# Patient Record
Sex: FEMALE | Race: BLACK OR AFRICAN AMERICAN | Employment: OTHER | URBAN - METROPOLITAN AREA
[De-identification: names, ages, dates, MRNs, and addresses within clinical notes are randomized per-mention and may not be internally consistent; named-entity substitution may affect disease eponyms.]

---

## 2017-02-13 ENCOUNTER — HOSPITAL ENCOUNTER (OUTPATIENT)
Dept: PHYSICAL THERAPY | Age: 67
Discharge: HOME | End: 2017-02-13
Payer: MEDICARE

## 2017-02-13 PROCEDURE — 97161 PT EVAL LOW COMPLEX 20 MIN: CPT

## 2017-02-13 PROCEDURE — G8978 MOBILITY CURRENT STATUS: HCPCS

## 2017-02-13 PROCEDURE — G8979 MOBILITY GOAL STATUS: HCPCS

## 2017-02-13 PROCEDURE — 97110 THERAPEUTIC EXERCISES: CPT

## 2017-02-13 NOTE — PROGRESS NOTES
[facility] [place] [employer] [facility]  [address] [place] [address]  [phone] fax    Plan of Care/ Statement of Necessity for Physical Therapy Services    Patient name: Eddie Lopez Start of Care: 2017   Referral source: Triston Aleixs MD : 1950    Medical Diagnosis: Unsteadiness on feet [R26.81]  Dizziness and giddiness [R42]   Onset Date: 15-20 years ago    Treatment Diagnosis: dizziness, L BPPV   Prior Hospitalization: see medical history Provider#: [id number]   Medications: Verified on Patient summary List    Comorbidities: HTN, arthritis    Prior Level of Function: Ind with ADLs, Ind with ambulation      The Plan of Care and following information is based on the information from the initial evaluation. Assessment/ key information:  Pt. Is a 74year old female c/o dizziness symptoms that began 15-20 years ago. She reports she usually has some dizziness but intensity goes up and down. She has been treated with Epley maneuver in the past with good results. She reports having room spinning dizziness with turning to left and looking up. Her symptoms last less than a minute. She demonstrates good occular mobility negative smooth pursuits, negative saccades, negative VOR testing. She also demonstrates decreased standing balance with tandem stance and with eyes closed balance on compliant surface. Positive Langdon-Hallpike test on L and Epley maneuver was performed. Skilled PT is medically necessary in order to improve dizziness symptoms and balance in order to improve quality of life.      Evaluation Complexity History MEDIUM  Complexity : 1-2 comorbidities / personal factors will impact the outcome/ POC ; Examination MEDIUM Complexity : 3 Standardized tests and measures addressing body structure, function, activity limitation and / or participation in recreation  ;Presentation LOW Complexity : Stable, uncomplicated  ;Clinical Decision Making MEDIUM Complexity : FOTO score of 26-74  Overall Complexity Rating: LOW   Problem List: pain affecting function, impaired gait/ balance, decrease ADL/ functional abilitiies, decrease activity tolerance and decrease transfer abilities   Treatment Plan may include any combination of the following: Therapeutic exercise, Therapeutic activities, Neuromuscular re-education, Physical agent/modality, Gait/balance training, Manual therapy, Patient education, Self Care training and Functional mobility training  Patient / Family readiness to learn indicated by: asking questions  Persons(s) to be included in education: patient (P)  Barriers to Learning/Limitations: None  Patient Goal (s): to have less dizziness  Patient Self Reported Health Status: good  Rehabilitation Potential: good    Short Term Goals: To be accomplished in 1 weeks:  1. Patient will demonstrate compliance with HEP in order to improve dizziness symptoms. Long Term Goals: To be accomplished in 5 weeks:  1. Patient will improve FOTO score by 23 points in order to demonstrate a significant improvement in function. 2. Patient will report a 75% improvement in dizziness symptoms since [facility] in order to improve quality of life. 3. Patient will demonstrate negative Kenny-Hallpike test in order to demonstrate decreased dizziness symptoms. 4. Patient will perform EC Rhomberg on compliant surface with no LOB in order to demonstrate improving balance. Frequency / Duration: Patient to be seen 1 times per week for 5 weeks. Patient/ Caregiver education and instruction: Diagnosis, prognosis, exercises   [x]  Plan of care has been reviewed with PTA    G-Codes (GP)  Mobility   Current  CK= 40-59%   Goal  CJ= 20-39%    The severity rating is based on clinical judgment and the FOTO score.     Certification Period: 2/13/17-4/14/17    Sarabjit Sandoval, PT 2/13/2017 9:29 AM    ________________________________________________________________________    I certify that the above Therapy Services are being furnished while the patient is under my care. I agree with the treatment plan and certify that this therapy is necessary.     Physician's Signature:____________________  Date:____________Time: _________    Please sign and return to [facility] [employer] [facility]  [address] [place] [address]  [phone] fax

## 2017-02-13 NOTE — PROGRESS NOTES
PT DAILY TREATMENT NOTE/VESTIBULAR EVAL 3-16    Patient Name: Stas Blanco  Date:2017  : 1950  [x]  Patient  Verified  Payor: Cristiana Jacob / Plan: [place] MEDICARE PART A & B / Product Type: Medicare /    In time: 8:30  Out time:9:20  Total Treatment Time (min): 50  Total Timed Codes (min): 10  1:1 Treatment Time ( only): 50   Visit #: 1 of 5    Treatment Area: Unsteadiness on feet [R26.81]  Dizziness and giddiness [R42]    SUBJECTIVE  Pain Level (0-10 scale):  6/10.  0/10 dizziness  []constant []intermittent []improving []worsening [x]no change since onset    Any medication changes, allergies to medications, adverse drug reactions, diagnosis change, or new procedure performed?: [x] No    [] Yes (see summary sheet for update)  Subjective functional status/changes:     PLOF: retired. Housework, reading  Mechanism of Injury:  Vertigo has been for 15-20 years. Comes and goes in intensity. Reports has had Epley maneuver before. Denies dizziness medication. Dizziness usually goes away within a minutes. Describes room tilting sensation. Reports she feels a clunk in the back of her head as well. Lack of sleep also brings it on. A lot of people around makes her dizzy as well like at [employer]. Gets nausea from it. Denies headaches. Denies difficulty speaking or swallowing. Dizziness rolling in bed and reaching into cabinets. 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 Living Situation: pt. Lives alone but 25year old grand daughter moved in recently  Pt Goals:   To have less dizziness  Barriers: []pain []financial []time []transportation []other none  Motivation: good  Substance use: []Alcohol []Tobacco []other: no   FABQ Score: [x]low []elevate  Cognition: A & O x 4    Other:    OBJECTIVE/EXAMINATION      10 min Manual Therapy:  Epley maneuver    Rationale: correct positional vertigo to increase ease of ADLs          With   [x] TE   [] TA   [] neuro   [] other: Patient Education: [x] Review HEP    [] Progressed/Changed HEP based on:   [] positioning   [] body mechanics   [] transfers   [] heat/ice application    [] other:      Physical Therapy Evaluation - Vestibular    Posture:  [] WNL      [x] Forward head    [x] Protracted shoulders    [] Retracted shoulders  [] Kyphosis:  [] increased   [] decreased   [] Lordosis:   [] increased   [] decreased  Other:    C/S ROM: [] WFL    [x] Limited    Describe: mild decrease with end range rotation     Optional Tests:    Coordination Testing:       Disdiadochokinesia [] WFL    [] Impaired    Describe:       Heel - [place]  [] WFL    [] Impaired    Describe:       FNF   [] WFL    [] Impaired    Describe: Toe Tap   [x] [facility]    [] Impaired    Describe:    Oculomotor Tests: (Fixation Not Blocked)       Ocular ROM:   [] WFL    [] Limited    Describe:       Spontaneous Nystag. [x] Neg     [] Pos    [] Left    [] Right       Gaze Holding Nystag. [x] Neg     [] Pos    [] Left    [] Right        Smooth Pursuit  [x] Neg     [] Pos    [] Left    [] Right        Saccades   [x] Neg     [] Pos    [] Left    [] Right challenged with side to side       VOR - Slow Head Mvmt [x] Neg     [] Pos    [] Left    [] Right        VOR - Fast Head Mvmt [x] Neg     [] Pos    [] Left    [] Right        Head Thrust  [x] Neg     [] Pos    [] Left    [] Right        Static Visual Acuity [] Neg     [] Pos    [] Left    [] Right        Dynamic Visual Acuity [] Neg     [] Pos    [] Left    [] Right     Good convergence    Other Special Tests:       Vertebral Artery Testing [x] Neg     [] Pos    [x] Left    [x] Right       Hallpike-Kenny Maneuver [] Neg     [x] Pos    [x] Left    [] Right       Roll Test   [x] Neg     [] Pos    [x] Left    [x] Right       Ashraf Balance Scale [] Neg     [] Pos    Score:       Dynamic Gait Index [] Neg     [] Pos    Score:       Functional Gait Assess.  [] Neg [] Pos    Score: time constraints     Balance Standard Testing (Eyes Open/Eyes Closed - EO/EC)       Romberg   [x] WFL    [] Pos    Describe:           Stand on Foam  [] WFL    [x] Pos    Describe: falls posteriorly with EC        Standing on Rail  [] WFL    [] Pos    Describe:        Sharpened Romberg [] WFL    [x] Pos    Describe: Difficulty with EO and unable with EC       Single Leg Stand  [] WFL    [] Pos    Describe:     Pain Level (0-10 scale) post treatment: 6/10    ASSESSMENT/Changes in Function:      [x]  See Plan of Care  []  See progress note/recertification  []  See Discharge Summary         Progress towards goals / Updated goals:  See POC    PLAN  []  Upgrade activities as tolerated     [x]  Continue plan of care  []  Update interventions per flow sheet       []  Discharge due to:_  []  Other:_      Alfonzo Huynh, PT 2/13/2017  8:37 AM

## 2017-02-20 ENCOUNTER — HOSPITAL ENCOUNTER (OUTPATIENT)
Dept: PHYSICAL THERAPY | Age: 67
Discharge: HOME | End: 2017-02-20
Payer: MEDICARE

## 2017-02-20 PROCEDURE — 97110 THERAPEUTIC EXERCISES: CPT

## 2017-02-20 PROCEDURE — 97140 MANUAL THERAPY 1/> REGIONS: CPT

## 2017-02-20 NOTE — PROGRESS NOTES
PT DAILY TREATMENT NOTE - [facility] 3-    Patient Name: Komal Lucas  Date:2017  : 1950  [x]  Patient  Verified  Payor: [place] MEDICARE / Plan: [place] MEDICARE PART A & B / Product Type: Medicare /    In time: 8:00  Out time: 8:25  Total Treatment Time (min):  25  Total Timed Codes (min):  25  1:1 Treatment Time ( only): 25   Visit #: 2 of 5    Treatment Area: Unsteadiness on feet [R26.81]  Dizziness and giddiness [R42]    SUBJECTIVE  Pain Level (0-10 scale): 6/10  Any medication changes, allergies to medications, adverse drug reactions, diagnosis change, or new procedure performed?: [x] No    [] Yes (see summary sheet for update)  Subjective functional status/changes:   [] No changes reported  Pt. Reports she has been feeling dizzy. She reports she has a slow spin or a tilt dizziness. She has increased dizziness bending forward. She reports she has not been doing her HEP. OBJECTIVE    13 min Therapeutic Exercise:  [x] See flow sheet :   Rationale: increase strength, improve coordination and improve balance to improve the patients ability to increase ease of ADLs    10 min Manual Therapy:  Epley maneuver x2   Rationale: correct positional vertigo to increase ease of ADLs          With   [x] TE   [] TA   [] neuro   [] other: Patient Education: [x] Review HEP    [] Progressed/Changed HEP based on:   [] positioning   [] body mechanics   [] transfers   [] heat/ice application    [] other:      Other Objective/Functional Measures:   Positive Kenny-Hallpike to left   Negative Erie-Hallpike after Epley maneuver   Pt. Has good balance with Rhomberg eyes closed  Pt. Was educated on importance of performing HEP    Pain Level (0-10 scale) post treatment: 8/10    ASSESSMENT/Changes in Function:  Pt. Is making limited progress towards goals. She continues to have dizziness symptoms and decreased balance.      Patient will continue to benefit from skilled PT services to modify and progress therapeutic interventions, address functional mobility deficits, address ROM deficits, address strength deficits, analyze and address soft tissue restrictions, analyze and cue movement patterns, analyze and modify body mechanics/ergonomics and address imbalance/dizziness to attain remaining goals. Progress towards goals / Updated goals:  Short Term Goals: To be accomplished in 1 weeks:  1. Patient will demonstrate compliance with HEP in order to improve dizziness symptoms. Not met: pt. Only performed HEP one time (2/20/17)     Long Term Goals: To be accomplished in 5 weeks:  1. Patient will improve FOTO score by 23 points in order to demonstrate a significant improvement in function. 2. Patient will report a 75% improvement in dizziness symptoms since [facility] in order to improve quality of life. 3. Patient will demonstrate negative Kenny-Hallpike test in order to demonstrate decreased dizziness symptoms. 4. Patient will perform EC Rhomberg on compliant surface with no LOB in order to demonstrate improving balance.     PLAN  []  Upgrade activities as tolerated     [x]  Continue plan of care  []  Update interventions per flow sheet       []  Discharge due to:_  []  Other:_      Luz Lugo, PT 2/20/2017  8:01 AM

## 2017-02-27 ENCOUNTER — HOSPITAL ENCOUNTER (OUTPATIENT)
Dept: PHYSICAL THERAPY | Age: 67
Discharge: HOME | End: 2017-02-27
Payer: MEDICARE

## 2017-02-27 PROCEDURE — 97140 MANUAL THERAPY 1/> REGIONS: CPT

## 2017-02-27 PROCEDURE — 97110 THERAPEUTIC EXERCISES: CPT

## 2017-02-27 NOTE — PROGRESS NOTES
PT DAILY TREATMENT NOTE - [facility] 3-    Patient Name: Carlene Benitez  Date:2017  : 1950  [x]  Patient  Verified  Payor: [place] MEDICARE / Plan: [place] MEDICARE PART A & B / Product Type: Medicare /    In time: 8:01  Out time: 8:25  Total Treatment Time (min):  24  Total Timed Codes (min):  24  1:1 Treatment Time (MC only): 24   Visit #: 3 of 5    Treatment Area: Unsteadiness on feet [R26.81]  Dizziness and giddiness [R42]    SUBJECTIVE  Pain Level (0-10 scale):  -6/10  Any medication changes, allergies to medications, adverse drug reactions, diagnosis change, or new procedure performed?: [x] No    [] Yes (see summary sheet for update)  Subjective functional status/changes:   [] No changes reported  Pt. Reports she continues to have dizziness with bending forward and looking up. Reports no significant change after Epley    OBJECTIVE    9 min Therapeutic Exercise:  [x] See flow sheet :   Rationale: increase ROM and increase strength to improve the patients ability to increase ease of ADLs    15 min Manual Therapy:   Epleyx2   Rationale: correct positional vertigo to increase ease of ADLs          With   [x] TE   [] TA   [] neuro   [] other: Patient Education: [x] Review HEP    [] Progressed/Changed HEP based on:   [] positioning   [] body mechanics   [] transfers   [] heat/ice application    [] other:      Other Objective/Functional Measures:   Kenny-Hallpike positive to L 2x with up beating nystagmus. She reported having spinning once getting up and possibly had down beating nystagmus   She was able to perform Rhomberg foam EC without LOB  She was re-educated on importance of HEP    Pain Level (0-10 scale) post treatment: -6/10    ASSESSMENT/Changes in Function:  Pt. Is making no significant progress with PT. She continues to have positive Kenny-Hallpike test. Will need to re-assess if symptoms persist after she is compliant with HEP.  May need to rule out shift into horizontal canal.     Patient will continue to benefit from skilled PT services to modify and progress therapeutic interventions, address functional mobility deficits, address ROM deficits, address strength deficits, analyze and address soft tissue restrictions, analyze and cue movement patterns and analyze and modify body mechanics/ergonomics to attain remaining goals. Progress towards goals / Updated goals:  Short Term Goals: To be accomplished in 1 weeks:  1. Patient will demonstrate compliance with HEP in order to improve dizziness symptoms. Not met: pt. Only performed HEP one time (2/20/17)      Long Term Goals: To be accomplished in 5 weeks:  1. Patient will improve FOTO score by 23 points in order to demonstrate a significant improvement in function. 2. Patient will report a 75% improvement in dizziness symptoms since [facility] in order to improve quality of life. 3. Patient will demonstrate negative Kenny-Hallpike test in order to demonstrate decreased dizziness symptoms. Not met: (2/27/17)  4. Patient will perform EC Rhomberg on compliant surface with no LOB in order to demonstrate improving balance.   Met (2/27/17)    PLAN  []  Upgrade activities as tolerated     [x]  Continue plan of care  []  Update interventions per flow sheet       []  Discharge due to:_  []  Other:_      Alina Tolbert, PT 2/27/2017  8:01 AM

## 2017-03-06 ENCOUNTER — HOSPITAL ENCOUNTER (OUTPATIENT)
Dept: PHYSICAL THERAPY | Age: 67
Discharge: HOME | End: 2017-03-06
Payer: MEDICARE

## 2017-03-06 PROCEDURE — 97140 MANUAL THERAPY 1/> REGIONS: CPT

## 2017-03-06 NOTE — PROGRESS NOTES
PT DAILY TREATMENT NOTE - [facility]     Patient Name: Claudia Larose  Date:3/6/2017  : 1950  [x]  Patient  Verified  Payor: VA MEDICARE / Plan: VA MEDICARE PART A & B / Product Type: Medicare /    In time:800  Out time:808  Total Treatment Time (min): 8  Total Timed Codes (min): 8  1:1 Treatment Time ( only): 8   Visit #: 4 of 5    Treatment Area: Unsteadiness on feet [R26.81]  Dizziness and giddiness [R42]    SUBJECTIVE  Pain Level (0-10 scale): 5-6/10  Any medication changes, allergies to medications, adverse drug reactions, diagnosis change, or new procedure performed?: [x] No    [] Yes (see summary sheet for update)  Subjective functional status/changes:   [] No changes reported  Pt stated that the dizziness is about the same    OBJECTIVE    8 min Manual Therapy:  eugene quinn pike, epley maneuver    Rationale: decrease pain, increase ROM and increase tissue extensibility to decrease dizziness    With   [x] TE   [] TA   [] neuro   [] other: Patient Education: [x] Review HEP    [] Progressed/Changed HEP based on:   [] positioning   [] body mechanics   [] transfers   [] heat/ice application    [] other:      Other Objective/Functional Measures:   Pt had neg eugene quinn pike   Pt stated that she was great and did not want to do the exercises  Pt stated that if the dizziness stays gone she will cancel her next appointment     Pain Level (0-10 scale) post treatment: 0/10    ASSESSMENT/Changes in Function:     Patient will continue to benefit from skilled PT services to address imbalance/dizziness to attain remaining goals. [x]  See Plan of Care  []  See progress note/recertification  []  See Discharge Summary         Progress towards goals / Updated goals:  Short Term Goals: To be accomplished in 1 weeks:  1. Patient will demonstrate compliance with HEP in order to improve dizziness symptoms. Not met: pt. Only performed HEP one time (17)      Long Term Goals: To be accomplished in 5 weeks:  1.  Patient will improve FOTO score by 23 points in order to demonstrate a significant improvement in function. 2. Patient will report a 75% improvement in dizziness symptoms since [facility] in order to improve quality of life. 3. Patient will demonstrate negative Kenny-Hallpike test in order to demonstrate decreased dizziness symptoms. Not met: (2/27/17)  4. Patient will perform EC Rhomberg on compliant surface with no LOB in order to demonstrate improving balance.   Met (2/27/17)    PLAN  []  Upgrade activities as tolerated     [x]  Continue plan of care  []  Update interventions per flow sheet       []  Discharge due to:_  []  Other:_      Michelle Becker PTA 3/6/2017  12:26 PM    Future Appointments  Date Time Provider Pricila Manuel   3/10/2017 9:00 AM Montserrat Goldman  [address]   3/13/2017 8:00 AM Kenney Emerson PT [id number]   3/20/2017 8:00 AM BONNIE TraceyPTOLIVER [facility]

## 2017-03-10 ENCOUNTER — OFFICE VISIT (OUTPATIENT)
Dept: ORTHOPEDIC SURGERY | Age: 67
End: 2017-03-10

## 2017-03-10 VITALS
DIASTOLIC BLOOD PRESSURE: 77 MMHG | HEART RATE: 91 BPM | OXYGEN SATURATION: 98 % | SYSTOLIC BLOOD PRESSURE: 154 MMHG | RESPIRATION RATE: 18 BRPM | TEMPERATURE: 98.6 F | WEIGHT: 172.6 LBS | BODY MASS INDEX: 34.8 KG/M2 | HEIGHT: 59 IN

## 2017-03-10 DIAGNOSIS — M54.2 NECK PAIN: ICD-10-CM

## 2017-03-10 DIAGNOSIS — M54.50 PAIN OF LUMBAR SPINE: ICD-10-CM

## 2017-03-10 DIAGNOSIS — M16.12 ARTHRITIS OF LEFT HIP: ICD-10-CM

## 2017-03-10 DIAGNOSIS — M48.061 LUMBAR STENOSIS: Primary | ICD-10-CM

## 2017-03-10 DIAGNOSIS — M54.16 LUMBAR RADICULOPATHY: ICD-10-CM

## 2017-03-10 DIAGNOSIS — M47.899 FACET SYNDROME: ICD-10-CM

## 2017-03-10 DIAGNOSIS — M06.9 RHEUMATOID ARTHRITIS, INVOLVING UNSPECIFIED SITE, UNSPECIFIED RHEUMATOID FACTOR PRESENCE: ICD-10-CM

## 2017-03-10 RX ORDER — PREDNISONE 10 MG/1
10 TABLET ORAL SEE ADMIN INSTRUCTIONS
Qty: 21 TAB | Refills: 0 | Status: SHIPPED | OUTPATIENT
Start: 2017-03-10 | End: 2017-04-11 | Stop reason: CLARIF

## 2017-03-10 RX ORDER — TRIAMTERENE/HYDROCHLOROTHIAZID 37.5-25 MG
TABLET ORAL
Refills: 0 | COMMUNITY
Start: 2017-03-03 | End: 2020-08-14

## 2017-03-10 NOTE — MR AVS SNAPSHOT
Visit Information Date & Time Provider Department Dept. Phone Encounter #  
 3/10/2017  9:00 AM Brian Cunningham MD [facility] [phone] [id number] Follow-up Instructions Return in about 2 weeks (around 3/24/2017), or if symptoms worsen or fail to improve. Upcoming Health Maintenance Date Due DTaP/Tdap/Td series (1 - Tdap) 5/3/1969 FOBT Q 1 YEAR AGE 50-75 5/3/1998 ZOSTER VACCINE AGE 60> 5/3/2008 GLAUCOMA SCREENING Q2Y 5/3/2013 Pneumococcal 65+ Low/Medium Risk (1 of 2 - PCV13) 5/3/2013 MEDICARE YEARLY EXAM 5/3/2013 INFLUENZA AGE 9 TO ADULT 8/1/2016 BREAST CANCER SCRN MAMMOGRAM 11/1/2018 Allergies as of 3/10/2017  Review Complete On: 3/10/2017 By: Kavon Torres No Known Allergies Current Immunizations  Never Reviewed No immunizations on file. Not reviewed this visit You Were Diagnosed With   
  
 Codes Comments Lumbar stenosis    -  Primary ICD-10-CM: M48.06 
ICD-9-CM: 724.02 Lumbar radiculopathy     ICD-10-CM: M54.16 
ICD-9-CM: 724.4 Facet syndrome     ICD-10-CM: M12.88 ICD-9-CM: 724.8 Arthritis of left hip     ICD-10-CM: M19.90 ICD-9-CM: 716.95 Pain of lumbar spine     ICD-10-CM: M54.5 ICD-9-CM: 724.2 Neck pain     ICD-10-CM: M54.2 ICD-9-CM: 723.1 Rheumatoid arthritis, involving unspecified site, unspecified rheumatoid factor presence ([facility]     ICD-10-CM: M06.9 ICD-9-CM: 714.0 Vitals BP Pulse Temp Resp Height(growth percentile) Weight(growth percentile) 154/77 91 98.6 °F (37 °C) (Oral) 18 4' 11\" (1.499 m) 172 lb 9.6 oz (78.3 kg) SpO2 BMI OB Status Smoking Status 98% 34.86 kg/m2 Hysterectomy Never Smoker BMI and BSA Data Body Mass Index Body Surface Area 34.86 kg/m 2 1.81 m 2 Preferred Pharmacy Pharmacy Name Phone [id number] [place], [address] [facility] [phone] Your Updated Medication List  
  
   
This list is accurate as of: 3/10/17 10:23 AM.  Always use your most recent med list.  
  
  
  
  
 allopurinol 300 mg tablet Commonly known as:  Lydia Reed Take  by mouth daily. folic acid 1 mg tablet Commonly known as:  Google Take  by mouth daily. metoprolol tartrate 50 mg tablet Commonly known as:  LOPRESSOR Take  by mouth daily. ondansetron 8 mg disintegrating tablet Commonly known as:  ZOFRAN ODT Take 1 Tab by mouth every eight (8) hours as needed for Nausea for up to 12 doses. predniSONE 10 mg dose pack Commonly known as:  STERAPRED DS Take 1 Tab by mouth See Admin Instructions. See administration instruction per 10mg dose pack PROTONIX 40 mg tablet Generic drug:  pantoprazole Take 40 mg by mouth daily. triamterene-hydroCHLOROthiazide 37.5-25 mg per tablet Commonly known as:  MAXZIDE  
  
 VITAMIN B-12 1,000 mcg tablet Generic drug:  cyanocobalamin Take 1,000 mcg by mouth daily. VITAMIN D3 1,000 unit Cap Generic drug:  cholecalciferol Take  by mouth. Prescriptions Sent to Pharmacy Refills  
 predniSONE (STERAPRED DS) 10 mg dose pack 0 Sig: Take 1 Tab by mouth See Admin Instructions. See administration instruction per 10mg dose pack Class: Normal  
 [address] #: [phone] Route: Oral  
  
We Performed the Following AMB POC XRAY, SPINE, CERVICAL; 2 OR 3 [13935 CPT(R)] AMB POC XRAY, SPINE, LUMBOSACRAL; 2 O [17181 CPT(R)] REFERRAL TO RHEUMATOLOGY [VII52 Custom] Comments:  
 Rheumatoid arthritis Follow-up Instructions Return in about 2 weeks (around 3/24/2017), or if symptoms worsen or fail to improve. To-Do List   
 03/13/2017 8:00 AM  
  Appointment with Silverio Hidalgo, PT at [facility] PT [address] [facility] ([phone])  
  
 03/17/2017 Imaging:  MRI LUMB SPINE WO CONT   
  
 2017 8:00 AM  
  Appointment with Veronika Bustillo PT at [facility] PT [address]  ([phone]) Referral Information Referral ID Referred By Referred To  
  
 [id number] Roshan Memos Not Available Visits Status Start Date End Date 1 New Request 3/10/17 3/10/18 If your referral has a status of pending review or denied, additional information will be sent to support the outcome of this decision. Referral ID Referred By Referred To  
 [id number] [facility] [place], [address] [facility] [place], [address] Jordine Dedrick [facility] [place] [address] [place], [address] Phone: [phone] Fax: [phone] Visits Status Start Date End Date 1 New Request 3/10/17 3/10/18 If your referral has a status of pending review or denied, additional information will be sent to support the outcome of this decision. Patient Instructions [employer] Activation Thank you for requesting access to [employer]. Please follow the instructions below to securely access and download your online medical record. [employer] allows you to send messages to your doctor, view your test results, renew your prescriptions, schedule appointments, and more. How Do I Sign Up? 1. In your internet browser, go to [email or web address] 
2. Click on the First Time User? Click Here link in the Sign In box. You will be redirect to the New Member Sign Up page. 3. Enter your [employer] Access Code exactly as it appears below. You will not need to use this code after youve completed the sign-up process. If you do not sign up before the expiration date, you must request a new code. [employer] Access Code: [email or web address] Expires: 3/29/2017  9:34 AM (This is the date your [employer] access code will ) 4. Enter the last four digits of your Social Security Number (xxxx) and Date of Birth (mm/dd/yyyy) as indicated and click Submit. You will be taken to the next sign-up page. 5. Create a Vdopiat ID. This will be your "[employer] login ID and cannot be changed, so think of one that is secure and easy to remember. 6. Create a "[employer] password. You can change your password at any time. 7. Enter your Password Reset Question and Answer. This can be used at a later time if you forget your password. 8. Enter your e-mail address. You will receive e-mail notification when new information is available in [address] 9. Click Sign Up. You can now view and download portions of your medical record. 10. Click the Download Summary menu link to download a portable copy of your medical information. Additional Information If you have questions, please visit the Frequently Asked Questions section of the "[employer] website at [email or web address]. Clear Blue Technologies/Innerscope Research/. Remember, "[employer] is NOT to be used for urgent needs. For medical emergencies, dial 911. MRI of the Lumbar Spine: About This Test 
What is it? MRI (magnetic resonance imaging) is a test that uses a magnetic field and pulses of radio wave energy to make pictures of the organs and structures inside the body. An MRI can give your doctor information about the spine in your lower back (the lumbar spine). This can include the spine, the space around the spinal cord, and the vertebrae in your lower back. When you have an MRI, you lie on a table that moves into the MRI machine. Why is this test done? An MRI of the lumbar spine can help find the cause of symptoms like back pain or leg pain, weakness, or numbness. It can help find problems such as a herniated disc, a tumor, or an infection. How can you prepare for the test? 
Talk to your doctor about all your health conditions before the test. For example, tell your doctor if: 
· You are allergic to any medicines. · You are or might be pregnant.  
· You have a pacemaker, an artificial limb, any metal pins or metal parts in your body, metal heart valves, metal clips in your brain, metal implants in your ears, or any other implanted or prosthetic medical device. · You have an intrauterine device (IUD) in place. · You get nervous in confined spaces. You may need medicine to help you relax. · You wear a patch that contains medicine. · You have kidney disease. What happens before the test? 
· You will remove all metal objects, such as hearing aids, dentures, jewelry, watches, and hairpins. · You will take off all or most of your clothes and change into a gown. · You may have contrast material (dye) put into your arm through a tube called an IV. Contrast material helps doctors see specific organs, blood vessels, and most tumors. What happens during the test? 
· You will lie on your back on a table that is part of the MRI scanner. Your head, chest, and arms may be held with straps to help you stay still. · The table will slide into the space that contains the magnet. · Inside the scanner you will hear a fan and feel air moving. You may hear tapping, thumping, or snapping noises. You may be given earplugs or headphones to reduce the noise. · You will be asked to hold still during the scan. You may be asked to hold your breath for short periods. · You may be alone in the scanning room, but a technologist will watch you through a window and talk with you during the test. 
What else should you know about the test? 
· An MRI does not hurt. · If a dye is used, you may feel a quick sting or pinch and some coolness when the IV is started. Some people feel sick to their stomach or get a headache. · If you breastfeed and are concerned about whether the dye used in this test is safe, talk to your doctor. Most experts believe that very little dye passes into breast milk and even less is passed on to the baby. But if you prefer, you can store some of your breast milk ahead of time and use it for a day or two after the test. 
· You may feel warmth in the area being examined.  This is normal. 
 How long does the test take? · The test usually takes 30 to 60 minutes. What happens after the test? 
· You will probably be able to go home right away, depending on the reason for the test. 
· You can go back to your usual activities right away. Follow-up care is a key part of your treatment and safety. Be sure to make and go to all appointments, and call your doctor if you are having problems. It's also a good idea to keep a list of the medicines you take. Ask your doctor when you can expect to have your test results. Where can you learn more? Go to http://jamison-ilya.info/. Enter O800 in the search box to learn more about \"MRI of the Lumbar Spine: About This Test.\" Current as of: February 19, 2016 Content Version: 11.1 © 9213-7770 Meez[employer] Incorporated. Care instructions adapted under license by [employer] (which disclaims liability or warranty for this information). If you have questions about a medical condition or this instruction, always ask your healthcare professional. [address] any warranty or liability for your use of this information. Introducing [facility]! Romayne Duster introduces [employer] patient portal. Now you can access parts of your medical record, email your doctor's office, and request medication refills online. 1. In your internet browser, go to [email or web address] 2. Click on the First Time User? Click Here link in the Sign In box. You will see the New Member Sign Up page. 3. Enter your [employer] Access Code exactly as it appears below. You will not need to use this code after youve completed the sign-up process. If you do not sign up before the expiration date, you must request a new code. · [employer] Access Code: [email or web address] Expires: 3/29/2017  9:34 AM 
 
4.  Enter the last four digits of your Social Security Number (xxxx) and Date of Birth (mm/dd/yyyy) as indicated and click Submit. You will be taken to the next sign-up page. 5. Create a [employer]ent[employer] ID. This will be your [employer] login ID and cannot be changed, so think of one that is secure and easy to remember. 6. Create a [employer] password. You can change your password at any time. 7. Enter your Password Reset Question and Answer. This can be used at a later time if you forget your password. 8. Enter your e-mail address. You will receive e-mail notification when new information is available in [address] 9. Click Sign Up. You can now view and download portions of your medical record. 10. Click the Download Summary menu link to download a portable copy of your medical information. If you have questions, please visit the Frequently Asked Questions section of the [employer] website. Remember, [employer] is NOT to be used for urgent needs. For medical emergencies, dial 911. Now available from your iPhone and Android! Please provide this summary of care documentation to your next provider. Your primary care clinician is listed as Jacinta Galvez. If you have any questions after today's visit, please call [phone].

## 2017-03-10 NOTE — PATIENT INSTRUCTIONS
[employer] Activation    Thank you for requesting access to [employer]. Please follow the instructions below to securely access and download your online medical record. [employer] allows you to send messages to your doctor, view your test results, renew your prescriptions, schedule appointments, and more. How Do I Sign Up? 1. In your internet browser, go to [email or web address]  2. Click on the First Time User? Click Here link in the Sign In box. You will be redirect to the New Member Sign Up page. 3. Enter your [employer] Access Code exactly as it appears below. You will not need to use this code after youve completed the sign-up process. If you do not sign up before the expiration date, you must request a new code. [employer] Access Code: [email or web address]  Expires: 3/29/2017  9:34 AM (This is the date your [employer] access code will )    4. Enter the last four digits of your Social Security Number (xxxx) and Date of Birth (mm/dd/yyyy) as indicated and click Submit. You will be taken to the next sign-up page. 5. Create a [employer] ID. This will be your [employer] login ID and cannot be changed, so think of one that is secure and easy to remember. 6. Create a [employer] password. You can change your password at any time. 7. Enter your Password Reset Question and Answer. This can be used at a later time if you forget your password. 8. Enter your e-mail address. You will receive e-mail notification when new information is available in [address] 9. Click Sign Up. You can now view and download portions of your medical record. 10. Click the Download Summary menu link to download a portable copy of your medical information. Additional Information    If you have questions, please visit the Frequently Asked Questions section of the [employer] website at [email or web address]. [email or web address] [email or web address] Remember, [employer] is NOT to be used for urgent needs. For medical emergencies, dial 911.          MRI of the Lumbar Spine: About This Test  What is it? MRI (magnetic resonance imaging) is a test that uses a magnetic field and pulses of radio wave energy to make pictures of the organs and structures inside the body. An MRI can give your doctor information about the spine in your lower back (the lumbar spine). This can include the spine, the space around the spinal cord, and the vertebrae in your lower back. When you have an MRI, you lie on a table that moves into the MRI machine. Why is this test done? An MRI of the lumbar spine can help find the cause of symptoms like back pain or leg pain, weakness, or numbness. It can help find problems such as a herniated disc, a tumor, or an infection. How can you prepare for the test?  Talk to your doctor about all your health conditions before the test. For example, tell your doctor if:  · You are allergic to any medicines. · You are or might be pregnant. · You have a pacemaker, an artificial limb, any metal pins or metal parts in your body, metal heart valves, metal clips in your brain, metal implants in your ears, or any other implanted or prosthetic medical device. · You have an intrauterine device (IUD) in place. · You get nervous in confined spaces. You may need medicine to help you relax. · You wear a patch that contains medicine. · You have kidney disease. What happens before the test?  · You will remove all metal objects, such as hearing aids, dentures, jewelry, watches, and hairpins. · You will take off all or most of your clothes and change into a gown. · You may have contrast material (dye) put into your arm through a tube called an IV. Contrast material helps doctors see specific organs, blood vessels, and most tumors. What happens during the test?  · You will lie on your back on a table that is part of the MRI scanner. Your head, chest, and arms may be held with straps to help you stay still. · The table will slide into the space that contains the magnet.   · Inside the scanner you will hear a fan and feel air moving. You may hear tapping, thumping, or snapping noises. You may be given earplugs or headphones to reduce the noise. · You will be asked to hold still during the scan. You may be asked to hold your breath for short periods. · You may be alone in the scanning room, but a technologist will watch you through a window and talk with you during the test.  What else should you know about the test?  · An MRI does not hurt. · If a dye is used, you may feel a quick sting or pinch and some coolness when the IV is started. Some people feel sick to their stomach or get a headache. · If you breastfeed and are concerned about whether the dye used in this test is safe, talk to your doctor. Most experts believe that very little dye passes into breast milk and even less is passed on to the baby. But if you prefer, you can store some of your breast milk ahead of time and use it for a day or two after the test.  · You may feel warmth in the area being examined. This is normal.  How long does the test take? · The test usually takes 30 to 60 minutes. What happens after the test?  · You will probably be able to go home right away, depending on the reason for the test.  · You can go back to your usual activities right away. Follow-up care is a key part of your treatment and safety. Be sure to make and go to all appointments, and call your doctor if you are having problems. It's also a good idea to keep a list of the medicines you take. Ask your doctor when you can expect to have your test results. Where can you learn more? Go to http://jamison-ilya.info/. Enter P945 in the search box to learn more about \"MRI of the Lumbar Spine: About This Test.\"  Current as of: February 19, 2016  Content Version: 11.1  © 4902-8889 [employer]ank. Care instructions adapted under license by [employer] (which disclaims liability or warranty for this information). If you have questions about a medical condition or this instruction, always ask your healthcare professional. [place] [address] any warranty or liability for your use of this information.

## 2017-03-10 NOTE — PROGRESS NOTES
[address]  [address], [address]  [place]  Phone: [phone]  Fax: [phone]        Kevin Brandt  : 1950  PCP: Natalia Jones DO  3/10/2017    NEW PATIENT      ASSESSMENT AND PLAN     Luca Limon comes in to the office today c/o 10/10 aching cervical and lumbar pain with regional pain in the anterior and posterior portions of her left leg x 10 years. Pt also notes that her leg frequently cramps. Her xray today shows degenerative lumbar scoliosis. Her lumbar pain is likely due to facet syndrome. Her left leg pain appears to be due to a combination of left hip arthritis and lumbar stenosis/radiculopathy. She was referred for a lumbar MRI. Pt was prescribed a Prednisone 10 mg dose pack. She was advised to see a rheumatologist for her RA. Pt will f/u in 2 weeks or prn. Freddy Iyer was seen today for back pain, new patient and neck pain. Diagnoses and all orders for this visit:    Lumbar stenosis  -     MRI LUMB SPINE WO CONT; Future  -     predniSONE (STERAPRED DS) 10 mg dose pack; Take 1 Tab by mouth See Admin Instructions. See administration instruction per 10mg dose pack    Lumbar radiculopathy  -     MRI LUMB SPINE WO CONT; Future  -     predniSONE (STERAPRED DS) 10 mg dose pack; Take 1 Tab by mouth See Admin Instructions. See administration instruction per 10mg dose pack    Facet syndrome  -     MRI LUMB SPINE WO CONT; Future  -     predniSONE (STERAPRED DS) 10 mg dose pack; Take 1 Tab by mouth See Admin Instructions. See administration instruction per 10mg dose pack    Arthritis of left hip  -     predniSONE (STERAPRED DS) 10 mg dose pack; Take 1 Tab by mouth See Admin Instructions. See administration instruction per 10mg dose pack    Pain of lumbar spine  -     [80395] L/S 2-3 views  -     MRI LUMB SPINE WO CONT; Future  -     predniSONE (STERAPRED DS) 10 mg dose pack; Take 1 Tab by mouth See Admin Instructions.  See administration instruction per 10mg dose pack    Neck pain  -     [49024] C Spine 2-3V  -     predniSONE (STERAPRED DS) 10 mg dose pack; Take 1 Tab by mouth See Admin Instructions. See administration instruction per 10mg dose pack    Rheumatoid arthritis, involving unspecified site, unspecified rheumatoid factor presence ([facility] [address]  -     REFERRAL TO RHEUMATOLOGY         Follow-up Disposition:  Return in about 2 weeks (around 3/24/2017), or if symptoms worsen or fail to improve. CHIEF COMPLAINT  Luis M Craven is seen today in consultation at the request of [facility] for complaints of lumbar pain. HISTORY OF PRESENT ILLNESS  Luis M Craven is a 74 y.o. female c/o 10/10 aching cervical and lumbar pain with regional pain in the anterior and posterior portions of her left leg x 10 years. Pt also notes that her leg frequently cramps. She states she has tried PT and spinal injections without relief. She has taken Tramadol and Tylenol for her pain. Sitting, standing, lying down, walking, changing positions, and lifting exacerbate her pain. She has RA. Pt denies any fevers, chills, nausea, vomiting. Pt denies any chest pain and shortness of breath. Pt denies any ear, nose, and throat problems. Pt denies any fecal or urinary incontinence. She works a seasonal tax preparation job.       PAST MEDICAL HISTORY   Past Medical History:   Diagnosis Date    Arthritis     GERD (gastroesophageal reflux disease)     Hypertension     Ill-defined condition     Positional Vertigo -  pt can not lie flat       Past Surgical History:   Procedure Laterality Date    HX BREAST BIOPSY      6-8 yrs ago, scar marked    HX CARPAL TUNNEL RELEASE Right     mid to late [de-identified]    HX CARPAL TUNNEL RELEASE Left     mid     HX  SECTION      HX CHOLECYSTECTOMY      HX GYN      Vaginal Hernia as a child     HX HYSTERECTOMY      partial        MEDICATIONS      Current Outpatient Prescriptions   Medication Sig Dispense Refill    triamterene-hydroCHLOROthiazide (MAXZIDE) 37.5-25 mg per tablet   0    metoprolol (LOPRESSOR) 50 mg tablet Take  by mouth daily.  allopurinol (ZYLOPRIM) 300 mg tablet Take  by mouth daily.  pantoprazole (PROTONIX) 40 mg tablet Take 40 mg by mouth daily.  folic acid (FOLVITE) 1 mg tablet Take  by mouth daily.  ondansetron (ZOFRAN ODT) 8 mg disintegrating tablet Take 1 Tab by mouth every eight (8) hours as needed for Nausea for up to 12 doses. 12 Tab 0    Cholecalciferol, Vitamin D3, (VITAMIN D3) 1,000 unit cap Take  by mouth.  cyanocobalamin (VITAMIN B-12) 1,000 mcg tablet Take 1,000 mcg by mouth daily. ALLERGIES  No Known Allergies       SOCIAL HISTORY    Social History     Social History    Marital status:      Spouse name: N/A    Number of children: N/A    Years of education: N/A     Social History Main Topics    Smoking status: Never Smoker    Smokeless tobacco: Not on file    Alcohol use Yes    Drug use: Not on file    Sexual activity: Not on file     Other Topics Concern    Not on file     Social History Narrative       FAMILY HISTORY  No family history on file. REVIEW OF SYSTEMS  Review of Systems   Constitutional: Negative for chills, diaphoresis, fever, malaise/fatigue and weight loss. Respiratory: Negative for shortness of breath. Cardiovascular: Negative for chest pain and leg swelling. Gastrointestinal: Negative for constipation, nausea and vomiting. Neurological: Negative for dizziness, tingling, seizures, loss of consciousness and headaches. Psychiatric/Behavioral: The patient does not have insomnia.           PHYSICAL EXAMINATION  Visit Vitals    /77    Pulse 91    Temp 98.6 °F (37 °C) (Oral)    Resp 18    Ht 4' 11\" (1.499 m)    Wt 172 lb 9.6 oz (78.3 kg)    SpO2 98%    BMI 34.86 kg/m2         Pain Assessment  3/10/2017   Location of Pain Back;Neck   Severity of Pain 7   Quality of Pain Aching Constitutional:  Well developed, well nourished, in no acute distress. Psychiatric: Affect and mood are appropriate. HEENT: Normocephalic, atraumatic. Extraocular movements intact. Integumentary: No rashes or abrasions noted on exposed areas. Cardiovascular: Regular rate and rhythm. Pulmonary: Clear to auscultation bilaterally. SPINE/MUSCULOSKELETAL EXAM    Cervical spine:  Neck is midline. Normal muscle tone. No focal atrophy is noted. ROM pain free. Shoulder ROM intact. Mild tenderness to palpation. Negative Spurling's sign. Negative Tinel's sign. Negative Leonard's sign. Sensation in the bilateral arms grossly intact to light touch. Lumbar spine:  No rash, ecchymosis, or gross obliquity. No fasciculations. No focal atrophy is noted. Pain with left hip internal rotation. Full range of motion. Tenderness to palpation in left QL. No tenderness to palpation at the sciatic notch. SI joints non-tender. Trochanters non tender. No pain with back flexion, relief with coming up. Pain with back extension L=M=R. Sensation in the bilateral legs grossly intact to light touch. MOTOR:      Biceps  Triceps Deltoids Wrist Ext Wrist Flex Hand Intrin   Right 5/5 5/5 5/5 5/5 5/5 5/5   Left 5/5 5/5 5/5 5/5 5/5 5/5             Hip Flex  Quads Hamstrings Ankle DF EHL Ankle PF   Right 5/5 5/5 5/5 5/5 5/5 5/5   Left 5/5 5/5 5/5 5/5 5/5 5/5     DTRs are 1+ biceps, triceps, brachioradialis, patella, and Achilles. Mildly positive left Straight Leg raise. Squat not tested. No difficulty with tandem gait. Ambulation without assistive device. FWB.       RADIOGRAPHS  Cervical XR images taken on 3/10/2017 personally reviewed with patient:  1) Osteophytes  2) No obvious fractures or instabilities    Lumbar XR images taken on 3/10/2017 personally reviewed with patient:  1) Degenerative sclerosis with bend to the right side centered at L2-3  2) No obvious fractures     reviewed    Ms. Erinn Cruz has a reminder for a \"due or due soon\" health maintenance. I have asked that she contact her primary care provider for follow-up on this health maintenance. This plan was reviewed with the patient and patient agrees. All questions were answered. More than half of this visit today was spent on counseling. Written by Kaci Leonard, as dictated by Dr. Charan Denis. I, Dr. Charan Denis, confirm that all documentation is accurate.

## 2017-03-13 ENCOUNTER — TELEPHONE (OUTPATIENT)
Dept: ORTHOPEDIC SURGERY | Age: 67
End: 2017-03-13

## 2017-03-13 ENCOUNTER — HOSPITAL ENCOUNTER (OUTPATIENT)
Dept: PHYSICAL THERAPY | Age: 67
Discharge: HOME | End: 2017-03-13
Payer: MEDICARE

## 2017-03-13 PROCEDURE — 97140 MANUAL THERAPY 1/> REGIONS: CPT

## 2017-03-13 PROCEDURE — 97110 THERAPEUTIC EXERCISES: CPT

## 2017-03-13 NOTE — PROGRESS NOTES
PT DAILY TREATMENT NOTE - [facility] 3-16    Patient Name: Bhanu Robertson  Date:3/13/2017  : 1950  [x]  Patient  Verified  [place]or[place] MED[place]ARE / Plan: [place] MEDICARE PART A & B / Product Type: Medicare /    In time: 8:03  Out time: 8:33  Total Treatment Time (min):  30  Total Timed Codes (min):  30  1:1 Treatment Time ( only): 30   Visit #: 5 of 5    Treatment Area: Unsteadiness on feet [R26.81]  Dizziness and giddiness [R42]    SUBJECTIVE  Pain Level (0-10 scale):  10  Any medication changes, allergies to medications, adverse drug reactions, diagnosis change, or new procedure performed?: [x] No    [] Yes (see summary sheet for update)  Subjective functional status/changes:   [] No changes reported  Pt. Reports she feels like a woozy dizziness today. Denies spinning dizziness. OBJECTIVE    10 min Therapeutic Exercise:  [x] See flow sheet : HEP   Rationale: increase ROM and increase strength to improve the patients ability to increase ease of ADLs    20 min: manual: Epley maneuver  Rationale: improve dizziness for increased ease of ADLs    With   [x] TE   [] TA   [] neuro   [] other: Patient Education: [x] Review HEP    [] Progressed/Changed HEP based on:   [] positioning   [] body mechanics   [] transfers   [] heat/ice application    [] other:      Other Objective/Functional Measures:   Positive Kenny-Hallpike test to L  Negative roll test  Pt.  Was educated on home Epley manuever     Pain Level (0-10 scale) post treatment: 6/10    ASSESSMENT/Changes in Function:      []  See Plan of Care  [x]  See progress note/recertification  []  See Discharge Summary         Progress towards goals / Updated goals:  See progress note    PLAN  []  Upgrade activities as tolerated     [x]  Continue plan of care  []  Update interventions per flow sheet       []  Discharge due to:_  []  Other:_      Sarabjit Sandoval, PT 3/13/2017  8:04 AM

## 2017-03-13 NOTE — PROGRESS NOTES
[facility]  [address] [place] [address]  [phone] fax    Continued Plan of Care/ Re-certification for Physical Therapy Services    Patient name: Ammon Art Start of Care:  17   Referral source: Jess Avina DO : 1950   Medical/Treatment Diagnosis: Unsteadiness on feet [R26.81]  Dizziness and giddiness [R42] Onset Date: 15-20 years ago     Prior Hospitalization: see medical history Provider#: [id number]   Medications: Verified on Patient Summary List    Comorbidities:  HTN, arthritis  Prior Level of Function: Ind with ADLs, Ind with ambulation  Visits from Start of Care: 5    Missed Visits: 0    The Plan of Care and following information is based on the patient's current status:  Goal: Patient will improve FOTO score by 23 points in order to demonstrate a significant improvement in function. Status at last note/certification: 46  Current Status: not met    Goal: Patient will report a 75% improvement in dizziness symptoms since [facility] in order to improve quality of life. Status at last note/certification: n/a  Current Status: not met    Goal: Patient will demonstrate negative Kenny-Hallpike test in order to demonstrate decreased dizziness symptoms. Status at last note/certification: positive on L   Current Status: not met    Goal: Patient will perform EC [place] on compliant surface with no LOB in order to demonstrate improving balance. Status at last note/certification: unable   Current Status: met    Key functional changes: pt. Demonstrated improving standing balance on compliant surface.        Problems/ barriers to goal attainment: none     Problem List: decrease strength, impaired gait/ balance, decrease ADL/ functional abilitiies, decrease activity tolerance, decrease flexibility/ joint mobility and decrease transfer abilities    Treatment Plan: Therapeutic exercise, Therapeutic activities, Neuromuscular re-education, Physical agent/modality, Gait/balance training, Manual therapy and Patient education     Patient Goal (s) has been updated and includes: to have less dizziness    Goals for this certification period to be accomplished in 1 weeks:  1. Patient will improve FOTO score by 23 points in order to demonstrate a significant improvement in function. 2. Patient will report a 75% improvement in dizziness symptoms since [facility] in order to improve quality of life. 3. Patient will demonstrate negative Kenny-Hallpike test in order to demonstrate decreased dizziness symptoms. Frequency / Duration: Patient to be seen 1-2 times per week for 4 weeks:    Assessment / Recommendations: pt. Is making limited progress towards goals. On her appointment on 3/6/17 she had a negative Kenny-Hallpike test and reported feeling better. On her appointment on 3/13/17 her symptoms had returned and she had positive Kenny-Hallpike test on left. Negative roll test bilaterally. Negative [place]-Hallpike to R. She was initially non-compliant with HEP but reports trying to do it more recently. She was educated on home Epley maneuver since symptoms have returned. She does demonstrate increased ease of eyes closed balance on compliant surface. Skilled PT is medically necessary in order to improve dizziness symptoms and balance in order to improve quality of life. G-Codes (GP)  Mobility  [id number] Current  CK= 40-59%  [id number] Goal  CJ= 20-39%    The severity rating is based on clinical judgment and the FOTO score. Certification Period:  3/13/17-5/12/17    Kenney Emerson, PT 3/13/2017 8:39 AM    ________________________________________________________________________  I certify that the above Therapy Services are being furnished while the patient is under my care. I agree with the treatment plan and certify that this therapy is necessary. [] I have read the above and request that my patient continue as recommended.   [] I have read the above report and request that my patient continue therapy with the following changes/special instructions: _______________________________________  [] I have read the above report and request that my patient be discharged from therapy    Physician's Signature:________________________________Date:___________Time:__________    Please sign and return to [facility]  [address] [place] [address]  [phone] fax

## 2017-03-18 ENCOUNTER — HOSPITAL ENCOUNTER (OUTPATIENT)
Dept: MRI IMAGING | Age: 67
Discharge: HOME | End: 2017-03-18
Attending: PHYSICAL MEDICINE & REHABILITATION
Payer: MEDICARE

## 2017-03-18 DIAGNOSIS — M47.899 FACET SYNDROME: ICD-10-CM

## 2017-03-18 DIAGNOSIS — M48.061 LUMBAR STENOSIS: ICD-10-CM

## 2017-03-18 DIAGNOSIS — M54.16 LUMBAR RADICULOPATHY: ICD-10-CM

## 2017-03-18 DIAGNOSIS — M54.50 PAIN OF LUMBAR SPINE: ICD-10-CM

## 2017-03-18 PROCEDURE — 72148 MRI LUMBAR SPINE W/O DYE: CPT

## 2017-03-20 ENCOUNTER — HOSPITAL ENCOUNTER (OUTPATIENT)
Dept: PHYSICAL THERAPY | Age: 67
Discharge: HOME | End: 2017-03-20
Payer: MEDICARE

## 2017-03-20 PROCEDURE — 97110 THERAPEUTIC EXERCISES: CPT

## 2017-03-20 PROCEDURE — 97140 MANUAL THERAPY 1/> REGIONS: CPT

## 2017-03-20 NOTE — PROGRESS NOTES
PT DAILY TREATMENT NOTE - [facility] 3-16    Patient Name: Raghav Nguyen  Date:3/20/2017  : 1950  [x]  Patient  Verified  Payor: [place] MEDICARE / Plan: [place] MEDICARE PART A & B / Product Type: Medicare /    In time: 8:04  Out time: 8:28  Total Treatment Time (min): 24  Total Timed Codes (min): 24  1:1 Treatment Time ( only): 24   Visit #: 1 of 8    Treatment Area: Unsteadiness on feet [R26.81]  Dizziness and giddiness [R42]    SUBJECTIVE  Pain Level (0-10 scale): 5/10  Any medication changes, allergies to medications, adverse drug reactions, diagnosis change, or new procedure performed?: [x] No    [] Yes (see summary sheet for update)  Subjective functional status/changes:   [] No changes reported  Pt. Reports she tried HEP a few times    OBJECTIVE    14 min Therapeutic Exercise:  [x] See flow sheet :   Rationale: improve balance to improve the patients ability to increase ease of ambulation    10 min Manual Therapy:  Epley maneuver x2   Rationale: correct positional vertigo to improve dizziness symptoms    With   [x] TE   [] TA   [] neuro   [] other: Patient Education: [x] Review HEP    [] Progressed/Changed HEP based on:   [] positioning   [] body mechanics   [] transfers   [] heat/ice application    [] other:      Other Objective/Functional Measures:   Pt. Continues to have positive [place]-Halpike to L with upbeating nystagmus  She was educated on following up with an ENT if symptoms continue to persist  She demonstrated good EC balance on compliant surface     Pain Level (0-10 scale) post treatment:  5/10    ASSESSMENT/Changes in Function: pt. Is making limited progress towards goals.  She continues to have dizziness symptoms with positional changes    Patient will continue to benefit from skilled PT services to modify and progress therapeutic interventions, address functional mobility deficits, address strength deficits, analyze and modify body mechanics/ergonomics, assess and modify postural abnormalities, address imbalance/dizziness and instruct in home and community integration to attain remaining goals. Progress towards goals / Updated goals:  1. Patient will improve FOTO score by 23 points in order to demonstrate a significant improvement in function. 2. Patient will report a 75% improvement in dizziness symptoms since [facility] in order to improve quality of life. Not met (3/20/17)  3. Patient will demonstrate negative [place]-Hallpike test in order to demonstrate decreased dizziness symptoms.      PLAN  []  Upgrade activities as tolerated     [x]  Continue plan of care  []  Update interventions per flow sheet       []  Discharge due to:_  []  Other:_      Diane Fraga, PT 3/20/2017  8:04 AM

## 2017-03-24 ENCOUNTER — OFFICE VISIT (OUTPATIENT)
Dept: ORTHOPEDIC SURGERY | Age: 67
End: 2017-03-24

## 2017-03-24 VITALS
BODY MASS INDEX: 34.07 KG/M2 | HEIGHT: 59 IN | TEMPERATURE: 97.9 F | HEART RATE: 89 BPM | RESPIRATION RATE: 18 BRPM | DIASTOLIC BLOOD PRESSURE: 88 MMHG | WEIGHT: 169 LBS | OXYGEN SATURATION: 98 % | SYSTOLIC BLOOD PRESSURE: 154 MMHG

## 2017-03-24 DIAGNOSIS — M47.899 FACET SYNDROME: ICD-10-CM

## 2017-03-24 DIAGNOSIS — M54.16 LUMBAR RADICULOPATHY: Primary | ICD-10-CM

## 2017-03-24 RX ORDER — KETOROLAC TROMETHAMINE 15 MG/ML
30 INJECTION, SOLUTION INTRAMUSCULAR; INTRAVENOUS ONCE
Qty: 1 VIAL | Refills: 0
Start: 2017-03-24 | End: 2017-03-24

## 2017-03-24 NOTE — PROGRESS NOTES
[address]  [address], [address]  [place] [address]  Phone: [phone]  Fax: [phone]        Haydee Lawrence  : 1950  PCP: Monet Miguel DO  3/24/2017    PROGRESS NOTE      ASSESSMENT AND PLAN    Michelle Johns comes in to the office today for a lumbar MRI f/u. The MRI shows L1-2 and L2-3 R>L disc protrusions. There is also L4-5 and L5-S1 moderate to severe facet arthropathy. She states that her pain varies from day to day and typically worsens as the day progresses with the worst regions of pain being her anterior and posterior left leg, the top of her left foot, and her right knee. She was referred for an L1-2 interlaminar injection with valium. Pt was given a Toradol injection today. We discussed medication as well but pt declined. She is scheduled to see a rheumatologist within the next few months. Pt will f/u in 3 weeks or prn. Romana Drew was seen today for back pain. Diagnoses and all orders for this visit:    Lumbar radiculopathy  -     SCHEDULE SURGERY  -     KETOROLAC TROMETHAMINE INJ  -     ketorolac (TORADOL) 15 mg/mL soln injection; 2 mL by IntraMUSCular route once for 1 dose. -     THER/PROPH/DIAG INJECTION, SUBCUT/IM    Facet syndrome  -     SCHEDULE SURGERY  -     KETOROLAC TROMETHAMINE INJ  -     ketorolac (TORADOL) 15 mg/mL soln injection; 2 mL by IntraMUSCular route once for 1 dose. -     THER/PROPH/DIAG INJECTION, SUBCUT/IM         Follow-up Disposition:  Return in about 3 weeks (around 2017), or if symptoms worsen or fail to improve. HISTORY OF PRESENT ILLNESS  Michelle Johns is a 74 y.o. female. A&P / HPI from 3/10/2017:  Sunny Lilia comes in to the office today c/o 10/10 aching cervical and lumbar pain with regional pain in the anterior and posterior portions of her left leg x 10 years. Pt also notes that her leg frequently cramps.  Her xray today shows degenerative lumbar scoliosis. Her lumbar pain is likely due to facet syndrome. Her left leg pain appears to be due to a combination of left hip arthritis and lumbar stenosis/radiculopathy.     She was referred for a lumbar MRI. Pt was prescribed a Prednisone 10 mg dose pack. She was advised to see a rheumatologist for her RA.     Pt will f/u in 2 weeks or prn. Updates from 17:  Pt presents for a lumbar MRI f/u. The MRI shows L1-2 and L2-3 R>L disc protrusions. There is also L4-5 and L5-S1 moderate to severe facet arthropathy. She states that her pain varies from day to day and typically worsens as the day progresses with the worst regions of pain being her anterior and posterior left leg, the top of her left foot, and her right knee. She is scheduled to see a rheumatologist within the next few months. Pt took the Prednisone 10 mg dose pack without relief. PAST MEDICAL HISTORY   Past Medical History:   Diagnosis Date    Arthritis     GERD (gastroesophageal reflux disease)     Hypertension     Ill-defined condition     Positional Vertigo -  pt can not lie flat       Past Surgical History:   Procedure Laterality Date    HX BREAST BIOPSY      6-8 yrs ago, scar marked    HX CARPAL TUNNEL RELEASE Right     mid to late [de-identified]    HX CARPAL TUNNEL RELEASE Left     mid 1990s    HX  SECTION      HX CHOLECYSTECTOMY      HX GYN      Vaginal Hernia as a child     HX HYSTERECTOMY      partial    .      MEDICATIONS      Current Outpatient Prescriptions   Medication Sig Dispense Refill    triamterene-hydroCHLOROthiazide (MAXZIDE) 37.5-25 mg per tablet   0    metoprolol (LOPRESSOR) 50 mg tablet Take  by mouth daily.  allopurinol (ZYLOPRIM) 300 mg tablet Take  by mouth daily.  pantoprazole (PROTONIX) 40 mg tablet Take 40 mg by mouth daily.  folic acid (FOLVITE) 1 mg tablet Take  by mouth daily.  Cholecalciferol, Vitamin D3, (VITAMIN D3) 1,000 unit cap Take  by mouth.       cyanocobalamin (VITAMIN B-12) 1,000 mcg tablet Take 1,000 mcg by mouth daily.  predniSONE (STERAPRED DS) 10 mg dose pack Take 1 Tab by mouth See Admin Instructions. See administration instruction per 10mg dose pack 21 Tab 0    ondansetron (ZOFRAN ODT) 8 mg disintegrating tablet Take 1 Tab by mouth every eight (8) hours as needed for Nausea for up to 12 doses. 12 Tab 0        ALLERGIES  No Known Allergies       SOCIAL HISTORY    Social History     Social History    Marital status:      Spouse name: N/A    Number of children: N/A    Years of education: N/A     Social History Main Topics    Smoking status: Never Smoker    Smokeless tobacco: None    Alcohol use Yes    Drug use: None    Sexual activity: Not Asked     Other Topics Concern    None     Social History Narrative       FAMILY HISTORY  History reviewed. No pertinent family history. REVIEW OF SYSTEMS  Review of Systems   Constitutional: Negative for chills, diaphoresis, fever, malaise/fatigue and weight loss. Respiratory: Negative for shortness of breath. Cardiovascular: Negative for chest pain and leg swelling. Gastrointestinal: Negative for constipation, nausea and vomiting. Neurological: Negative for dizziness, tingling, seizures, loss of consciousness and headaches. Psychiatric/Behavioral: The patient does not have insomnia. PHYSICAL EXAMINATION  Visit Vitals    /88    Pulse 89    Temp 97.9 °F (36.6 °C) (Oral)    Resp 18    Ht 4' 11\" (1.499 m)    Wt 169 lb (76.7 kg)    SpO2 98%    BMI 34.13 kg/m2       Pain Assessment  3/10/2017   Location of Pain Back;Neck   Severity of Pain 7   Quality of Pain Aching           Constitutional:  Well developed, well nourished, in no acute distress. Psychiatric: Affect and mood are appropriate. Integumentary: No rashes or abrasions noted on exposed areas. SPINE/MUSCULOSKELETAL EXAM    Cervical spine:  Neck is midline. Normal muscle tone. No focal atrophy is noted.    ROM pain free. Shoulder ROM intact. Mild tenderness to palpation. Negative Spurling's sign. Negative Tinel's sign. Negative Leonard's sign.       Sensation in the bilateral arms grossly intact to light touch.      Lumbar spine:  No rash, ecchymosis, or gross obliquity. No fasciculations. No focal atrophy is noted. Pain with left hip internal rotation. Full range of motion. Tenderness to palpation in left QL. No tenderness to palpation at the sciatic notch. SI joints non-tender. Trochanters non tender. No pain with back flexion, relief with coming up. Pain with back extension L=M=R.      Sensation in the bilateral legs grossly intact to light touch. MOTOR:      Biceps  Triceps Deltoids Wrist Ext Wrist Flex Hand Intrin   Right 5/5 5/5 5/5 5/5 5/5 5/5   Left 5/5 5/5 5/5 5/5 5/5 5/5             Hip Flex  Quads Hamstrings Ankle DF EHL Ankle PF   Right 5/5 5/5 5/5 5/5 5/5 5/5   Left 5/5 5/5 5/5 5/5 5/5 5/5     DTRs are 1+ biceps, triceps, brachioradialis, patella, and Achilles.     Mildly positive left Straight Leg raise. Squat not tested. No difficulty with tandem gait.      Ambulation without assistive device. FWB.       RADIOGRAPHS  Cervical XR images taken on 3/10/2017 personally reviewed with patient:  1) Osteophytes  2) No obvious fractures or instabilities     Lumbar XR images taken on 3/10/2017 personally reviewed with patient:  1) Degenerative sclerosis with bend to the right side centered at L2-3  2) No obvious fractures    Lumbar MRI images taken on 3/18/2017 personally reviewed with patient:  FINDINGS:      No evidence of destructive bone marrow replacement process is detected. There  is slight sclerotic curvature. Associated with the scoliotic curvature, there  are multiple endplate osteophytes in the lumbar spine.  Minimal grade 1  spondylolisthesis is suggested at L4-5 and L5-S1, with offset of about 0.2 cm at  L4-5 and 0.3 cm at L5-S1.     No abnormal intrathecal contents are noted. The conus medullaris is identified  at T12 level. Notably, there is disc-osteophyte bulge-protrusion at T10-11 with  left sided neural foraminal narrowing.     T12-L1: Unremarkable.     L1-2: Broad-based bilateral protruding disc-osteophyte, most pronounced in the  right paracentral to posterolateral aspect. Moderate right foraminal narrowing. No significant left foraminal narrowing. Right lateral recess narrowing. No  significant central canal stenosis.     L2-3: Broad-based disc height and osteophyte protrusion along both  posterolateral to lateral aspects, greater on the right side than the left. Minimal foraminal narrowing on the right side. No significant foraminal  narrowing on the left. No significant central canal stenosis.     L3-4: Bilobed disc bulge, more pronounced on the left side than the right. Mild facet and ligamentum flavum hypertrophy. No central canal stenosis or  foraminal stenosis.     L4-5: Moderate to pronounced bilateral facet hypertrophy. Bilobed small disc  protrusion along both paracentral to posterolateral aspects. No central canal  stenosis. No significant foraminal stenosis.     L5-S1: Severe facet hypertrophy bilaterally. Mild disc protrusion along the  left paracentral to posterolateral aspect. Moderate bulging disc-osteophyte  along the right side. Mild to moderate left foraminal narrowing. Borderline  right foraminal narrowing.     IMPRESSION  IMPRESSION:     1. Disc-osteophyte at multiple levels of the lumbar spine as described above. No significant central canal stenosis. The most pronounced disc protrusion is  observed at L1-2, followed by L2-3. Significant foraminal narrowing on the  right side at L1-2. Foraminal narrowing is observed on the left side at L5-S1  as well.     2. Facet arthropathy.     3. Additional degenerative disc disease suspected in the thoracic spine.       reviewed     Ms. Kenisha Reina has a reminder for a \"due or due soon\" health maintenance. I have asked that she contact her primary care provider for follow-up on this health maintenance.      This plan was reviewed with the patient and patient agrees. All questions were answered. More than half of this visit today was spent on counseling.     Written by Lillian Sevilla, as dictated by Dr. Bert Rodriguez, Dr. Kena Wong, confirm that all documentation is accurate.

## 2017-03-24 NOTE — PATIENT INSTRUCTIONS
[employer] Activation    Thank you for requesting access to [employer]. Please follow the instructions below to securely access and download your online medical record. [employer] allows you to send messages to your doctor, view your test results, renew your prescriptions, schedule appointments, and more. How Do I Sign Up? 1. In your internet browser, go to [email or web address]  2. Click on the First Time User? Click Here link in the Sign In box. You will be redirect to the New Member Sign Up page. 3. Enter your [employer] Access Code exactly as it appears below. You will not need to use this code after youve completed the sign-up process. If you do not sign up before the expiration date, you must request a new code. [employer] Access Code: [email or web address]  Expires: 3/29/2017 10:34 AM (This is the date your [employer] access code will )    4. Enter the last four digits of your Social Security Number (xxxx) and Date of Birth (mm/dd/yyyy) as indicated and click Submit. You will be taken to the next sign-up page. 5. Create a [employer] ID. This will be your [employer] login ID and cannot be changed, so think of one that is secure and easy to remember. 6. Create a [employer] password. You can change your password at any time. 7. Enter your Password Reset Question and Answer. This can be used at a later time if you forget your password. 8. Enter your e-mail address. You will receive e-mail notification when new information is available in [address] 9. Click Sign Up. You can now view and download portions of your medical record. 10. Click the Download Summary menu link to download a portable copy of your medical information. Additional Information    If you have questions, please visit the Frequently Asked Questions section of the [employer] website at [email or web address] Remember, [employer] is NOT to be used for urgent needs. For medical emergencies, dial 911.          Learning About Lumbar Epidural Steroid Injections  What is a lumbar epidural steroid injection? A doctor may give you a lumbar epidural steroid injection to try to decrease pain, tingling, or numbness in your back, buttock, or leg. These might be the result of a back or disc problem. The injection goes directly into your epidural space. This is the area in your back around your spinal cord. This injection may have both a local anesthetic and a steroid medicine. Or it may only have a steroid. Local anesthetic medicines numb your nerves right away for a short time. Steroids reduce swelling and pain. But they take a few days to start working. Some people get a series of these injections over weeks or months. How is a lumbar epidural done? The doctor may use an imaging test before or during your injection. This can be an MRI, a CT scan, or an X-ray. These tests can show where your nerve problems are. After finding the right spot, the doctor may inject a numbing medicine into the skin where you will get the steroid injection. Then he or she puts the needle for the steroid into the numbed area. You may feel some pressure. You could feel some stinging or burning during the injection. It takes about 10 to 15 minutes to get this injection. You will probably go home about 20 to 30 minutes after you get it. You will need someone to drive you home. What can you expect after a lumbar epidural?  If your injection had local anesthetic and a steroid, your legs may feel heavy or numb right after. You will probably be able to walk. But you may need to be extra careful. Take care not to lose your balance and be sure to follow your doctor's instructions. If your injection contained local anesthetic, you may feel better right away. But this pain relief will last only a few hours. Your pain will probably return. This is because the steroids have not started working yet. Before the steroids start to work, your back may be sore for a few days.   These injections don't always work. When they do, it takes 1 to 5 days. This pain relief can last for several days to a few months or longer. You may want to do less than normal for a few days. But you may also be able to return to your daily routine. Some people are dizzy or feel sick to their stomach after getting this injection. These symptoms usually do not last very long. If your pain is better, you may be able to keep doing your normal activities or physical therapy. But try not to overdo it, even if your back pain has improved a lot. If your pain is only a little better or if it comes back, your doctor may recommend another injection in a few weeks. If your pain has not changed, talk to your doctor about other treatment choices. Side effects from an epidural steroid injection include headache, fever, or infection. Serious side effects are rare. But they can include stroke, paralysis, or loss of vision. Follow-up care is a key part of your treatment and safety. Be sure to make and go to all appointments, and call your doctor if you are having problems. It's also a good idea to know your test results and keep a list of the medicines you take. Where can you learn more? Go to http://jamison-ilya.info/. Enter Marline Couch in the search box to learn more about \"Learning About Lumbar Epidural Steroid Injections. \"  Current as of: May 23, 2016  Content Version: 11.1  © 3544-7184 [employer]. Care instructions adapted under license by [employer] (which disclaims liability or warranty for this information). If you have questions about a medical condition or this instruction, always ask your healthcare professional. [address] any warranty or liability for your use of this information.

## 2017-03-30 ENCOUNTER — APPOINTMENT (OUTPATIENT)
Dept: GENERAL RADIOLOGY | Age: 67
End: 2017-03-30
Attending: PHYSICAL MEDICINE & REHABILITATION
Payer: MEDICARE

## 2017-03-30 ENCOUNTER — HOSPITAL ENCOUNTER (OUTPATIENT)
Age: 67
Setting detail: OUTPATIENT SURGERY
Discharge: HOME | End: 2017-03-30
Attending: PHYSICAL MEDICINE & REHABILITATION | Admitting: PHYSICAL MEDICINE & REHABILITATION
Payer: MEDICARE

## 2017-03-30 VITALS
OXYGEN SATURATION: 93 % | TEMPERATURE: 98.3 F | DIASTOLIC BLOOD PRESSURE: 105 MMHG | SYSTOLIC BLOOD PRESSURE: 171 MMHG | HEART RATE: 90 BPM | RESPIRATION RATE: 18 BRPM

## 2017-03-30 PROCEDURE — 74011000250 HC RX REV CODE- 250

## 2017-03-30 PROCEDURE — 76010000009 HC PAIN MGT 0 TO 30 MIN PROC: Performed by: PHYSICAL MEDICINE & REHABILITATION

## 2017-03-30 PROCEDURE — 74011250636 HC RX REV CODE- 250/636

## 2017-03-30 PROCEDURE — 77030003543 HC NDL EPDRL BBMI -A: Performed by: PHYSICAL MEDICINE & REHABILITATION

## 2017-03-30 PROCEDURE — 77030014124 HC TY EPDRL BBMI -A: Performed by: PHYSICAL MEDICINE & REHABILITATION

## 2017-03-30 PROCEDURE — 74011250637 HC RX REV CODE- 250/637: Performed by: PHYSICAL MEDICINE & REHABILITATION

## 2017-03-30 RX ORDER — DIAZEPAM 5 MG/1
5-20 TABLET ORAL ONCE
Status: COMPLETED | OUTPATIENT
Start: 2017-03-30 | End: 2017-03-30

## 2017-03-30 RX ORDER — DEXAMETHASONE SODIUM PHOSPHATE 100 MG/10ML
INJECTION INTRAMUSCULAR; INTRAVENOUS AS NEEDED
Status: DISCONTINUED | OUTPATIENT
Start: 2017-03-30 | End: 2017-03-30 | Stop reason: HOSPADM

## 2017-03-30 RX ORDER — LIDOCAINE HYDROCHLORIDE 10 MG/ML
INJECTION, SOLUTION EPIDURAL; INFILTRATION; INTRACAUDAL; PERINEURAL AS NEEDED
Status: DISCONTINUED | OUTPATIENT
Start: 2017-03-30 | End: 2017-03-30 | Stop reason: HOSPADM

## 2017-03-30 RX ADMIN — DIAZEPAM 5 MG: 5 TABLET ORAL at 12:33

## 2017-03-30 NOTE — DISCHARGE INSTRUCTIONS
[facility]   (JADE)  Dr. Jennifer Hansen, Dr. Luigi Merrill, Dr. Mayen Bent not drive a car, operate heavy machinery or dangerous equipment for 24 hours. * Activity as tolerated; rest for the remainder of the day. * Resume pre-block medications including those for your family doctor. * Do not drink alcoholic beverages for 24 hours. Alcohol and the medications you have received may interact and cause an adverse reaction. * You may feel better this evening and worse tomorrow, as the numbing medications wears off and the steroid has yet to begin to work. After 48 hrs the steroid should begin to release bringing you relief. * You may shower this evening and remove any bandages. * Avoid hot tubs and heating pads for 24 hours. You may use cold packs on the procedure site as tolerated for the first 24 hours. * If a headache develops, drink plenty of fluids and rest.  Take over the counter medications for headache if needed. If the headache continues longer than 24 hours, call MD at the [address] [place] [address]. [phone]    * Continue taking pain medications as needed. * You may resume your regular diet if tolerated. Otherwise, start with sips of water and advance slowly. * If Diabetic: check your blood sugar three times a day for the next 3 days. If your sugar is greater than 300 call your family doctor. If your sugar is greater than 400, have someone transport you to the nearest Emergency Room. * If you experience any of the following problems, Please Call the [address] [place] [address] at [phone].         * Shortness of Breath    * Fever of 101 or higher    * Nausea / Vomiting    * Severe Headache    * Weakness or numbness in arms or legs that is not      resolving    * Prolonged increase in pain greater than 4 days      DISCHARGE SUMMARY from Nurse      PATIENT INSTRUCTIONS:    After oral sedation, for 24 hours or while taking prescription Narcotics:  · Limit your activities  · Do not drive and operate hazardous machinery  · Do not make important personal or business decisions  · Do  not drink alcoholic beverages  · If you have not urinated within 8 hours after discharge, please contact your surgeon on call. Report the following to your surgeon:  · Excessive pain, swelling, redness or odor of or around the surgical area  · Temperature over 101  · Nausea and vomiting lasting longer than 4 hours or if unable to take medications  · Any signs of decreased circulation or nerve impairment to extremity: change in color, persistent  numbness, tingling, coldness or increase pain  · Any questions            What to do at Home:  Recommended activity: Activity as tolerated, NO DRIVING FOR 12 Hours post injection          *  Please give a list of your current medications to your Primary Care Provider. *  Please update this list whenever your medications are discontinued, doses are      changed, or new medications (including over-the-counter products) are added. *  Please carry medication information at all times in case of emergency situations. These are general instructions for a healthy lifestyle:    No smoking/ No tobacco products/ Avoid exposure to second hand smoke    Surgeon General's Warning:  Quitting smoking now greatly reduces serious risk to your health. Obesity, smoking, and sedentary lifestyle greatly increases your risk for illness    A healthy diet, regular physical exercise & weight monitoring are important for maintaining a healthy lifestyle    You may be retaining fluid if you have a history of heart failure or if you experience any of the following symptoms:  Weight gain of 3 pounds or more overnight or 5 pounds in a week, increased swelling in our hands or feet or shortness of breath while lying flat in bed.   Please call your doctor as soon as you notice any of these symptoms; do not wait until your next office visit. Recognize signs and symptoms of STROKE:    F-face looks uneven    A-arms unable to move or move unevenly    S-speech slurred or non-existent    T-time-call 911 as soon as signs and symptoms begin-DO NOT go       Back to bed or wait to see if you get better-TIME IS BRAIN. MyChart Activation    Thank you for requesting access to [employer]. Please follow the instructions below to securely access and download your online medical record. [employer] allows you to send messages to your doctor, view your test results, renew your prescriptions, schedule appointments, and more. How Do I Sign Up? 1. In your internet browser, go to www.[employer]  2. Click on the First Time User? Click Here link in the Sign In box. You will be redirect to the New Member Sign Up page. 3. Enter your [employer] Access Code exactly as it appears below. You will not need to use this code after youve completed the sign-up process. If you do not sign up before the expiration date, you must request a new code. [employer] Access Code: [email or web address]  Expires: 2017 11:30 AM (This is the date your [employer] access code will )    4. Enter the last four digits of your Social Security Number (xxxx) and Date of Birth (mm/dd/yyyy) as indicated and click Submit. You will be taken to the next sign-up page. 5. Create a [employer] ID. This will be your [employer] login ID and cannot be changed, so think of one that is secure and easy to remember. 6. Create a [employer] password. You can change your password at any time. 7. Enter your Password Reset Question and Answer. This can be used at a later time if you forget your password. 8. Enter your e-mail address. You will receive e-mail notification when new information is available in [address] 9. Click Sign Up. You can now view and download portions of your medical record.   10. Click the Download Summary menu link to download a portable copy of your medical information. Additional Information    If you have questions, please visit the Frequently Asked Questions section of the [employer] website at [email or web address] Remember, [employer] is NOT to be used for urgent needs. For medical emergencies, dial 911.

## 2017-03-30 NOTE — PROCEDURES
Intralaminar Epidural Steroid Block Procedure Note      Patient Name: Jesus Alberto Infanjum    Date of Procedure: March 30, 2017    Preoperative Diagnosis: Lumbar radiculopathy [M54.16]  Arthropathy of cervical facet joint ([address] [M12.88]    Post Operative Diagnosis: Lumbar radiculopathy [M54.16]  Arthropathy of cervical facet joint ([address] [M12.88]    Procedure: L1-L2 Epidural Block     PROCEDURE:  Lumbar Epidural Block    Consent:  Informed  Consent was obtained prior to the procedure. The patient was given the opportunity to ask questions regarding the procedure and its associated risks. In addition to the potential risks associated with the procedure itself, the patient was informed both verbally and in writing of potential side effects of the use glucocorticoids. The patient appeared to comprehend the informed consent and desired to have the procedure performed. The patient was placed in the prone position on the fluoroscopy table and the back prepped and draped in the usual sterile manner. The interlaminar space was identified using fluoroscopy and the skin anesthetized with 1% Lidocaine. A #18 Tuohy Epidural needle was advanced under fluoroscopic control from a paramedian approach to the  epidural space as noted above, using the loss of resistance to fluid technique. Then, 30 mg of preservative free Dexamethasone and 4 cc of Lidocaine was slowly injected. The patient tolerated the procedure well. The injection area was cleaned and band aids applied. No excessive bleeding was noted. Patient dressed and was discharged to home with instructions.

## 2017-04-04 NOTE — TELEPHONE ENCOUNTER
Patient states that after the spinal block she had on  Thursday she has been in the worst pain, more than the pain she had before. She has had major muscle spasms in her back. She is asking that a muscle relaxer would be called in for her. Please advise. Patient can be reached now until 5:45pm at [phone],  After 6 pm [phone].

## 2017-04-04 NOTE — TELEPHONE ENCOUNTER
Per Dr. Kierra Montano, patient could be having a steroid flare from injection. Encourage patient to increase fluids and caffeine intake. Informed patient of above, informed patient she should start feeling some relief in the morning, however for full relief it could take about 4 more days. Patient verbalized understanding, informed patient I would check back with her tomorrow afternoon to see how she is feeling. Patient very grateful.

## 2017-04-04 NOTE — TELEPHONE ENCOUNTER
Called and spoke with patient, per patient, she does not recall ever trying any muscle spasm or muscle relaxer medications in the past.    Per patient, when she took the steroid pack in the past prior to her injection, that did not relieve her pain at all. Patient states she is having pain at a 9/10 currently that increases at the end of the day. Patient states pain is located in left side of back, to her buttocks/thigh/leg/and foot to the left as well. Patient describes pain as burning/spasms with pinching down her left leg. Informed patient it can take up to a week to feel full effects of injection, but will put message in for review by Dr. Gilbert Santa.

## 2017-04-06 RX ORDER — TRAMADOL HYDROCHLORIDE 50 MG/1
50 TABLET ORAL
Qty: 60 TAB | Refills: 0 | OUTPATIENT
Start: 2017-04-06 | End: 2020-08-14

## 2017-04-06 RX ORDER — CYCLOBENZAPRINE HCL 10 MG
10 TABLET ORAL
Qty: 90 TAB | Refills: 1 | Status: SHIPPED | OUTPATIENT
Start: 2017-04-06 | End: 2020-08-14

## 2017-04-06 NOTE — TELEPHONE ENCOUNTER
Called patient to inform I am awaiting a response back from Dr. Lula Irby. Patient verbalized agreement/understanding.

## 2017-04-06 NOTE — TELEPHONE ENCOUNTER
Returned call to patient, apologized for not getting back with her yesterday afternoon. Per patient, her pain is still the same, it has not even in the slightest improved. Patient requesting a muscle relaxer and possibly an anti-inflammatory. She did state that the prednisone prescribed prior to her injection never touched the pain. Please advise.

## 2017-04-06 NOTE — TELEPHONE ENCOUNTER
Patient called to follow up on prescription request from earlier - she wanted to remind Daisy Santos.   Please advise patient at [phone]

## 2017-04-06 NOTE — TELEPHONE ENCOUNTER
Per Dr. Casey Lange, due to patient's GFR she is unable to take any anti-inflammatories as he does not want to do any damage to her kidneys. However he is willing to try Flexeril 10mg TID PRN #90/1RF, it may make patient sleepy, if patient feels it is too strong she can cut pill in half. Also he is willing to order Tramadol 50mg BID PRN #60/0RF, to help with pain. Per Dr. Casey Lange, he does not intend for this medication to be long term, he is giving this to the patient this one time, and will see her at her f/u appt in April. Called patient verified pharmacy, informed of above. Patient wanting to half the pill of Tramadol. Per Dr. Casey Lange, yes the patient can half the Tramadol tablet. Patient verbalized agreement/understanding. Med phoned into pharmacy on file, spoke with Pharmacist Marleni Aguayo, ben Connell. No further action required at this time.

## 2017-04-11 ENCOUNTER — APPOINTMENT (OUTPATIENT)
Age: 67
End: 2017-04-11
Attending: EMERGENCY MEDICINE
Payer: MEDICARE

## 2017-04-11 ENCOUNTER — HOSPITAL ENCOUNTER (EMERGENCY)
Age: 67
Discharge: HOME | End: 2017-04-11
Attending: EMERGENCY MEDICINE
Payer: MEDICARE

## 2017-04-11 ENCOUNTER — APPOINTMENT (OUTPATIENT)
Dept: CT IMAGING | Age: 67
End: 2017-04-11
Attending: EMERGENCY MEDICINE
Payer: MEDICARE

## 2017-04-11 ENCOUNTER — TELEPHONE (OUTPATIENT)
Dept: ORTHOPEDIC SURGERY | Age: 67
End: 2017-04-11

## 2017-04-11 VITALS
HEIGHT: 59 IN | RESPIRATION RATE: 18 BRPM | DIASTOLIC BLOOD PRESSURE: 88 MMHG | HEART RATE: 100 BPM | BODY MASS INDEX: 34.27 KG/M2 | OXYGEN SATURATION: 93 % | TEMPERATURE: 98.9 F | WEIGHT: 170 LBS | SYSTOLIC BLOOD PRESSURE: 135 MMHG

## 2017-04-11 DIAGNOSIS — R20.2 FACIAL PARESTHESIA: Primary | ICD-10-CM

## 2017-04-11 DIAGNOSIS — T50.905A MEDICATION ADVERSE EFFECT, INITIAL ENCOUNTER: ICD-10-CM

## 2017-04-11 LAB
ALBUMIN SERPL BCP-MCNC: 3.3 G/DL (ref 3.4–5)
ALBUMIN/GLOB SERPL: 0.8 {RATIO} (ref 0.8–1.7)
ALP SERPL-CCNC: 91 U/L (ref 45–117)
ALT SERPL-CCNC: 23 U/L (ref 13–56)
ANION GAP BLD CALC-SCNC: 7 MMOL/L (ref 3–18)
AST SERPL W P-5'-P-CCNC: 17 U/L (ref 15–37)
ATRIAL RATE: 96 BPM
BILIRUB SERPL-MCNC: 0.3 MG/DL (ref 0.2–1)
BUN SERPL-MCNC: 19 MG/DL (ref 7–18)
BUN/CREAT SERPL: 15 (ref 12–20)
CALCIUM SERPL-MCNC: 9.3 MG/DL (ref 8.5–10.1)
CALCULATED P AXIS, ECG09: 51 DEGREES
CALCULATED R AXIS, ECG10: -4 DEGREES
CALCULATED T AXIS, ECG11: 29 DEGREES
CHLORIDE SERPL-SCNC: 104 MMOL/L (ref 100–108)
CO2 SERPL-SCNC: 30 MMOL/L (ref 21–32)
CREAT SERPL-MCNC: 1.3 MG/DL (ref 0.6–1.3)
DIAGNOSIS, 93000: NORMAL
ERYTHROCYTE [DISTWIDTH] IN BLOOD BY AUTOMATED COUNT: 15.2 % (ref 11.6–14.5)
GLOBULIN SER CALC-MCNC: 4 G/DL (ref 2–4)
GLUCOSE SERPL-MCNC: 100 MG/DL (ref 74–99)
HCT VFR BLD AUTO: 41.1 % (ref 35–45)
HGB BLD-MCNC: 12.9 G/DL (ref 12–16)
MCH RBC QN AUTO: 25.2 PG (ref 24–34)
MCHC RBC AUTO-ENTMCNC: 31.4 G/DL (ref 31–37)
MCV RBC AUTO: 80.3 FL (ref 74–97)
P-R INTERVAL, ECG05: 116 MS
PLATELET # BLD AUTO: 265 K/UL (ref 135–420)
PMV BLD AUTO: 10.5 FL (ref 9.2–11.8)
POTASSIUM SERPL-SCNC: 3.4 MMOL/L (ref 3.5–5.5)
PROT SERPL-MCNC: 7.3 G/DL (ref 6.4–8.2)
Q-T INTERVAL, ECG07: 352 MS
QRS DURATION, ECG06: 70 MS
QTC CALCULATION (BEZET), ECG08: 444 MS
RBC # BLD AUTO: 5.12 M/UL (ref 4.2–5.3)
SODIUM SERPL-SCNC: 141 MMOL/L (ref 136–145)
VENTRICULAR RATE, ECG03: 96 BPM
WBC # BLD AUTO: 7.8 K/UL (ref 4.6–13.2)

## 2017-04-11 PROCEDURE — 80053 COMPREHEN METABOLIC PANEL: CPT | Performed by: EMERGENCY MEDICINE

## 2017-04-11 PROCEDURE — 70450 CT HEAD/BRAIN W/O DYE: CPT

## 2017-04-11 PROCEDURE — 93005 ELECTROCARDIOGRAM TRACING: CPT

## 2017-04-11 PROCEDURE — 70551 MRI BRAIN STEM W/O DYE: CPT

## 2017-04-11 PROCEDURE — 85027 COMPLETE CBC AUTOMATED: CPT | Performed by: EMERGENCY MEDICINE

## 2017-04-11 PROCEDURE — 99285 EMERGENCY DEPT VISIT HI MDM: CPT

## 2017-04-11 RX ORDER — DIAZEPAM 5 MG/1
5 TABLET ORAL
Qty: 20 TAB | Refills: 0 | Status: SHIPPED | OUTPATIENT
Start: 2017-04-11 | End: 2020-11-12

## 2017-04-11 NOTE — ED NOTES
PT resting in bed with monitors on. PT talking on the phone no signs of distress noted. PT denied any pain or discomfort at this time.

## 2017-04-11 NOTE — TELEPHONE ENCOUNTER
Pt called in states that she started Flexeril on 04/06/17 from Dr Carri Case and since then her mouth/ lips have been pulling to the right side. Pt also states that her face feels numb like after having a tooth pulled and that she is not always forming her words correctly. Pt had spinal block on 03/30/17 by Sonal Yee  Please contact pt at [phone].

## 2017-04-11 NOTE — ED TRIAGE NOTES
Patient states she started taking the Flexril on Thursday, states it makes her feel \"a little drowsy\". Pt states she was also given Ultram, states she has not taken that due to the bottle stating it causes drowsiness.

## 2017-04-11 NOTE — TELEPHONE ENCOUNTER
Called patient to inform her of Liv Romano advise. Patient was informed to go to her nearest ER, medication should not be causeing those symptoms and she needs to be evaluated to see if there is something else going on. Patient verbalized understanding and stated this has been going on for 2 days. Patient stated she may no go today she will just go tomorrow. Advised patient that is not a good ideas and that I advise for her to go today and be evaluated. Patient still insisted she will just go tomorrow morning. Advised Nilda Turpin of patients response.

## 2017-04-11 NOTE — ED NOTES
Per MD, no code s is necessary. Patient states possible reaction to Flexeril. H-HIT test performed by MD and found to be negative.

## 2017-04-11 NOTE — ED NOTES
Spoke with Carl Hernandez in [address] [place] [address]  Requested estimated time for patient's MRI procedure. She states that Erving Bears will call back with estimation.

## 2017-04-11 NOTE — DISCHARGE INSTRUCTIONS
Numbness and Tingling: Care Instructions  Your Care Instructions  Many things can cause numbness or tingling. Swelling may put pressure on a nerve. This could cause you to lose feeling or have a pins-and-needles sensation on part of your body. Nerves may be damaged from trauma, toxins, or diseases, such as diabetes or multiple sclerosis (MS). Sometimes, though, the cause is not clear. If there is no clear reason for your symptoms, and you are not having any other symptoms, your doctor may suggest watching and waiting for a while to see if the numbness or tingling goes away on its own. Your doctor may want you to have blood or nerve tests to find the cause of your symptoms. Follow-up care is a key part of your treatment and safety. Be sure to make and go to all appointments, and call your doctor if you are having problems. It's also a good idea to know your test results and keep a list of the medicines you take. How can you care for yourself at home? · If your doctor prescribes medicine, take it exactly as directed. Call your doctor if you think you are having a problem with your medicine. · If you have any swelling, put ice or a cold pack on the area for 10 to 20 minutes at a time. Put a thin cloth between the ice and your skin. When should you call for help? Call 911 anytime you think you may need emergency care. For example, call if:  · You have weakness, numbness, or tingling in both legs. · You lose bowel or bladder control. · You have symptoms of a stroke. These may include:  ¨ Sudden numbness, tingling, weakness, or loss of movement in your face, arm, or leg, especially on only one side of your body. ¨ Sudden vision changes. ¨ Sudden trouble speaking. ¨ Sudden confusion or trouble understanding simple statements. ¨ Sudden problems with walking or balance. ¨ A sudden, severe headache that is different from past headaches.   Watch closely for changes in your health, and be sure to contact your doctor if you have any problems, or if:  · You do not get better as expected. Where can you learn more? Go to http://jamison-ilya.info/. Enter J851 in the search box to learn more about \"Numbness and Tingling: Care Instructions. \"  Current as of: October 14, 2016  Content Version: 11.2  © 6938-1835 [employer]. Care instructions adapted under license by [employer] (which disclaims liability or warranty for this information). If you have questions about a medical condition or this instruction, always ask your healthcare professional. [place] [address] any warranty or liability for your use of this information.

## 2017-04-11 NOTE — ED TRIAGE NOTES
Patient reports feeling as if her mouth is \"twisted\", states \"I think my tongue is off centered\". Pt reports feeling as though her eye is numb and tingling in her face. Pt also express concern this may be related to taking Flexril. Pt states symptoms first started on Sunday.

## 2017-04-11 NOTE — ED NOTES
Pt back from MRI and placed back on cardiac and pulse ox monitors.   PT denied any pain or discomfort at this time

## 2017-04-11 NOTE — ED NOTES
PT laying in bed resting with no signs of distress noted. Pt stated that she had no pain and was comfortable.

## 2017-04-11 NOTE — ED PROVIDER NOTES
HPI Comments: 11:25 AM Masoud Pisano is a 74 y.o. female with h/o HTN who presents to ED complaining of facial numbness onset 2 days ago. The patient states \"my tongue is acting strange and not making words right\" and \"my bottom lip is pulling over to the right\". The patient states she began taking Flexeril 5 days ago for \"spinal issues\" and she does notice an increase in symptoms after taking this medication and has attributed the symptoms to Flexeril. The patient also complains of chronic vertigo. No other concerns or symptoms at this time. Left cheek, forehead  Numb tingling and lower lip \"drawn\". Onset Sat midnight. Waxing waning. PCP: Nikita Madrigal DO      The history is provided by the patient. Past Medical History:   Diagnosis Date    Arthritis     GERD (gastroesophageal reflux disease)     Hypertension     Ill-defined condition     Positional Vertigo -  pt can not lie flat       Past Surgical History:   Procedure Laterality Date    HX BREAST BIOPSY      6-8 yrs ago, scar marked    HX CARPAL TUNNEL RELEASE Right     mid to late [de-identified]    HX CARPAL TUNNEL RELEASE Left     mid 1990s    HX  SECTION      HX CHOLECYSTECTOMY      HX GYN      Vaginal Hernia as a child     HX HYSTERECTOMY      partial          No family history on file. Social History     Social History    Marital status:      Spouse name: N/A    Number of children: N/A    Years of education: N/A     Occupational History    Not on file. Social History Main Topics    Smoking status: Never Smoker    Smokeless tobacco: Not on file    Alcohol use Yes    Drug use: Not on file    Sexual activity: Not on file     Other Topics Concern    Not on file     Social History Narrative         ALLERGIES: Review of patient's allergies indicates no known allergies. Review of Systems   Constitutional: Negative for fever. HENT: Negative for congestion. Eyes: Negative for visual disturbance.    Respiratory: Negative for cough and shortness of breath. Cardiovascular: Negative for chest pain. Gastrointestinal: Negative for abdominal pain and vomiting. Musculoskeletal: Negative for back pain. Skin: Negative for rash. Neurological: Positive for light-headedness and numbness (facial). Has been treated for vertigo in the recent past.   All other systems reviewed and are negative. Vitals:    04/11/17 1558 04/11/17 1600 04/11/17 1615 04/11/17 1635   BP: 154/87 151/87 135/88    Pulse: (!) 104 (!) 105 (!) 104 100   Resp: 24 (!) 47 25 18   Temp:       SpO2: 94% (!) 84% 93%    Weight:       Height:                Physical Exam   Constitutional: She is oriented to person, place, and time. She appears well-developed. HENT:   Head: Normocephalic. Mouth/Throat: Oropharynx is clear and moist.   Eyes: Pupils are equal, round, and reactive to light. Neck: Normal range of motion. Cardiovascular: Normal rate and normal heart sounds. No murmur heard. Pulmonary/Chest: Effort normal. She has no wheezes. She has no rales. Abdominal: Soft. There is no tenderness. Musculoskeletal: Normal range of motion. She exhibits deformity. She exhibits no edema. Neurological: She is alert and oriented to person, place, and time. She has normal reflexes. No cranial nerve deficit. Coordination normal.   GUME . EOM's OK. h-HIT normal response. Gait and Romberg normal.   Skin: Skin is warm and dry. Nursing note and vitals reviewed. MDM  Number of Diagnoses or Management Options  Facial paresthesia:   Medication adverse effect, initial encounter:   Diagnosis management comments: Imp: Drug effext- Flexeril is a Tricyclic with anticholinergic effects  Etc. ED Course       Procedures      Vitals:  No data found. SpO2 reviewed and within normal limits.         Medications ordered:   Medications - No data to display      Lab findings:  No results found for this or any previous visit (from the past 12 hour(s)). X-Ray, CT or other radiology findings or impressions:  CT HEAD WO CONT   Final Result   IMPRESSION:     No acute intracranial abnormality. Interpreted by Radiologist.     MRI BRAIN WO CONT:  Impression:     Unremarkable MRI of the brain without contrast.      Progress notes, Consult notes or additional Procedure notes:   Stable and asymptomatic in ED    Disposition:  Diagnosis:   1. Facial paresthesia    2. Medication adverse effect, initial encounter        Disposition: Home . To Follow up with family MD.    Follow-up Information     Follow up With Details Comments [facility], [address] [place] [address]  [phone]             Discharge Medication List as of 4/11/2017  4:12 PM      START taking these medications    Details   diazePAM (VALIUM) 5 mg tablet Take 1 Tab by mouth every eight (8) hours as needed for Anxiety. Max Daily Amount: 15 mg., Print, Disp-20 Tab, R-0         CONTINUE these medications which have NOT CHANGED    Details   traMADol (ULTRAM) 50 mg tablet Take 1 Tab by mouth two (2) times daily as needed for Pain. Max Daily Amount: 100 mg., Phone In, Disp-60 Tab, R-0      cyclobenzaprine (FLEXERIL) 10 mg tablet Take 1 Tab by mouth three (3) times daily as needed for Muscle Spasm(s). Indications: MUSCLE SPASM, Normal, Disp-90 Tab, R-1      triamterene-hydroCHLOROthiazide (MAXZIDE) 37.5-25 mg per tablet Historical Med, R-0      ondansetron (ZOFRAN ODT) 8 mg disintegrating tablet Take 1 Tab by mouth every eight (8) hours as needed for Nausea for up to 12 doses. , Print, Disp-12 Tab, R-0      metoprolol (LOPRESSOR) 50 mg tablet Take  by mouth daily. , Historical Med      allopurinol (ZYLOPRIM) 300 mg tablet Take  by mouth daily. , Historical Med      pantoprazole (PROTONIX) 40 mg tablet Take 40 mg by mouth daily. , Historical Med      folic acid (FOLVITE) 1 mg tablet Take  by mouth daily. , Historical Med      Cholecalciferol, Vitamin D3, (VITAMIN D3) 1,000 unit cap Take  by mouth., Historical Med      cyanocobalamin (VITAMIN B-12) 1,000 mcg tablet Take 1,000 mcg by mouth daily. , Historical Med           Scribe Attestation  Reymundo Dakins scribing for and in the presence of Ryann Velázquez MD (04/11/17)  Signed by: Reymundo Dakins, Scribe, April 11, 2017 at 1:17 PM     Physician Attestation  I personally performed the services described in this documentation, reviewed and edited the documentation which was dictated to the scribe in my presence, and it accurately records my words and actions.     Ryann Velázquez MD (04/11/17)

## 2017-04-20 ENCOUNTER — OFFICE VISIT (OUTPATIENT)
Dept: ORTHOPEDIC SURGERY | Age: 67
End: 2017-04-20

## 2017-04-20 VITALS
SYSTOLIC BLOOD PRESSURE: 138 MMHG | TEMPERATURE: 98.1 F | BODY MASS INDEX: 34.96 KG/M2 | WEIGHT: 173.4 LBS | OXYGEN SATURATION: 100 % | RESPIRATION RATE: 18 BRPM | HEIGHT: 59 IN | HEART RATE: 86 BPM | DIASTOLIC BLOOD PRESSURE: 78 MMHG

## 2017-04-20 DIAGNOSIS — M79.18 MYOFASCIAL PAIN: Primary | ICD-10-CM

## 2017-04-20 RX ORDER — ACETAMINOPHEN 325 MG/1
650 TABLET ORAL
COMMUNITY

## 2017-04-20 NOTE — MR AVS SNAPSHOT
Visit Information Date & Time Provider Department Dept. Phone Encounter #  
 4/20/2017 11:30 AM Jennifer Hawk MD [facility] [phone] [id number] Follow-up Instructions Return in about 6 weeks (around 6/1/2017), or if symptoms worsen or fail to improve. Upcoming Health Maintenance Date Due DTaP/Tdap/Td series (1 - Tdap) 5/3/1969 FOBT Q 1 YEAR AGE 50-75 5/3/1998 ZOSTER VACCINE AGE 60> 5/3/2008 GLAUCOMA SCREENING Q2Y 5/3/2013 Pneumococcal 65+ Low/Medium Risk (1 of 2 - PCV13) 5/3/2013 MEDICARE YEARLY EXAM 5/3/2013 INFLUENZA AGE 9 TO ADULT 8/1/2016 BREAST CANCER SCRN MAMMOGRAM 11/1/2018 Allergies as of 4/20/2017  Review Complete On: 4/20/2017 By: Balwinder Yee No Known Allergies Current Immunizations  Never Reviewed No immunizations on file. Not reviewed this visit You Were Diagnosed With   
  
 Codes Comments Myofascial pain    -  Primary ICD-10-CM: M79.1 ICD-9-CM: 729.1 Vitals BP Pulse Temp Resp Height(growth percentile) Weight(growth percentile) 138/78 86 98.1 °F (36.7 °C) (Oral) 18 4' 11\" (1.499 m) 173 lb 6.4 oz (78.7 kg) SpO2 BMI OB Status Smoking Status 100% 35.02 kg/m2 Hysterectomy Never Smoker BMI and BSA Data Body Mass Index Body Surface Area 35.02 kg/m 2 1.81 m 2 Preferred Pharmacy Pharmacy Name Phone [id number]. Timbo Haynes, [address] [phone] Your Updated Medication List  
  
   
This list is accurate as of: 4/20/17 12:07 PM.  Always use your most recent med list.  
  
  
  
  
 allopurinol 300 mg tablet Commonly known as:  Ever Gobble Take  by mouth daily. cyclobenzaprine 10 mg tablet Commonly known as:  FLEXERIL Take 1 Tab by mouth three (3) times daily as needed for Muscle Spasm(s). Indications: MUSCLE SPASM  
  
 diazePAM 5 mg tablet Commonly known as:  VALIUM  
 Take 1 Tab by mouth every eight (8) hours as needed for Anxiety. Max Daily Amount: 15 mg.  
  
 folic acid 1 mg tablet Commonly known as:  Google Take  by mouth daily. metoprolol tartrate 50 mg tablet Commonly known as:  LOPRESSOR Take  by mouth daily. ondansetron 8 mg disintegrating tablet Commonly known as:  ZOFRAN ODT Take 1 Tab by mouth every eight (8) hours as needed for Nausea for up to 12 doses. PROTONIX 40 mg tablet Generic drug:  pantoprazole Take 40 mg by mouth daily. traMADol 50 mg tablet Commonly known as:  ULTRAM  
Take 1 Tab by mouth two (2) times daily as needed for Pain. Max Daily Amount: 100 mg.  
  
 triamterene-hydroCHLOROthiazide 37.5-25 mg per tablet Commonly known as:  Ruthe Salon TYLENOL 325 mg tablet Generic drug:  acetaminophen Take  by mouth every four (4) hours as needed for Pain. VITAMIN B-12 1,000 mcg tablet Generic drug:  cyanocobalamin Take 1,000 mcg by mouth daily. VITAMIN D3 1,000 unit Cap Generic drug:  cholecalciferol Take  by mouth. We Performed the Following REFERRAL TO PHYSICAL THERAPY [FJA61 Custom] Comments:  
 Patient is a member of the [place]. Please provide her with a gym based exercise program to relieve myofascial back pain. Follow-up Instructions Return in about 6 weeks (around 6/1/2017), or if symptoms worsen or fail to improve. Referral Information Referral ID Referred By Referred To  
  
 [id number] MARY Mcgill Not Available Visits Status Start Date End Date 1 New Request 4/20/17 4/20/18 If your referral has a status of pending review or denied, additional information will be sent to support the outcome of this decision. Patient Instructions What Is Myofascial Pain Syndrome?  
 
Myofascial pain syndrome is a chronic pain disorder that affects fascia (connective tissue that covers the muscles) and is characterized by muscle pain, tenderness, and spasm. The syndrome can involve a single muscle or a muscle group. Myofascial pain syndrome typically affects muscles in asymmetric areas of the body. The precise cause of the syndrome is unknown. As with most chronic pain conditions, associated symptoms may include poor sleep, fatigue, depression, and behavioral disturbances. In myofascial pain syndrome, there are focal tender points in muscles called trigger points. A trigger point is usually within a taut band of muscle that can be felt by the examiner. They can ultimately be identified by the examiner applying pressure with one to three fingers and the thumb. Patients usually exhibit a jump sign when a trigger point is pressed. The patient with a positive jump sign may wince, cry out, and withdraw from the pressure being applied by an examiner. The pain may be referred, or felt at a distance away from the area being compressed. Trigger points can occur in muscles, ligaments, fascia, and periosteum (the membrane surrounding a bone). Pain in trigger points can be made worse with activity or stress. Currently, four types of trigger points can be distinguished: 
 
-An active trigger point is an area of extreme tenderness usually within a taut muscle or muscle group 
-A latent trigger point is an inactive area with the potential to act like a trigger point 
-A secondary trigger point is a highly irritable area in a muscle or muscle group that can be activated due to a trigger point or overload in another muscle or muscle group 
-A satellite trigger point is a highly irritable spot in a muscle or muscle group that becomes active because the muscle is in the region of another trigger point Causes Of Myofascial Pain Syndrome No one single factor can be identified as causing myofascial pain syndrome. The syndrome may develop from a muscle injury or excessive strain on a certain muscle or muscle group, ligament, or tendon. In addition, the following factors may be contributors: 
 
Trauma to the discs between the backbones, or vertebrae Inflammatory conditions Lack of blood flow to heart muscle Deconditioning from lack of exercise General fatigue Nutritional deficiencies Hormonal changes Obesity Intense cooling of parts of the body Use of tobacco 
Repetitive motions Overuse of a muscle Alcohol or drug abuse Long-term emotional stress Treatments For Myofascial Pain Syndrome Myofascial pain syndrome is best treated with a team of various medical professionals and various forms of therapies. The treatment team may consist of a primary care physician, a specialist in [address] [facility], nurses, physical and occupational therapists, massage therapists, and psychologists. The therapies may be carried out through drug and non-drug means. Current practice is combining non-drug therapies with short-term drug therapies for longer lasting and maximal benefits. Various drugs can be used in the treatment of myofascial pain syndrome. Non-steroidal anti-inflammatory drugs (NSAIDs) such as aspirin (Rahel), ibuprofen (Advil), and naproxen sodium (Aleve) can be used short term to reduce acute pain and inflammation. Acetaminophen (Tylenol) and oral narcotics such as oxycodone and hydrocodone may also be used in the short term for pain relief. Tricyclic antidepressants such as trazodone (Desyrel) and amitriptyline (Elavil) can be used at bedtime to improve sleep as well as relieve pain. Muscle relaxants such as cyclobenzaprine (Flexeril) and carisoprodol (Soma) can be used to relax muscle spasm and improve sleep, as they can have a sedating effect. Antidepressants such as fluoxetine (Prozac), sertraline (Zoloft), and duloxetine (Cymbalta) can be helpful with the chronic pain of myofascial pain syndrome.  Anti-seizure medications such as gabapentin (Neurontin) and pregabalin (Lyrica) can also be helpful with the chronic pain of the syndrome. Capsaicin cream, a topical pain reliever derived from chili peppers, may also be helpful with the chronic pain of myofascial pain syndrome. Injections can also be utilized in the treatment of myofascial pain syndrome. Trigger point injections and botulinum toxin (Botox) injections are two areas of recent interest. Trigger point injections involve direct injection of a local anesthetic into the trigger point. This method is very effective when there are only a few precisely located trigger points. Botox can also be directly injected into trigger points. This method has produced inconsistent results. Various non-drug therapies can be utilized in the treatment of myofascial pain syndrome. Physical therapy is one of the best treatments for the syndrome. The stretch and spray method has also been used with some success for treatment of the syndrome. It involves spraying the muscle or muscle group containing the trigger point with a coolant, such as flourimethane, and then slowly stretching the muscle. Massage therapy is another non-drug treatment used in the treatment of the syndrome. Massage therapists exert ischemic compression, which is the application of progressively stronger pressure on a trigger point for the purpose of eliminating its tenderness. Conclusion Myofascial pain syndrome is a chronic pain disorder that affects muscle and the fascia covering the muscle. Key to the diagnosis and treatment of the syndrome is the identification of trigger points, which are areas of extreme tenderness in bands of taut muscle. No one knows the exact cause of the disorder. The treatment of myofascial pain syndrome is best done by a multidisciplinary team utilizing various drug and non-drug therapies.  The combination of physical therapy, trigger point injections, and massage are routinely used with some success in the treatment of myofascial pain syndrome. [email or web address]  
 
  
Introducing [facility]! Gayatri Bravo introduces Gl[employer]Re[employer] patient portal. Now you can access parts of your medical record, email your doctor's office, and request medication refills online. 1. In your internet browser, go to [email or web address] [email or web address] 2. Click on the First Time User? Click Here link in the Sign In box. You will see the New Member Sign Up page. 3. Enter your Gl[employer] Access Code exactly as it appears below. You will not need to use this code after youve completed the sign-up process. If you do not sign up before the expiration date, you must request a new code. · Gl[employer] Access Code: [email or web address] Expires: 6/27/2017 11:30 AM 
 
4. Enter the last four digits of your Social Security Number (xxxx) and Date of Birth (mm/dd/yyyy) as indicated and click Submit. You will be taken to the next sign-up page. 5. Create a Gl[employer]Reach ID. This will be your [employer]Re[employer] login ID and cannot be changed, so think of one that is secure and easy to remember. 6. Create a [employer] password. You can change your password at any time. 7. Enter your Password Reset Question and Answer. This can be used at a later time if you forget your password. 8. Enter your e-mail address. You will receive e-mail notification when new information is available in [address] 9. Click Sign Up. You can now view and download portions of your medical record. 10. Click the Download Summary menu link to download a portable copy of your medical information. If you have questions, please visit the Frequently Asked Questions section of the [employer] website. Remember, [employer]ach is NOT to be used for urgent needs. For medical emergencies, dial 911. Now available from your iPhone and Android! Please provide this summary of care documentation to your next provider. Your primary care clinician is listed as Shahriar Flores. If you have any questions after today's visit, please call [phone].

## 2017-04-20 NOTE — PROGRESS NOTES
[address]  [address]  [place] [address]  Phone: [phone]  Fax: [phone]        [facility]  : 1950  PCP: Reed Ramires, DO  2017    PROGRESS NOTE      ASSESSMENT AND PLAN    Shira Toure comes in to the office today for an L1-2 interlaminar injection f/u. She did not find relief for her left leg pain with the injection. Pt called in for a muscle relaxer afterwards and was prescribed Flexeril which caused her mouth to twist and her face to feel numb. She went to the ED who diagnosed her symptoms as an adverse reaction to the medication as her brain MRI was negative. Her pain today appears to be mostly myofascial in nature. We discussed a course of PT but pt would prefer to do her own exercise program at the [facility]. She was given a referral to PT to learn exercises to use in the gym. Pt was informed that she may call for a refill of Tramadol. Pt will f/u in 6 weeks or prn. Ariana was seen today for back pain. Diagnoses and all orders for this visit:    Myofascial pain  -     REFERRAL TO PHYSICAL THERAPY         Follow-up Disposition:  Return in about 6 weeks (around 2017), or if symptoms worsen or fail to improve. HISTORY OF PRESENT ILLNESS  Shira Toure is a 74 y.o. female. A&P / HPI from 3/10/2017:  Ellie [place] comes in to the office today c/o 10/10 aching cervical and lumbar pain with regional pain in the anterior and posterior portions of her left leg x 10 years. Pt also notes that her leg frequently cramps. Her xray today shows degenerative lumbar scoliosis. Her lumbar pain is likely due to facet syndrome. Her left leg pain appears to be due to a combination of left hip arthritis and lumbar stenosis/radiculopathy.  Ray Hankss  She was referred for a lumbar MRI. Pt was prescribed a Prednisone 10 mg dose pack.  She was advised to see a rheumatologist for her RA.      Pt will f/u in 2 weeks or prn.     Updates from 17:  Pt presents for a lumbar MRI f/u. The MRI shows L1-2 and L2-3 R>L disc protrusions. There is also L4-5 and L5-S1 moderate to severe facet arthropathy. She states that her pain varies from day to day and typically worsens as the day progresses with the worst regions of pain being her anterior and posterior left leg, the top of her left foot, and her right knee.     She is scheduled to see a rheumatologist within the next few months.     Pt took the Prednisone 10 mg dose pack without relief. Updates from 17:  Pt presents for an L1-2 interlaminar injection f/u. She did not find relief for her left leg pain with the injection. Pt called in for a muscle relaxer afterwards and was prescribed Flexeril which caused her mouth to twist and her face to feel numb. She went to the ED who diagnosed her symptoms as an adverse reaction to the medication as her brain MRI was negative. Her pain today appears to be mostly myofascial in nature. PAST MEDICAL HISTORY   Past Medical History:   Diagnosis Date    Arthritis     GERD (gastroesophageal reflux disease)     Hypertension     Ill-defined condition     Positional Vertigo -  pt can not lie flat       Past Surgical History:   Procedure Laterality Date    HX BREAST BIOPSY      6-8 yrs ago, scar marked    HX CARPAL TUNNEL RELEASE Right     mid to late     HX CARPAL TUNNEL RELEASE Left     mid     HX  SECTION      HX CHOLECYSTECTOMY      HX GYN      Vaginal Hernia as a child     HX HYSTERECTOMY      partial    .      MEDICATIONS      Current Outpatient Prescriptions   Medication Sig Dispense Refill    acetaminophen (TYLENOL) 325 mg tablet Take  by mouth every four (4) hours as needed for Pain.  traMADol (ULTRAM) 50 mg tablet Take 1 Tab by mouth two (2) times daily as needed for Pain. Max Daily Amount: 100 mg. 60 Tab 0    metoprolol (LOPRESSOR) 50 mg tablet Take  by mouth daily.       allopurinol (ZYLOPRIM) 300 mg tablet Take  by mouth daily.  pantoprazole (PROTONIX) 40 mg tablet Take 40 mg by mouth daily.  folic acid (FOLVITE) 1 mg tablet Take  by mouth daily.  diazePAM (VALIUM) 5 mg tablet Take 1 Tab by mouth every eight (8) hours as needed for Anxiety. Max Daily Amount: 15 mg. 20 Tab 0    cyclobenzaprine (FLEXERIL) 10 mg tablet Take 1 Tab by mouth three (3) times daily as needed for Muscle Spasm(s). Indications: MUSCLE SPASM 90 Tab 1    triamterene-hydroCHLOROthiazide (MAXZIDE) 37.5-25 mg per tablet   0    ondansetron (ZOFRAN ODT) 8 mg disintegrating tablet Take 1 Tab by mouth every eight (8) hours as needed for Nausea for up to 12 doses. 12 Tab 0    Cholecalciferol, Vitamin D3, (VITAMIN D3) 1,000 unit cap Take  by mouth.  cyanocobalamin (VITAMIN B-12) 1,000 mcg tablet Take 1,000 mcg by mouth daily. ALLERGIES  No Known Allergies       SOCIAL HISTORY    Social History     Social History    Marital status:      Spouse name: N/A    Number of children: N/A    Years of education: N/A     Social History Main Topics    Smoking status: Never Smoker    Smokeless tobacco: None    Alcohol use Yes    Drug use: None    Sexual activity: Not Asked     Other Topics Concern    None     Social History Narrative       FAMILY HISTORY  History reviewed. No pertinent family history. REVIEW OF SYSTEMS  Review of Systems   Constitutional: Negative for chills, diaphoresis, fever, malaise/fatigue and weight loss. Respiratory: Negative for shortness of breath. Cardiovascular: Negative for chest pain and leg swelling. Gastrointestinal: Negative for constipation, nausea and vomiting. Neurological: Negative for dizziness, tingling, seizures, loss of consciousness and headaches. Psychiatric/Behavioral: The patient does not have insomnia.            PHYSICAL EXAMINATION  Visit Vitals    /78    Pulse 86    Temp 98.1 °F (36.7 °C) (Oral)    Resp 18    Ht 4' 11\" (1.499 m)    Wt 173 lb 6.4 oz (78.7 kg)    SpO2 100%    BMI 35.02 kg/m2       Pain Assessment  3/10/2017   Location of Pain Back;Neck   Severity of Pain 7   Quality of Pain Aching           Constitutional:  Well developed, well nourished, in no acute distress. Psychiatric: Affect and mood are appropriate. Integumentary: No rashes or abrasions noted on exposed areas. SPINE/MUSCULOSKELETAL EXAM    Cervical spine:  Neck is midline. Normal muscle tone. No focal atrophy is noted. ROM pain free. Shoulder ROM intact. Mild tenderness to palpation. Negative Spurling's sign. Negative Tinel's sign. Negative Leonard's sign.       Sensation in the bilateral arms grossly intact to light touch.       Lumbar spine:  No rash, ecchymosis, or gross obliquity. No fasciculations. No focal atrophy is noted. Pain with left hip internal rotation. Full range of motion. Tenderness to palpation in left QL. No tenderness to palpation at the sciatic notch. SI joints non-tender. Trochanters non tender. No pain with back flexion, relief with coming up. Pain with back extension L=M=R.      Sensation in the bilateral legs grossly intact to light touch. Updates from 04/20/17:  Pain with back flexion and extension. MOTOR:      Biceps  Triceps Deltoids Wrist Ext Wrist Flex Hand Intrin   Right 5/5 5/5 5/5 5/5 5/5 5/5   Left 5/5 5/5 5/5 5/5 5/5 5/5             Hip Flex  Quads Hamstrings Ankle DF EHL Ankle PF   Right 5/5 5/5 5/5 5/5 5/5 5/5   Left 5/5 5/5 5/5 5/5 5/5 5/5     DTRs are 1+ biceps, triceps, brachioradialis, patella, and Achilles.      Mildly positive left Straight Leg raise. Squat not tested. No difficulty with tandem gait.       Ambulation without assistive device. FWB.         RADIOGRAPHS  Cervical XR images taken on 3/10/2017 personally reviewed with patient:  1) Osteophytes  2) No obvious fractures or instabilities      Lumbar XR images taken on 3/10/2017 personally reviewed with patient:  1) Degenerative sclerosis with bend to the right side centered at L2-3  2) No obvious fractures     Lumbar MRI images taken on 3/18/2017 personally reviewed with patient:  FINDINGS:       No evidence of destructive bone marrow replacement process is detected. There  is slight sclerotic curvature. Associated with the scoliotic curvature, there  are multiple endplate osteophytes in the lumbar spine. Minimal grade 1  spondylolisthesis is suggested at L4-5 and L5-S1, with offset of about 0.2 cm at  L4-5 and 0.3 cm at L5-S1.      No abnormal intrathecal contents are noted. The conus medullaris is identified  at T12 level. Notably, there is disc-osteophyte bulge-protrusion at T10-11 with  left sided neural foraminal narrowing.      T12-L1: Unremarkable.      L1-2: Broad-based bilateral protruding disc-osteophyte, most pronounced in the  right paracentral to posterolateral aspect. Moderate right foraminal narrowing. No significant left foraminal narrowing. Right lateral recess narrowing. No  significant central canal stenosis.      L2-3: Broad-based disc height and osteophyte protrusion along both  posterolateral to lateral aspects, greater on the right side than the left. Minimal foraminal narrowing on the right side. No significant foraminal  narrowing on the left. No significant central canal stenosis.      L3-4: Bilobed disc bulge, more pronounced on the left side than the right. Mild facet and ligamentum flavum hypertrophy. No central canal stenosis or  foraminal stenosis.      L4-5: Moderate to pronounced bilateral facet hypertrophy. Bilobed small disc  protrusion along both paracentral to posterolateral aspects. No central canal  stenosis. No significant foraminal stenosis.      L5-S1: Severe facet hypertrophy bilaterally. Mild disc protrusion along the  left paracentral to posterolateral aspect. Moderate bulging disc-osteophyte  along the right side. Mild to moderate left foraminal narrowing. Borderline  right foraminal narrowing.      IMPRESSION  IMPRESSION:      1. Disc-osteophyte at multiple levels of the lumbar spine as described above. No significant central canal stenosis. The most pronounced disc protrusion is  observed at L1-2, followed by L2-3. Significant foraminal narrowing on the  right side at L1-2. Foraminal narrowing is observed on the left side at L5-S1  as well.      2. Facet arthropathy.      3. Additional degenerative disc disease suspected in the thoracic spine.        reviewed      Ms. Amadou Torres has a reminder for a \"due or due soon\" health maintenance. I have asked that she contact her primary care provider for follow-up on this health maintenance.       This plan was reviewed with the patient and patient agrees. All questions were answered. More than half of this visit today was spent on counseling.      Written by Sol Crabtree, as dictated by Dr. Zach Du, Dr. Silke Pa, confirm that all documentation is accurate.

## 2017-04-20 NOTE — PATIENT INSTRUCTIONS
What Is Myofascial Pain Syndrome? Myofascial pain syndrome is a chronic pain disorder that affects fascia (connective tissue that covers the muscles) and is characterized by muscle pain, tenderness, and spasm. The syndrome can involve a single muscle or a muscle group. Myofascial pain syndrome typically affects muscles in asymmetric areas of the body. The precise cause of the syndrome is unknown. As with most chronic pain conditions, associated symptoms may include poor sleep, fatigue, depression, and behavioral disturbances. In myofascial pain syndrome, there are focal tender points in muscles called trigger points. A trigger point is usually within a taut band of muscle that can be felt by the examiner. They can ultimately be identified by the examiner applying pressure with one to three fingers and the thumb. Patients usually exhibit a jump sign when a trigger point is pressed. The patient with a positive jump sign may wince, cry out, and withdraw from the pressure being applied by an examiner. The pain may be referred, or felt at a distance away from the area being compressed. Trigger points can occur in muscles, ligaments, fascia, and periosteum (the membrane surrounding a bone). Pain in trigger points can be made worse with activity or stress.  Currently, four types of trigger points can be distinguished:    -An active trigger point is an area of extreme tenderness usually within a taut muscle or muscle group  -A latent trigger point is an inactive area with the potential to act like a trigger point  -A secondary trigger point is a highly irritable area in a muscle or muscle group that can be activated due to a trigger point or overload in another muscle or muscle group  -A satellite trigger point is a highly irritable spot in a muscle or muscle group that becomes active because the muscle is in the region of another trigger point  Causes Of Myofascial Pain Syndrome    No one single factor can be identified as causing myofascial pain syndrome. The syndrome may develop from a muscle injury or excessive strain on a certain muscle or muscle group, ligament, or tendon. In addition, the following factors may be contributors:    Trauma to the discs between the backbones, or vertebrae  Inflammatory conditions  Lack of blood flow to heart muscle  Deconditioning from lack of exercise  General fatigue  Nutritional deficiencies  Hormonal changes  Obesity  Intense cooling of parts of the body  Use of tobacco  Repetitive motions  Overuse of a muscle  Alcohol or drug abuse  Long-term emotional stress  Treatments For Myofascial Pain Syndrome    Myofascial pain syndrome is best treated with a team of various medical professionals and various forms of therapies. The treatment team may consist of a primary care physician, a specialist in [address] [facility], nurses, physical and occupational therapists, massage therapists, and psychologists. The therapies may be carried out through drug and non-drug means. Current practice is combining non-drug therapies with short-term drug therapies for longer lasting and maximal benefits. Various drugs can be used in the treatment of myofascial pain syndrome. Non-steroidal anti-inflammatory drugs (NSAIDs) such as aspirin (Rahel), ibuprofen (Advil), and naproxen sodium (Aleve) can be used short term to reduce acute pain and inflammation. Acetaminophen (Tylenol) and oral narcotics such as oxycodone and hydrocodone may also be used in the short term for pain relief. Tricyclic antidepressants such as trazodone (Desyrel) and amitriptyline (Elavil) can be used at bedtime to improve sleep as well as relieve pain. Muscle relaxants such as cyclobenzaprine (Flexeril) and carisoprodol (Soma) can be used to relax muscle spasm and improve sleep, as they can have a sedating effect.     Antidepressants such as fluoxetine (Prozac), sertraline (Zoloft), and duloxetine (Cymbalta) can be helpful with the chronic pain of myofascial pain syndrome. Anti-seizure medications such as gabapentin (Neurontin) and pregabalin (Lyrica) can also be helpful with the chronic pain of the syndrome. Capsaicin cream, a topical pain reliever derived from chili peppers, may also be helpful with the chronic pain of myofascial pain syndrome. Injections can also be utilized in the treatment of myofascial pain syndrome. Trigger point injections and botulinum toxin (Botox) injections are two areas of recent interest. Trigger point injections involve direct injection of a local anesthetic into the trigger point. This method is very effective when there are only a few precisely located trigger points. Botox can also be directly injected into trigger points. This method has produced inconsistent results. Various non-drug therapies can be utilized in the treatment of myofascial pain syndrome. Physical therapy is one of the best treatments for the syndrome. The stretch and spray method has also been used with some success for treatment of the syndrome. It involves spraying the muscle or muscle group containing the trigger point with a coolant, such as flourimethane, and then slowly stretching the muscle. Massage therapy is another non-drug treatment used in the treatment of the syndrome. Massage therapists exert ischemic compression, which is the application of progressively stronger pressure on a trigger point for the purpose of eliminating its tenderness. Conclusion    Myofascial pain syndrome is a chronic pain disorder that affects muscle and the fascia covering the muscle. Key to the diagnosis and treatment of the syndrome is the identification of trigger points, which are areas of extreme tenderness in bands of taut muscle. No one knows the exact cause of the disorder. The treatment of myofascial pain syndrome is best done by a multidisciplinary team utilizing various drug and non-drug therapies.  The combination of physical therapy, trigger point injections, and massage are routinely used with some success in the treatment of myofascial pain syndrome.     [email or web address]

## 2017-05-04 NOTE — PROGRESS NOTES
[facility] [place] [employer]  [address] [facility]  [phone] fax    Physical Therapy Discharge Summary    Patient name: Yana Oneill Start of Care:  17   Referral source: Bertha Aparicio DO : [id number]   Medical/Treatment Diagnosis: Unsteadiness on feet [R26.81]  Dizziness and giddiness [R42] Onset Date: 15-20 years ago     Prior Hospitalization: see medical history Provider#: [id number]   Medications: Verified on Patient Summary List    Comorbidities:  HTN, arthritis  Prior Level of Function: Ind with ADLs, Ind with ambulation    Visits from Start of Care: 6    Missed Visits: 0    Reporting Period : 17 to 3/20/17    Summary of Care:  Goal: Patient will improve FOTO score by 23 points in order to demonstrate a significant improvement in function. Status at last note/certification: 46  Status at discharge: not met    Goal: Patient will report a 75% improvement in dizziness symptoms since [facility] in order to improve quality of life. Status at last note/certification: n/a  Status at discharge: not met    Goal: Patient will demonstrate negative Dodson-Hallpike test in order to demonstrate decreased dizziness symptoms. Status at last note/certification: positive   Status at discharge: not met    Pt. Was making limited progress towards goals. She was feeling better and had a negative Dodson-Hallpike test, but then symptoms returned the next week. She continues to have positive Dodson-Hallpike test to L. Negative roll test. She was educated on home Epley maneuver. Pt. Came for 1 visit following last progress note and then did not return to PT. She will be D/C at this time.      ASSESSMENT/RECOMMENDATIONS:  [x]Discontinue therapy: []Patient has reached or is progressing toward set goals      [x]Patient is non-compliant or has abdicated      []Due to lack of appreciable progress towards set goals    Dion Wang, PT 2017 12:31 PM

## 2017-05-08 ENCOUNTER — APPOINTMENT (OUTPATIENT)
Dept: PHYSICAL THERAPY | Age: 67
End: 2017-05-08

## 2017-06-01 ENCOUNTER — OFFICE VISIT (OUTPATIENT)
Dept: ORTHOPEDIC SURGERY | Age: 67
End: 2017-06-01

## 2017-06-01 VITALS
HEART RATE: 83 BPM | WEIGHT: 171.2 LBS | HEIGHT: 59 IN | BODY MASS INDEX: 34.51 KG/M2 | OXYGEN SATURATION: 98 % | RESPIRATION RATE: 18 BRPM | SYSTOLIC BLOOD PRESSURE: 138 MMHG | TEMPERATURE: 98 F | DIASTOLIC BLOOD PRESSURE: 81 MMHG

## 2017-06-01 DIAGNOSIS — M79.18 MYOFASCIAL PAIN: Primary | ICD-10-CM

## 2017-06-01 RX ORDER — GLUCOSAMINE/CHONDR SU A SOD 750-600 MG
10000 TABLET ORAL EVERY OTHER DAY
COMMUNITY
End: 2020-08-14

## 2017-06-01 NOTE — PROGRESS NOTES
[address]  [address]  [place], [facility] [address]  Phone: [phone]  Fax: [phone]        Daren Graham  : 1950  PCP: Arelis Arroyo,   2017    PROGRESS NOTE      ASSESSMENT AND PLAN    Anthony Guy comes in to the office today for L1-2 interlaminar injection f/u. She reports that the injection did not provide any relief. Although, she has been to a chiropractor who did some manipulation which provided good relief. She has also seen Dr. Sofiya Ross who did a trochanter bursitis injection which provided great relief. I believe that her pain is mainly myofascial by nature, therefore she will continue with the chiropractric aspect. She is also going to continue with aqua therapy. We will see how she does with this approach. Pt will f/u in 6 months or sooner as needed. Daletyler Parrish was seen today for back pain. Diagnoses and all orders for this visit:    Myofascial pain       Follow-up Disposition:  Return in about 6 months (around 2017), or if symptoms worsen or fail to improve. HISTORY OF PRESENT ILLNESS  Anthony Guy is a 69 y.o. female. A&P / HPI from 3/10/2017:  Roly Michael comes in to the office today c/o 10/10 aching cervical and lumbar pain with regional pain in the anterior and posterior portions of her left leg x 10 years. Pt also notes that her leg frequently cramps. Her xray today shows degenerative lumbar scoliosis. Her lumbar pain is likely due to facet syndrome. Her left leg pain appears to be due to a combination of left hip arthritis and lumbar stenosis/radiculopathy.  Baby Butch  She was referred for a lumbar MRI. Pt was prescribed a Prednisone 10 mg dose pack. She was advised to see a rheumatologist for her RA.      Pt will f/u in 2 weeks or prn.     Updates from 17:  Pt presents for a lumbar MRI f/u. The MRI shows L1-2 and L2-3 R>L disc protrusions.  There is also L4-5 and L5-S1 moderate to severe facet arthropathy. She states that her pain varies from day to day and typically worsens as the day progresses with the worst regions of pain being her anterior and posterior left leg, the top of her left foot, and her right knee.     She is scheduled to see a rheumatologist within the next few months.     Pt took the Prednisone 10 mg dose pack without relief.       Updates from 17:  Pt presents for L1-2 interlaminar injection with valium f/u. She reports that the injection did not provide any relief. She has been to a chiropractor who did some manipulation which provided good relief. She has also seen Dr. Jimmy Watson who did a trochanter bursitis injection which provided great relief as well. She has also done acupuncture which helped relieve her myofascial pain. PAST MEDICAL HISTORY   Past Medical History:   Diagnosis Date    Arthritis     GERD (gastroesophageal reflux disease)     Hypertension     Ill-defined condition     Positional Vertigo -  pt can not lie flat       Past Surgical History:   Procedure Laterality Date    HX BREAST BIOPSY      6-8 yrs ago, scar marked    HX CARPAL TUNNEL RELEASE Right     mid to late [de-identified]    HX CARPAL TUNNEL RELEASE Left     mid     HX  SECTION      HX CHOLECYSTECTOMY      HX GYN      Vaginal Hernia as a child     HX HYSTERECTOMY      partial    .      MEDICATIONS      Current Outpatient Prescriptions   Medication Sig Dispense Refill    Biotin 2,500 mcg cap Take  by mouth.  MELOXICAM PO Take  by mouth.  diazePAM (VALIUM) 5 mg tablet Take 1 Tab by mouth every eight (8) hours as needed for Anxiety. Max Daily Amount: 15 mg. 20 Tab 0    triamterene-hydroCHLOROthiazide (MAXZIDE) 37.5-25 mg per tablet   0    metoprolol (LOPRESSOR) 50 mg tablet Take  by mouth daily.  allopurinol (ZYLOPRIM) 300 mg tablet Take  by mouth daily.  pantoprazole (PROTONIX) 40 mg tablet Take 40 mg by mouth daily.       folic acid (FOLVITE) 1 mg tablet Take  by mouth daily.  acetaminophen (TYLENOL) 325 mg tablet Take  by mouth every four (4) hours as needed for Pain.  traMADol (ULTRAM) 50 mg tablet Take 1 Tab by mouth two (2) times daily as needed for Pain. Max Daily Amount: 100 mg. 60 Tab 0    cyclobenzaprine (FLEXERIL) 10 mg tablet Take 1 Tab by mouth three (3) times daily as needed for Muscle Spasm(s). Indications: MUSCLE SPASM 90 Tab 1    ondansetron (ZOFRAN ODT) 8 mg disintegrating tablet Take 1 Tab by mouth every eight (8) hours as needed for Nausea for up to 12 doses. 12 Tab 0    Cholecalciferol, Vitamin D3, (VITAMIN D3) 1,000 unit cap Take  by mouth.  cyanocobalamin (VITAMIN B-12) 1,000 mcg tablet Take 1,000 mcg by mouth daily. ALLERGIES  No Known Allergies       SOCIAL HISTORY    Social History     Social History    Marital status:      Spouse name: N/A    Number of children: N/A    Years of education: N/A     Social History Main Topics    Smoking status: Never Smoker    Smokeless tobacco: None    Alcohol use Yes    Drug use: None    Sexual activity: Not Asked     Other Topics Concern    None     Social History Narrative       FAMILY HISTORY  History reviewed. No pertinent family history. REVIEW OF SYSTEMS  Review of Systems   Constitutional: Negative for chills, diaphoresis, fever, malaise/fatigue and weight loss. Respiratory: Negative for shortness of breath. Cardiovascular: Negative for chest pain and leg swelling. Gastrointestinal: Negative for constipation, nausea and vomiting. Neurological: Negative for dizziness, tingling, seizures, loss of consciousness and headaches. Psychiatric/Behavioral: The patient does not have insomnia.            PHYSICAL EXAMINATION  Visit Vitals    /81    Pulse 83    Temp 98 °F (36.7 °C) (Oral)    Resp 18    Ht 4' 11\" (1.499 m)    Wt 171 lb 3.2 oz (77.7 kg)    SpO2 98%    BMI 34.58 kg/m2       Pain Assessment  6/1/2017   Location of Pain Back   Severity of Pain 5   Quality of Pain Aching   Quality of Pain Comment tingling.numbness   Frequency of Pain Constant   Result of Injury No           Constitutional:  Well developed, well nourished, in no acute distress. Psychiatric: Affect and mood are appropriate. Integumentary: No rashes or abrasions noted on exposed areas. SPINE/MUSCULOSKELETAL EXAM    Cervical spine:  Neck is midline. Normal muscle tone. No focal atrophy is noted. ROM pain free. Shoulder ROM intact. Mild tenderness to palpation. Negative Spurling's sign. Negative Tinel's sign. Negative Leonard's sign.       Sensation in the bilateral arms grossly intact to light touch.       Lumbar spine:  No rash, ecchymosis, or gross obliquity. No fasciculations. No focal atrophy is noted. Pain with left hip internal rotation. Full range of motion. Tenderness to palpation in left QL. No tenderness to palpation at the sciatic notch. SI joints non-tender. Trochanters non tender. No pain with back flexion, relief with coming up. Pain with back extension L=M=R.      Sensation in the bilateral legs grossly intact to light touch. MOTOR:      Biceps  Triceps Deltoids Wrist Ext Wrist Flex Hand Intrin   Right 5/5 5/5 5/5 5/5 5/5 5/5   Left 5/5 5/5 5/5 5/5 5/5 5/5             Hip Flex  Quads Hamstrings Ankle DF EHL Ankle PF   Right 5/5 5/5 5/5 5/5 5/5 5/5   Left 5/5 5/5 5/5 5/5 5/5 5/5     DTRs are 1+ biceps, triceps, brachioradialis, patella, and Achilles.      Mildly positive left Straight Leg raise. Squat not tested. No difficulty with tandem gait.       Ambulation without assistive device. FWB.         RADIOGRAPHS  Cervical XR images taken on 3/10/2017 personally reviewed with patient:  1) Osteophytes  2) No obvious fractures or instabilities      Lumbar XR images taken on 3/10/2017 personally reviewed with patient:  1) Degenerative sclerosis with bend to the right side centered at L2-3  2) No obvious fractures     Lumbar MRI images taken on 3/18/2017 personally reviewed with patient:  FINDINGS:       No evidence of destructive bone marrow replacement process is detected. There  is slight sclerotic curvature. Associated with the scoliotic curvature, there  are multiple endplate osteophytes in the lumbar spine. Minimal grade 1  spondylolisthesis is suggested at L4-5 and L5-S1, with offset of about 0.2 cm at  L4-5 and 0.3 cm at L5-S1.      No abnormal intrathecal contents are noted. The conus medullaris is identified  at T12 level. Notably, there is disc-osteophyte bulge-protrusion at T10-11 with  left sided neural foraminal narrowing.      T12-L1: Unremarkable.      L1-2: Broad-based bilateral protruding disc-osteophyte, most pronounced in the  right paracentral to posterolateral aspect. Moderate right foraminal narrowing. No significant left foraminal narrowing. Right lateral recess narrowing. No  significant central canal stenosis.      L2-3: Broad-based disc height and osteophyte protrusion along both  posterolateral to lateral aspects, greater on the right side than the left. Minimal foraminal narrowing on the right side. No significant foraminal  narrowing on the left. No significant central canal stenosis.      L3-4: Bilobed disc bulge, more pronounced on the left side than the right. Mild facet and ligamentum flavum hypertrophy. No central canal stenosis or  foraminal stenosis.      L4-5: Moderate to pronounced bilateral facet hypertrophy. Bilobed small disc  protrusion along both paracentral to posterolateral aspects. No central canal  stenosis. No significant foraminal stenosis.      L5-S1: Severe facet hypertrophy bilaterally. Mild disc protrusion along the  left paracentral to posterolateral aspect. Moderate bulging disc-osteophyte  along the right side. Mild to moderate left foraminal narrowing. Borderline  right foraminal narrowing.      IMPRESSION  IMPRESSION:      1.  Disc-osteophyte at multiple levels of the lumbar spine as described above. No significant central canal stenosis. The most pronounced disc protrusion is  observed at L1-2, followed by L2-3. Significant foraminal narrowing on the  right side at L1-2. Foraminal narrowing is observed on the left side at L5-S1  as well.      2. Facet arthropathy.      3. Additional degenerative disc disease suspected in the thoracic spine.        reviewed      Ms. Willey Duane has a reminder for a \"due or due soon\" health maintenance. I have asked that she contact her primary care provider for follow-up on this health maintenance.       This plan was reviewed with the patient and patient agrees. All questions were answered.  More than half of this visit today was spent on counseling.      Written by Dianelys Rob, as dictated by Dr. Dorenda Snellen, Dr. Cassy Hanson, confirm that all documentation is accurate.

## 2017-06-01 NOTE — PATIENT INSTRUCTIONS

## 2017-06-01 NOTE — MR AVS SNAPSHOT
Visit Information Date & Time Provider Department Dept. Phone Encounter #  
 6/1/2017 11:30 AM Corona Lind, 27 [facility] [phone] [id number] Follow-up Instructions Return in about 6 months (around 12/1/2017), or if symptoms worsen or fail to improve. Upcoming Health Maintenance Date Due DTaP/Tdap/Td series (1 - Tdap) 5/3/1969 FOBT Q 1 YEAR AGE 50-75 5/3/1998 ZOSTER VACCINE AGE 60> 5/3/2008 GLAUCOMA SCREENING Q2Y 5/3/2013 Pneumococcal 65+ Low/Medium Risk (1 of 2 - PCV13) 5/3/2013 MEDICARE YEARLY EXAM 5/3/2013 INFLUENZA AGE 9 TO ADULT 8/1/2017 BREAST CANCER SCRN MAMMOGRAM 11/1/2018 Allergies as of 6/1/2017  Review Complete On: 6/1/2017 By: Monica Mccray LPN No Known Allergies Current Immunizations  Never Reviewed No immunizations on file. Not reviewed this visit You Were Diagnosed With   
  
 Codes Comments Myofascial pain    -  Primary ICD-10-CM: M79.1 ICD-9-CM: 729.1 Vitals BP Pulse Temp Resp Height(growth percentile) Weight(growth percentile) 138/81 83 98 °F (36.7 °C) (Oral) 18 4' 11\" (1.499 m) 171 lb 3.2 oz (77.7 kg) SpO2 BMI OB Status Smoking Status 98% 34.58 kg/m2 Hysterectomy Never Smoker BMI and BSA Data Body Mass Index Body Surface Area 34.58 kg/m 2 1.8 m 2 Preferred Pharmacy Pharmacy Name Phone [id number] Vish Vicente, [address] [facility] [phone] Your Updated Medication List  
  
   
This list is accurate as of: 6/1/17 11:47 AM.  Always use your most recent med list.  
  
  
  
  
 allopurinol 300 mg tablet Commonly known as:  Pratt Clifton Take  by mouth daily. Biotin 2,500 mcg Cap Take  by mouth. cyclobenzaprine 10 mg tablet Commonly known as:  FLEXERIL Take 1 Tab by mouth three (3) times daily as needed for Muscle Spasm(s). Indications: MUSCLE SPASM diazePAM 5 mg tablet Commonly known as:  VALIUM Take 1 Tab by mouth every eight (8) hours as needed for Anxiety. Max Daily Amount: 15 mg.  
  
 folic acid 1 mg tablet Commonly known as:  Google Take  by mouth daily. MELOXICAM PO Take  by mouth.  
  
 metoprolol tartrate 50 mg tablet Commonly known as:  LOPRESSOR Take  by mouth daily. ondansetron 8 mg disintegrating tablet Commonly known as:  ZOFRAN ODT Take 1 Tab by mouth every eight (8) hours as needed for Nausea for up to 12 doses. PROTONIX 40 mg tablet Generic drug:  pantoprazole Take 40 mg by mouth daily. traMADol 50 mg tablet Commonly known as:  ULTRAM  
Take 1 Tab by mouth two (2) times daily as needed for Pain. Max Daily Amount: 100 mg.  
  
 triamterene-hydroCHLOROthiazide 37.5-25 mg per tablet Commonly known as:  Stark Rota TYLENOL 325 mg tablet Generic drug:  acetaminophen Take  by mouth every four (4) hours as needed for Pain. VITAMIN B-12 1,000 mcg tablet Generic drug:  cyanocobalamin Take 1,000 mcg by mouth daily. VITAMIN D3 1,000 unit Cap Generic drug:  cholecalciferol Take  by mouth. Follow-up Instructions Return in about 6 months (around 12/1/2017), or if symptoms worsen or fail to improve. Patient Instructions Low Back Pain: Exercises Your Care Instructions Here are some examples of typical rehabilitation exercises for your condition. Start each exercise slowly. Ease off the exercise if you start to have pain. Your doctor or physical therapist will tell you when you can start these exercises and which ones will work best for you. How to do the exercises Press-up 1. Lie on your stomach, supporting your body with your forearms. 2. Press your elbows down into the floor to raise your upper back.  As you do this, relax your stomach muscles and allow your back to arch without using your back muscles. As your press up, do not let your hips or pelvis come off the floor. 3. Hold for 15 to 30 seconds, then relax. 4. Repeat 2 to 4 times. Alternate arm and leg (bird dog) exercise Note: Do this exercise slowly. Try to keep your body straight at all times, and do not let one hip drop lower than the other. 1. Start on the floor, on your hands and knees. 2. Tighten your belly muscles. 3. Raise one leg off the floor, and hold it straight out behind you. Be careful not to let your hip drop down, because that will twist your trunk. 4. Hold for about 6 seconds, then lower your leg and switch to the other leg. 5. Repeat 8 to 12 times on each leg. 6. Over time, work up to holding for 10 to 30 seconds each time. 7. If you feel stable and secure with your leg raised, try raising the opposite arm straight out in front of you at the same time. Knee-to-chest exercise 1. Lie on your back with your knees bent and your feet flat on the floor. 2. Bring one knee to your chest, keeping the other foot flat on the floor (or keeping the other leg straight, whichever feels better on your lower back). 3. Keep your lower back pressed to the floor. Hold for at least 15 to 30 seconds. 4. Relax, and lower the knee to the starting position. 5. Repeat with the other leg. Repeat 2 to 4 times with each leg. 6. To get more stretch, put your other leg flat on the floor while pulling your knee to your chest. 
Curl-ups 1. Lie on the floor on your back with your knees bent at a 90-degree angle. Your feet should be flat on the floor, about 12 inches from your buttocks. 2. Cross your arms over your chest. If this bothers your neck, try putting your hands behind your neck (not your head), with your elbows spread apart. 3. Slowly tighten your belly muscles and raise your shoulder blades off the floor.  
4. Keep your head in line with your body, and do not press your chin to your chest. 
 5. Hold this position for 1 or 2 seconds, then slowly lower yourself back down to the floor. 6. Repeat 8 to 12 times. Pelvic tilt exercise 1. Lie on your back with your knees bent. 2. \"Brace\" your stomach. This means to tighten your muscles by pulling in and imagining your belly button moving toward your spine. You should feel like your back is pressing to the floor and your hips and pelvis are rocking back. 3. Hold for about 6 seconds while you breathe smoothly. 4. Repeat 8 to 12 times. Heel dig bridging 1. Lie on your back with both knees bent and your ankles bent so that only your heels are digging into the floor. Your knees should be bent about 90 degrees. 2. Then push your heels into the floor, squeeze your buttocks, and lift your hips off the floor until your shoulders, hips, and knees are all in a straight line. 3. Hold for about 6 seconds as you continue to breathe normally, and then slowly lower your hips back down to the floor and rest for up to 10 seconds. 4. Do 8 to 12 repetitions. Hamstring stretch in doorway 1. Lie on your back in a doorway, with one leg through the open door. 2. Slide your leg up the wall to straighten your knee. You should feel a gentle stretch down the back of your leg. 3. Hold the stretch for at least 15 to 30 seconds. Do not arch your back, point your toes, or bend either knee. Keep one heel touching the floor and the other heel touching the wall. 4. Repeat with your other leg. 5. Do 2 to 4 times for each leg. Hip flexor stretch 1. Kneel on the floor with one knee bent and one leg behind you. Place your forward knee over your foot. Keep your other knee touching the floor. 2. Slowly push your hips forward until you feel a stretch in the upper thigh of your rear leg. 3. Hold the stretch for at least 15 to 30 seconds. Repeat with your other leg. 4. Do 2 to 4 times on each side. Wall sit 1. Stand with your back 10 to 12 inches away from a wall. 2. Lean into the wall until your back is flat against it. 3. Slowly slide down until your knees are slightly bent, pressing your lower back into the wall. 4. Hold for about 6 seconds, then slide back up the wall. 5. Repeat 8 to 12 times. Follow-up care is a key part of your treatment and safety. Be sure to make and go to all appointments, and call your doctor if you are having problems. It's also a good idea to know your test results and keep a list of the medicines you take. Where can you learn more? Go to http://jamison-ilya.info/. Enter X083 in the search box to learn more about \"Low Back Pain: Exercises. \" Current as of: May 23, 2016 Content Version: 11.2 © 0592-6191 [employer] ([employer]. Care instructions adapted under license by [employer] (which disclaims liability or warranty for this information). If you have questions about a medical condition or this instruction, always ask your healthcare professional. [place] [address] any warranty or liability for your use of this information. Introducing [facility]! [employer] introduces The [employer]oot patient portal. Now you can access parts of your medical record, email your doctor's office, and request medication refills online. 1. In your internet browser, go to https://[email or web address]. [employer] 2. Click on the First Time User? Click Here link in the Sign In box. You will see the New Member Sign Up page. 3. Enter your [employer] Whoot Access Code exactly as it appears below. You will not need to use this code after youve completed the sign-up process. If you do not sign up before the expiration date, you must request a new code. · The Wh[employer] Access Code: [email or web address] Expires: 6/27/2017 11:30 AM 
 
4. Enter the last four digits of your Social Security Number (xxxx) and Date of Birth (mm/dd/yyyy) as indicated and click Submit. You will be taken to the next sign-up page. 5. Create a [email or web address] ID. This will be your [email or web address] login ID and cannot be changed, so think of one that is secure and easy to remember. 6. Create a [email or web address] password. You can change your password at any time. 7. Enter your Password Reset Question and Answer. This can be used at a later time if you forget your password. 8. Enter your e-mail address. You will receive e-mail notification when new information is available in [address] 9. Click Sign Up. You can now view and download portions of your medical record. 10. Click the Download Summary menu link to download a portable copy of your medical information. If you have questions, please visit the Frequently Asked Questions section of the [email or web address] website. Remember, [email or web address] is NOT to be used for urgent needs. For medical emergencies, dial 911. Now available from your iPhone and Android! Please provide this summary of care documentation to your next provider. Your primary care clinician is listed as Star Min. If you have any questions after today's visit, please call [phone].

## 2017-10-18 ENCOUNTER — HOSPITAL ENCOUNTER (OUTPATIENT)
Dept: MAMMOGRAPHY | Age: 67
Discharge: HOME | End: 2017-10-18
Attending: FAMILY MEDICINE
Payer: MEDICARE

## 2017-10-18 DIAGNOSIS — Z12.31 VISIT FOR SCREENING MAMMOGRAM: ICD-10-CM

## 2017-10-18 PROCEDURE — 77063 BREAST TOMOSYNTHESIS BI: CPT

## 2018-02-14 ENCOUNTER — HOSPITAL ENCOUNTER (OUTPATIENT)
Dept: PHYSICAL THERAPY | Age: 68
Discharge: HOME | End: 2018-02-14
Payer: MEDICARE

## 2018-02-14 PROCEDURE — G8981 BODY POS CURRENT STATUS: HCPCS

## 2018-02-14 PROCEDURE — 97110 THERAPEUTIC EXERCISES: CPT

## 2018-02-14 PROCEDURE — G8982 BODY POS GOAL STATUS: HCPCS

## 2018-02-14 PROCEDURE — 97161 PT EVAL LOW COMPLEX 20 MIN: CPT

## 2018-02-14 NOTE — PROGRESS NOTES
PT DAILY TREATMENT NOTE - [facility]     Patient Name: Dominik Mejia  Date:2018  : 1950  [x]  Patient  Verified  Payor: Stoney Page / Plan: [place] & B / Product Type: Medicare /    In time:12:03  Out time:12:48  Total Treatment Time (min): 45  Total Timed Codes (min): 25  1:1 Treatment Time ( only): 39   Visit #: 1 of 8    Treatment Area: Radiculopathy, lumbar region [M54.16]    SUBJECTIVE  Pain Level (0-10 scale): 5  Any medication changes, allergies to medications, adverse drug reactions, diagnosis change, or new procedure performed?: [x] No    [] Yes (see summary sheet for update)  Subjective functional status/changes:   [] No changes reported  Pt reports her lumbar pain is more of a discomfort, she does have some aching into L lateral LE    OBJECTIVE    20 min [x]Eval                  []Re-Eval       25 min Therapeutic Exercise:  [] See flow sheet :   Rationale: increase ROM and improve coordination to improve the patients ability to perform ADLs        With   [] TE   [] TA   [] neuro   [] other: Patient Education: [x] Review HEP    [] Progressed/Changed HEP based on:   [] positioning   [] body mechanics   [] transfers   [] heat/ice application    [] other:      Other Objective/Functional Measures: (+) R upslip, improved with LE traction     Pain Level (0-10 scale) post treatment: 4    ASSESSMENT/Changes in Function: see POC    Patient will continue to benefit from skilled PT services to modify and progress therapeutic interventions, address functional mobility deficits, address ROM deficits, address strength deficits, analyze and address soft tissue restrictions, analyze and cue movement patterns and analyze and modify body mechanics/ergonomics to attain remaining goals. []  See Plan of Care  []  See progress note/recertification  []  See Discharge Summary         Progress towards goals / Updated goals:  Short Term Goals: To be accomplished in 2 weeks:  1.  Pt will be I and compliant with HEP daily to improve progression and efficacy of therapy. 2. Pt will improve L 90/90 HS flexibility <20 deg for reduced dural tension and improved lumbopelvic mechanics. Long Term Goals: To be accomplished in 4 weeks:  1. Pt will demonstrate 10 bridges to full height for improved core strength and bed mobility. 2. Pt will improve trunk SB AROM to 75% of WNL for improved performance of housework. 3. Pt will report no difficulty performing her normal housework for improved functional independence. 4. Pt will demonstrate 4/5 hip ABD and EXT MMT for improved standing activity tolerance.     PLAN  []  Upgrade activities as tolerated     [x]  Continue plan of care  []  Update interventions per flow sheet       []  Discharge due to:_  []  Other:_      Deshaun Olivas DPT, CMTPT 2/14/2018  1:11 PM    Future Appointments  Date Time Provider Pricila Manuel   2/16/2018 2:00 PM [facility] PT PHYSICAL THERAPY 1 [facility]   2/19/2018 10:00 AM Claribel Del Castillo [facility]   2/21/2018 10:00 AM Bell Butt PT [facility]   2/26/2018 11:00 AM Deshaun Olivas [facility]   2/28/2018 11:00 AM Gus Paez PTA [facility]   3/5/2018 11:00 AM Briceño Filter [facility]   3/7/2018 11:00 AM Travis Bell PTA [facility]

## 2018-02-14 NOTE — PROGRESS NOTES
[facility]   [place] [address]  [place], [address]  [phone] fax    Plan of Care/ Statement of Necessity for Physical Therapy Services    Patient name: Staci Bright Start of Care: 2018   Referral source: Nia Heredia MD : 1950    Medical Diagnosis: Radiculopathy, lumbar region [M54.16]   Onset Date:1 year   Laminectomy 2018    Treatment Diagnosis: LBP s/p lumbar laminectomy   Prior Hospitalization: see medical history Provider#: [id number]   Medications: Verified on Patient summary List    Comorbidities: arthritis, HTN, sciatica, vertigo, B knee pain   Prior Level of Function: Pt reports chronic LBP for the past year. I with ADLs      The Plan of Care and following information is based on the information from the initial evaluation. Assessment/ key information: Pt is a 70 y/o F presenting with c/o lumbar \"discomfort\" with occasional lateral L LE aching. She reports strong radicular symptoms into L LE priox to laminectomy on 18. Pt unsure but believes her surgery was performed at L5-S1 segment. Pt demonstrates limited trunk extension and SB ROM at this time, questionable dural tension on L LE. Static balance WNL with EO and EC. Soft tissue restrictions and TTP through B glutes L>R with associated R upslip. Pt would benefit from PT to address deficits in ROM, flexibility, core strength and activity tolerance to improve functional independence.     Evaluation Complexity History HIGH Complexity :3+ comorbidities / personal factors will impact the outcome/ POC ; Examination LOW Complexity : 1-2 Standardized tests and measures addressing body structure, function, activity limitation and / or participation in recreation  ;Presentation LOW Complexity : Stable, uncomplicated  ;Clinical Decision Making MEDIUM Complexity : FOTO score of 26-74  Overall Complexity Rating: LOW   Problem List: pain affecting function, decrease ROM, decrease strength, decrease ADL/ functional abilitiies, decrease activity tolerance, decrease flexibility/ joint mobility and decrease transfer abilities   Treatment Plan may include any combination of the following: Therapeutic exercise, Therapeutic activities, Neuromuscular re-education, Physical agent/modality, Gait/balance training, Manual therapy, Patient education, Self Care training and Functional mobility training  Patient / Family readiness to learn indicated by: asking questions and trying to perform skills  Persons(s) to be included in education: patient (P)  Barriers to Learning/Limitations: yes;  other chronic pain  Patient Goal (s): To walk and do my housework  Patient Self Reported Health Status: good  Rehabilitation Potential: good    Short Term Goals: To be accomplished in 2 weeks:  1. Pt will be I and compliant with HEP daily to improve progression and efficacy of therapy. 2. Pt will improve L 90/90 HS flexibility <20 deg for reduced dural tension and improved lumbopelvic mechanics. Long Term Goals: To be accomplished in 4 weeks:  1. Pt will demonstrate 10 bridges to full height for improved core strength and bed mobility. 2. Pt will improve trunk SB AROM to 75% of WNL for improved performance of housework. 3. Pt will report no difficulty performing her normal housework for improved functional independence. 4. Pt will demonstrate 4/5 hip ABD and EXT MMT for improved standing activity tolerance. Frequency / Duration: Patient to be seen 2 times per week for 4 weeks. Patient/ Caregiver education and instruction: Diagnosis, prognosis, self care and exercises   [x]  Plan of care has been reviewed with PTA    G-Codes (GP)  Position   Current  CL= 60-79%   Goal  CK= 40-59%      The severity rating is based on clinical judgment and the FOTO score.     Certification Period: 2/14/2018 to 4/11/2018  Nam Torres DPT, JOSE 2/14/2018 12:59 PM    ________________________________________________________________________    I certify that the above Therapy Services are being furnished while the patient is under my care. I agree with the treatment plan and certify that this therapy is necessary.     [de-identified] Signature:____________________  Date:____________Time: _________    Please sign and return to [facility]  [address] [facility] [address]  [place], [address]  [phone] fax

## 2018-02-16 ENCOUNTER — HOSPITAL ENCOUNTER (OUTPATIENT)
Dept: PHYSICAL THERAPY | Age: 68
Discharge: HOME | End: 2018-02-16
Payer: MEDICARE

## 2018-02-16 PROCEDURE — 97112 NEUROMUSCULAR REEDUCATION: CPT | Performed by: PHYSICAL THERAPIST

## 2018-02-16 PROCEDURE — 97110 THERAPEUTIC EXERCISES: CPT | Performed by: PHYSICAL THERAPIST

## 2018-02-16 NOTE — PROGRESS NOTES
PT DAILY TREATMENT NOTE - [facility]     Patient Name: Analia Figueredo  Date:2018  : 1950  [x]  Patient  Verified  Payor: Namita Chan / Plan: [place] MEDICARE PART A & B / Product Type: Medicare /    In time:  Out time:247  Total Treatment Time (min): 45  Total Timed Codes (min): 35  1:1 Treatment Time ( [address] only): 39   Visit #: 2 of 8    Treatment Area: Radiculopathy, lumbar region [M54.16]    SUBJECTIVE  Pain Level (0-10 scale): 6  Any medication changes, allergies to medications, adverse drug reactions, diagnosis change, or new procedure performed?: [x] No    [] Yes (see summary sheet for update)  Subjective functional status/changes:   [] No changes reported  \"I'm hurting today, and I had a lot of pain after my evaluation. I took Ibuprofen after I got home. Yesterday, my daughter smashed her thumb, and I spent all day in the car, which really hurt my back. \"    OBJECTIVE    Modality rationale: decrease pain and increase tissue extensibility to improve the patients ability to ambulate with reduced pain.    Min Type Additional Details    [] Estim:  []Unatt       []IFC  []Premod                        []Other:  []w/ice   []w/heat  Position:  Location:    [] Estim: []Att    []TENS instruct  []NMES                    []Other:  []w/US   []w/ice   []w/heat  Position:  Location:    []  Traction: [] Cervical       []Lumbar                       [] Prone          []Supine                       []Intermittent   []Continuous Lbs:  [] before manual  [] after manual    []  Ultrasound: []Continuous   [] Pulsed                           []1MHz   []3MHz W/cm2:  Location:    []  Iontophoresis with dexamethasone         Location: [] Take home patch   [] In clinic   10 []  Ice     [x]  heat  []  Ice massage  []  Laser   []  Anodyne Position:seated, 2 pillows under feet to elevate  Location: LB    []  Laser with stim  []  Other:  Position:  Location:    []  Vasopneumatic Device Pressure:       [] lo [] med [] hi Temperature: [] lo [] med [] hi   [x] Skin assessment post-treatment:  [x]intact []redness- no adverse reaction    []redness - adverse reaction:     25 min Therapeutic Exercise:  [] See flow sheet :   Rationale: increase ROM, increase strength, improve coordination, improve balance and increase proprioception to improve the patients ability to ambulate with reduced c/o LBP. 10 min Neuromuscular Re-education:  []  See flow sheet :   Rationale: increase ROM, increase strength, improve coordination, improve balance and increase proprioception  to improve the patients ability to ambulate with reduced pain and reduced fall risk. With   [] TE   [] TA   [] neuro   [] other: Patient Education: [x] Review HEP    [] Progressed/Changed HEP based on:   [] positioning   [] body mechanics   [] transfers   [] heat/ice application    [] other:      Other Objective/Functional Measures: Pt notes nerve pain/symptoms down B LE, but greater on L than R.     Pain Level (0-10 scale) post treatment: 5    ASSESSMENT/Changes in Function: Pt notes benefit from initiating POC today and tolerates treatment well, with slight increased c/o pain. PT spent increased time on education regarding posture/use of lumbar roll, proper posture when picking objects off floor, importance of HEP. Patient will continue to benefit from skilled PT services to modify and progress therapeutic interventions, address functional mobility deficits, address ROM deficits, address strength deficits, analyze and address soft tissue restrictions, analyze and cue movement patterns, analyze and modify body mechanics/ergonomics and assess and modify postural abnormalities to attain remaining goals. []  See Plan of Care  []  See progress note/recertification  []  See Discharge Summary         Progress towards goals / Updated goals:  Short Term Goals: To be accomplished in 2 weeks:  1.  Pt will be I and compliant with HEP daily to improve progression and efficacy of therapy. 2. Pt will improve L 90/90 HS flexibility <20 deg for reduced dural tension and improved lumbopelvic mechanics. Long Term Goals: To be accomplished in 4 weeks:  1. Pt will demonstrate 10 bridges to full height for improved core strength and bed mobility. 2. Pt will improve trunk SB AROM to 75% of WNL for improved performance of housework. 3. Pt will report no difficulty performing her normal housework for improved functional independence. 4. Pt will demonstrate 4/5 hip ABD and EXT MMT for improved standing activity tolerance.     PLAN  []  Upgrade activities as tolerated     []  Continue plan of care  []  Update interventions per flow sheet       []  Discharge due to:_  []  Other:_      Johnna Holstein, PT 2/16/2018  2:18 PM    Future Appointments  Date Time Provider Pricila Manuel   [facility]2018 10[facility]   2/21/2018 10:00 AM Giovani Montano, PT [facility]   2/26/2018 11:00 AM Maine Terrell [facility]   2/28/2018 11:00 AM Chanel Lopez PTA [facility]   3/5/2018 11:00 AM Thanh Navarro [facility]   3/7/2018 11:00 AM [facility]once [facility]

## 2018-02-19 ENCOUNTER — HOSPITAL ENCOUNTER (OUTPATIENT)
Dept: PHYSICAL THERAPY | Age: 68
Discharge: HOME | End: 2018-02-19
Payer: MEDICARE

## 2018-02-19 PROCEDURE — 97110 THERAPEUTIC EXERCISES: CPT

## 2018-02-19 PROCEDURE — 97112 NEUROMUSCULAR REEDUCATION: CPT

## 2018-02-19 NOTE — PROGRESS NOTES
PT DAILY TREATMENT NOTE - [facility]    Patient Name: Kiara Rodriguez  Date:2018  : 1950  [x]  Patient  Verified  Payor: [place] MEDICARE / Plan: [place] MEDICARE PART A & B / Product Type: Medicare /    In time:10:00  Out time:10:46  Total Treatment Time (min): 46  Total Timed Codes (min): 36  1:1 Treatment Time ( only): 36   Visit #: 3 of 8    Treatment Area: Radiculopathy, lumbar region [M54.16]    SUBJECTIVE  Pain Level (0-10 scale): 5  Any medication changes, allergies to medications, adverse drug reactions, diagnosis change, or new procedure performed?: [x] No    [] Yes (see summary sheet for update)  Subjective functional status/changes:   [] No changes reported  Patient's main complaint continues to be left LE aching.      OBJECTIVE  Modality rationale: decrease pain and increase tissue extensibility to improve the patients ability to ease ADL tolerance   Min Type Additional Details    [] Estim:  []Unatt       []IFC  []Premod                        []Other:  []w/ice   []w/heat  Position:  Location:    [] Estim: []Att    []TENS instruct  []NMES                    []Other:  []w/US   []w/ice   []w/heat  Position:  Location:    []  Traction: [] Cervical       []Lumbar                       [] Prone          []Supine                       []Intermittent   []Continuous Lbs:  [] before manual  [] after manual    []  Ultrasound: []Continuous   [] Pulsed                           []1MHz   []3MHz Location:  W/cm2:    []  Iontophoresis with dexamethasone         Location: [] Take home patch   [] In clinic   10 []  Ice     [x]  heat  []  Ice massage  []  Laser   []  Anodyne Position:  Location:    []  Laser with stim  []  Other: Position:  Location:    []  Vasopneumatic Device Pressure:       [] lo [] med [] hi   Temperature: [] lo [] med [] hi   [] Skin assessment post-treatment:  []intact []redness- no adverse reaction    []redness - adverse reaction:    26 min Therapeutic Exercise:  [x] See flow sheet : Rationale: increase ROM, increase strength and improve coordination to improve the patients ability to increase ease with ADLs    10 min Neuromuscular Re-education:  [x]  See flow sheet : core stabilization activities   Rationale: increase strength, improve coordination and increase proprioception  to improve the patients ability to improve core activation and reduce stress on l/s     With   [] TE   [] TA   [] neuro   [] other: Patient Education: [x] Review HEP    [] Progressed/Changed HEP based on:   [] positioning   [] body mechanics   [] transfers   [] heat/ice application    [] other:      Other Objective/Functional Measures:   Continues to be noncompliant with HEP     Pain Level (0-10 scale) post treatment: 5    ASSESSMENT/Changes in Function:   Patient is a pleasure to work with, though requires cues to maintain on task secondary to her talkative nature. Continues to c/o left LE aching. Patient fatigues quickly. Patient will continue to benefit from skilled PT services to modify and progress therapeutic interventions, address functional mobility deficits, address ROM deficits, address strength deficits, analyze and address soft tissue restrictions, analyze and cue movement patterns, analyze and modify body mechanics/ergonomics and assess and modify postural abnormalities to attain remaining goals. []  See Plan of Care  []  See progress note/recertification  []  See Discharge Summary         Progress towards goals / Updated goals:  Short Term Goals: To be accomplished in 2 weeks:  1. Pt will be I and compliant with HEP daily to improve progression and efficacy of therapy. not met per patient report (2/19/2018)  2. Pt will improve L 90/90 HS flexibility <20 deg for reduced dural tension and improved lumbopelvic mechanics. Long Term Goals: To be accomplished in 4 weeks:  1. Pt will demonstrate 10 bridges to full height for improved core strength and bed mobility.   2. Pt will improve trunk SB AROM to 75% of WNL for improved performance of housework. 3. Pt will report no difficulty performing her normal housework for improved functional independence. 4. Pt will demonstrate 4/5 hip ABD and EXT MMT for improved standing activity tolerance.     PLAN  []  Upgrade activities as tolerated     [x]  Continue plan of care  []  Update interventions per flow sheet       []  Discharge due to:_  []  Other:_      Sameera Precise 2/19/2018  9:54 AM    Future Appointments  Date Time Provider Pricila Manuel   2/19/2018 10:00 AM Sameera Precise [facility]   2/21/2018 10:00 AM Resa Schwab, PT [facility]   2/26/2018 11:00 AM Pedro Contreras [facility]   2/28/2018 11:00 AM Giovana Stoner PTA [facility]   3/5/2018 11:00 AM Sameera Precise [facility]   3/7/2018 11:00 AM Dion Flaherty PTA [facility]

## 2018-02-21 ENCOUNTER — HOSPITAL ENCOUNTER (OUTPATIENT)
Dept: PHYSICAL THERAPY | Age: 68
Discharge: HOME | End: 2018-02-21
Payer: MEDICARE

## 2018-02-21 PROCEDURE — 97110 THERAPEUTIC EXERCISES: CPT

## 2018-02-21 NOTE — PROGRESS NOTES
PT DAILY TREATMENT NOTE - [facility]     Patient Name: Anthony Ma  Date:2018  : 1950  [x]  Patient  Verified  Payor: VA MEDICARE / Plan: VA MEDICARE PART A & B / Product Type: Medicare /    In time:10:00  Out time:10:45  Total Treatment Time (min): 45  Total Timed Codes (min): 35  1:1 Treatment Time ([facility] only): 17   Visit #: 4 of 8    Treatment Area: Radiculopathy, lumbar region [M54.16]    SUBJECTIVE   Pain Level (0-10 scale): 7/10  Any medication changes, allergies to medications, adverse drug reactions, diagnosis change, or new procedure performed?: [x] No    [] Yes (see summary sheet for update)  Subjective functional status/changes:   [] No changes reported  The patient states that her knees are bothersome upon presentation today. Slow progress    OBJECTIVE  Modality rationale: decrease edema, decrease inflammation and decrease pain to improve the patients ability to improve ADL ease. Min Type Additional Details   10 []  Ice     [x]  heat  []  Ice massage  []  Laser   []  Anodyne Position: seated  Location: lumbar spine   [] Skin assessment post-treatment:  []intact []redness- no adverse reaction    []redness - adverse reaction:     25 min Therapeutic Exercise:  [x] See flow sheet :   Rationale: increase ROM and increase strength to improve the patients ability to improve ADL ease. 10 min Neuromuscular Re-education:  [x]  See flow sheet :   Rationale: increase ROM and increase strength  to improve the patients ability to improve ADL ease. With   [] TE   [] TA   [] neuro   [] other: Patient Education: [x] Review HEP    [] Progressed/Changed HEP based on:   [] positioning   [] body mechanics   [] transfers   [] heat/ice application    [] other:      Other Objective/Functional Measures: The patient is limited by knee pain and apparent lumbar referral pattern into L lower lateral leg during standing. Held mini squats due to this.       The patient required cues in order to position correctly into sidelying as well as reducing lumbar rotation during hip strengthening exercises in sidelying. Pain Level (0-10 scale) post treatment: 5/10    ASSESSMENT/Changes in Function: Fair progress with limitations during standing exercises due to knee pain reported. Patient will continue to benefit from skilled PT services to modify and progress therapeutic interventions, address functional mobility deficits, address ROM deficits, address strength deficits, analyze and address soft tissue restrictions, analyze and cue movement patterns, analyze and modify body mechanics/ergonomics, assess and modify postural abnormalities and instruct in home and community integration to attain remaining goals. []  See Plan of Care  []  See progress note/recertification  []  See Discharge Summary         Progress towards goals / Updated goals:  Short Term Goals: To be accomplished in 2 weeks:  1. Pt will be I and compliant with HEP daily to improve progression and efficacy of therapy. not met per patient report (2/19/2018)  2. Pt will improve L 90/90 HS flexibility <20 deg for reduced dural tension and improved lumbopelvic mechanics. Long Term Goals: To be accomplished in 4 weeks:  1. Pt will demonstrate 10 bridges to full height for improved core strength and bed mobility. 2. Pt will improve trunk SB AROM to 75% of WNL for improved performance of housework. 3. Pt will report no difficulty performing her normal housework for improved functional independence.   4. Pt will demonstrate 4/5 hip ABD and EXT MMT for improved standing activity tolerance.     PLAN  []  Upgrade activities as tolerated     [x]  Continue plan of care  []  Update interventions per flow sheet       []  Discharge due to:_  []  Other:_      Danni Martínez, PT 2/21/2018  11:19 AM    Future Appointments  Date Time Provider Pricila Manuel   2/26/2018 11:00 AM [id number] [facility]   2/28/2018 11:00 AM [facility]   3/5/2018 11:00 AM [facility]   3/7/2018 11:00 AM [facility] Butcher [facility]

## 2018-02-26 ENCOUNTER — HOSPITAL ENCOUNTER (OUTPATIENT)
Dept: PHYSICAL THERAPY | Age: 68
Discharge: HOME | End: 2018-02-26
Payer: MEDICARE

## 2018-02-26 PROCEDURE — 97110 THERAPEUTIC EXERCISES: CPT

## 2018-02-26 PROCEDURE — 97112 NEUROMUSCULAR REEDUCATION: CPT

## 2018-02-26 NOTE — PROGRESS NOTES
PT DAILY TREATMENT NOTE - [facility]     Patient Name: Bk Precise  Date:2018  : 1950  [x]  Patient  Verified  Payor: VA MEDICARE / Plan: VA MEDICARE PART A & B / Product Type: Medicare /    In time:11:05  Out time:11:55  Total Treatment Time (min): 50  Total Timed Codes (min): 40  1:1 Treatment Time ( [address] only): 34   Visit #: 5 of 8    Treatment Area: Low back pain [M54.5]    SUBJECTIVE  Pain Level (0-10 scale): 5  Any medication changes, allergies to medications, adverse drug reactions, diagnosis change, or new procedure performed?: [x] No    [] Yes (see summary sheet for update)  Subjective functional status/changes:   [] No changes reported  Pt reports pulling in her legs TM ambulation but also declines to perform bike. Advised pt we need some LE strength and endurance to progress activity tolerance.     OBJECTIVE    Modality rationale: decrease pain and increase tissue extensibility to improve the patients ability to perform ADLs   Min Type Additional Details    [] Estim:  []Unatt       []IFC  []Premod                        []Other:  []w/ice   []w/heat  Position:  Location:    [] Estim: []Att    []TENS instruct  []NMES                    []Other:  []w/US   []w/ice   []w/heat  Position:  Location:    []  Traction: [] Cervical       []Lumbar                       [] Prone          []Supine                       []Intermittent   []Continuous Lbs:  [] before manual  [] after manual    []  Ultrasound: []Continuous   [] Pulsed                           []1MHz   []3MHz W/cm2:  Location:    []  Iontophoresis with dexamethasone         Location: [] Take home patch   [] In clinic   10 []  Ice     [x]  heat  []  Ice massage  []  Laser   []  Anodyne Position:seated  Location: lumbar    []  Laser with stim  []  Other:  Position:  Location:    []  Vasopneumatic Device Pressure:       [] lo [] med [] hi   Temperature: [] lo [] med [] hi   [] Skin assessment post-treatment:  []intact []redness- no adverse reaction    []redness - adverse reaction:       25 min Therapeutic Exercise:  [] See flow sheet :   Rationale: increase ROM and increase strength to improve the patients ability to perform ADLs     15 min Neuromuscular Re-education:  []  See flow sheet :   Rationale: increase strength and increase proprioception  to improve the patients core activation and endurance          With   [] TE   [] TA   [] neuro   [] other: Patient Education: [x] Review HEP    [] Progressed/Changed HEP based on:   [] positioning   [] body mechanics   [] transfers   [] heat/ice application    [] other:      Other Objective/Functional Measures: progressed to seated alternating UE/LE to challenge core stability     Pain Level (0-10 scale) post treatment: 5.5    ASSESSMENT/Changes in Function: Pt complains of limited activity tolerance and knee pain limiting TM to 3 minutes. Good overall tolerance to therapy, LE fatigue most limiting. Continue to progress core strength and endurance    Patient will continue to benefit from skilled PT services to modify and progress therapeutic interventions, address functional mobility deficits, address ROM deficits, address strength deficits, analyze and address soft tissue restrictions, analyze and cue movement patterns, analyze and modify body mechanics/ergonomics and assess and modify postural abnormalities to attain remaining goals. []  See Plan of Care  []  See progress note/recertification  []  See Discharge Summary         Progress towards goals / Updated goals:  Short Term Goals: To be accomplished in 2 weeks:  1. Pt will be I and compliant with HEP daily to improve progression and efficacy of therapy. not met per patient report (2/19/2018); pt still reports not compliant with HEP 2/26/18  2. Pt will improve L 90/90 HS flexibility <20 deg for reduced dural tension and improved lumbopelvic mechanics. Progressing 25 deg 2/26/18  Long Term Goals: To be accomplished in 4 weeks:  1.  Pt will demonstrate 10 bridges to full height for improved core strength and bed mobility. 2. Pt will improve trunk SB AROM to 75% of WNL for improved performance of housework. 3. Pt will report no difficulty performing her normal housework for improved functional independence. 4. Pt will demonstrate 4/5 hip ABD and EXT MMT for improved standing activity tolerance.     PLAN  [x]  Upgrade activities as tolerated     []  Continue plan of care  []  Update interventions per flow sheet       []  Discharge due to:_  []  Other:_      Garlan Soulier, DPT, CMTPT 2/26/2018  11:07 AM    Future Appointments  Date Time Provider Pricila Manuel   2/28/2018 11:00 AM Jean Huang PTA [facility]   3/5/2018 11:00 AM Will Robison [facility]   3/7/2018 11:00 AM Davina Quinonez PTA [facility]

## 2018-02-28 ENCOUNTER — HOSPITAL ENCOUNTER (OUTPATIENT)
Dept: PHYSICAL THERAPY | Age: 68
Discharge: HOME | End: 2018-02-28
Payer: MEDICARE

## 2018-02-28 PROCEDURE — 97112 NEUROMUSCULAR REEDUCATION: CPT

## 2018-02-28 PROCEDURE — 97110 THERAPEUTIC EXERCISES: CPT

## 2018-02-28 NOTE — PROGRESS NOTES
PT DAILY TREATMENT NOTE - [facility]     Patient Name: Bina [place]  Date:2018  : 1950  [x]  Patient  Verified  Payor: VA MEDICARE / Plan: VA MEDICARE PART A & B / Product Type: Medicare /    In time:11:00  Out time:11:56  Total Treatment Time (min): 56  Total Timed Codes (min): 46  1:1 Treatment Time ( only): 36   Visit #: 6 of 8    Treatment Area: Low back pain [M54.5]    SUBJECTIVE  Pain Level (0-10 scale): 510  Any medication changes, allergies to medications, adverse drug reactions, diagnosis change, or new procedure performed?: [x] No    [] Yes (see summary sheet for update)  Subjective functional status/changes:   [] No changes reported  \"I'm still a bit sore from Monday. \"    OBJECTIVE    Modality rationale: decrease pain and increase tissue extensibility to improve the patients ability to perform ADL's.    Min Type Additional Details    [] Estim:  []Unatt       []IFC  []Premod                        []Other:  []w/ice   []w/heat  Position:  Location:    [] Estim: []Att    []TENS instruct  []NMES                    []Other:  []w/US   []w/ice   []w/heat  Position:  Location:    []  Traction: [] Cervical       []Lumbar                       [] Prone          []Supine                       []Intermittent   []Continuous Lbs:  [] before manual  [] after manual    []  Ultrasound: []Continuous   [] Pulsed                           []1MHz   []3MHz W/cm2:  Location:    []  Iontophoresis with dexamethasone         Location: [] Take home patch   [] In clinic   10 []  Ice     [x]  heat  []  Ice massage  []  Laser   []  Anodyne Position: Seated  Location: L/S    []  Laser with stim  []  Other:  Position:  Location:    []  Vasopneumatic Device Pressure:       [] lo [] med [] hi   Temperature: [] lo [] med [] hi   [] Skin assessment post-treatment:  []intact []redness- no adverse reaction    []redness - adverse reaction:     36 min Therapeutic Exercise:  [x] See flow sheet :   Rationale: increase ROM and increase strength to improve the patients ability to perform ADL's. 10 min Neuromuscular Re-education:  [x]  See flow sheet :   Rationale: increase strength and increase proprioception  to improve the patients ability to perform functional activities. With   [x] TE   [] TA   [] neuro   [] other: Patient Education: [x] Review HEP    [] Progressed/Changed HEP based on:   [] positioning   [] body mechanics   [] transfers   [] heat/ice application    [] other:      Other Objective/Functional Measures: Added mini-squats 10 reps. Performing bridges ~75% of full height. Pain Level (0-10 scale) post treatment: 4.5/10    ASSESSMENT/Changes in Function: FOTO 36%. Knee discomfort with mini-squats, denied low back pain. Patient will continue to benefit from skilled PT services to modify and progress therapeutic interventions, address functional mobility deficits, address ROM deficits, address strength deficits, analyze and cue movement patterns and analyze and modify body mechanics/ergonomics to attain remaining goals. [x]  See Plan of Care  []  See progress note/recertification  []  See Discharge Summary         Progress towards goals / Updated goals:  Short Term Goals: To be accomplished in 2 weeks:  1. Pt will be I and compliant with HEP daily to improve progression and efficacy of therapy. not met per patient report (2/19/2018); pt still reports not compliant with HEP 2/26/18  2. Pt will improve L 90/90 HS flexibility <20 deg for reduced dural tension and improved lumbopelvic mechanics. Progressing 25 deg 2/26/18  Long Term Goals: To be accomplished in 4 weeks:  1. Pt will demonstrate 10 bridges to full height for improved core strength and bed mobility. - Performing bridges ~75% of full height. 2/28/2018  2. Pt will improve trunk SB AROM to 75% of WNL for improved performance of housework. 3. Pt will report no difficulty performing her normal housework for improved functional independence.   4. Pt will demonstrate 4/5 hip ABD and EXT MMT for improved standing activity tolerance.     PLAN  []  Upgrade activities as tolerated     [x]  Continue plan of care  []  Update interventions per flow sheet       []  Discharge due to:_  []  Other:_      Leafy Litten, PTA 2/28/2018  10:46 AM    Future Appointments  Date Time Provider Pricila Manuel   2/28/2018 11:00 AM Leafy Litten, PTA [facility]   3/5/2018 11:00 AM Ada Margarette [facility]   3/7/2018 11:00 AM Joaquin Villeda PTA [facility]

## 2018-03-05 ENCOUNTER — HOSPITAL ENCOUNTER (OUTPATIENT)
Dept: PHYSICAL THERAPY | Age: 68
Discharge: HOME | End: 2018-03-05
Payer: MEDICARE

## 2018-03-05 PROCEDURE — 97110 THERAPEUTIC EXERCISES: CPT

## 2018-03-05 NOTE — PROGRESS NOTES
PT DAILY TREATMENT NOTE - [facility]    Patient Name: Susan Flores  Date:3/5/2018  : 1950  [x]  Patient  Verified  Payor: VA MEDICARE / Plan: VA MEDICARE PART A & B / Product Type: Medicare /    In time:11:01  Out time:11:45  Total Treatment Time (min): 44  Total Timed Codes (min): 34  1:1 Treatment Time ( [address] only): 20   Visit #: 7 of 8    Treatment Area: Low back pain [M54.5]    SUBJECTIVE  Pain Level (0-10 scale): 7  Any medication changes, allergies to medications, adverse drug reactions, diagnosis change, or new procedure performed?: [x] No    [] Yes (see summary sheet for update)  Subjective functional status/changes:   [] No changes reported  \"I'm not doing good today. \" Patient reports that she had to sit for prolonged periods over the weekend which could be contributing to her elevated pain.      OBJECTIVE  Modality rationale: decrease pain and increase tissue extensibility to improve the patients ability to ease ADL tolerance   Min Type Additional Details    [] Estim:  []Unatt       []IFC  []Premod                        []Other:  []w/ice   []w/heat  Position:  Location:    [] Estim: []Att    []TENS instruct  []NMES                    []Other:  []w/US   []w/ice   []w/heat  Position:  Location:    []  Traction: [] Cervical       []Lumbar                       [] Prone          []Supine                       []Intermittent   []Continuous Lbs:  [] before manual  [] after manual    []  Ultrasound: []Continuous   [] Pulsed                           []1MHz   []3MHz Location:  W/cm2:    []  Iontophoresis with dexamethasone         Location: [] Take home patch   [] In clinic   10 []  Ice     [x]  heat  []  Ice massage  []  Laser   []  Anodyne Position: seated  Location: l/s    []  Laser with stim  []  Other: Position:  Location:    []  Vasopneumatic Device Pressure:       [] lo [] med [] hi   Temperature: [] lo [] med [] hi   [] Skin assessment post-treatment:  []intact []redness- no adverse reaction []redness - adverse reaction:     24 min Therapeutic Exercise:  [x] See flow sheet :   Rationale: increase ROM, increase strength and improve coordination to improve the patients ability to increase ease with ADLs    10 min Neuromuscular Re-education:  [x]  See flow sheet :   Rationale: increase ROM, increase strength and increase proprioception  to improve the patients ability to improve core activation             With   [] TE   [] TA   [] neuro   [] other: Patient Education: [x] Review HEP    [] Progressed/Changed HEP based on:   [] positioning   [] body mechanics   [] transfers   [] heat/ice application    [] other:      Other Objective/Functional Measures:   Trunk SB AROM: L, 50% with c/o pain. R, 75% with c/o pain      Pain Level (0-10 scale) post treatment: 5    ASSESSMENT/Changes in Function:   Patient making slower progress towards initial goals with varying pain levels contributing. She reports that sitting increases her pain and that she had to sit for prolonged periods over this past weekend. Trunk AROM continues to be somewhat limited. Patient will continue to benefit from skilled PT services to modify and progress therapeutic interventions, address functional mobility deficits, address ROM deficits, address strength deficits, analyze and address soft tissue restrictions, analyze and cue movement patterns, analyze and modify body mechanics/ergonomics and assess and modify postural abnormalities to attain remaining goals. []  See Plan of Care  []  See progress note/recertification  []  See Discharge Summary         Progress towards goals / Updated goals:  Short Term Goals: To be accomplished in 2 weeks:  1. Pt will be I and compliant with HEP daily to improve progression and efficacy of therapy. not met per patient report (2/19/2018); pt still reports not compliant with HEP 2/26/18  2. Pt will improve L 90/90 HS flexibility <20 deg for reduced dural tension and improved lumbopelvic mechanics. Progressing 25 deg 2/26/18  Long Term Goals: To be accomplished in 4 weeks:  1. Pt will demonstrate 10 bridges to full height for improved core strength and bed mobility. - Performing bridges ~75% of full height. 2/28/2018  2. Pt will improve trunk SB AROM to 75% of WNL for improved performance of housework.-no change since initial eval, L, 50% with c/o pain. R, 75% with c/o pain (3/5/2018)  3. Pt will report no difficulty performing her normal housework for improved functional independence. 4. Pt will demonstrate 4/5 hip ABD and EXT MMT for improved standing activity tolerance.     PLAN  []  Upgrade activities as tolerated     [x]  Continue plan of care  []  Update interventions per flow sheet       []  Discharge due to:_  []  Other:_      Marlon Kocher 3/5/2018  11:13 AM    Future Appointments  Date Time Provider Pricila Manuel   3/7/2018 11:00 AM Zainab Mayoal, PTA [id number]

## 2018-03-07 ENCOUNTER — HOSPITAL ENCOUNTER (OUTPATIENT)
Dept: PHYSICAL THERAPY | Age: 68
Discharge: HOME | End: 2018-03-07
Payer: MEDICARE

## 2018-03-07 PROCEDURE — G8982 BODY POS GOAL STATUS: HCPCS

## 2018-03-07 PROCEDURE — G8981 BODY POS CURRENT STATUS: HCPCS

## 2018-03-07 PROCEDURE — 97112 NEUROMUSCULAR REEDUCATION: CPT

## 2018-03-07 PROCEDURE — 97110 THERAPEUTIC EXERCISES: CPT

## 2018-03-07 NOTE — PROGRESS NOTES
PT DAILY TREATMENT NOTE - [facility]     Patient Name: Eric Herr  Date:3/7/2018  : 1950  [x]  Patient  Verified  Payor: [place] MED[place] / Plan: [place] MEDICARE PART A & B / Product Type: Medicare /    In time:11:00  Out time:11:50  Total Treatment Time (min): 50  Total Timed Codes (min): 40  1:1 Treatment Time ( only): 30   Visit #: 8 of 8    Treatment Area: Low back pain [M54.5]    SUBJECTIVE  Pain Level (0-10 scale): 5/10  Any medication changes, allergies to medications, adverse drug reactions, diagnosis change, or new procedure performed?: [x] No    [] Yes (see summary sheet for update)  Subjective functional status/changes:   [] No changes reported  Pt reports no new complaints of pain. Pt reports she has pain sometimes during or after performing housework. OBJECTIVE    Modality rationale: decrease inflammation and decrease pain to improve the patients ability to perform functional activities.     Min Type Additional Details    [] Estim:  []Unatt       []IFC  []Premod                        []Other:  []w/ice   []w/heat  Position:  Location:    [] Estim: []Att    []TENS instruct  []NMES                    []Other:  []w/US   []w/ice   []w/heat  Position:  Location:    []  Traction: [] Cervical       []Lumbar                       [] Prone          []Supine                       []Intermittent   []Continuous Lbs:  [] before manual  [] after manual    []  Ultrasound: []Continuous   [] Pulsed                           []1MHz   []3MHz W/cm2:  Location:    []  Iontophoresis with dexamethasone         Location: [] Take home patch   [] In clinic   10 []  Ice     [x]  heat  []  Ice massage  []  Laser   []  Anodyne Position:sitting  Location:lumbar    []  Laser with stim  []  Other:  Position:  Location:    []  Vasopneumatic Device Pressure:       [] lo [] med [] hi   Temperature: [] lo [] med [] hi   [] Skin assessment post-treatment:  []intact []redness- no adverse reaction    []redness - adverse reaction:       30 min Therapeutic Exercise:  [x] See flow sheet :   Rationale: increase ROM and increase strength to improve the patients ability to tolerate ADLs. 10 min Neuromuscular Re-education:  [x]  See flow sheet :   Rationale: increase strength, improve coordination and increase proprioception  to improve the patients ability to perform functional activities. With   [] TE   [] TA   [] neuro   [] other: Patient Education: [x] Review HEP    [] Progressed/Changed HEP based on:   [] positioning   [] body mechanics   [] transfers   [] heat/ice application    [] other:      Other Objective/Functional Measures:  Hip abduction strength: 4/5 bilaterally  Hip extension strength: 4-/5 bilaterally     Pain Level (0-10 scale) post treatment: 4/10    ASSESSMENT/Changes in Function: Pt continues to put fair effort into performing therapeutic exercises during PT treatment. Educated patient about ethical PT treatment when continuing POC. Continue PT for 3 weeks to assess progress toward functional strength and mobility goals; Informed patient if she demonstrates no progress in 3 weeks she will be DC'd to HEP. Patient will continue to benefit from skilled PT services to modify and progress therapeutic interventions, address functional mobility deficits, address ROM deficits, address strength deficits, analyze and address soft tissue restrictions, analyze and cue movement patterns and analyze and modify body mechanics/ergonomics to attain remaining goals. []  See Plan of Care  [x]  See progress note/recertification  []  See Discharge Summary         Progress towards goals / Updated goals:  Short Term Goals: To be accomplished in 2 weeks:  1. Pt will be I and compliant with HEP daily to improve progression and efficacy of therapy. not met per patient report (2/19/2018); pt still reports not compliant with HEP 2/26/18  2.  Pt will improve L 90/90 HS flexibility <20 deg for reduced dural tension and improved lumbopelvic mechanics. Progressing 25 deg 2/26/18  Long Term Goals: To be accomplished in 4 weeks:  1. Pt will demonstrate 10 bridges to full height for improved core strength and bed mobility. - Performing bridges ~75% of full height. 2/28/2018  2. Pt will improve trunk SB AROM to 75% of WNL for improved performance of housework.-no change since initial eval, L, 50% with c/o pain. R, 75% with c/o pain (3/5/2018)  3. Pt will report no difficulty performing her normal housework for improved functional independence. -not met per patient report. 3/7/18  4. Pt will demonstrate 4/5 hip ABD and EXT MMT for improved standing activity tolerance. - Not met B hip abduction 4/5; B hip extension 4-/5. 3/7/18       PLAN  []  Upgrade activities as tolerated     [x]  Continue plan of care  []  Update interventions per flow sheet       []  Discharge due to:_  []  Other:_      Hector Bishop PTA 3/7/2018  11:08 AM    No future appointments.

## 2018-03-09 NOTE — PROGRESS NOTES
[facility]   [address]  [place], [address]  [phone] fax    Medicare Progress Report    Patient name: Nash Julien Start of Care: 2018   Referral source: Christiano Khan MD : 1950                         Medical Diagnosis: Radiculopathy, lumbar region [M54.16] Onset Date:1 year   Laminectomy 2018                         Treatment Diagnosis: LBP s/p lumbar laminectomy   Prior Hospitalization: see medical history Provider#: [id number]   Medications: Verified on Patient summary List    Comorbidities: arthritis, HTN, sciatica, vertigo, B knee pain   Prior Level of Function: Pt reports chronic LBP for the past year. I with ADLs  Visits from Start of Care: 8    Missed Visits: 0    Reporting Period: 2018 to 3/7/2018    Subjective Reports: Pt reports continued lumbar pain and limited activity tolerance    Key functional changes: slowly progressing core strength    Short Term Goals: To be accomplished in 2 weeks:  1. Pt will be I and compliant with HEP daily to improve progression and efficacy of therapy. not met per patient report (2018); pt still reports not compliant with HEP 18  2. Pt will improve L 90/90 HS flexibility <20 deg for reduced dural tension and improved lumbopelvic mechanics. Progressing 25 deg 18  Long Term Goals: To be accomplished in 4 weeks:  1. Pt will demonstrate 10 bridges to full height for improved core strength and bed mobility. - Performing bridges ~75% of full height. 2018  2. Pt will improve trunk SB AROM to 75% of WNL for improved performance of housework.-no change since initial eval, L, 50% with c/o pain. R, 75% with c/o pain (3/5/2018)  3. Pt will report no difficulty performing her normal housework for improved functional independence. -not met per patient report. 3/7/18  4. Pt will demonstrate 4/5 hip ABD and EXT MMT for improved standing activity tolerance. - Not met B hip abduction 4/5; B hip extension 4-/5. 3/7/18    Problems/ barriers to goal attainment: continued pain and poor HEP compliance    Assessment / Recommendations:Pt has made marginal progress towards her goals at this time. She is still quite limited with daily task performance and tolerance. Will continue PT for 3 more weeks to attempt further core strength and activity tolerance. Problem List: pain affecting function, decrease ROM, decrease strength, impaired gait/ balance, decrease ADL/ functional abilitiies, decrease activity tolerance and decrease flexibility/ joint mobility   Treatment Plan: Therapeutic exercise, Therapeutic activities, Neuromuscular re-education, Physical agent/modality, Gait/balance training, Manual therapy, Patient education, Self Care training, Functional mobility training and Stair training    Patient Goal (s) has been updated and includes:      Updated Goals to be accomplished in 3-4 weeks:  1. Pt will improve trunk SB AROM to 75% of WNL for improved performance of housework. 2. Pt will report no difficulty performing her normal housework for improved functional independence. 3. Pt will demonstrate 4/5 hip EXT MMT for improved standing activity tolerance. Frequency / Duration: Patient to be seen 2 times per week for 4 weeks:    Bret (GP)  Position  [id number] Current  CL= 60-79%   Goal  CK= 40-59%      The severity rating is based on clinical judgment and the FOTO score.       Erick Mcfadden DPT, CMTPT 3/9/2018 9:49 AM

## 2018-03-12 ENCOUNTER — HOSPITAL ENCOUNTER (OUTPATIENT)
Dept: PHYSICAL THERAPY | Age: 68
Discharge: HOME | End: 2018-03-12
Payer: MEDICARE

## 2018-03-12 PROCEDURE — 97112 NEUROMUSCULAR REEDUCATION: CPT

## 2018-03-12 PROCEDURE — 97110 THERAPEUTIC EXERCISES: CPT

## 2018-03-12 NOTE — PROGRESS NOTES
PT DAILY TREATMENT NOTE - [facility] 3-16    Patient Name: Johnny Vasquez  Date:3/12/2018  : 1950  [x]  Patient  Verified  Payor: Brittani Panaca / Plan: [place] MEDICARE PART A & B / Product Type: Medicare /    In time:10:33  Out time:11:18  Total Treatment Time (min): 45  Total Timed Codes (min): 35  1:1 Treatment Time ( only): 30   Visit #: 1 of 8    Treatment Area: Low back pain [M54.5]    SUBJECTIVE  Pain Level (0-10 scale): 4  Any medication changes, allergies to medications, adverse drug reactions, diagnosis change, or new procedure performed?: [x] No    [] Yes (see summary sheet for update)  Subjective functional status/changes:   [] No changes reported  \"My arthritis is killing me. Sometimes, it's hard for me to give a pain level because I have arthritis in my knees, elbows, and back. \"    OBJECTIVE  Modality rationale: decrease pain and increase tissue extensibility to improve the patients ability to ease ADL tolerance   Min Type Additional Details    [] Estim:  []Unatt       []IFC  []Premod                        []Other:  []w/ice   []w/heat  Position:  Location:    [] Estim: []Att    []TENS instruct  []NMES                    []Other:  []w/US   []w/ice   []w/heat  Position:  Location:    []  Traction: [] Cervical       []Lumbar                       [] Prone          []Supine                       []Intermittent   []Continuous Lbs:  [] before manual  [] after manual    []  Ultrasound: []Continuous   [] Pulsed                           []1MHz   []3MHz Location:  W/cm2:    []  Iontophoresis with dexamethasone         Location: [] Take home patch   [] In clinic   10 []  Ice     [x]  heat  []  Ice massage  []  Laser   []  Anodyne Position: seated  Location: l/s    []  Laser with stim  []  Other: Position:  Location:    []  Vasopneumatic Device Pressure:       [] lo [] med [] hi   Temperature: [] lo [] med [] hi   [] Skin assessment post-treatment:  []intact []redness- no adverse reaction    []redness - adverse reaction:     25 min Therapeutic Exercise:  [x] See flow sheet :   Rationale: increase ROM, increase strength and improve coordination to improve the patients ability to increase ease with ADLs    10 min Neuromuscular Re-education:  [x]  See flow sheet :   Rationale: increase strength, improve coordination and increase proprioception  to improve the patients ability to improve core activation            With   [] TE   [] TA   [] neuro   [] other: Patient Education: [x] Review HEP    [] Progressed/Changed HEP based on:   [] positioning   [] body mechanics   [] transfers   [] heat/ice application    [] other:      Other Objective/Functional Measures:   Presents with lordosis posture with standing therex     Pain Level (0-10 scale) post treatment: 3-4/10    ASSESSMENT/Changes in Function:   She requires constant cueing for upright posture and to maintain TA activation. She puts forth good effort with exercises today. Patient will continue to benefit from skilled PT services to modify and progress therapeutic interventions, address functional mobility deficits, address ROM deficits, address strength deficits, analyze and address soft tissue restrictions, analyze and cue movement patterns, analyze and modify body mechanics/ergonomics and assess and modify postural abnormalities to attain remaining goals. []  See Plan of Care  []  See progress note/recertification  []  See Discharge Summary            Updated Goals to be accomplished in 3-4 weeks:  1. Pt will improve trunk SB AROM to 75% of WNL for improved performance of housework. 2. Pt will report no difficulty performing her normal housework for improved functional independence.   3. Pt will demonstrate 4/5 hip EXT MMT for improved standing activity tolerance.     PLAN  []  Upgrade activities as tolerated     [x]  Continue plan of care  []  Update interventions per flow sheet       []  Discharge due to:_  []  Other:_      Chrissy Vergara Bud Escalona 3/12/2018  10:32 AM    Future Appointments  Date Time Provider Pricila Manuel   3/14/2018 10:30 AM [facility]AN [facility]   3/19/2018 10:30 AM Kyra [facility]   3/21/2018 10:30 AM Kyra Argueta [facility]   3/26/2018 10:30 AM Raina Quezada PTA [facility]   3/28/2018 10:30 AM Raina Quezada PTA [facility]

## 2018-03-14 ENCOUNTER — HOSPITAL ENCOUNTER (OUTPATIENT)
Dept: PHYSICAL THERAPY | Age: 68
Discharge: HOME | End: 2018-03-14
Payer: MEDICARE

## 2018-03-14 PROCEDURE — 97112 NEUROMUSCULAR REEDUCATION: CPT

## 2018-03-14 PROCEDURE — 97110 THERAPEUTIC EXERCISES: CPT

## 2018-03-14 NOTE — PROGRESS NOTES
PT DAILY TREATMENT NOTE - [facility]     Patient Name: Abilio De Luna  Date:3/14/2018  : 1950  [x]  Patient  Verified  Payor: [place] MEDICARE / Plan: [place] MEDICARE PART A & B / Product Type: Medicare /    In time:10:30  Out time:11:22  Total Treatment Time (min): 52   Total Timed Codes (min): 42  1:1 Treatment Time ( only): 42   Visit #: 2 of 8    Treatment Area: Low back pain [M54.5]    SUBJECTIVE  Pain Level (0-10 scale): 6/10  Any medication changes, allergies to medications, adverse drug reactions, diagnosis change, or new procedure performed?: [x] No    [] Yes (see summary sheet for update)  Subjective functional status/changes:   [] No changes reported  \"I had to sit for a long time yesterday while I was getting my hair down, I always have more pain when I sit for a long time. \"    OBJECTIVE    Modality rationale: decrease pain and increase tissue extensibility to improve the patients ability to perform ADL's.    Min Type Additional Details    [] Estim:  []Unatt       []IFC  []Premod                        []Other:  []w/ice   []w/heat  Position:  Location:    [] Estim: []Att    []TENS instruct  []NMES                    []Other:  []w/US   []w/ice   []w/heat  Position:  Location:    []  Traction: [] Cervical       []Lumbar                       [] Prone          []Supine                       []Intermittent   []Continuous Lbs:  [] before manual  [] after manual    []  Ultrasound: []Continuous   [] Pulsed                           []1MHz   []3MHz W/cm2:  Location:    []  Iontophoresis with dexamethasone         Location: [] Take home patch   [] In clinic   10 []  Ice     [x]  heat  []  Ice massage  []  Laser   []  Anodyne Position: Seated  Location: L/S    []  Laser with stim  []  Other:  Position:  Location:    []  Vasopneumatic Device Pressure:       [] lo [] med [] hi   Temperature: [] lo [] med [] hi   [] Skin assessment post-treatment:  []intact []redness- no adverse reaction    []redness - adverse reaction:     32 min Therapeutic Exercise:  [x] See flow sheet :   Rationale: increase ROM and increase strength to improve the patients ability to perform ADL's. 10 min Neuromuscular Re-education:  [x]  See flow sheet :   Rationale: increase strength and increase proprioception  to improve the patients ability to perform functional activities. With   [x] TE   [] TA   [] neuro   [] other: Patient Education: [x] Review HEP    [] Progressed/Changed HEP based on:   [] positioning   [] body mechanics   [] transfers   [] heat/ice application    [] other:      Other Objective/Functional Measures: Extensive education on core strength and positioning. Added PPT/TA hold with SB in lap 10x3\", added contralateral iso hip flexion in supine 10x3\". Pain Level (0-10 scale) post treatment: 5/10    ASSESSMENT/Changes in Function: Core fatigue while performing PPT and TA holds. Slight decrease in pain level post-treatment. Patient will continue to benefit from skilled PT services to modify and progress therapeutic interventions, address functional mobility deficits, address ROM deficits, address strength deficits, analyze and cue movement patterns and analyze and modify body mechanics/ergonomics to attain remaining goals. [x]  See Plan of Care  []  See progress note/recertification  []  See Discharge Summary         Progress towards goals / Updated goals:  Updated Goals to be accomplished in 3-4 weeks:  1. Pt will improve trunk SB AROM to 75% of WNL for improved performance of housework. 2. Pt will report no difficulty performing her normal housework for improved functional independence.   3. Pt will demonstrate 4/5 hip EXT MMT for improved standing activity tolerance.     PLAN  []  Upgrade activities as tolerated     [x]  Continue plan of care  []  Update interventions per flow sheet       []  Discharge due to:_  []  Other:_      Nilson Montana, PTA 3/14/2018  10:27 AM    Future Appointments  Date Time Provider Department [place]   3/14/2018 10:30 AM Giovana Stoner PTA [facility]   3/19/2018 10:30 AM Pedro Contreras [facility]   3/21/2018 10:30 AM Pedro Contreras [facility]   3/26/2018 10:30 AM Giovana Stoner PTA [facility]   3/28/2018 10:30 AM Giovana Stoner PTA [facility]

## 2018-03-19 ENCOUNTER — HOSPITAL ENCOUNTER (OUTPATIENT)
Dept: PHYSICAL THERAPY | Age: 68
Discharge: HOME | End: 2018-03-19
Payer: MEDICARE

## 2018-03-19 PROCEDURE — 97112 NEUROMUSCULAR REEDUCATION: CPT

## 2018-03-19 PROCEDURE — 97110 THERAPEUTIC EXERCISES: CPT

## 2018-03-19 NOTE — PROGRESS NOTES
PT DAILY TREATMENT NOTE - [facility] 3-16    Patient Name: Erick Harrison  Date:3/19/2018  : 1950  [x]  Patient  Verified  Payor: [place] MEDICARE / Plan: [place] MEDICARE PART A & B / Product Type: Medicare /    In time:10:30  Out time:11:20  Total Treatment Time (min): 50  Total Timed Codes (min): 40  1:1 Treatment Time ( only): 40   Visit #: 3 of 8    Treatment Area: Low back pain [M54.5]    SUBJECTIVE  Pain Level (0-10 scale): 5  Any medication changes, allergies to medications, adverse drug reactions, diagnosis change, or new procedure performed?: [x] No    [] Yes (see summary sheet for update)  Subjective functional status/changes:   [] No changes reported  Patient reports that she went to [facility] over the weekend because of how bad her neck pain was. Per patient report, she was told she most likely slept wrong. She was given steroids for pain.      OBJECTIVE  Modality rationale: decrease pain and increase tissue extensibility to improve the patients ability to ease ADL tolerance   Min Type Additional Details    [] Estim:  []Unatt       []IFC  []Premod                        []Other:  []w/ice   []w/heat  Position:  Location:    [] Estim: []Att    []TENS instruct  []NMES                    []Other:  []w/US   []w/ice   []w/heat  Position:  Location:    []  Traction: [] Cervical       []Lumbar                       [] Prone          []Supine                       []Intermittent   []Continuous Lbs:  [] before manual  [] after manual    []  Ultrasound: []Continuous   [] Pulsed                           []1MHz   []3MHz Location:  W/cm2:    []  Iontophoresis with dexamethasone         Location: [] Take home patch   [] In clinic   10 []  Ice     [x]  heat  []  Ice massage  []  Laser   []  Anodyne Position: seated  Location: l/s    []  Laser with stim  []  Other: Position:  Location:    []  Vasopneumatic Device Pressure:       [] lo [] med [] hi   Temperature: [] lo [] med [] hi   [] Skin assessment post-treatment:  []intact []redness- no adverse reaction    []redness - adverse reaction:     30 min Therapeutic Exercise:  [x] See flow sheet :   Rationale: increase ROM, increase strength and improve coordination to improve the patients ability to increase ease with ADLs    10 min Neuromuscular Re-education:  [x]  See flow sheet :   Rationale: increase strength, improve coordination and increase proprioception  to improve the patients ability to improve core activation     With   [] TE   [] TA   [] neuro   [] other: Patient Education: [x] Review HEP    [] Progressed/Changed HEP based on:   [] positioning   [] body mechanics   [] transfers   [] heat/ice application    [] other:      Other Objective/Functional Measures:   Unable to isolate glutes with s/l hip ext reps--frequent hip rotational compensation     Pain Level (0-10 scale) post treatment: 4    ASSESSMENT/Changes in Function:   Patient continues with c/o occasional left knee pain that limits her standing tolerance. She continues to fatigue quickly and presents with decreased glute strength. Patient will continue to benefit from skilled PT services to modify and progress therapeutic interventions, address functional mobility deficits, address ROM deficits, address strength deficits, analyze and address soft tissue restrictions, analyze and cue movement patterns, analyze and modify body mechanics/ergonomics and assess and modify postural abnormalities to attain remaining goals. []  See Plan of Care  []  See progress note/recertification  []  See Discharge Summary         Progress towards goals / Updated goals:  Updated Goals to be accomplished in 3-4 weeks:  1. Pt will improve trunk SB AROM to 75% of WNL for improved performance of housework. 2. Pt will report no difficulty performing her normal housework for improved functional independence.   3. Pt will demonstrate 4/5 hip EXT MMT for improved standing activity tolerance.     PLAN  []  Upgrade activities as tolerated     [x]  Continue plan of care  []  Update interventions per flow sheet       []  Discharge due to:_  []  Other:_      Thanh Heath 3/19/2018  10:29 AM    Future Appointments  Date Time Provider Pricila Manuel   3/19/2018 10:30 AM Camilo Morgano [facility]   3/21/2018 10:30 AM Thanh Heath [facility]   [facility]   3/28/2018 10:30 AM SEAN Osborn [facility]

## 2018-03-21 ENCOUNTER — HOSPITAL ENCOUNTER (OUTPATIENT)
Dept: PHYSICAL THERAPY | Age: 68
Discharge: HOME | End: 2018-03-21
Payer: MEDICARE

## 2018-03-21 PROCEDURE — 97110 THERAPEUTIC EXERCISES: CPT

## 2018-03-21 PROCEDURE — 97112 NEUROMUSCULAR REEDUCATION: CPT

## 2018-03-21 NOTE — PROGRESS NOTES
PT DAILY TREATMENT NOTE - [facility] 3-16    Patient Name: Iggy Sin  Date:3/21/2018  : 1950  [x]  Patient  Verified  Payor: [place] MEDICARE / Plan: [place] MEDICARE PART A & B / Product Type: Medicare /    In time:10:30  Out time:11:09  Total Treatment Time (min): 39  Total Timed Codes (min): 39  1:1 Treatment Time ( [address] only): 44   Visit #: 4 of 8    Treatment Area: Low back pain [M54.5]    SUBJECTIVE  Pain Level (0-10 scale): 5  Any medication changes, allergies to medications, adverse drug reactions, diagnosis change, or new procedure performed?: [x] No    [] Yes (see summary sheet for update)  Subjective functional status/changes:   [] No changes reported  \"It's just certain things that fire up my back. \" Patient reports noncompliance with HEP. Stressed importance of HEP compliance.      OBJECTIVE  Modality rationale: PD   Min Type Additional Details    [] Estim:  []Unatt       []IFC  []Premod                        []Other:  []w/ice   []w/heat  Position:  Location:    [] Estim: []Att    []TENS instruct  []NMES                    []Other:  []w/US   []w/ice   []w/heat  Position:  Location:    []  Traction: [] Cervical       []Lumbar                       [] Prone          []Supine                       []Intermittent   []Continuous Lbs:  [] before manual  [] after manual    []  Ultrasound: []Continuous   [] Pulsed                           []1MHz   []3MHz Location:  W/cm2:    []  Iontophoresis with dexamethasone         Location: [] Take home patch   [] In clinic    []  Ice     []  heat  []  Ice massage  []  Laser   []  Anodyne Position:  Location:    []  Laser with stim  []  Other: Position:  Location:    []  Vasopneumatic Device Pressure:       [] lo [] med [] hi   Temperature: [] lo [] med [] hi   [] Skin assessment post-treatment:  []intact []redness- no adverse reaction    []redness - adverse reaction:     29 min Therapeutic Exercise:  [x] See flow sheet :   Rationale: increase ROM, increase strength and improve coordination to improve the patients ability to increase ease with ADLs    10 min Neuromuscular Re-education:  [x]  See flow sheet :   Rationale: increase strength, improve coordination and increase proprioception  to improve the patients ability to improve core activation and postural awareness    With   [] TE   [] TA   [] neuro   [] other: Patient Education: [x] Review HEP    [] Progressed/Changed HEP based on:   [] positioning   [] body mechanics   [] transfers   [] heat/ice application    [] other:      Other Objective/Functional Measures:   Lumbar SB=75%    Requires cues to maintain core activation with seated activities     Pain Level (0-10 scale) post treatment: 4    ASSESSMENT/Changes in Function:   Patient's pain levels remain unchanged although her l/s ROM has improved. Patient will continue to benefit from skilled PT services to modify and progress therapeutic interventions, address functional mobility deficits, address ROM deficits, address strength deficits, analyze and address soft tissue restrictions, analyze and cue movement patterns, analyze and modify body mechanics/ergonomics and assess and modify postural abnormalities to attain remaining goals. []  See Plan of Care  []  See progress note/recertification  []  See Discharge Summary         Progress towards goals / Updated goals:  Updated Goals to be accomplished in 3-4 weeks:  1. Pt will improve trunk SB AROM to 75% of WNL for improved performance of housework.-met, 75% (3/21/2018)  2. Pt will report no difficulty performing her normal housework for improved functional independence.   3. Pt will demonstrate 4/5 hip EXT MMT for improved standing activity tolerance.     PLAN  []  Upgrade activities as tolerated     [x]  Continue plan of care  []  Update interventions per flow sheet       []  Discharge due to:_  []  Other:_      Emely Singh 3/21/2018  10:31 AM    Future Appointments  Date Time Provider Pricila Manuel   3/26/2018 10:30 AM [facility]   3/28/2018 10:30 AM SEAN Saini [facility]

## 2018-03-26 ENCOUNTER — HOSPITAL ENCOUNTER (OUTPATIENT)
Dept: PHYSICAL THERAPY | Age: 68
Discharge: HOME | End: 2018-03-26
Payer: MEDICARE

## 2018-03-26 PROCEDURE — 97110 THERAPEUTIC EXERCISES: CPT

## 2018-03-26 PROCEDURE — 97112 NEUROMUSCULAR REEDUCATION: CPT

## 2018-03-28 ENCOUNTER — HOSPITAL ENCOUNTER (OUTPATIENT)
Dept: PHYSICAL THERAPY | Age: 68
Discharge: HOME | End: 2018-03-28
Payer: MEDICARE

## 2018-03-28 PROCEDURE — 97110 THERAPEUTIC EXERCISES: CPT

## 2018-03-28 PROCEDURE — 97112 NEUROMUSCULAR REEDUCATION: CPT

## 2018-03-28 NOTE — PROGRESS NOTES
PT DAILY TREATMENT NOTE - [facility]     Patient Name: Filemon Matthews  Date:3/28/2018  : 1950  [x]  Patient  Verified  Payor: [place] MEDICARE / Plan: [place] MEDICARE PART A & B / Product Type: Medicare /    In time:10:30  Out time:11:24  Total Treatment Time (min): 54  Total Timed Codes (min): 44  1:1 Treatment Time ( [address] only): 31   Visit #: 5 of 8    Treatment Area: Low back pain [M54.5]    SUBJECTIVE  Pain Level (0-10 scale): 3.5/10  Any medication changes, allergies to medications, adverse drug reactions, diagnosis change, or new procedure performed?: [x] No    [] Yes (see summary sheet for update)  Subjective functional status/changes:   [] No changes reported  \"I was going to make today my last but I think I'd like to finish the last two visits. \"    OBJECTIVE    Modality rationale: decrease pain and increase tissue extensibility to improve the patients ability to perform ADL's.    Min Type Additional Details    [] Estim:  []Unatt       []IFC  []Premod                        []Other:  []w/ice   []w/heat  Position:  Location:    [] Estim: []Att    []TENS instruct  []NMES                    []Other:  []w/US   []w/ice   []w/heat  Position:  Location:    []  Traction: [] Cervical       []Lumbar                       [] Prone          []Supine                       []Intermittent   []Continuous Lbs:  [] before manual  [] after manual    []  Ultrasound: []Continuous   [] Pulsed                           []1MHz   []3MHz W/cm2:  Location:    []  Iontophoresis with dexamethasone         Location: [] Take home patch   [] In clinic   10 []  Ice     [x]  heat  []  Ice massage  []  Laser   []  Anodyne Position: Seated  Location: Low back    []  Laser with stim  []  Other:  Position:  Location:    []  Vasopneumatic Device Pressure:       [] lo [] med [] hi   Temperature: [] lo [] med [] hi   [] Skin assessment post-treatment:  []intact []redness- no adverse reaction    []redness - adverse reaction:     34 min Therapeutic Exercise:  [x] See flow sheet :   Rationale: increase ROM and increase strength to improve the patients ability to perform ADL's. 10 min Neuromuscular Re-education:  [x]  See flow sheet :   Rationale: increase strength and increase proprioception  to improve the patients ability to perform functional activities. With   [x] TE   [] TA   [] neuro   [] other: Patient Education: [x] Review HEP    [] Progressed/Changed HEP based on:   [] positioning   [] body mechanics   [] transfers   [] heat/ice application    [] other:      Other Objective/Functional Measures: Added airex for sit to stands, performed with good mechanics and form. MMT Hip ext Left 4-/5, Right 4/5. Mild difficulty performing usual ADL's/housework per pt. Pain Level (0-10 scale) post treatment: 3/10    ASSESSMENT/Changes in Function: FOTO improved to 44%. Pt reports pain continues to increase with prolonged walking/standing and when carrying heavy objects. Continue PT for additional 2 visits to work on current limitations. Patient will continue to benefit from skilled PT services to modify and progress therapeutic interventions, address functional mobility deficits, address ROM deficits, address strength deficits, analyze and cue movement patterns and analyze and modify body mechanics/ergonomics to attain remaining goals. [x]  See Plan of Care  []  See progress note/recertification  []  See Discharge Summary         Progress towards goals / Updated goals:  Updated Goals to be accomplished in 3-4 weeks:  1. Pt will improve trunk SB AROM to 75% of WNL for improved performance of housework.-met, 75% (3/21/2018)  2. Pt will report no difficulty performing her normal housework for improved functional independence.  - Mild difficulty performing usual ADL's/housework per pt. 3/28/2018  3. Pt will demonstrate 4/5 hip EXT MMT for improved standing activity tolerance.    - MMT Hip ext Left 4-/5, Right 4/5. 3/28/2018    PLAN  []  Upgrade activities as tolerated     [x]  Continue plan of care  []  Update interventions per flow sheet       []  Discharge due to:_  []  Other:_       Dartha Collet, PTA 3/28/2018  10:30 AM    No future appointments.

## 2018-04-04 ENCOUNTER — HOSPITAL ENCOUNTER (OUTPATIENT)
Dept: PHYSICAL THERAPY | Age: 68
Discharge: HOME | End: 2018-04-04
Payer: MEDICARE

## 2018-04-04 PROCEDURE — 97110 THERAPEUTIC EXERCISES: CPT

## 2018-04-04 PROCEDURE — 97112 NEUROMUSCULAR REEDUCATION: CPT

## 2018-04-04 NOTE — PROGRESS NOTES
PT DAILY TREATMENT NOTE - [facility] 3-16    Patient Name: Flor Estimable  Date:2018  : 1950  [x]  Patient  Verified  Payor: VA MEDICARE / Plan: VA MEDICARE PART A & B / Product Type: Medicare /    In time:11:00  Out time:11:53  Total Treatment Time (min): 53  Total Timed Codes (min): 43  1:1 Treatment Time ([facility] only): 37   Visit #: 6 of 8    Treatment Area: Low back pain [M54.5]    SUBJECTIVE  Pain Level (0-10 scale): 3.5-4  Any medication changes, allergies to medications, adverse drug reactions, diagnosis change, or new procedure performed?: [x] No    [] Yes (see summary sheet for update)  Subjective functional status/changes:   [] No changes reported  Pt reports overall improvement in symptoms since beginning therapy. Pt is now able to perform household duties with longer endurance and less pain. OBJECTIVE  Modality rationale: decrease pain and increase tissue extensibility to improve the patients ability to ease ADL tolerance.    Min Type Additional Details    [] Estim:  []Unatt       []IFC  []Premod                        []Other:  []w/ice   []w/heat  Position:  Location:    [] Estim: []Att    []TENS instruct  []NMES                    []Other:  []w/US   []w/ice   []w/heat  Position:  Location:    []  Traction: [] Cervical       []Lumbar                       [] Prone          []Supine                       []Intermittent   []Continuous Lbs:  [] before manual  [] after manual    []  Ultrasound: []Continuous   [] Pulsed                           []1MHz   []3MHz Location:  W/cm2:    []  Iontophoresis with dexamethasone         Location: [] Take home patch   [] In clinic    []  Ice     []  heat  []  Ice massage  []  Laser   []  Anodyne Position:  Location:    []  Laser with stim  []  Other: Position:  Location:    []  Vasopneumatic Device Pressure:       [] lo [] med [] hi   Temperature: [] lo [] med [] hi   [] Skin assessment post-treatment:  []intact []redness- no adverse reaction    []redness - adverse reaction:     33 min Therapeutic Exercise:  [x] See flow sheet :    Rationale: increase ROM, increase strength and improve coordination to improve the patients ability to increase ease with ADLs    10 min Neuromuscular Re-education:  [x]  See flow sheet :   Rationale: increase strength, improve coordination, improve balance and increase proprioception  to improve the patients ability to improve core activation and hip stability             With   [] TE   [] TA   [] neuro   [] other: Patient Education: [x] Review HEP    [] Progressed/Changed HEP based on:   [x] positioning   [x] body mechanics   [] transfers   [] heat/ice application    [] other:      Other Objective/Functional Measures: Added dynadisc for increased core stability, pt had fair tolerance. Pain Level (0-10 scale) post treatment: 2-2.5    ASSESSMENT/Changes in Function: Pt tolerated all therapeutic interventions well and without increase in pain. Pt able to perform all exercises with progressions and with minimal verbal cuing provided. Pt continues to remain compliant with home exercises and reports overall improvement since start of care. Patient will continue to benefit from skilled PT services to modify and progress therapeutic interventions, address functional mobility deficits, address ROM deficits, address strength deficits, analyze and address soft tissue restrictions, analyze and cue movement patterns, analyze and modify body mechanics/ergonomics and assess and modify postural abnormalities to attain remaining goals. []  See Plan of Care  []  See progress note/recertification  []  See Discharge Summary         Progress towards goals / Updated goals:  Updated Goals to be accomplished in 3-4 weeks:  1. Pt will improve trunk SB AROM to 75% of WNL for improved performance of housework.-met, 75% (3/21/2018)  2.  Pt will report no difficulty performing her normal housework for improved functional independence.  - Mild difficulty performing usual ADL's/housework per pt. 3/28/2018  3. Pt will demonstrate 4/5 hip EXT MMT for improved standing activity tolerance.    - MMT Hip ext Left 4-/5, Right 4/5. 3/28/2018    PLAN  [x]  Upgrade activities as tolerated     [x]  Continue plan of care  []  Update interventions per flow sheet       []  Discharge due to:_  []  Other:     [facility] 4/4/2018  10:57 AM    Future Appointments  Date Time Provider Pricila Manuel   4/4/2018 11:00 AM Camilo Carrera [facility] [id number] [facility]   4/6/2018 11:00 AM [id number]

## 2018-04-06 ENCOUNTER — HOSPITAL ENCOUNTER (OUTPATIENT)
Dept: PHYSICAL THERAPY | Age: 68
Discharge: HOME | End: 2018-04-06
Payer: MEDICARE

## 2018-04-06 PROCEDURE — 97112 NEUROMUSCULAR REEDUCATION: CPT

## 2018-04-06 PROCEDURE — G8979 MOBILITY GOAL STATUS: HCPCS

## 2018-04-06 PROCEDURE — G8980 MOBILITY D/C STATUS: HCPCS

## 2018-04-06 PROCEDURE — 97110 THERAPEUTIC EXERCISES: CPT

## 2018-04-06 NOTE — PROGRESS NOTES
PT DAILY TREATMENT NOTE - [facility] 3-16    Patient Name: Erlinda Mckeon  Date:2018  : 1950  [x]  Patient  Verified  Payor: [place] MEDICARE / Plan: VA MEDICARE PART A & B / Product Type: Medicare /    In time:11:00  Out time:11:45  Total Treatment Time (min): 45  Total Timed Codes (min): 35  1:1 Treatment Time ( only): 25   Visit #: 8 of 8    Treatment Area: Low back pain [M54.5]    SUBJECTIVE  Pain Level (0-10 scale): 5  Any medication changes, allergies to medications, adverse drug reactions, diagnosis change, or new procedure performed?: [x] No    [] Yes (see summary sheet for update)  Subjective functional status/changes:   [] No changes reported  \"I'm hurting today. Harrison Somers did me in on Wednesday. \"    OBJECTIVE  Modality rationale: decrease pain and increase tissue extensibility to improve the patients ability to ease ADL tolerance   Min Type Additional Details    [] Estim:  []Unatt       []IFC  []Premod                        []Other:  []w/ice   []w/heat  Position:  Location:    [] Estim: []Att    []TENS instruct  []NMES                    []Other:  []w/US   []w/ice   []w/heat  Position:  Location:    []  Traction: [] Cervical       []Lumbar                       [] Prone          []Supine                       []Intermittent   []Continuous Lbs:  [] before manual  [] after manual    []  Ultrasound: []Continuous   [] Pulsed                           []1MHz   []3MHz Location:  W/cm2:    []  Iontophoresis with dexamethasone         Location: [] Take home patch   [] In clinic   10 []  Ice     [x]  heat  []  Ice massage  []  Laser   []  Anodyne Position: seated  Location: l/s    []  Laser with stim  []  Other: Position:  Location:    []  Vasopneumatic Device Pressure:       [] lo [] med [] hi   Temperature: [] lo [] med [] hi   [] Skin assessment post-treatment:  []intact []redness- no adverse reaction    []redness - adverse reaction:     25 min Therapeutic Exercise:  [x] See flow sheet :   Rationale: increase ROM, increase strength and improve coordination to improve the patients ability to increase ease with ADLs    10 min Neuromuscular Re-education:  [x]  See flow sheet :   Rationale: increase strength, improve coordination, improve balance and increase proprioception  to improve the patients ability to improve core activation             With   [] TE   [] TA   [] neuro   [] other: Patient Education: [x] Review HEP    [] Progressed/Changed HEP based on:   [] positioning   [] body mechanics   [] transfers   [] heat/ice application    [] other:      Other Objective/Functional Measures:   Reports 85-90% improvement     Pain Level (0-10 scale) post treatment: 4    ASSESSMENT/Changes in Function:   Patient reports 85-90% improvement since [facility]. Patient has made slow, steady progress since [facility] with intermittent c/o knee pain and her decreased overall endurance contributing to plateau in progress at this time. She is discharged today with updated HEP. Patient will continue to benefit from skilled PT services to modify and progress therapeutic interventions, address functional mobility deficits, address ROM deficits, address strength deficits, analyze and address soft tissue restrictions, analyze and cue movement patterns, analyze and modify body mechanics/ergonomics and assess and modify postural abnormalities to attain remaining goals. []  See Plan of Care  []  See progress note/recertification  [x]  See Discharge Summary         Progress towards goals / Updated goals:  Updated Goals to be accomplished in 3-4 weeks:  1. Pt will improve trunk SB AROM to 75% of WNL for improved performance of housework.-met, 75% (3/21/2018)  2. Pt will report no difficulty performing her normal housework for improved functional independence.  - Mild difficulty performing usual ADL's/housework per pt. 3/28/2018  3.  Pt will demonstrate 4/5 hip EXT MMT for improved standing activity tolerance.   - MMT Hip ext Left 4-/5, Right 4/5. 3/28/2018    PLAN  []  Upgrade activities as tolerated     []  Continue plan of care  []  Update interventions per flow sheet       [x]  Discharge  []  Other:_      Arielle Gee 4/6/2018  11:04 AM    No future appointments.

## 2018-04-06 NOTE — PROGRESS NOTES
[facility]  [address]  [place], [address]  [phone] fax    Physical Therapy Discharge Summary    Patient name: Jacque Canas Start of Care: 2018   Referral source: Clay Kidd MD : 1950                         Medical Diagnosis: Radiculopathy, lumbar region [M54.16] Onset Date:1 year   Laminectomy 2018                         Treatment Diagnosis: LBP s/p lumbar laminectomy   Prior Hospitalization: see medical history Provider#: [id number]   Medications: Verified on Patient summary List    Comorbidities: arthritis, HTN, sciatica, vertigo, B knee pain   Prior Level of Function: Pt reports chronic LBP for the past year. I with ADLs  Visits from Start of Care: 16    Missed Visits: 0  Reporting Period : 3/9/2018 to 2018    Summary of Care:  Updated Goals to be accomplished in 3-4 weeks:  1. Pt will improve trunk SB AROM to 75% of WNL for improved performance of housework.-met, 75% (3/21/2018)  2. Pt will report no difficulty performing her normal housework for improved functional independence.  - Mild difficulty performing usual ADL's/housework per pt. 3/28/2018  3. Pt will demonstrate 4/5 hip EXT MMT for improved standing activity tolerance.   - MMT Hip ext Left 4-/5, Right 4/5. 3/28/2018    G-Codes (GP)  Position   Goal  CK= 40-59%   D/C  CK= 40-59%      The severity rating is based on clinical judgment and the FOTO score. ASSESSMENT/RECOMMENDATIONS: Pt has progressed well with overall functional mobility and LE stability. She reports 85-90% improvement since [facility]. Will D/C to HEP management at this time.     [x]Discontinue therapy: [x]Patient has reached or is progressing toward set goals      []Patient is non-compliant or has abdicated      []Due to lack of appreciable progress towards set goals    Marlene Cornell DPT, KAITPT 2018 12:21 PM

## 2018-06-05 ENCOUNTER — HOSPITAL ENCOUNTER (OUTPATIENT)
Dept: GENERAL RADIOLOGY | Age: 68
Discharge: HOME | End: 2018-06-05
Attending: INTERNAL MEDICINE
Payer: MEDICARE

## 2018-06-05 DIAGNOSIS — R13.13 PHARYNGEAL DYSPHAGIA: ICD-10-CM

## 2018-06-05 DIAGNOSIS — R13.10 DYSPHAGIA: ICD-10-CM

## 2018-06-05 PROCEDURE — G8998 SWALLOW D/C STATUS: HCPCS

## 2018-06-05 PROCEDURE — 74230 X-RAY XM SWLNG FUNCJ C+: CPT

## 2018-06-05 PROCEDURE — 74011000255 HC RX REV CODE- 255: Performed by: INTERNAL MEDICINE

## 2018-06-05 PROCEDURE — 92611 MOTION FLUOROSCOPY/SWALLOW: CPT

## 2018-06-05 PROCEDURE — G8997 SWALLOW GOAL STATUS: HCPCS

## 2018-06-05 PROCEDURE — G8996 SWALLOW CURRENT STATUS: HCPCS

## 2018-06-05 RX ADMIN — BARIUM SULFATE 120 G: 960 POWDER, FOR SUSPENSION ORAL at 14:00

## 2018-06-05 RX ADMIN — BARIUM SULFATE 700 MG: 700 TABLET ORAL at 14:00

## 2018-06-05 RX ADMIN — BARIUM SULFATE 45 ML: 400 PASTE ORAL at 14:00

## 2018-06-05 NOTE — PROGRESS NOTES
Outpatient Modified Barium Swallow Evaluation    Patient: Germain Perez (77 y.o. female)  Date: 6/5/2018  Primary Diagnosis: Dysphagia [R13.10]        Precautions: aspiration       Videofluoroscopy Results  MBS completed with results yielding mild-mod pharyngeal dysphagia c/b trace penetration with thin liquids +/- straw and 13 mm Ba pill with thin liquid wash, resolved with chin tuck maneuver. Pt tolerated reg solid, puree, and thin liquids +/- straw with chin tuck without any aspiration/penetration events. Bolus manipulation and tongue base retraction were functional and timely throughout study. Mildly decreased laryngeal elevation noted. Positive oropharyngeal clearance observed. Rec reg solid diet with thin liquids, strict chin tuck with liquid PO, aspiration precautions, oral care TID, and meds as tolerated with chin tuck. Pt may benefit from OP SLP services to address above stated deficits and for further dysphagia management. Results/recommendations d/w pt in detail utilizing video feedback with verbalized understanding. Video Flouroscopic Procedures  [x] Lateral View   [] A-P View [] Scanned to level of Sternum    [x] Seated at 90 deg.   [] Other:    Presentation:    [x] Spoon   [x] Cup   [x] Straw   [] Syringe   [] Consecutive Swallows  [] Other:    Consistencies:   [x] Ba+ liquid   [] Ba+ liquid (nectar)   [] Ba+ liquid (honey)   [x] Ba+ puree [x] Ba+ cookie [x] 13 mm Ba pill with thin Ba wash    Treatment Techniques Attempted  [] Head Turn: [] Right [] Left     [] Head Tilt: [] Right [] Left     [x] Chin Down: effective at airway protection  [] Small Sips/bites:  [] Effortful swallow:  [] Double swallow:  [] Other:    Results  Dysphagia Present:     [x] Yes  [] No    Ratings of Dysphagia:     [x] Mild  [x] Moderate  [] Severe    Stages of Breakdown:   [] Oral  [x] Pharyngeal  [] Esophageal    Aspiration:   [] Yes    [x] No  [x] At Risk     Cough: [] Yes      [x] No     Penetration:   [x] Yes    [] No     Cough: [] Yes      [x] No   [x] Flash/trace   [] Mod   [] To Chords          Consistency Aspirated:   Consistency Penetrated:   [] Thin Liquid     [x] Thin Liquid  [] Nectar-thick Liquid    [] Nectar-thick Liquid   [] Honey-thick Liquid    [] Honey-thick Liquid   [] Puree     [] Puree  [] Solid     [] Solid  [] 13 mm Ba pill with     [x] 13 mm Ba pill with thin     Motility Problems with:  [] Lip Closure:    [] Mastication:   [] Bolus Formation/control:   [] A-P Transport:  [] Tongue Base Retraction:  [] Swallow Response (delayed):  [] Velopharyngeal Closure:  [] Pharyngeal Aspirations:  [x] Laryngeal Elevation/adduction: mild-mod  [] Epiglottic Inversion:  [] Pharyngeal motility/sensation:  [] Cricopharyngeal Relaxation:  [] Esophageal Peristalsis:  [] Other:    Timing of Aspiration/Penetration:  [] Before Swallow:  [x] During Swallow:  [] After Swallow:    Transit Time Delay:  [] >1 Second  Oral  [] >1 Second Pharyngeal  [] >20 Second Esophageal     Residuals:  [] Vallecula:    [] Mild  [] Mod  [] Severe  [] Pyriform Sinus:   [] Mild  [] Mod  [] Severe  [] Posterior Pharyngeal Wall:  [] Mild  [] Mod  [] Severe    GCODES(GN): GCODESwallowing:  Swallow Current Status CJ= 20-39%   Swallow Goal Status CJ= 20-39%   Swallow D/C Status CJ= 20-39%    Thank you for this referral.    Vanna Lubin M.S. CCC-SLP/L  Speech-Language Pathologist

## 2018-07-26 ENCOUNTER — HOSPITAL ENCOUNTER (OUTPATIENT)
Dept: PHYSICAL THERAPY | Age: 68
Discharge: HOME | End: 2018-07-26
Payer: MEDICARE

## 2018-07-26 PROCEDURE — 92526 ORAL FUNCTION THERAPY: CPT

## 2018-07-26 PROCEDURE — 92610 EVALUATE SWALLOWING FUNCTION: CPT

## 2018-07-26 PROCEDURE — G8997 SWALLOW GOAL STATUS: HCPCS

## 2018-07-26 PROCEDURE — G8996 SWALLOW CURRENT STATUS: HCPCS

## 2018-07-26 NOTE — PROGRESS NOTES
ST DAILY TREATMENT NOTE    Patient Name: Adryan Noyola  Date:2018  : 1950  [x]  Patient  Verified  Payor: Meghana Villanueva / Plan: [place] MED[place] & B / Product Type: Medicare /   In time: 12:50  Out time:1:50  Total Treatment Time (min): 60  Visit #: 1 of 24    SUBJECTIVE  Pain Level (0-10 scale): 6 LBP   Any medication changes, allergies to medications, adverse drug reactions, diagnosis change, or new procedure performed?: [x] No    [] Yes (see summary sheet for update)  Subjective functional status/changes:   [] No changes reported  I feel that pills get stuck in my throat. Date of Onset: at least a year. Social History: , lives alone, twin daughters who live in the area  Prior Functional level: Consuming a regular diet with thin liquids without difficulty  Radiology: MBS completed 18 (see results in connect care and summary on speech therapy POC)  Current diet: regular diet with thin liquids   positioning: most of the time upright, but sometimes partially reclined  Mental Status:  [x]alert []lethargic []confused  Orientation: [x]person [x]place [x]time []situation  [facility] Directions: [x]1-step [x]2-step [x]3-step [x]complex  Motivation: []excellent []good  []fair  []poor   Barriers to learning: [x]none []aphasia []? cognition []lethargy/motivation [][place]   []?vision []language []Fatigue/pain []psych factors compensate with:   Dentition: []normal []abnormal []edentulous []dentures   Respiratory Status: [x]WFL []SOB  []O2 L/min:  []NC []Mask               []Trach Tube:                     []Excess secretions  Lips:  [x]Symmetrical []asymmetrical  Retraction [x]WFL  []?min []?mod []?max  Protrusion [x]WFL  []?min []?mod []?max  Strength [x]WFL  []?min []?mod []?max  Puff  [x]WFL  []?min []?mod []?max  Tongue:  [x]Symmetrical []asymmetrical  Protrusion [x]WFL  []?min []?mod []?max  Elevation [x]WFL  []?min []?mod []?max  Depression [x]WFL  []?min []?mod []?max  Lateralization [x]WFL  []?min []?mod []?max  Strength [x]WFL  []?min []?mod []?max  Velum:  [x]Symmetrical []asymmetrical  Gag Reflex:  [x]Present []absent []DNT  Sensation:  [x]Intact []Diminished  Specify:   Voice:  [x]Normal []Hoarse []harsh  []Breathy []Hypernasal []hyponasal  []Gurgly Other:   Swallow:  [x]Volitional []absent  [x]Reflexive []absent  Cough   Strength : []WNL []Diminished  [x]Volitional []absent  []Reflexive []absent (no observed)   Mildly decreased laryngeal elevation upon palpation noted. OBJECTIVE    OP SWALLOW EVALUATION    Observation of Swallow:  Thin Nectar Honey Puree  applesauce Solids  Mechanical soft fruit and grain bar/regular peanut butter cracker nabs Other  Mixed consistency canned fruit cocktail   Oral Phase         Anterior loss of bolus  (decreased labial seal [])         Decreased bolus formation  (?lingual ROM/Coord[])         Increased mastication         Increased oral transit time  (?A-P bolus transit[])         Abnormal chewing         Tongue pumping/tremors         Pocketing  (?labial/buccal tension/lingual control)         Other         Oral Pharyngeal Phase         Delayed swallow initiation         Absent swallow         Stasis on lingual surface  (?lingual movement)         Adherence to hard palate   (? lingual elevation)         Pharyngeal Phase         Multiple swallows  (residue in pharynx [])         Coughing post swallow         Throat clear post swallow     X    Wet vocal quality  (residue on vocal cords [])         Reduced laryngeal elevation X   X X X   Nasal Regurgitation  (? velopharyngeal closure)         Other           [] No symptoms of dysphagia evidenced  [x] Symptoms of dysphagia observed  [] Patient at risk for aspiration  [] Other:     Recommendations:  Diet:  [] NPO    [] Pureed [] Ground [] MechSoft [x] Regular  Liquids: [] Water  [x] Regular [] Thickened   [] [place]  [] [place]  [] Pudding  Presentation: [x] Cup    [] Spoon [] Straw [x] Alternate liquids and solids  Monitor: [x] Sitting up at 90 deg [] Reclined to:  [] Head turned to:    [x] Chin tuck  [] Head tilt to:      [x] Seated upright post meals (min): for at least 35 minutes    Videoflouroscopy: [] Yes [x] No  Dysphagia Treatment: [x] Yes [] No Sessions per week: 2        Patient/Caregiver instruction/education: [] Review HEP    [] Progressed/Changed    HEP/Handouts given: falsetto pitch ee, safe swallowing strategies (see more information on POC)    Pain Level (0-10 scale) post treatment: 6 LPB    ASSESSMENT  [x]  See Plan of Care    Short Term Goals: To be accomplished in 4-6 weeks     1. Patient will complete 15 pharyngeal/laryngeal  swallow exercises (ie: effortful swallow, produce  push/ pull,  mendelsohn, supraglottic,  Chilo Luster Lonza Penta, etc as appropriate) in therapy and at home in 5/5 trials with min-mod A given visual/verbal cues to increase pharyngeal muscle strength and airway protection. Current: 7/26:  falsetto pitch ee x 10 with mod cues   2. Patient will recall/demonstrate aspiration precautions and safe swallowing strategies with 10/10 trials 100% acc ( small sips/bites, chin tuck with liquids, mixed consistency slow rate; oral care 3x daily; sit upright at 90 degree angle during po trials and for at least 30 minute post po intake) for thin liquids and mixed consistencies, regular solids, pills  to decrease the reported incidences of dysphagia and s/sx of aspiration/ globus complaints. Current: 7/26:  Progressing with thin liquid trials with small sip, chin tuck, slow rate : 5/5 100% acc with min cues.    3. Patient will tolerate trials 10/10 trials of isolated small sips of thin liquids using comp strategies ( chin tuck) independently without s/sx of aspiration/ penetration of 5/5 sessions. Current: 7/26: progressing 0 overt s/sx of asp nor complaints when using these strategies with min cues.         Long Term Goals: To be accomplished in 12 weeks       1Patient will complete restorative swallowing exercises and utilize compensatory strategies to increase safe swallowing function with po intake with SLP present to the safety of PO intake and improved QOL during mealtimes. 2. P atient will consume thin liquids and regular consistencies with 0 instances of aspiration/globus complaints utilizing  compensatory swallowing strategies with mod I    3. The patient will implement safe swallowing techniques/strategies with 100% acc with mod I in order to improve the safety of oral intake and increase her , increase her QOL during mealtimes.  Tiffani Burow  PLAN  []  Upgrade activities as tolerated     []  Continue plan of care  []  Discharge due to:__  [x] Other:_intiiate dysphagia treatments/continue POC_    JUSTYNA Anderson 7/26/2018  1:44 PM

## 2018-07-26 NOTE — PROGRESS NOTES
[facility] [place] [employer] [facility]  [address] [place] [address]  [phone] fax    Plan of Care/ Statement of Necessity for Speech Therapy Services    Patient name: Tenisha Hull Start of Care: 2018   Referral source: Louie Charles MD : 1950    Medical Diagnosis: Dysphagia [R13.10]   Onset Date: approx a year    Treatment Diagnosis: haryngeal dysphagia   Prior Hospitalization: see medical history Provider#: [id number]   Medications: Verified on Patient summary List    Comorbidities: HTN, arthritis, GERD, vertigo, gout, breast biopsy, carpel tunnel release, cholecysterctomy, partial hysterectomy   Prior Level of Function: she was consuming a regular diet with thin liquids without difficulty     The Plan of Care and following information is based on the information from the initial evaluation. Assessment/ key information: Patient is a 78 yo female referred to speech therapy due to difficulty swallowing. She stated that she has been having difficulty with swallowing for some time now but can't really determine how long it has been-possibly a year. She stated that she has to keep swallowing for her food or liquid to go down and that at times it feels as if her throat is closing. She said that she keeps drinking water for her pills to go down but she feels that go too slow. She stated that her pills get stuck in her throat and that sometimes she takes her pills without anything to eat or drink. She reported that sometimes she will have solid food come out her nose. She stated that having a hx of working for [employer] and hurrying with lunch that she normally eats fast and can take large bites. She stated that she has some difficulty with her long term memory. She is consuming a regular diet with thin liquids. An MBS was completed on 18 at [facility].  From the SLP's written summary the results yielding mild-mod pharyngeal dysphagia c/b trace penetration with thin liquids +/- straw and 13 mm Ba pill with thin liquid wash, resolved with chin tuck maneuver. Pt tolerated reg solid, puree, and thin liquids +/- straw with chin tuck without any aspiration/penetration events. Bolus manipulation and tongue base retraction were functional and timely throughout study. Mildly decreased laryngeal elevation noted. Positive oropharyngeal clearance observed. Rec reg solid diet with thin liquids, strict chin tuck with liquid PO, aspiration precautions, oral care TID, and meds as tolerated with chin tuck. Pt may benefit from OP SLP services to address above stated deficits and for further dysphagia management. Results/recommendations d/w pt in detail utilizing video feedback with verbalized understanding. A bedside swallowing evaluation was completed today with the following results: taking into consideration results of today's bedside swallowing evaluation, patient's complaints and results of there [facility], patient exhibits a mild-mod pharyngeal dysphagia. Patient was oriented x 4 and able to follow complex directives. She has normal dentition, normal speech/articulation and voice. An oral peripherall exam was unremarkable for labial, lingual and velar structures and functions. She has a positive gag reflex and the strength of her volitional cough was normal. She exhibited  mildly decreased laryngeal elevation with palpation. . PO trials today included thin liquid via cup and straw, pureed, mechanical soft, mixed and regular consistencies. She exhibited no overt s/sx of aspiration with thin liquids, pureed, mechanical soft and mixed consistencies. She exhibited a throat clear post swallow for regular consistency cookie on 1/4 trials. Bolus manipulation and trigger of the pharyngeal swallow were timely and she exhibited no oral residue post swallow. Patient was able to demo chin tuck with thin liquids after teaching today.   The following recommendations were made (verbal and written) with patient verbalizing understanding. It was recommended that she have a regular diet with thin liquids. Safe swallowing/compensatory swallowing strategies were also addressed. (see this information under patient education below). It is recommended that patient undergo skilled speech therapy services as treatment is medically necessary to address the above mentioned deficits to improve patients safety for oral intake and her QOL. Problem List:     []aphasic  []dysarthric  [x]dysphagic       []alexic  []agraphic  []dysphonia       []dysfluency   []Cognitive-Linguistic Disorder       []other   Treatment Plan may include any combination of the following: Dysphagia Treatment and Patient Education      Patient / Family readiness to learn indicated by: asking questions, trying to perform skills and interest    Persons(s) to be included in education:   patient (P)    Barriers to Learning/Limitations: None    Patient Goal (s): I want to swallow safely and be able to swallow without food or pills getting stuck in my throat\"     Patient Self Reported Health Status: fair    Rehabilitation Potential: good     Short Term Goals: To be accomplished in 4-6 weeks  1. Patient will complete 15 pharyngeal/laryngeal  swallow exercises (ie: effortful swallow, produce  push/ pull,  mendelsohn, supraglottic,  Nannie Jolly Rica Dayhoff, etc as appropriate) in therapy and at home in 5/5 trials with min-mod A given visual/verbal cues to increase pharyngeal muscle strength and airway protection.   2. Patient will recall/demonstrate aspiration precautions and safe swallowing strategies with 10/10 trials 100% acc ( small sips/bites, chin tuck with liquids, mixed consistency slow rate; oral care 3x daily; sit upright at 90 degree angle during po trials and for at least 30 minute post po intake) for thin liquids and mixed consistencies, regular solids, pills  to decrease the reported incidences of dysphagia and s/sx of aspiration/ globus complaints. .   3. Patient will tolerate trials 10/10 trials of isolated small sips of thin liquids using comp strategies ( chin tuck) independently without s/sx of aspiration/ penetration of 5/5 sessions.       Long Term Goals: To be accomplished in 12 weeks       1Patient will complete restorative swallowing exercises and utilize compensatory strategies to increase safe swallowing function with po intake with SLP present to the safety of PO intake and improved QOL during mealtimes. .   2. P atient will consume thin liquids and regular consistencies with 0 instances of aspiration/globus complaints utilizing  compensatory swallowing strategies with mod I  3. The patient will implement safe swallowing techniques/strategies with 100% acc with mod I in order to improve the safety of oral intake and increase her , increase her QOL during mealtimes. .         Frequency / Duration: Patient to be seen 3 times per week for 12 weeks:       G Codes:  Swallowing:  Swallow Current Status CJ= 20-39%   Swallow Goal Status CI= 1-19%    The severity rating is based on the following outcomes:                   National Outcomes Measures (NOMS) re-assessment/ results of MBS if indicated, clinical judgement         Patient/ Caregiver education and instruction: Diagnosis, prognosis, Swallowing Precautions, Compensatory Techniques and Exercises, s/sx of aspiration, what aspiration is, safe swallowing guidelines and compensatory swallowing strategies (sit upright for all eating and drinking and for at least 35 minutes afterwards, take small isolated sip, strict use of  chin tuck for every liquid swallow and every mixed consistency swallow, Take small bites,  alternate solids and liquids, eat and drink at a  slow rate, meds as tolerated-one at a time buried in applesauce/pureed item.  Short and long term goals were addressed and developed with patient input and approval  Swallowing exercise introdued today: laryngeal elevation ex: dave jimenez      Certification Period: 7/26/18 to 10/24/18    JUSTYNA Fairchild 7/26/2018 1:43 PM  ________________________________________________________________________    I certify that the above Therapy Services are being furnished while the patient is under my care. I agree with the treatment plan and certify that this therapy is necessary.     Physician's Signature:____________________  Date:___________Time:_________    Please sign and return to [facility]  [address] [place] [address]  [phone] fax     Thank you

## 2018-07-31 ENCOUNTER — HOSPITAL ENCOUNTER (OUTPATIENT)
Dept: PHYSICAL THERAPY | Age: 68
Discharge: HOME | End: 2018-07-31
Payer: MEDICARE

## 2018-07-31 PROCEDURE — 92526 ORAL FUNCTION THERAPY: CPT

## 2018-07-31 NOTE — PROGRESS NOTES
ST DAILY TREATMENT NOTE    Patient Name: Ericka Escudero  Date:2018  : 1950  [x]  Patient  Verified  Payor: Payor: [place] MEDICARE / Plan: [place] MEDICARE PART A & B / Product Type: Medicare /   In time: 9:51 Out time: 10:30  Total Treatment Time (min): 39  Visit #: 2 of 24    Treatment Diagnosis: Dysphagia [R13.10]    SUBJECTIVE  Pain Level (0-10 scale):5 LBP sore from exercise, right elbow sharp  Any medication changes, allergies to medications, adverse drug reactions, diagnosis change, or new procedure performed?: [x] No    [] Yes (see summary sheet for update)    Subjective functional status/changes:   [] No changes reported  I have been sitting up 30 minutes to an hour after I eat. I have gotten in the habit more of taking small portions, I have to be more cognizant about taking smaller bites. OBJECTIVE  Treatment provided includes:  Increase/Improve:  []  Voice Quality []  Cognitive Linguistic Skills []  Laryngeal/Pharyngeal Exercises   []  Vocal Loudness []  Reading Comprehension [x]  Swallowing Skills    []  Vocal Cord Function []  Auditory Comprehension []  Oral Motor Skills   []  Resonance []  Writing Skills []  Compensatory strategies    []  Speech Intelligibility []  Expressive Language []  Attention   []  Breath Support/Coord. []  Receptive language []  Memory   []  Articulation [x]  Safety Awareness []    []  Fluency []  Word Retrieval []        Treatment Provided:   Pharyngeal strengthening exercises:   Teaching s/sx of aspiration, safe swallowing strategies  Patient/Caregiver  Education: [x] Review HEP      HEP/Handouts given: purpose of each ex, copies of vocal fold adduction with pushing, mendelsohn, information of how to obtain CTAR ball for HEP.      Pain Level (0-10 scale) post treatment:5 LBP sore from exercise, right elbow sharp pain    .      []   Improving appropriately and progressing toward goals  [x]   Improving slowly and progressing toward goals  []   Approximating goals/maximum potential  [x]   Continues to benefit from skilled therapy to address remaining functional deficits  []   Not progressing toward goals and plan of care will be adjusted    Patient will continue to benefit from skilled therapy to address remaining functional deficits: pharyngeal dysphagia    Progress towards goals / Updated goals:    1. Patient will complete 15 pharyngeal/laryngeal  swallow exercises (ie: effortful swallow, produce  push/ pull,  mendelsohn, supraglottic,  Duayne Gamaliel Kyara Situ, etc as appropriate) in therapy and at home in 5/5 trials with min-mod A given visual/verbal cues to increase pharyngeal muscle strength and airway protection. Current: 7/31Regular Shaker exercise attempted in supine, it activated her vertigo. CTAR ball for modified Shaker, 3 1 minute holds, min cues dec to S.vocal fold adduction with pulling x10 mod cues, falsetto pitch ee x 10 min cues, mendelsohn 0/5 with max cues. 2. Patient will recall/demonstrate aspiration precautions and safe swallowing strategies with 10/10 trials 100% acc ( small sips/bites, chin tuck with liquids, mixed consistency slow rate; oral care 3x daily; sit upright at 90 degree angle during po trials and for at least 30 minute post po intake) for thin liquids and mixed consistencies, regular solids, pills  to decrease the reported incidences of dysphagia and s/sx of aspiration/ globus complaints. Current: 7/31: patient stated with min cues after reviewed again today. 3. Patient will tolerate trials 10/10 trials of isolated small sips of thin liquids using comp strategies ( chin tuck) independently without s/sx of aspiration/ penetration of 5/5 sessions.   Current: 7/31: not targeted this session    PLAN  [x]  Continue plan of care  []  Modify Goals/Treatment Plan      []  Discharge due to:  [] Other:    JUSTYNA Arambula 7/31/2018  9:51 AM    Future Appointments  Date Time Provider Pricila Manuel   8/2/2018 9:45 AM JUSTYNA Arambula EATUJIT [facility]   8/14/2018 3:45 PM Dulce Dao, SLP [facility]   8/16/2018 1:00 PM Dulce Dao, SLP [facility]   8/21/2018 10:45 AM JUSTYNA Wright CRYUTLW [facility]   8/24/2018 9:00 AM Reyes Forte, SLP [facility]   8/28/2018 12:15 PM Dulce Dao SLP [facility]   8/30/2018 12:15 PM Dulce Dao, SLP [facility]

## 2018-08-02 ENCOUNTER — HOSPITAL ENCOUNTER (OUTPATIENT)
Dept: PHYSICAL THERAPY | Age: 68
Discharge: HOME | End: 2018-08-02
Payer: MEDICARE

## 2018-08-02 PROCEDURE — 92526 ORAL FUNCTION THERAPY: CPT

## 2018-08-02 NOTE — PROGRESS NOTES
ST DAILY TREATMENT NOTE    Patient Name: Miguel Ángel Mccloud  Date:2018  : 1950  [x]  Patient  Verified  Payor: Payor: [place] MEDICARE / Plan: [place] MEDICARE PART A & B / Product Type: Medicare /   In time: 9:51 Out time: 10:33  Total Treatment Time (min): 42  Visit #: 3 of 24    Treatment Diagnosis: Dysphagia [R13.10]    SUBJECTIVE  Pain Level (0-10 scale):4 LBP sore from exercise, right elbow sharp  Any medication changes, allergies to medications, adverse drug reactions, diagnosis change, or new procedure performed?: [x] No    [] Yes (see summary sheet for update)    Subjective functional status/changes:   [] No changes reported  Patient stated that she always belches a lot. Feels the bubbles in her chest and feels when she swallows that there isn't enough room in her throat. She said that she had her espohagus dialated last year. Checking in chart review the only one listed was with Dr. Latanya Guan in . She will call his office to see when the last one was done. OBJECTIVE  Treatment provided includes:  Increase/Improve:  []  Voice Quality []  Cognitive Linguistic Skills []  Laryngeal/Pharyngeal Exercises   []  Vocal Loudness []  Reading Comprehension [x]  Swallowing Skills    []  Vocal Cord Function []  Auditory Comprehension []  Oral Motor Skills   []  Resonance []  Writing Skills []  Compensatory strategies    []  Speech Intelligibility []  Expressive Language []  Attention   []  Breath Support/Coord. []  Receptive language []  Memory   []  Articulation [x]  Safety Awareness []    []  Fluency []  Word Retrieval []        Treatment Provided:   Pharyngeal strengthening exercises:   Teaching s/sx of aspiration, safe swallowing strategies  Thin liquid trials with compensatory strategies. Patient/Caregiver  Education: [x] Review HEP      HEP/Handouts given:Continue HEP. How to palpate for connor apples, versus cricoid cartilage.  Mendelsohn maneuver: Come down from your chin only an inch and put your finger there, swallow and you will feel your rickie's apple rise and then lower as you swallow. Then teaching about how to perform the ex. Pain Level (0-10 scale) post treatment:4 LBP sore from exercise, right elbow sharp pain     Assessment:     Throat clear post swallow with thin liquids resulting in a throat clear post swallow my be due to some esophageal issues as she would belch or feel air coming up. Her throat may have been a little irritated from the vocal fold adduction and falsetto pitch exercises. Max A with Mendelsohn ex. []   Improving appropriately and progressing toward goals  [x]   Improving slowly and progressing toward goals  []   Approximating goals/maximum potential  [x]   Continues to benefit from skilled therapy to address remaining functional deficits  []   Not progressing toward goals and plan of care will be adjusted    Patient will continue to benefit from skilled therapy to address remaining functional deficits: pharyngeal dysphagia    Progress towards goals / Updated goals:    1. Patient will complete 15 pharyngeal/laryngeal  swallow exercises (ie: effortful swallow, produce  push/ pull,  mendelsohn, supraglottic,  [place] Kyara Situ, etc as appropriate) in therapy and at home in 5/5 trials with min-mod A given visual/verbal cues to increase pharyngeal muscle strength and airway protection. Current: 8/2:  CTAR ball for modified Shaker, 3 1 minute holds with S;Sfold adduction with pulling x15 min cues, falsetto pitch ee x 10 min cues, mendelsohn 2/7 with max cues. Difficulty triggering the swallow reflex with mendelsohn trials even when she would take a liquid sip prior.   2. Patient will recall/demonstrate aspiration precautions and safe swallowing strategies with 10/10 trials 100% acc ( small sips/bites, chin tuck with liquids, mixed consistency slow rate; oral care 3x daily; sit upright at 90 degree angle during po trials and for at least 30 minute post po intake) for thin liquids and mixed consistencies, regular solids, pills  to decrease the reported incidences of dysphagia and s/sx of aspiration/ globus complaints. Current: 8/2: patient stated with min cues after reviewed again today. 3. Patient will tolerate trials 10/10 trials of isolated small sips of thin liquids using comp strategies ( chin tuck) independently without s/sx of aspiration/ penetration of 5/5 sessions. Current: 8/2:  10 trials, with patient taking a small sip, chin tuck: small throat clear post swallow on 7/10 trials She stated that she would have air bubbles when she swallowed or needed to belch. She also stated that her throat felt scratching and that is another reason she was throat clearing she stated. (she had completed falsetto pitch ee and vocal fold adduction exercises prior the trials) added double swallows but there was no decrease in the throat clear post swallow.        PLAN  [x]  Continue plan of care  []  Modify Goals/Treatment Plan      []  Discharge due to:  [] Other:    Marylee Levan, SLP 8/2/2018  9:51 AM    Future Appointments  Date Time Provider Pricila Manuel   8/14/2018 3:45 PM JUSTYNA Carter [facility]   8/16/2018 1:00 PM JUSTYNA Carter [facility]   8/21/2018 10:45 AM JUSTYNA Carter [facility]   8/24/2018 9:00 AM Marylee Levan, SLP [facility]   8/28/2018 12:15 PM JUSTYNA Carter [facility]   8/30/2018 12:15 PM JUSTYNA Carter [facility]

## 2018-08-14 ENCOUNTER — HOSPITAL ENCOUNTER (OUTPATIENT)
Dept: PHYSICAL THERAPY | Age: 68
Discharge: HOME | End: 2018-08-14
Payer: MEDICARE

## 2018-08-14 PROCEDURE — 92526 ORAL FUNCTION THERAPY: CPT

## 2018-08-14 NOTE — PROGRESS NOTES
ST DAILY TREATMENT NOTE    Patient Name: Rishabh Hill  Date:2018  : 1950  [x]  Patient  Verified  Payor: Payor: Vani Breath / Plan: [place] MEDICARE PART A & B / Product Type: Medicare /   In time: 3:50  Out time: 4:45  Total Treatment Time (min): 55  Visit #: 4 of 24    Treatment Diagnosis: Dysphagia [R13.10]    SUBJECTIVE  Pain Level (0-10 scale): Knees 6-7; Back 5; right elbow 6; Neck 4    Any medication changes, allergies to medications, adverse drug reactions, diagnosis change, or new procedure performed?: [x] No    [] Yes (see summary sheet for update)    Subjective functional status/changes:   [] No changes reported  Pt forgot to call Dr Eber Herrera re: last dilatation. Pt to call tomorrow. \"It seems to be doing a better\". \"High /eee/ is giving me a sore throat\". Pt c/o having trouble doing the exercises. She reports not being able to do 25 reps throughout her day  and not feeling confident doing them correctly. Modifications to be made this session with scheduling recs. OBJECTIVE  Treatment provided includes:  Increase/Improve:  []  Voice Quality []  Cognitive Linguistic Skills [x]  Laryngeal/Pharyngeal Exercises   []  Vocal Loudness []  Reading Comprehension [x]  Swallowing Skills    []  Vocal Cord Function []  Auditory Comprehension []  Oral Motor Skills   []  Resonance []  Writing Skills [x]  Compensatory strategies    []  Speech Intelligibility []  Expressive Language []  Attention   []  Breath Support/Coord. []  Receptive language []  Memory   []  Articulation []  Safety Awareness [x] Pt education    []  Fluency []  Word Retrieval []        Treatment Provided:  Pharyngeal/laryngeal exercises; recall/ demo comp strategies with po trials. Patient/Caregiver  Education: [x] Review HEP      HEP/Handouts given: Daily swallowing exercises modified; magenta theraband given to trial at home and in clinic for push/ pull d/t arthritis pain.      Pain Level (0-10 scale) post treatment: Knees 6-7; Back 5; right elbow 6; Neck 4      ASSESSMENT     []   Improving appropriately and progressing toward goals  [x]   Improving slowly and progressing toward goals  []   Approximating goals/maximum potential  [x]   Continues to benefit from skilled therapy to address remaining functional deficits  []   Not progressing toward goals and plan of care will be adjusted    Patient will continue to benefit from skilled therapy to address remaining functional deficits: dysphagia    Progress towards goals / Updated goals:  1. Patient will complete 15 pharyngeal/laryngeal  swallow exercises (ie: effortful swallow, produce  push/ pull,  mendelsohn, supraglottic, Sharlett Dills Verlinda Bering, etc as appropriate) in therapy and at home in 5/5 trials with min-mod A given visual/verbal cues to increase pharyngeal muscle strength and airway protection. Current: 8/14:    1) CTAR ball for modified Shaker, 3 1 minute holds with Lawrence  2) VF adduction with pushing and pulling x15 with mod skilled instruction for options d/t reported pain in arms and back; options and improved posture: using a theraband, a table, or seat of chair min cues (rec complete during commercial breaks)  3) falsetto pitch high /eee/ x 15 min cues (rec completing during shower or doing dishes d/t pt c/o of sound bothers her);   4) Mendelsohn maneuver x10/15 attempts following mod-max skilled instruction to take a  Breath and push entire tongue to roof of mouth with therapedia video and tactile cues. 5) Chin tuck effortful swallow: x15 min skilled instruction with saliva only ( rec completing during meals/ snacks throughout the day)   2.  Patient will recall/demonstrate aspiration precautions and safe swallowing strategies with 10/10 trials 100% acc ( small sips/bites, chin tuck with liquids, mixed consistency slow rate; oral care 3x daily; sit upright at 90 degree angle during po trials and for at least 30 minute post po intake) for thin liquids and mixed consistencies, regular solids, pills  to decrease the reported incidences of dysphagia and s/sx of aspiration/ globus complaints. Current: 8/14: patient stated/demo'd with min cues  With thin liquid trials  3. Patient will tolerate trials 10/10 trials of isolated small sips of thin liquids using comp strategies ( chin tuck) independently without s/sx of aspiration/ penetration of 5/5 sessions. Current: 8/14:  6 trials using comp strategies; no overt s/sx of aspiration/ penetration; no change in VQ apparent or observed this date.      PLAN  [x]  Continue plan of care  []  Modify Goals/Treatment Plan      []  Discharge due to:   [x] Other: FURTHER ADDRESS [address] AND SUPRAGLOTTIC SWALLOW NEXT SESSION X15 REPS TO INCREASE CARRYOVER OF HEP    JUSTYNA Bosch 8/14/2018  3:53 PM  MA, [address] Pathologist    Future Appointments  Date Time Provider Pricila Manuel   8/16/2018 1:00 PM JUSTYNA Larkin [facility]   8/21/2018 10:45 AM JUSTYNA Bosch LDZIFF[facility]   8/24/2018 9:00 AM JUSTYNA LarkinPTOLIVER [facility]   8/28/2018 12:15 PM JUSTYNA BoschPTOLIVER [facility]

## 2018-08-16 ENCOUNTER — HOSPITAL ENCOUNTER (OUTPATIENT)
Dept: PHYSICAL THERAPY | Age: 68
Discharge: HOME | End: 2018-08-16
Payer: MEDICARE

## 2018-08-16 PROCEDURE — 92526 ORAL FUNCTION THERAPY: CPT

## 2018-08-16 NOTE — PROGRESS NOTES
ST DAILY TREATMENT NOTE    Patient Name: Francine Ward  Date:2018  : 1950  [x]  Patient  Verified  Payor: Payor: [place] MEDICARE / Plan: [place] MEDICARE PART A & B / Product Type: Medicare /   In time: 1:03  Out time: 1:45  Total Treatment Time (min): 42  Visit #: 5 of 24    Treatment Diagnosis: Dysphagia [R13.10]     SUBJECTIVE  Pain Level (0-10 scale): Knees 6-6.5; Back, right elbow; Neck     Any medication changes, allergies to medications, adverse drug reactions, diagnosis change, or new procedure performed?: [x] No    [] Yes (see summary sheet for update)    Subjective functional status/changes:   [] No changes reported  She stated that she had her last dilatation 2018. She said that she took her pill which was tiny in applesauce and it stuck in her throat. She said the big pill went down. OBJECTIVE  Treatment provided includes:  Increase/Improve:  []  Voice Quality []  Cognitive Linguistic Skills [x]  Laryngeal/Pharyngeal Exercises   []  Vocal Loudness []  Reading Comprehension [x]  Swallowing Skills    []  Vocal Cord Function []  Auditory Comprehension []  Oral Motor Skills   []  Resonance []  Writing Skills [x]  Compensatory strategies    []  Speech Intelligibility []  Expressive Language []  Attention   []  Breath Support/Coord. []  Receptive language []  Memory   []  Articulation []  Safety Awareness [x] Pt education    []  Fluency []  Word Retrieval []      Treatment Provided:  Pharyngeal/laryngeal exercises; recall/ demo comp strategies with po trials. PO trials thin liquids with chin tuck. Supraglottic swallow with small thin water sip to assist with triggering of swallow reflex. Patient/Caregiver  Education: [x] Review HEP      HEP/Handouts given: Daily swallowing exercises modified; added supraglottic swallow, mendelsohn handouts    Pain Level (0-10 scale) post treatment: Knees 6-6.5;  Back, right elbow; Neck       ASSESSMENT   Patient is progressing with her swallowing exercises. She had difficulty performing supraglottic swallow with saliva due to dry mouth. She used a little bit of thin water with chin tuck with mod A. She was unable to comprehend/perform Mendelsohn due to fatigue after performing supra-glottic swallows. . No overt s/sx of asp with thin liquid small isolated sips with the use of chin tuck. Patient exhibited a burp after each supraglottic swallow trial.   []   Improving appropriately and progressing toward goals  [x]   Improving slowly and progressing toward goals  []   Approximating goals/maximum potential  [x]   Continues to benefit from skilled therapy to address remaining functional deficits  []   Not progressing toward goals and plan of care will be adjusted    Patient will continue to benefit from skilled therapy to address remaining functional deficits: dysphagia    Progress towards goals / Updated goals:    1. Patient will complete 15 pharyngeal/laryngeal  swallow exercises (ie: effortful swallow, produce  push/ pull,  mendelsohn, supraglottic, Radha Kales Joice Kawasaki, etc as appropriate) in therapy and at home in 5/5 trials with min-mod A given visual/verbal cues to increase pharyngeal muscle strength and airway protection. Current: 8/16:    1) supraglottic swallow with saliva/enough thin water to initiate the swallows x 18: 70% acc with mod cues  2) falsetto pitch high ee x 15 with min cues to not push so hard to start to avoid sore throat. 4) Mendelsohn maneuver x 5 15 attempts following mod-max skilled instruction. She was too tired from supraglottic swallow to accurately perform the exercises. 5) Chin tuck effortful swallow: 10 trials of thin water with min cues   2.  Patient will recall/demonstrate aspiration precautions and safe swallowing strategies with 10/10 trials 100% acc ( small sips/bites, chin tuck with liquids, mixed consistency slow rate; oral care 3x daily; sit upright at 90 degree angle during po trials and for at least 30 minute post po intake) for thin liquids and mixed consistencies, regular solids, pills  to decrease the reported incidences of dysphagia and s/sx of aspiration/ globus complaints. Current: 8/16: patient stated/demo'd with min cues wWith thin liquid trials  3. Patient will tolerate trials 10/10 trials of isolated small sips of thin liquids using comp strategies ( chin tuck) independently without s/sx of aspiration/ penetration of 5/5 sessions. Current: 8/16:  10  trials using comp strategies; no overt s/sx of aspiration/ penetration; no change in VQ apparent or observed this date. PLAN  [x]  Continue plan of care  []  Modify Goals/Treatment Plan      []  Discharge due to:   [x] Other: continue supraglottic swallow after teaching Mendelsohn. Have her come down from her chin to palpate rickie's apple as she palpates cricoid cartilage instead.       JUSTYNA Serrano 8/16/2018  1:03 AM  MA, [facility]-SLP  Speech-Language Pathologist    Future Appointments  Date Time Provider Pricila Manuel   8/21/2018 10:45 AM JUSTYNA Almeida [facility] [address]   8/24/2018 9:00 AM JUSTYNA Serrano [facility] [address]   8/28/2018 12:15 PM JUSTYNA Almeida [facility] [address]

## 2018-08-21 ENCOUNTER — HOSPITAL ENCOUNTER (OUTPATIENT)
Dept: PHYSICAL THERAPY | Age: 68
Discharge: HOME | End: 2018-08-21
Payer: MEDICARE

## 2018-08-21 PROCEDURE — G8996 SWALLOW CURRENT STATUS: HCPCS

## 2018-08-21 PROCEDURE — G8997 SWALLOW GOAL STATUS: HCPCS

## 2018-08-21 PROCEDURE — 92526 ORAL FUNCTION THERAPY: CPT

## 2018-08-21 NOTE — PROGRESS NOTES
[facility] [employer] [facility] [employer]  [address] [place] [address]  [phone] fax    Medicare Progress Report    Patient name: Lorena Alvarado Start of Care: 18   Referral source: Dre Menendez MD : 1950   Medical/Treatment Diagnosis: Dysphagia [R13.10] Onset Date: ~ 1 year     Prior Hospitalization: see medical history Provider#: [id number]   Medications: Verified on Patient Summary List    Comorbidities: HTN, arthritis, GERD, vertigo, gout, breast biopsy, carpel tunnel release, cholecysterctomy, partial hysterectomy   Prior Level of Function: she was consuming a regular diet with thin liquids without difficulty     Visits from Start of Care: 6    Missed Visits: 0    Reporting Period: 18 to 18    Subjective Reports: Pt forgot to call Dr Maxi Rojo re: last dilatation. Pt to call tomorrow. \"It seems to be doing a better\". \"High /eee/ is giving me a sore throat\". Pt c/o having trouble doing the exercises. She reports not being able to do 25 reps throughout her day  and not feeling confident doing them correctly. Modifications to be made this session with scheduling recs. Goal: 1. Patient will complete 15 pharyngeal/laryngeal  swallow exercises (ie: effortful swallow, produce  push/ pull,  mendelsohn, supraglottic, [place]ta [place] Levonia Balk, etc as appropriate) in therapy and at home in 5/5 trials with min-mod   Status at last note/certification: Mild moderate pharyngeal dysphagia  Current Status: not met;   1) supraglottic swallow with saliva/enough thin water to initiate the swallows x 18: 70% acc with mod cues  2) falsetto pitch high ee x 15 with min cues to not push so hard to start to avoid sore throat. 4) Mendelsohn maneuver x 5 15 attempts following mod-max skilled instruction. She was too tired from supraglottic swallow to accurately perform the exercises. 5) Chin tuck effortful swallow: 10 trials of thin water with min cues    Goal: 2.  Patient will recall/demonstrate aspiration precautions and safe swallowing strategies with 10/10 trials 100% acc ( small sips/bites, chin tuck with liquids, mixed consistency slow rate; oral care 3x daily; sit upright at 90 degree angle during po trials and for at least 30 minute post po intake) for thin liquids and mixed consistencies, regular solids, pills  to decrease the reported incidences of dysphagia and s/sx of aspiration/ globus complaints. Status at last note/certification:  Mild moderate pharyngeal dysphagia  Current Status: not met; patient stated/demo'd with min cues with thin liquid trials    Goal:3. Patient will tolerate trials 10/10 trials of isolated small sips of thin liquids using comp strategies ( chin tuck) independently without s/sx of aspiration/ penetration of 5/5 sessions. Status at last note/certification:  Mild moderate pharyngeal dysphagia  Current Status: not met; 10  trials using comp strategies; no overt s/sx of aspiration/ penetration; no change in VQ apparent or observed this date with Lawrence      Key functional changes: Pt is learning strengthening exercises with less cueing for improved compliance with HEP in order to improve overall swallow function. Chin tuck was determine to be best comp strategy to reduce aspiration/ penetration, per MBS. Problems/ barriers to goal attainment: pt may warrant an esophageal dilitation    Assessment / Recommendations:  Continue adressing skilled ST to further addres dysphagia, as it is medically necessary to promote safety, implement LRD, and reduce aspiration risk. Problem List:    []aphasic  []dysarthric  [x]dysphagic       []alexic  []agraphic  []dysphonia       []dysfluency   []Cognitive-Linguistic Disorder       []other        Treatment Plan: Dysphagia Treatment, Treatment of Swallowing and Patient Education    Patient Goal (s) has been updated and includes: Continue addressing all STGS. Updated Goals to be accomplished in 4-6 weeks:  1. Patient will complete 15 pharyngeal/laryngeal  swallow exercises (ie: effortful swallow, produce  push/ pull,  mendelsohn, supraglottic,  Erica Killer Garcia Chol, etc as appropriate) in therapy and at home in 5/5 trials with min A given visual/verbal cues to increase pharyngeal muscle strength and airway protection. 2. Patient will recall/demonstrate aspiration precautions and safe swallowing strategies with 10/10 trials 100% acc ( small sips/bites, chin tuck with liquids, mixed consistency slow rate; oral care 3x daily; sit upright at 90 degree angle during po trials and for at least 30 minute post po intake) for thin liquids and mixed consistencies, regular solids, pills  to decrease the reported incidences of dysphagia and s/sx of aspiration/ globus complaints. .   3. Patient will tolerate trials 10/10 trials of isolated small sips of thin liquids using comp strategies ( chin tuck) independently without s/sx of aspiration/ penetration of 5/5 sessions.     Frequency / Duration: Patient to be seen 3 times per week for 12 weeks:    G Codes:     Swallow Current Status CJ= 20-39%   Swallow Goal Status CJ= 20-39%    The severity rating is based on the following outcomes:    National Outcomes Measures (NOMS); clinical judgment      Chey Cain, SLP 8/21/2018 11:06 AM  MA, CCC-SLP  Speech-Language Pathologist

## 2018-08-21 NOTE — PROGRESS NOTES
ST DAILY TREATMENT NOTE    Patient Name: Anais Art  Date:2018  : 1950  [x]  Patient  Verified  Payor: Payor: [place] MEDICARE / Plan: [place] MEDICARE PART A & B / Product Type: Medicare /   In time : 10:45 Out time: 11:30  Total Treatment Time (min): 45  Visit #: 6 of 24    Treatment Diagnosis: Dysphagia [R13.10]    SUBJECTIVE  Pain Level (0-10 scale): 3.5 Right elbow; back; knees  Any medication changes, allergies to medications, adverse drug reactions, diagnosis change, or new procedure performed?: [x] No    [] Yes (see summary sheet for update)    Subjective functional status/changes:   [] No changes reported  Pt reports having difficulty with supraglottic swallow and Mendelsohn maneuver    OBJECTIVE  Treatment provided includes:  Increase/Improve:  []  Voice Quality []  Cognitive Linguistic Skills [x]  Laryngeal/Pharyngeal Exercises   []  Vocal Loudness []  Reading Comprehension [x]  Swallowing Skills    []  Vocal Cord Function []  Auditory Comprehension []  Oral Motor Skills   []  Resonance []  Writing Skills []  Compensatory strategies    []  Speech Intelligibility []  Expressive Language []  Attention   []  Breath Support/Coord. []  Receptive language []  Memory   []  Articulation [x]  Safety Awareness [x] Pt education   []  Fluency []  Word Retrieval []        Treatment Provided:  Pt education re comp strategies to improve carryover; laryngeal pharyngeal exercises. Po trials with thin liquids.      Patient/Caregiver  Education: [x] Review HEP      HEP/Handouts given: Continue swallowing HEP     Pain Level (0-10 scale) post treatment: 3.5 Right elbow; back; knees  ASSESSMENT     []   Improving appropriately and progressing toward goals  [x]   Improving slowly and progressing toward goals  []   Approximating goals/maximum potential  [x]   Continues to benefit from skilled therapy to address remaining functional deficits  []   Not progressing toward goals and plan of care will be adjusted    Patient will continue to benefit from skilled therapy to address remaining functional deficits: dysphagia    Progress towards goals / Updated goals:  1. Patient will complete 15 pharyngeal/laryngeal  swallow exercises (ie: effortful swallow, produce  push/ pull,  mendelsohn, supraglottic, Roxianne Narrow Eino Blanco, etc as appropriate) in therapy and at home in 5/5 trials with min-mod A given visual/verbal cues to increase pharyngeal muscle strength and airway protection. Current: 8/21:  Max skilled instruction with multi-modal cues to implement Mendelsohn maneuver x15 with 1-3 second holds    1) supraglottic swallow with thin liquids  x 15 with mod cues  2) falsetto pitch high ee x 15 with min cues to not push so hard to start to avoid sore throat. 3) Push/Pull x15 with mod cues/instruction   4) Mendelsohn maneuver x 15 1max skilled instruction/cues . 5) Chin tuck effortful swallow: 10 trials of thin water with min cues   2. Patient will recall/demonstrate aspiration precautions and safe swallowing strategies with 10/10 trials 100% acc ( small sips/bites, chin tuck with liquids, mixed consistency slow rate; oral care 3x daily; sit upright at 90 degree angle during po trials and for at least 30 minute post po intake) for thin liquids and mixed consistencies, regular solids, pills  to decrease the reported incidences of dysphagia and s/sx of aspiration/ globus complaints. Current: 8/16: patient stated/demo'd with min cues when given thin liquids. 3. Patient will tolerate trials 10/10 trials of isolated small sips of thin liquids using comp strategies ( chin tuck) independently without s/sx of aspiration/ penetration of 5/5 sessions. Current: 8/16: With Lawrence, and greater than 10 trials, pt utilized chin with thin liquids without any overt s/sx of aspiration/ penetration.     PLAN  [x]  Continue plan of care  []  Modify Goals/Treatment Plan      []  Discharge due to:  [] Other:    Karena Ng SLP 8/21/2018  10:48 AM  MA, CCC-SLP  Speech-Language Pathologist    Future Appointments  Date Time Provider Pricila Manuel   8/28/2018 12:15 PM JUSTYNA Almeida [facility]   9/4/2018 10:45 AM JUSTYNA Almeida [facility]   9/6/2018 1:00 PM JUSTYNA Almeida [facility]   9/11/2018 1:00 PM JUSTYNA Almeida [facility]   9/13/2018 1:00 PM JUSTYNA Almeida [facility]

## 2018-08-24 ENCOUNTER — APPOINTMENT (OUTPATIENT)
Dept: PHYSICAL THERAPY | Age: 68
End: 2018-08-24
Payer: MEDICARE

## 2018-08-28 ENCOUNTER — APPOINTMENT (OUTPATIENT)
Dept: PHYSICAL THERAPY | Age: 68
End: 2018-08-28
Payer: MEDICARE

## 2018-08-30 ENCOUNTER — APPOINTMENT (OUTPATIENT)
Dept: PHYSICAL THERAPY | Age: 68
End: 2018-08-30
Payer: MEDICARE

## 2018-08-30 ENCOUNTER — HOSPITAL ENCOUNTER (OUTPATIENT)
Dept: PHYSICAL THERAPY | Age: 68
Discharge: HOME | End: 2018-08-30
Payer: MEDICARE

## 2018-08-30 PROCEDURE — 92526 ORAL FUNCTION THERAPY: CPT

## 2018-08-30 NOTE — PROGRESS NOTES
ST DAILY TREATMENT NOTE    Patient Name: Elan Guerra  Date:2018  : 1950  [x]  Patient  Verified  Payor: Payor: Dora Cain / Plan: [place] MEDICARE PART A & B / Product Type: Medicare /   In time:11:35 Out time: 12:15  Total Treatment Time (min): 40  Visit #: 7 of 24    Treatment Diagnosis: Dysphagia [R13.10]    SUBJECTIVE  Pain Level (0-10 scale): 3 knees, elbow (right) 3, back 4, neck 4-5    Any medication changes, allergies to medications, adverse drug reactions, diagnosis change, or new procedure performed?: [x] No    [] Yes (see summary sheet for update)    Subjective functional status/changes:   [] No changes reported  Pt reports she apologies re: missing the last 2 sessions, she had some unforseen repairs warranted on her home and had to cxl tx. OBJECTIVE  Treatment provided includes:  Increase/Improve:  []  Voice Quality []  Cognitive Linguistic Skills [x]  Laryngeal/Pharyngeal Exercises   []  Vocal Loudness []  Reading Comprehension [x]  Swallowing Skills    []  Vocal Cord Function []  Auditory Comprehension []  Oral Motor Skills   []  Resonance []  Writing Skills [x]  Compensatory strategies    []  Speech Intelligibility []  Expressive Language []  Attention   []  Breath Support/Coord. []  Receptive language []  Memory   []  Articulation [x]  Safety Awareness [x] Pt educ   []  Fluency []  Word Retrieval []        Treatment Provided:  Recall/demo comp strategies;  Laryngeal/pharyngeal exercises    Patient/Caregiver  Education: [x] Review HEP      HEP/Handouts given: Continue swallowing HEP    Pain Level (0-10 scale) post treatment:  3 knees, elbow (right) 3, back 4, neck 4-5    ASSESSMENT     []   Improving appropriately and progressing toward goals  [x]   Improving slowly and progressing toward goals  []   Approximating goals/maximum potential  [x]   Continues to benefit from skilled therapy to address remaining functional deficits  []   Not progressing toward goals and plan of care will be adjusted    Patient will continue to benefit from skilled therapy to address remaining functional deficits: dysphagia    Progress towards goals / Updated goals:  1. Patient will complete 15 pharyngeal/laryngeal  swallow exercises (ie: effortful swallow, produce  push/ pull,  mendelsohn, supraglottic, Lita Dull Glenard Salm, etc as appropriate) in therapy and at home in 5/5 trials with min A given visual/verbal cues to increase pharyngeal muscle strength and airway protection. Current 8/30:  falsetto pitch high ee x 15 Frantz; Mendelsohn maneuver x 15 with; Push/pull with breath holds x15 with min-modA;   2. Patient will recall/demonstrate aspiration precautions and safe swallowing strategies with 10/10 trials 100% acc (small sips/bites, chin tuck with liquids, mixed consistency slow rate; oral care 3x daily; sit upright at 90 degree angle during po trials and for at least 30 minute post po intake) for thin liquids and mixed consistencies, regular solids, pills  to decrease the reported incidences of dysphagia and s/sx of aspiration/ globus complaints. .   Current 8/30: 100% acc with min cues. 3. Patient will tolerate trials 10/10 trials of isolated small sips of thin liquids using comp strategies ( chin tuck) independently without s/sx of aspiration/ penetration of 5/5 sessions. Current 8/30: Not targeted this date.     PLAN  [x]  Continue plan of care  []  Modify Goals/Treatment Plan      []  Discharge due to:  [] Other:    JUSTYNA Delaney 8/30/2018  11:41 AM  MA, CCC-SLP  Speech-Language Pathologist    Future Appointments  Date Time Provider Pricila Manuel   9/4/2018 10:45 AM JUSTYNA Delaney [facility]   9/6/2018 1:00 PM JUSTYNA Stacy [facility]   9/11/2018 1:00 PM JUSTYNA Delaney [facility]   9/13/2018 1:00 PM JUSTYNA Delaney [facility]

## 2018-09-04 ENCOUNTER — HOSPITAL ENCOUNTER (OUTPATIENT)
Dept: PHYSICAL THERAPY | Age: 68
Discharge: HOME | End: 2018-09-04
Payer: MEDICARE

## 2018-09-04 NOTE — PROGRESS NOTES
ST DAILY TREATMENT NOTE    Patient Name: Octavia Hunt  Date:2018  : 1950  [x]  Patient  Verified  Payor: Payor: [place] MEDICARE / Plan: [place] MEDICARE PART A & B / Product Type: Medicare /   In time: 10:50  Out time: 11:30  Total Treatment Time (min): 40  Visit #: 8 of 24    Treatment Diagnosis: Dysphagia [R13.10]    SUBJECTIVE  Pain Level (0-10 scale):2-3 knees; 3 back    Any medication changes, allergies to medications, adverse drug reactions, diagnosis change, or new procedure performed?: [x] No    [] Yes (see summary sheet for update)    Subjective functional status/changes:   [] No changes reported  \"I think my swallowing is getting a little bit better\". OBJECTIVE  Treatment provided includes:  Increase/Improve:  []  Voice Quality []  Cognitive Linguistic Skills [x]  Laryngeal/Pharyngeal Exercises   []  Vocal Loudness []  Reading Comprehension [x]  Swallowing Skills    []  Vocal Cord Function []  Auditory Comprehension []  Oral Motor Skills   []  Resonance []  Writing Skills [x]  Compensatory strategies    []  Speech Intelligibility []  Expressive Language []  Attention   []  Breath Support/Coord.  []  Receptive language []  Memory   []  Articulation [x]  Safety Awareness [x] Pt education   []  Fluency []  Word Retrieval []        Treatment Provided:  Pharyngeal/laryngeal exercises; po trials with use of comp strategies  Patient/Caregiver  Education: [x] Review HEP      HEP/Handouts given: Continue Swallowing HEP     Pain Level (0-10 scale) post treatment: 2-3 knees; 3 back      ASSESSMENT     []   Improving appropriately and progressing toward goals  [x]   Improving slowly and progressing toward goals  []   Approximating goals/maximum potential  [x]   Continues to benefit from skilled therapy to address remaining functional deficits  []   Not progressing toward goals and plan of care will be adjusted    Patient will continue to benefit from skilled therapy to address remaining functional deficits: dysphagia    Progress towards goals / Updated goals:  1. Patient will complete 15 pharyngeal/laryngeal  swallow exercises (ie: effortful swallow, produce  push/ pull,  mendelsohn, supraglottic, Hannah Julienne Hassan Sidjesús, etc as appropriate) in therapy and at home in 5/5 trials with min A given visual/verbal cues to increase pharyngeal muscle strength and airway protection. Current 9/4: Supraglottic swallow x 15 Lawrence; Mendelsohn maneuver x 15 with modA; Modified Shaker with CTAR ball x1 minute x3reps and then x30 reps for 3-5 second holds with Lawrence; high /eee/ x15 with Lawrence  2. Patient will recall/demonstrate aspiration precautions and safe swallowing strategies with 10/10 trials 100% acc (small sips/bites, chin tuck with liquids, mixed consistency slow rate; oral care 3x daily; sit upright at 90 degree angle during po trials and for at least 30 minute post po intake) for thin liquids and mixed consistencies, regular solids, pills  to decrease the reported incidences of dysphagia and s/sx of aspiration/ globus complaints. .   Current 9/4: 80% acc with min cues. 3. Patient will tolerate trials 10/10 trials of isolated small sips of thin liquids using comp strategies (chin tuck) independently without s/sx of aspiration/ penetration of 5/5 sessions. Current 9/4: 10/10 with Lawrence and no overt s/sx of aspiration/ penetration.     PLAN  [x]  Continue plan of care  []  Modify Goals/Treatment Plan      []  Discharge due to:  [] Other:    JUSTYNA Daly 9/4/2018  10:56 AM  MA, CCC-SLP  Speech-Language Pathologist    Future Appointments  Date Time Provider Pricila Manuel   9/6/2018 1:00 PM Willim Leventhal, SLP [facility]   9/11/2018 1:00 PM JUSTYNA Daly [facility]   9/13/2018 1:00 PM JUSTYNA DalyPTOLIVER [facility]   9/18/2018 10:30 AM JUSTYNA Daly [facility]   9/21/2018 8:15 AM JUSTYNA Daly [facility]

## 2018-09-06 ENCOUNTER — HOSPITAL ENCOUNTER (OUTPATIENT)
Dept: PHYSICAL THERAPY | Age: 68
Discharge: HOME | End: 2018-09-06
Payer: MEDICARE

## 2018-09-06 PROCEDURE — 92526 ORAL FUNCTION THERAPY: CPT

## 2018-09-06 NOTE — PROGRESS NOTES
ST DAILY TREATMENT NOTE    Patient Name: Kristina Ortega  Date:2018  : 1950  [x]  Patient  Verified  Payor: Payor: [place] MEDICARE / Plan: [place] MEDICARE PART A & B / Product Type: Medicare /   In time: 1:03  Out time: 11:45  Total Treatment Time (min): 40  Visit #: 9 of 24    Treatment Diagnosis: Dysphagia [R13.10]    SUBJECTIVE  Pain Level (0-10 scale): knees,elbows 3    Any medication changes, allergies to medications, adverse drug reactions, diagnosis change, or new procedure performed?: [x] No    [] Yes (see summary sheet for update)    Subjective functional status/changes:   [] No changes reported  I have a misery level. No pain but I am just dragging. \"I am not getting choked out of the blue like I was-my swallowing is getting a little better\". OBJECTIVE  Treatment provided includes:  Increase/Improve:  []  Voice Quality []  Cognitive Linguistic Skills [x]  Laryngeal/Pharyngeal Exercises   []  Vocal Loudness []  Reading Comprehension [x]  Swallowing Skills    []  Vocal Cord Function []  Auditory Comprehension []  Oral Motor Skills   []  Resonance []  Writing Skills [x]  Compensatory strategies    []  Speech Intelligibility []  Expressive Language []  Attention   []  Breath Support/Coord. []  Receptive language []  Memory   []  Articulation [x]  Safety Awareness [x] Pt education   []  Fluency []  Word Retrieval []        Treatment Provided:  Pharyngeal/laryngeal exercises; po trials with use of comp strategies  Patient/Caregiver  Education: [x] Review HEP      HEP/Handouts given: come down with your finger from your chin and you will feel where the top of your connor's apple is. Do not go low on the neck to feel it-that is the cricoid cartilage. Continue Swallowing HEP, work more on Frederiksberg C and supraglottic swallow,    Pain Level (0-10 scale) post treatment: knees,elbows 3      ASSESSMENT   Patient has a difficult time with Mendelson. She is difficult to palpate for laryngeal elevation.    [] Improving appropriately and progressing toward goals  [x]   Improving slowly and progressing toward goals  []   Approximating goals/maximum potential  [x]   Continues to benefit from skilled therapy to address remaining functional deficits  []   Not progressing toward goals and plan of care will be adjusted    Patient will continue to benefit from skilled therapy to address remaining functional deficits: dysphagia    Progress towards goals / Updated goals:  1. Patient will complete 15 pharyngeal/laryngeal  swallow exercises (ie: effortful swallow, produce  push/ pull,  mendelsohn, supraglottic, Cloretta Chad Kyara Situ, etc as appropriate) in therapy and at home in 5/5 trials with min A given visual/verbal cues to increase pharyngeal muscle strength and airway protection. Current 9/5: Supraglottic swallow x 15 Lawrence; Mendelsohn maneuver x 7/15 correct with with modA,  Modified Shaker with CTAR ball x1 minute x3reps and then x30 reps for 3-5 second holds with Lawrence; high /eee/ x15 with S   2. Patient will recall/demonstrate aspiration precautions and safe swallowing strategies with 10/10 trials 100% acc (small sips/bites, chin tuck with liquids, mixed consistency slow rate; oral care 3x daily; sit upright at 90 degree angle during po trials and for at least 30 minute post po intake) for thin liquids and mixed consistencies, regular solids, pills  to decrease the reported incidences of dysphagia and s/sx of aspiration/ globus complaints. .   Current 9/5: 80% acc with min cues. 3. Patient will tolerate trials 10/10 trials of isolated small sips of thin liquids using comp strategies (chin tuck) independently without s/sx of aspiration/ penetration of 5/5 sessions. Current 9/5: 10/10 with S  and no overt s/sx of aspiration/ penetration.     PLAN  [x]  Continue plan of care  []  Modify Goals/Treatment Plan      []  Discharge due to:  [] Other:    Araceli Jackson, JUSTYNA 9/6/2018  1:03 PM  MS, CCC-SLP  Speech-Language Pathologist    Future Appointments  Date Time Provider Pricila Manuel   9/11/2018 1:00 PM JUSTYNA Bosch [facility]   9/13/2018 1:00 PM JUSTYNA Bosch [facility]   9/18/2018 10:30 AM JUSTYNA Bosch [facility]   9/21/2018 8:15 AM JUSTYNA Bosch [facility]

## 2018-09-11 ENCOUNTER — HOSPITAL ENCOUNTER (OUTPATIENT)
Dept: PHYSICAL THERAPY | Age: 68
End: 2018-09-11
Payer: MEDICARE

## 2018-09-13 ENCOUNTER — APPOINTMENT (OUTPATIENT)
Dept: PHYSICAL THERAPY | Age: 68
End: 2018-09-13
Payer: MEDICARE

## 2018-09-18 ENCOUNTER — HOSPITAL ENCOUNTER (OUTPATIENT)
Dept: PHYSICAL THERAPY | Age: 68
Discharge: HOME | End: 2018-09-18
Payer: MEDICARE

## 2018-09-18 PROCEDURE — G8996 SWALLOW CURRENT STATUS: HCPCS

## 2018-09-18 PROCEDURE — 92526 ORAL FUNCTION THERAPY: CPT

## 2018-09-18 PROCEDURE — G8997 SWALLOW GOAL STATUS: HCPCS

## 2018-09-18 NOTE — PROGRESS NOTES
ST DAILY TREATMENT NOTE    Patient Name: Miguel Ángel Mccloud  Date:2018  : 1950  [x]  Patient  Verified  Payor: Payor: Hina Leal / Plan: [place] MEDICARE PART A & B / Product Type: Medicare /   In time: 10:30  Out time: 11:15  Total Treatment Time (min): 45  Visit #: 10 of 24    Treatment Diagnosis: Dysphagia [R13.10]    SUBJECTIVE  Pain Level (0-10 scale): 0  Any medication changes, allergies to medications, adverse drug reactions, diagnosis change, or new procedure performed?: [] No    [] Yes (see summary sheet for update)    Subjective functional status/changes:   [] No changes reported  Pt reports she has not been doing her HEP faithfully, but wants to get back on board. OBJECTIVE  Treatment provided includes:  Increase/Improve:  []  Voice Quality []  Cognitive Linguistic Skills [x]  Laryngeal/Pharyngeal Exercises   []  Vocal Loudness []  Reading Comprehension [x]  Swallowing Skills    []  Vocal Cord Function []  Auditory Comprehension []  Oral Motor Skills   []  Resonance []  Writing Skills [x]  Compensatory strategies    []  Speech Intelligibility []  Expressive Language []  Attention   []  Breath Support/Coord. []  Receptive language []  Memory   []  Articulation [x]  Safety Awareness [x] Pt education    []  Fluency []  Word Retrieval []        Treatment Provided:  Pharyngeal/laryngeal exercises; po trials with thin liquids and using comp strategies.     Patient/Caregiver  Education: [x] Review HEP      HEP/Handouts given: Continue HEP    Pain Level (0-10 scale) post treatment: 0    ASSESSMENT     []   Improving appropriately and progressing toward goals  [x]   Improving slowly and progressing toward goals  []   Approximating goals/maximum potential  [x]   Continues to benefit from skilled therapy to address remaining functional deficits  []   Not progressing toward goals and plan of care will be adjusted    Patient will continue to benefit from skilled therapy to address remaining functional deficits: dysphagia    Progress towards goals / Updated goals:  1. Patient will complete 15 pharyngeal/laryngeal  swallow exercises (ie: effortful swallow, produce  push/ pull,  mendelsohn, supraglottic, Turner Yasmine Ryann Check, etc as appropriate) in therapy and at home in 5/5 trials with min A given visual/verbal cues to increase pharyngeal muscle strength and airway protection. Current 9/18: Mendelsohn maneuver x15 Lawrence; Modified Shaker using CTAR ball with Lawrence; Supraglottic swallow x 15 Frantz;  high /eee/ x15 with I; push/pull with sustained phonation x15 Frantz    2. Patient will recall/demonstrate aspiration precautions and safe swallowing strategies with 10/10 trials 100% acc (small sips/bites, chin tuck with liquids, mixed consistency slow rate; oral care 3x daily; sit upright at 90 degree angle during po trials and for at least 30 minute post po intake) for thin liquids and mixed consistencies, regular solids, pills  to decrease the reported incidences of dysphagia and s/sx of aspiration/ globus complaints. .   Current 9/18: 100% acc with min cues. 3. Patient will tolerate trials 10/10 trials of isolated small sips of thin liquids using comp strategies (chin tuck) independently without s/sx of aspiration/ penetration of 5/5 sessions.   Current 9/18: 8/10 I coughing x1 with no change in VQ.       PLAN  [x]  Continue plan of care  []  Modify Goals/Treatment Plan      []  Discharge due to:  [] Other:    JUSTYNA Antonio 9/18/2018  10:36 AM  MA, CCC-SLP  Speech-Language Pathologist    Future Appointments  Date Time Provider Pricial Manuel   9/21/2018 8:15 AM JUSTYNA Antonio [facility]

## 2018-09-21 ENCOUNTER — HOSPITAL ENCOUNTER (OUTPATIENT)
Dept: PHYSICAL THERAPY | Age: 68
Discharge: HOME | End: 2018-09-21
Payer: MEDICARE

## 2018-09-21 PROCEDURE — 92526 ORAL FUNCTION THERAPY: CPT

## 2018-09-21 NOTE — PROGRESS NOTES
[facility] - Barrett Martinez  [address] [facility] [place]  [phone] fax    Medicare Progress Report    Patient name: Uche Santoro Start of Care: 18   Referral source: Gaudencio Elizabeth MD : 1950   Medical/Treatment Diagnosis: Dysphagia [R13.10] Onset Date: ~     Prior Hospitalization: see medical history Provider#: [id number]   Medications: Verified on Patient Summary List    Comorbidities: HTN, arthritis, GERD, vertigo, gout, breast biopsy, carpel tunnel release, cholecysterctomy, partial hysterectomy  Prior Level of Function: she was consuming a regular diet with thin liquids without difficulty     Visits from Start of Care: 10    Missed Visits: 2    Reporting Period: 1899 to     Subjective Reports:   Pt reports she has not been doing her HEP faithfully, but wants to get back on board. \"I am not getting choked out of the blue like I was-my swallowing is getting a little better\". Goal: 1. Patient will complete 15 pharyngeal/laryngeal  swallow exercises (ie: effortful swallow, produce  push/ pull,  mendelsohn, supraglottic, Truner Yasmine Ryann Check, etc as appropriate) in therapy and at home in 5/5 trials with min A given visual/verbal cues to increase pharyngeal muscle strength and airway protection. Status at last note/certification: not met;   1) supraglottic swallow with saliva/enough thin water to initiate the swallows x 18: 70% acc with mod cues  2) falsetto pitch high ee x 15 with min cues to not push so hard to start to avoid sore throat. 4) Mendelsohn maneuver x 5 15 attempts following mod-max skilled instruction. She was too tired from supraglottic swallow to accurately perform the exercises. 5) Chin tuck effortful swallow: 10 trials of thin water with min cues  Current Status: not met; Mendelsohn maneuver x15 Lawrence;   Modified Shaker using CTAR ball with Lawrence; Supraglottic swallow x 15 Frantz;  high /eee/ x15 with I; push/pull with sustained phonation x15 Frantz (progessing)    Goal: 2. Patient will recall/demonstrate aspiration precautions and safe swallowing strategies with 10/10 trials 100% acc ( small sips/bites, chin tuck with liquids, mixed consistency slow rate; oral care 3x daily; sit upright at 90 degree angle during po trials and for at least 30 minute post po intake) for thin liquids and mixed consistencies, regular solids, pills  to decrease the reported incidences of dysphagia and s/sx of aspiration/ globus complaints. .   Status at last note/certification:not met; patient stated/demo'd with min cues with thin liquid trials  Current Status: not met; % (Approaching/ continue addressing)    Goal:  3. Patient will tolerate trials 10/10 trials of isolated small sips of thin liquids using comp strategies ( chin tuck) independently without s/sx of aspiration/ penetration of 5/5 sessions. Status at last note/certification: not met; 10  trials using comp strategies; no overt s/sx of aspiration/ penetration; no change in VQ apparent or observed this date with Lawrence  Current Status: not met; x3 inconsistent sessions. (progessing)    Key functional changes: Improving with pharyngeal/laryngeal exercises and starting to utilize chin tuck more consistently     Problems/ barriers to goal attainment: None    Assessment / Recommendations:Pt would benefit from continued skilled ST to further address dysphagia, as it is medically necessary to improve swallowing function and  promote safety, implement LRD, and reduce aspiration risk. Problem List:    []aphasic  []dysarthric  [x]dysphagic       []alexic  []agraphic  []dysphonia       []dysfluency   []Cognitive-Linguistic Disorder       []other        Treatment Plan: Dysphagia Treatment, Treatment of Swallowing and Patient Education    Patient Goal (s) has been updated and includes: continue addressing all STGS. Increased exercise reps to 20.       Updated Goals to be accomplished in 4-6 weeks: 1. Patient will complete x20 pharyngeal/laryngeal  swallow exercises (ie: effortful swallow, produce  push/ pull,  mendelsohn, supraglottic,  Marten Hammans Daril Muster, etc as appropriate) in therapy and at home in 5/5 trials with min given visual/verbal cues to increase pharyngeal muscle strength and airway protection. 2. Patient will recall/demonstrate aspiration precautions and safe swallowing strategies with 10/10 trials 100% acc ( small sips/bites, chin tuck with liquids, mixed consistency slow rate; oral care 3x daily; sit upright at 90 degree angle during po trials and for at least 30 minute post po intake) for thin liquids and mixed consistencies, regular solids, pills  to decrease the reported incidences of dysphagia and s/sx of aspiration/ globus complaints. .   3. Patient will tolerate trials 10/10 trials of isolated small sips of thin liquids using comp strategies ( chin tuck) independently without s/sx of aspiration/ penetration of 5/5 sessions.     Frequency / Duration: Patient to be seen 3 times per week for 12 weeks:    G Codes:     Swallow Current Status CJ= 20-39%   Swallow Goal Status CI= 1-19%    The severity rating is based on the following outcomes:    National Outcomes Measures (NOMS); clinical judgment      JUSTYNA Cross 9/18/2018 8:21 AM  [place], [address] [facility]  Speech-Language Pathologist

## 2018-09-25 ENCOUNTER — HOSPITAL ENCOUNTER (OUTPATIENT)
Dept: PHYSICAL THERAPY | Age: 68
Discharge: HOME | End: 2018-09-25
Payer: MEDICARE

## 2018-09-25 PROCEDURE — 92526 ORAL FUNCTION THERAPY: CPT

## 2018-09-25 NOTE — PROGRESS NOTES
ST DAILY TREATMENT NOTE    Patient Name: Dwight Ferreira  Date:2018  : 1950  [x]  Patient  Verified  Payor: Payor: [place] MEDICARE / Plan: [place] MEDICARE PART [place] & B / Product Type: Medicare /   In time: 7:30  Out time: 8:15  Total Treatment Time (min): 45  Visit #: 12 of 24    Treatment Diagnosis: Dysphagia [R13.10]    SUBJECTIVE  Pain Level (0-10 scale): neck 6; back 6  ; knees 6  Any medication changes, allergies to medications, adverse drug reactions, diagnosis change, or new procedure performed?: [x] No    [] Yes (see summary sheet for update)    Subjective functional status/changes:   [] No changes reported      OBJECTIVE  Treatment provided includes:  Increase/Improve:  []  Voice Quality []  Cognitive Linguistic Skills [x]  Laryngeal/Pharyngeal Exercises   []  Vocal Loudness []  Reading Comprehension [x]  Swallowing Skills    []  Vocal Cord Function []  Auditory Comprehension []  Oral Motor Skills   []  Resonance []  Writing Skills [x]  Compensatory strategies    []  Speech Intelligibility []  Expressive Language []  Attention   []  Breath Support/Coord. []  Receptive language []  Memory   []  Articulation [x]  Safety Awareness [x] Pt education    []  Fluency []  Word Retrieval []        Treatment Provided:  Laryngeal/pharyngeal exercise; recall/ demo comp strategies with po trials of thin liquids.    Patient/Caregiver  Education: [x] Review HEP      HEP/Handouts given: Continue Swallowing HEP    Pain Level (0-10 scale) post treatment: neck 6; back; knees 6    ASSESSMENT     []   Improving appropriately and progressing toward goals  [x]   Improving slowly and progressing toward goals  []   Approximating goals/maximum potential  [x]   Continues to benefit from skilled therapy to address remaining functional deficits  []   Not progressing toward goals and plan of care will be adjusted    Patient will continue to benefit from skilled therapy to address remaining functional deficits: dysphagia Progress towards goals / Updated goals:  1. Patient will complete x20 pharyngeal/laryngeal  swallow exercises (ie: effortful swallow, produce  push/ pull,  mendelsohn, supraglottic, Alvira Sos Nena Heri, etc as appropriate) in therapy and at home in 5/5 trials with min given visual/verbal cues to increase pharyngeal muscle strength and airway protection. Current 9/25/18:   1) supraglottic swallow with water:  x20 Sandra  2) falsetto pitch high eee x 20 Sandra. 3) CTAR /modified shaker: sandra  4) Mendelsohn maneuver x 20 Lawrence  5) Chin tuck effortful swallow: x10 trials with  thin water sb  6) breath hold against resistance: push/pull: Not targeted this date    2. Patient will recall/demonstrate aspiration precautions and safe swallowing strategies with 10/10 trials 100% acc ( small sips/bites, chin tuck with liquids, mixed consistency slow rate; oral care 3x daily; sit upright at 90 degree angle during po trials and for at least 30 minute post po intake) for thin liquids and mixed consistencies, regular solids, pills  to decrease the reported incidences of dysphagia and s/sx of aspiration/ globus complaints. Current 9/25/18: recall and demo with 100% acc. 3. Patient will tolerate trials 10/10 trials of isolated small sips of thin liquids using comp strategies ( chin tuck) independently without s/sx of aspiration/ penetration of 5/5 sessions.    Current 9/25/18:  x10 trials with  thin water sb    PLAN  [x]  Continue plan of care  []  Modify Goals/Treatment Plan      []  Discharge due to:  [] Other:    JUSTYNA Marcial 9/25/2018  7:36 AM  MA, [facility]-SLP  Speech-Language Pathologist    Future Appointments  Date Time Provider Pricila Manuel   9/27/2018 11:30 AM JUSTYNA Marcial [facility]   10/2/2018 10:30 AM JUSTYNA Marcial [facility]   10/4/2018 11:30 AM JUSTYNA Marcial [facility]   10/23/2018 11:00 AM [facility] DONNIE FCO [facility]

## 2018-09-27 ENCOUNTER — APPOINTMENT (OUTPATIENT)
Dept: PHYSICAL THERAPY | Age: 68
End: 2018-09-27
Payer: MEDICARE

## 2018-10-02 ENCOUNTER — APPOINTMENT (OUTPATIENT)
Dept: PHYSICAL THERAPY | Age: 68
End: 2018-10-02
Payer: MEDICARE

## 2018-10-04 ENCOUNTER — APPOINTMENT (OUTPATIENT)
Dept: PHYSICAL THERAPY | Age: 68
End: 2018-10-04
Payer: MEDICARE

## 2018-10-19 ENCOUNTER — HOSPITAL ENCOUNTER (OUTPATIENT)
Dept: PHYSICAL THERAPY | Age: 68
Discharge: HOME | End: 2018-10-19
Payer: MEDICARE

## 2018-10-19 PROCEDURE — 92526 ORAL FUNCTION THERAPY: CPT

## 2018-10-19 PROCEDURE — G8996 SWALLOW CURRENT STATUS: HCPCS

## 2018-10-19 PROCEDURE — G8997 SWALLOW GOAL STATUS: HCPCS

## 2018-10-19 NOTE — PROGRESS NOTES
ST DAILY TREATMENT NOTE    Patient Name: Kelly Sainz  Date:10/19/2018  : 1950  [x]  Patient  Verified  Payor: Payor: Dusty Spotted / Plan: [place] MEDICARE PART A & B / Product Type: Medicare /   In time: 12:10  Out time: 12:49  Total  Treatment Time (min): 39  Visit #: 13 of 24    Treatment Diagnosis: Dysphagia [R13.10]    SUBJECTIVE  Pain Level (0-10 scale): neck 6-7; back 6. Waist area 6  ; knees 6-7  Any medication changes, allergies to medications, adverse drug reactions, diagnosis change, or new procedure performed?: [x] No    [] Yes (see summary sheet for update)    Subjective functional status/changes:   [] No changes reported  Patient said that she has been missing therapy due to illness, back and neck pain. She reports doing her swallowing exercises \" a little\" about 3 times a week. OBJECTIVE  Treatment provided includes:  Increase/Improve:  []  Voice Quality []  Cognitive Linguistic Skills [x]  Laryngeal/Pharyngeal Exercises   []  Vocal Loudness []  Reading Comprehension [x]  Swallowing Skills    []  Vocal Cord Function []  Auditory Comprehension []  Oral Motor Skills   []  Resonance []  Writing Skills [x]  Compensatory strategies    []  Speech Intelligibility []  Expressive Language []  Attention   []  Breath Support/Coord. []  Receptive language []  Memory   []  Articulation [x]  Safety Awareness [x] Pt education    []  Fluency []  Word Retrieval []        Treatment Provided:  Laryngeal/pharyngeal exercise; recall/ demo comp strategies with po trials of thin liquids. ; teaching regarding expectations of SLP for patient to perform the HEP as instructed, daily and reason why the HEP is so important in her swallowing rehabilitation.    Patient/Caregiver  Education: [x] Review HEP      HEP/Handouts given: Continue Swallowing HEP; importance of doing all of the exercises as recommended on your pages given every day     Pain Level (0-10 scale) post treatment:  ASSESSMENT     []   Improving appropriately and progressing toward goals  [x]   Improving slowly and progressing toward goals  []   Approximating goals/maximum potential  [x]   Continues to benefit from skilled therapy to address remaining functional deficits  []   Not progressing toward goals and plan of care will be adjusted    Patient will continue to benefit from skilled therapy to address remaining functional deficits: dysphagia     Progress towards goals / Updated goals:  1. Patient will complete x20 pharyngeal/laryngeal  swallow exercises (ie: effortful swallow, produce  push/ pull,  mendelsohn, supraglottic, Trinna Edge Bambi Roes, etc as appropriate) in therapy and at home in 5/5 trials with min given visual/verbal cues to increase pharyngeal muscle strength and airway protection. Current 10/19/18:   1) supraglottic swallow with water:  x20 Sandra  2) falsetto pitch high eee x 20 Sandra. 3) CTAR /modified shaker: sandra  4) Mendelsohn maneuver x 20 Lawrence  5) Chin tuck effortful swallow: x10 trials with  thin water sb  6) breath hold against resistance: push/pull: 5 sec holds: Mod cues to keep her shoulders and bear down in throat    2. Patient will recall/demonstrate aspiration precautions and safe swallowing strategies with 10/10 trials 100% acc ( small sips/bites, chin tuck with liquids, mixed consistency slow rate; oral care 3x daily; sit upright at 90 degree angle during po trials and for at least 30 minute post po intake) for thin liquids and mixed consistencies, regular solids, pills  to decrease the reported incidences of dysphagia and s/sx of aspiration/ globus complaints. Current 10/19/18: recall and demo with thin liquids 100% acc. 3. Patient will tolerate trials 10/10 trials of isolated small sips of thin liquids using comp strategies ( chin tuck) independently without s/sx of aspiration/ penetration of 5/5 sessions.    Current 10/19/18:  x10 trials with  thin water sb with no overt s/sx of aspiration    PLAN  [x]  Continue plan of care  []  Modify Goals/Treatment Plan      []  Discharge due to:  [] Other:    Clint Villanueva, SLP 10/19/2018  12:10 PM  MA, [address] Pathologist    Future Appointments   Date Time Provider Pricila Manuel   10/23/2018 11:00 AM [facility] DONNIE FCO  [facility]   10/26/2018  8:15 AM JUSTYNA Phelan [facility]   10/31/2018  9:00 AM JUSTYNA Phelan [facility]   11/6/2018  9:45 AM JUSTYNA Phelan [facility]   11/8/2018 10:00 AM JUSTYNA Phelan [facility]

## 2018-10-23 ENCOUNTER — HOSPITAL ENCOUNTER (OUTPATIENT)
Dept: MAMMOGRAPHY | Age: 68
Discharge: HOME | End: 2018-10-23
Attending: FAMILY MEDICINE
Payer: MEDICARE

## 2018-10-23 DIAGNOSIS — Z12.31 VISIT FOR SCREENING MAMMOGRAM: ICD-10-CM

## 2018-10-23 PROCEDURE — 77063 BREAST TOMOSYNTHESIS BI: CPT

## 2018-10-26 ENCOUNTER — HOSPITAL ENCOUNTER (OUTPATIENT)
Dept: PHYSICAL THERAPY | Age: 68
Discharge: HOME | End: 2018-10-26
Payer: MEDICARE

## 2018-10-26 PROCEDURE — 92526 ORAL FUNCTION THERAPY: CPT

## 2018-10-26 NOTE — PROGRESS NOTES
ST DAILY TREATMENT NOTE    Patient Name: Oziel Chavez  Date:10/26/2018  : 1950  [x]  Patient  Verified  Payor: Payor: [place] MEDICARE / Plan: [place] MEDICARE PART A & B / Product Type: Medicare /   In time: 8:15  Out time: 9:00  Total Treatment Time (min): 45  Visit #: 1 of 16    Treatment Diagnosis: Dysphagia [R13.10]    SUBJECTIVE  Pain Level (0-10 scale): neck 6; back 6  Any medication changes, allergies to medications, adverse drug reactions, diagnosis change, or new procedure performed?: [x] No    [] Yes (see summary sheet for update)    Subjective functional status/changes:   [] No changes reported  \"I feel like it's better\". Pt reports seeing GI on 10/9 and MD wants to schedule a breath test.     OBJECTIVE  Treatment provided includes:  Increase/Improve:  []  Voice Quality []  Cognitive Linguistic Skills [x]  Laryngeal/Pharyngeal Exercises   []  Vocal Loudness []  Reading Comprehension [x]  Swallowing Skills    []  Vocal Cord Function []  Auditory Comprehension []  Oral Motor Skills   []  Resonance []  Writing Skills [x]  Compensatory strategies    []  Speech Intelligibility []  Expressive Language []  Attention   []  Breath Support/Coord.  []  Receptive language []  Memory   []  Articulation [x]  Safety Awareness [x] Pt education    []  Fluency []  Word Retrieval []        Treatment Provided:  Pharyngeal/laryngeal exercises; po trials with use of comp strategies  Patient/Caregiver  Education: [x] Review HEP      HEP/Handouts given: Continue swallowing HEP    Pain Level (0-10 scale) post treatment: 0    ASSESSMENT     []   Improving appropriately and progressing toward goals  [x]   Improving slowly and progressing toward goals  []   Approximating goals/maximum potential  [x]   Continues to benefit from skilled therapy to address remaining functional deficits  []   Not progressing toward goals and plan of care will be adjusted    Patient will continue to benefit from skilled therapy to address remaining functional deficits: dysphagia    Progress towards goals / Updated goals:  1. Patient will complete x20 pharyngeal/laryngeal  swallow exercises (ie: effortful swallow, produce  push/ pull,  mendelsohn, supraglottic, Sherrilyn Hannah Birdena Clock, etc as appropriate) in therapy and at home in 5/5 trials with min given visual/verbal cues to increase pharyngeal muscle strength and airway protection. Current 10/26/18:   1) supraglottic swallow with water:  x20 Sandra  2) falsetto pitch high eee x 20 Sandra. 3) CTAR /modified shaker: sandra  4) Mendelsohn maneuver x 20 Lawrence  5) Chin tuck effortful swallow: x10 trials with  thin water sb  6) breath hold against resistance: push/pull: 5 sec holds:  Lawrence     2. Patient will recall/demonstrate aspiration precautions and safe swallowing strategies with 10/10 trials 100% acc ( small sips/bites, chin tuck with liquids, mixed consistency slow rate; oral care 3x daily; sit upright at 90 degree angle during po trials and for at least 30 minute post po intake) for thin liquids and mixed consistencies, regular solids, pills in 5/5 sessions  to decrease the reported incidences of dysphagia and s/sx of aspiration/ globus complaints.   Current 10/26/18: recall and demo with thin liquids and mixed consistency snack 100% acc. Pt  C/o intermittent globus, but no odynophagia. Frequent belching observed. \"It feels clogged\"    3. Patient will tolerate 10/10 trials of thin liquids +/- straw using comp strategies ( chin tuck) independently without s/sx of aspiration/ penetration of 5/5 sessions.   Current 10/26/18:  x10 trials with  thin water sb -straw with no overt s/sx of aspiration    1.  Patient will complete x20 pharyngeal/laryngeal  swallow exercises (ie: effortful swallow, produce  push/ pull,  mendelsohn, supraglottic, Liz Broach Birdena Clock, etc as appropriate) in therapy and at home in 5/5 trials with min given visual/verbal cues to increase pharyngeal muscle strength and airway protection.           PLAN  [x] Continue plan of care  []  Modify Goals/Treatment Plan      []  Discharge due to:  [] Other:    JUSTYNA Frederick 10/26/2018  8:25 AM  MA, [facility]-SLP  Speech-Language Pathologist    Future Appointments   Date Time Provider Pricila Manuel   10/31/2018  9:00 AM JUSTYNA Clark [facility]   11/6/2018  9:45 AM JUSTYNA Clark [facility]   11/8/2018 10:00 AM JUSTYNA Clark [facility]

## 2018-10-29 NOTE — PROGRESS NOTES
[facility]  [address] [facility]  [phone] fax    Continued Plan of Care/ Re-certification for Speech Therapy Services    Patient name: Corry Smyth Start of Care: 18   Referral source: Jean Ferro MD : 1950   Medical/Treatment Diagnosis: Dysphagia [R13.10]  Payor: [place] MEDICARE / Plan: VA MEDICARE PART A & B / Product Type: Medicare /  Onset Date: ~     Prior Hospitalization: see medical history Provider#: [id number]   Medications: Verified on Patient Summary List    Comorbidities: HTN, arthritis, GERD, vertigo, gout, breast biopsy, carpel tunnel release, cholecysterctomy, partial hysterectomy  Prior Level of Function: she was consuming a regular diet with thin liquids without difficulty       Visits from Start of Care: 13    Missed Visits: 3    The Plan of Care and following information is based on the patient's current status:  Goal: 1. Patient will complete x20 pharyngeal/laryngeal  swallow exercises (ie: effortful swallow, produce  push/ pull,  mendelsohn, supraglottic, Domenico Southerly Judy Anais, etc as appropriate) in therapy and at home in 5/5 trials with min given visual/verbal cues to increase pharyngeal muscle strength and airway protection. Status at last note/certification:not met; Mendelsohn maneuver x15 Lawrence;  Modified Shaker using CTAR ball with Lawrence; Supraglottic swallow x 15 Sandra;  high /eee/ x15 with I; push/pull with sustained phonation x15 Sandra   Current Status: not met  1) supraglottic swallow with water:  x20 Sandra (3/3 sessions)  2) falsetto pitch high eee x 20 Sandra. (3/3 sessions)  3) CTAR /modified shaker: sandra (2/2 sessions)  4) Mendelsohn maneuver x 20 Lawrence (2/3 sessions)  5) Chin tuck effortful swallow: x10 trials with  thin water sb (3/3 sessions)  6) breath hold against resistance: push/pull: (/1 sessions)  (progessing)    Goal: 2.  Patient will recall/demonstrate aspiration precautions and safe swallowing strategies with 10/10 trials 100% acc ( small sips/bites, chin tuck with liquids, mixed consistency slow rate; oral care 3x daily; sit upright at 90 degree angle during po trials and for at least 30 minute post po intake) for thin liquids and mixed consistencies, regular solids, pills  to decrease the reported incidences of dysphagia and s/sx of aspiration/ globus complaints. .   Status at last note/certification: not met; %   Current Status: not met; 100% acc in 3/3 sessions with thin liquids and regular solids (progessing)      Goal: 3. Patient will tolerate trials 10/10 trials of isolated small sips of thin liquids using comp strategies ( chin tuck) independently without s/sx of aspiration/ penetration of 5/5 sessions. Status at last note/certification: not met; x3 inconsistent sessions. (progessing)  Current Status: met (advance goal)      Key functional changes:   Barriers/limitations to obtain max potential and goals have  Been pt's limited attendance over the past 30 days d/t illness. She also c/o neck and back pain. Pt is exhibiting progress in skilled ST by tolerating increase reps for pharyngeal/laryngeal exercises with x20 reps of swallowing exercises for her HEP. SLP d/w pt expectations of HEP to be completed in order to yield best prognosis. Pt reports she is motivated to continue improving. Pt reports seeing GI on 10/9 and MD wants to schedule a breath test. Pt continues to belch excessively during po trials in tx. SLP has obtained consent to collaborate with GI MD. Pt has met goal of tolerating thin liquids via sip from cup +chin tuck. SLP to advance goal with straw. Pt clinically may benefit from the traditional Shaker exercise, but d/t c/o of neck/back pain, SLP recommends against this exercise at this time; continued modified Shaker with CTAR ball. Problems/ barriers to goal attainment: Pt has had limited attendance over the past 30 days d/t illness.  Physical esophageal dysphagia (seeing GI)    Problem List:    []aphasic  []dysarthric  [x]dysphagic       []alexic  []agraphic  []dysphonia       []dysfluency  []Cognitive-Linguistic Disorder       []other     Treatment Plan: Dysphagia Treatment, Treatment of Swallowing and Patient Education    Patient Goal (s) has been updated and includes: consistency of po tolerance using comp strategies for mixed consistency, regular texture and thin liquids +/- straw. Pt will tolerate thin liquids via straw with chin tuck  in isolation x10 with consistency. Goals for this certification period to be accomplished in 4 weeks:  1. Patient will complete x20 pharyngeal/laryngeal  swallow exercises (ie: effortful swallow, produce  push/ pull,  mendelsohn, supraglottic, Cece [employer], etc as appropriate) in therapy and at home in 5/5 trials with min given visual/verbal cues to increase pharyngeal muscle strength and airway protection. 2. Patient will recall/demonstrate aspiration precautions and safe swallowing strategies with 10/10 trials 100% acc ( small sips/bites, chin tuck with liquids, mixed consistency slow rate; oral care 3x daily; sit upright at 90 degree angle during po trials and for at least 30 minute post po intake) for thin liquids and mixed consistencies, regular solids, pills  in 5/5 sessions   to decrease the reported incidences of dysphagia and s/sx of aspiration/ globus complaints.     3. Patient will tolerate 10/10 trials of thin liquids +/- straw using comp strategies ( chin tuck) independently without s/sx of aspiration/ penetration of 3/3 sessions.       Frequency / Duration: Patient to be seen 1-2 times per week for 8 weeks:    Assessment/Recommendations:   Pt would benefit from continued skilled ST to further address dysphagia with globus sensation, as it is medically necessary to improve swallowing function and  promote safety, implement LRD, and reduce aspiration risk    G Codes:  Swallowing:  Swallow Current Status CI= 1-19%   Swallow Goal Status CI= 1-19%    The severity rating is based on the following outcomes:    National Outcomes Measures (NOMS); clinical judgment    Certification Period: 10/20-12/15/18    JUSTYNA Heredia 10/19/2018 6:34 PM  MA, [facility]-SLP  Speech-Language Pathologist      _____________________________________________________________________    I certify that the above Therapy Services are being furnished while the patient is under my care. I agree with the treatment plan and certify that this therapy is necessary. []  I have read the above report and request that my patient continue as recommended. []  I have read the above report and request that my patient continue therapy with the following changes/special instructions:________________________________________  []I have read the above report and request that my patient be discharged from therapy.     Physician's Signature:____________Date:_________TIME:________    ** Signature, Date and Time must be completed for valid certification **      Please sign and return to [facility]   [address] [place] [address]  [phone] fax

## 2018-10-31 ENCOUNTER — HOSPITAL ENCOUNTER (OUTPATIENT)
Dept: PHYSICAL THERAPY | Age: 68
Discharge: HOME | End: 2018-10-31
Payer: MEDICARE

## 2018-10-31 PROCEDURE — 92507 TX SP LANG VOICE COMM INDIV: CPT

## 2018-10-31 NOTE — PROGRESS NOTES
ST DAILY TREATMENT NOTE    Patient Name: Cecille Villalta  Date:10/31/2018  : 1950  [x]  Patient  Verified  Payor: Payor: VA MEDICARE / Plan: VA MEDICARE PART A & B / Product Type: Medicare /   In time: 9:05 Out time: 9:45  Total Treatment Time (min): 40  Visit #: 2 of 16    Treatment Diagnosis: Dysphagia [R13.10]    SUBJECTIVE  Pain Level (0-10 scale): back 5; neck 6-7; knees: 5  Any medication changes, allergies to medications, adverse drug reactions, diagnosis change, or new procedure performed?: [x] No    [] Yes (see summary sheet for update)    Subjective functional status/changes:   [x] No changes reported      OBJECTIVE  Treatment provided includes:  Increase/Improve:  []  Voice Quality []  Cognitive Linguistic Skills [x]  Laryngeal/Pharyngeal Exercises   []  Vocal Loudness []  Reading Comprehension [x]  Swallowing Skills    []  Vocal Cord Function []  Auditory Comprehension []  Oral Motor Skills   []  Resonance []  Writing Skills [x]  Compensatory strategies    []  Speech Intelligibility []  Expressive Language []  Attention   []  Breath Support/Coord. []  Receptive language []  Memory   []  Articulation [x]  Safety Awareness [x] Pt education    []  Fluency []  Word Retrieval []        Treatment Provided:  Pharyngeal/laryngeal exercises    Patient/Caregiver  Education: [x] Review HEP      HEP/Handouts given: Swallowing HEP    Pain Level (0-10 scale) post treatment: back 5; neck 6-7; knees: 5    ASSESSMENT     []   Improving appropriately and progressing toward goals  [x]   Improving slowly and progressing toward goals  []   Approximating goals/maximum potential  [x]   Continues to benefit from skilled therapy to address remaining functional deficits  []   Not progressing toward goals and plan of care will be adjusted    Patient will continue to benefit from skilled therapy to address remaining functional deficits: dysphagia    Progress towards goals / Updated goals:  1.  Patient will complete x20 pharyngeal/laryngeal  swallow exercises (ie: effortful swallow, produce  push/ pull,  mendelsohn, supraglottic, CTAR Caesar Skillern, etc as appropriate) in therapy and at home in 5/5 trials with min given visual/verbal cues to increase pharyngeal muscle strength and airway protection.   Current 10/31: modified Shaker with CTAR ball: Frantz; Push/pul/ x20 with min-mod cues; supraglottic swallow x15 with regular po trials. 2. Patient will recall/demonstrate aspiration precautions and safe swallowing strategies with 10/10 trials 100% acc ( small sips/bites, chin tuck with liquids, mixed consistency slow rate; oral care 3x daily; sit upright at 90 degree angle during po trials and for at least 30 minute post po intake) for thin liquids and mixed consistencies, regular solids, pills  in 5/5 sessions   to decrease the reported incidences of dysphagia and s/sx of aspiration/ globus complaints.    Current 10/31: recall/demo comp starts with 100% acc:  regular trials with supraglottic swallow with mod cues for acc, with 100% of contents. No overt s/sx of aspiration or globus sensation. 3. Patient will tolerate 10/10 trials of thin liquids +/- straw using comp strategies (chin tuck) independently without s/sx of aspiration/ penetration of 3/3 sessions.   Current 10/31: x10 sips via straw without any overt s/sx of aspiration/penetration; no change in VQ. Delayed belching at the end of session.        PLAN  [x]  Continue plan of care  []  Modify Goals/Treatment Plan      []  Discharge due to:  [] Other:    Nora Velarde, JUSTYNA 10/31/2018  9:12 AM  MA, CCC-SLP  Speech-Language Pathologist    Future Appointments   Date Time Provider Pricila Mar   11/6/2018  9:45 AM JUSTYNA Rubio [facility]   11/8/2018 10:00 AM JUSTYNA Rubio [facility]

## 2018-11-06 ENCOUNTER — HOSPITAL ENCOUNTER (OUTPATIENT)
Dept: PHYSICAL THERAPY | Age: 68
Discharge: HOME | End: 2018-11-06
Payer: MEDICARE

## 2018-11-06 NOTE — PROGRESS NOTES
ST DAILY TREATMENT NOTE    Patient Name: Vladimir Lutz  Date:2018  : 1950  [x]  Patient  Verified  Payor: Payor: VA MEDICARE / Plan: VA MEDICARE PART A & B / Product Type: Medicare /   In time: 9:50  Out time: 10:30   Total Treatment Time (min): 40  Visit #: 3 of 16    Treatment Diagnosis: Dysphagia [R13.10]    SUBJECTIVE  Pain Level (0-10 scale): 6 back   Any medication changes, allergies to medications, adverse drug reactions, diagnosis change, or new procedure performed?: [] No    [] Yes (see summary sheet for update)    Subjective functional status/changes:   [x] No changes reported      OBJECTIVE  Treatment provided includes:  Increase/Improve:  []  Voice Quality []  Cognitive Linguistic Skills [x]  Laryngeal/Pharyngeal Exercises   []  Vocal Loudness []  Reading Comprehension [x]  Swallowing Skills    []  Vocal Cord Function []  Auditory Comprehension []  Oral Motor Skills   []  Resonance []  Writing Skills [x]  Compensatory strategies    []  Speech Intelligibility []  Expressive Language []  Attention   []  Breath Support/Coord. []  Receptive language []  Memory   []  Articulation [x]  Safety Awareness [x] Pt educ   []  Fluency []  Word Retrieval []        Treatment Provided:  Pharyngeal/laryngeal exercises with po and use of comp strats    Patient/Caregiver  Education: [x] Review HEP      HEP/Handouts given: Continue swallowing HEP    Pain Level (0-10 scale) post treatment: 6    ASSESSMENT     []   Improving appropriately and progressing toward goals  [x]   Improving slowly and progressing toward goals  []   Approximating goals/maximum potential  [x]   Continues to benefit from skilled therapy to address remaining functional deficits  []   Not progressing toward goals and plan of care will be adjusted    Patient will continue to benefit from skilled therapy to address remaining functional deficits: dysphagia    Progress towards goals / Updated goals:  1.  Patient will complete x20 pharyngeal/laryngeal  swallow exercises (ie: effortful swallow, produce  push/ pull,  mendelsohn, supraglottic, CTAR Kev Fought, etc as appropriate) in therapy and at home in 5/5 trials with min given visual/verbal cues to increase pharyngeal muscle strength and airway protection.   Current 11/6: modified Shaker with CTAR ball: Lawrence; supraglottic swallow x 20 with pudding po trials x 20 reps with Lawrence with c/o min globus sensation and delayed belching. 2. Patient will recall/demonstrate aspiration precautions and safe swallowing strategies with 10/10 trials 100% acc ( small sips/bites, chin tuck with liquids, mixed consistency slow rate; oral care 3x daily; sit upright at 90 degree angle during po trials and for at least 30 minute post po intake) for thin liquids and mixed consistencies, regular solids, pills  in 5/5 sessions   to decrease the reported incidences of dysphagia and s/sx of aspiration/ globus complaints.    Current 11/6: recall/demo comp starts with 100% acc:     3.  Patient will tolerate 10/10 trials of thin liquids +/- straw using comp strategies (chin tuck) independently without s/sx of aspiration/ penetration of 3/3 sessions.   Current 11/6: x10 sips via straw without any overt s/sx of aspiration/penetration; no change in VQ.            PLAN  [x]  Continue plan of care  []  Modify Goals/Treatment Plan      []  Discharge due to:  [] Other:    JUSTYNA Maldonado 11/6/2018  10:00 AM  MA, CCC-SLP  Speech-Language Pathologist    Future Appointments   Date Time Provider Pricila Manuel   11/8/2018 10:00 AM JUSTYNA Mathis [facility]

## 2018-11-08 ENCOUNTER — HOSPITAL ENCOUNTER (OUTPATIENT)
Dept: PHYSICAL THERAPY | Age: 68
Discharge: HOME | End: 2018-11-08
Payer: MEDICARE

## 2018-11-08 PROCEDURE — 92526 ORAL FUNCTION THERAPY: CPT

## 2018-11-08 NOTE — PROGRESS NOTES
ST DAILY TREATMENT NOTE    Patient Name: Austen Oconnell  Date:2018  : 1950  [x]  Patient  Verified  Payor: Payor: VA MEDICARE / Plan: VA MEDICARE PART A & B / Product Type: Medicare /   In time:10:00  Out time: 10:45  Total Treatment Time (min): 45  Visit #: 4 of 16    Treatment Diagnosis: Dysphagia [R13.10]    SUBJECTIVE  Pain Level (0-10 scale): Back/ neck 4  Any medication changes, allergies to medications, adverse drug reactions, diagnosis change, or new procedure performed?: [x] No    [] Yes (see summary sheet for update)    Subjective functional status/changes:   [] No changes reported  Pt observed frequently throat clearing and belching    OBJECTIVE  Treatment provided includes:  Increase/Improve:  []  Voice Quality []  Cognitive Linguistic Skills [x]  Laryngeal/Pharyngeal Exercises   []  Vocal Loudness []  Reading Comprehension [x]  Swallowing Skills    []  Vocal Cord Function []  Auditory Comprehension []  Oral Motor Skills   []  Resonance []  Writing Skills []  Compensatory strategies    []  Speech Intelligibility []  Expressive Language []  Attention   []  Breath Support/Coord.  []  Receptive language []  Memory   []  Articulation [x]  Safety Awareness [x] Pt education   []  Fluency []  Word Retrieval []        Treatment Provided:  Pharyngeal/ laryngeal exercises; pt education     Patient/Caregiver  Education: [x] Review HEP      HEP/Handouts given: Continue Current HEP    Pain Level (0-10 scale) post treatment:  Back/ neck 4    ASSESSMENT     []   Improving appropriately and progressing toward goals  [x]   Improving slowly and progressing toward goals  []   Approximating goals/maximum potential  [x]   Continues to benefit from skilled therapy to address remaining functional deficits  []   Not progressing toward goals and plan of care will be adjusted    Patient will continue to benefit from skilled therapy to address remaining functional deficits: dysphagia    Progress towards goals / Updated goals:  1. Patient will complete x20 pharyngeal/laryngeal  swallow exercises (ie: effortful swallow, produce  push/ pull,  mendelsohn, supraglottic, CTAR Clarnce Armor, etc as appropriate) in therapy and at home in 5/5 trials with min given visual/verbal cues to increase pharyngeal muscle strength and airway protection.   Current 11/8:high /eeee/ x20 Frantz; modified Shaker with CTAR ball: Frantz; push/pull x20 with Lawrence; supraglottic swallow x 20 withthin liquids given  Lawrence and excessive belching; mendelsohn maneuver: with Lawrence holding > 3 seconds x20 with Lawrence   2. Patient will recall/demonstrate aspiration precautions and safe swallowing strategies with 10/10 trials 100% acc ( small sips/bites, chin tuck with liquids, mixed consistency slow rate; oral care 3x daily; sit upright at 90 degree angle during po trials and for at least 30 minute post po intake) for thin liquids and mixed consistencies, regular solids, pills  in 5/5 sessions   to decrease the reported incidences of dysphagia and s/sx of aspiration/ globus complaints.    Current 11/8: recall/demo comp starts with 100% acc with thin liquid trials. No s/sx of aspiration/penetration.      3. Patient will tolerate 10/10 trials of thin liquids +/- straw using comp strategies (chin tuck) independently without s/sx of aspiration/ penetration of 3/3 sessions.   Current 11/8: Not targeted this date. PLAN  [x]  Continue plan of care  []  Modify Goals/Treatment Plan      []  Discharge due to:  [] Other:    JUSTYNA Trivedi 11/8/2018  10:14 AM  MA, CCC-SLP  Speech-Language Pathologist    No future appointments.

## 2018-11-14 ENCOUNTER — HOSPITAL ENCOUNTER (OUTPATIENT)
Dept: PHYSICAL THERAPY | Age: 68
Discharge: HOME | End: 2018-11-14
Payer: MEDICARE

## 2018-11-14 PROCEDURE — 92526 ORAL FUNCTION THERAPY: CPT

## 2018-11-14 NOTE — PROGRESS NOTES
ST DAILY TREATMENT NOTE    Patient Name: Skyler Hamilton  Date:2018  : 1950  [x]  Patient  Verified  Payor: Payor: Shukri Riojas / Plan: VA MEDICARE PART A & B / Product Type: Medicare /   In time:1:10 Out time:1:45  Total Treatment Time (min): 35  Visit #: 5 of 16    Treatment Diagnosis: Dysphagia, unspecified [R13.10]    SUBJECTIVE  Pain Level (0-10 scale): neck 5, back 5, knees 6, head 7     Any medication changes, allergies to medications, adverse drug reactions, diagnosis change, or new procedure performed?: [x] No    [] Yes (see summary sheet for update)    Subjective functional status/changes:   [] No changes reported  Pt reporting difficulty performing 2nd swallow/re-swallow. \"My throat feels like what clogged drain would look like\". Delayed belching      OBJECTIVE  Treatment provided includes:  Increase/Improve:  []  Voice Quality []  Cognitive Linguistic Skills [x]  Laryngeal/Pharyngeal Exercises   []  Vocal Loudness []  Reading Comprehension [x]  Swallowing Skills    []  Vocal Cord Function []  Auditory Comprehension []  Oral Motor Skills   []  Resonance []  Writing Skills [x]  Compensatory strategies    []  Speech Intelligibility []  Expressive Language []  Attention   []  Breath Support/Coord. []  Receptive language []  Memory   []  Articulation [x]  Safety Awareness [x] Pt education   []  Fluency []  Word Retrieval []        Treatment Provided:  Po trials regular with thin liquids with straw and use of comp strategies.  Pharyngeal/laryngeal exercises    Patient/Caregiver  Education: [x] Review HEP      HEP/Handouts given: Continue Current HEP    Pain Level (0-10 scale) post treatment: neck 5, back 5, knees 6, head 7     ASSESSMENT     []   Improving appropriately and progressing toward goals  [x]   Improving slowly and progressing toward goals  []   Approximating goals/maximum potential  [x]   Continues to benefit from skilled therapy to address remaining functional deficits  [] Not progressing toward goals and plan of care will be adjusted    Patient will continue to benefit from skilled therapy to address remaining functional deficits: dysphagia    Progress towards goals / Updated goals:  1. Patient will complete x20 pharyngeal/laryngeal  swallow exercises (ie: effortful swallow, produce  push/ pull,  mendelsohn, supraglottic, CTAR Ana Bird, etc as appropriate) in therapy and at home in 5/5 trials with min given visual/verbal cues to increase pharyngeal muscle strength and airway protection.   Current 11/14: modified Shaker with CTAR ball: Frantz; push/pull x20 with Lawrence; supraglottic swallow x 20 withthin liquids given  Lawrence and excessive belching; mendelsohn maneuver: with Lawrence holding > 3 seconds x20 with Lawrence   2. Patient will recall/demonstrate aspiration precautions and safe swallowing strategies with 10/10 trials 100% acc ( small sips/bites, chin tuck with liquids, mixed consistency slow rate; oral care 3x daily; sit upright at 90 degree angle during po trials and for at least 30 minute post po intake) for thin liquids and mixed consistencies, regular solids, pills  in 5/5 sessions   to decrease the reported incidences of dysphagia and s/sx of aspiration/ globus complaints.    Current 11/14: recall/demo comp starts with 100% acc gi with thin liquid trials. No s/sx of aspiration/penetration.      3. Patient will tolerate 10/10 trials of thin liquids +/- straw using comp strategies (chin tuck) independently without s/sx of aspiration/ penetration of 3/3 sessions.   Current 11/14: x10 trials with sips via straw and chin tuck. x1 coughing observed this date.      PLAN  [x]  Continue plan of care  []  Modify Goals/Treatment Plan      []  Discharge due to:  [] Other:    JUSTYNA Georges 11/14/2018  1:18 PM  MA, [address] Pathologist    Future Appointments   Date Time Provider Pricila Manuel   11/27/2018 10:30 AM JUSTYNA Brunson [facility]   11/29/2018 12:15 REKHA Hines, SLP [facility]

## 2018-11-27 ENCOUNTER — HOSPITAL ENCOUNTER (OUTPATIENT)
Dept: PHYSICAL THERAPY | Age: 68
Discharge: HOME | End: 2018-11-27
Payer: MEDICARE

## 2018-11-27 PROCEDURE — G8997 SWALLOW GOAL STATUS: HCPCS

## 2018-11-27 PROCEDURE — 92526 ORAL FUNCTION THERAPY: CPT

## 2018-11-27 PROCEDURE — G8998 SWALLOW D/C STATUS: HCPCS

## 2018-11-27 NOTE — PROGRESS NOTES
[facility] - Nato Yusuf  [address] [facility]  [phone] fax    Speech Therapy Daily Note Discharge Summary  Date:2018    Patient name: Josey Ang Start of Care: 18    Referral source: Garth Robb MD : 1950   Medical/Treatment Diagnosis: Dysphagia, unspecified [R13.10]  Payor: [place] MEDICARE / Plan: VA MEDICARE PART A & B / Product Type: Medicare /  Onset Date: ~     Prior Hospitalization: see medical history Provider#: [id number]   Medications: Verified on Patient Summary List    Comorbidities: HTN, arthritis, GERD, vertigo, gout, breast biopsy, carpel tunnel release, cholecysterctomy, partial hysterectomy  Prior Level of Function: she was consuming a regular diet with thin liquids without difficulty         Visits from Start of Care: 19    Missed Visits: 3    Reporting Period : 10/19/18 to 18    [x]  Patient  Verified  Payor: VA MEDICARE / Plan: VA MEDICARE PART A & B / Product Type: Medicare /   In time:10:30  Out time: 11:15  Total Treatment Time (min): 45  Visit #: 6 of 16      SUBJECTIVE  Pain Level (0-10 scale): knees: 6 back: 7 right elbow: 4-5    Any medication changes, allergies to medications, adverse drug reactions, diagnosis change, or new procedure performed?: [] No    [x] Yes (see summary sheet for update)  + Uloric 80mg daily and losartan 50 mg; d/c maxide, allopurinol. Triamerene, provastatin, and diclofenac    Subjective functional status/changes:   [] No changes reported  Pt reports her swallowing has been good.      OBJECTIVE  Treatment provided includes:  Increase/Improve:  []  Voice Quality []  Cognitive Linguistic Skills [x]  Laryngeal/Pharyngeal Exercises   []  Vocal Loudness []  Reading Comprehension [x]  Swallowing Skills    []  Vocal Cord Function []  Auditory Comprehension []  Oral Motor Skills   []  Resonance []  Writing Skills [x]  Compensatory strategies    []  Speech Intelligibility [] Expressive Language []  Attention   []  Breath Support/Coord. []  Receptive language [] Memory   []  Articulation [x]  Safety Awareness [x] Pt education   []  Fluency []  Word Retrieval []        Treatment Provided:  Pharyngeal/ laryngeal exercises; po trials with comp strategies. Pt education re: d/c instructions. Patient/Caregiver  Education: [x] Review HEP      HEP/Handouts given: D/C instructions    Pain Level (0-10 scale) post treatment: knees: 6 back: 7 right elbow: 4-5  Summary of Care:  Goal: 1. Patient will complete x20 pharyngeal/laryngeal  swallow exercises (ie: effortful swallow, produce  push/ pull,  mendelsohn, supraglottic, Theresa Jonatan Giovana Bent, etc as appropriate) in therapy and at home in 5/5 trials with min given visual/verbal cues to increase pharyngeal muscle strength and airway protection. Status at last note/certification:   1) supraglottic swallow with water:  x20 Sandra (3/3 sessions)  2) falsetto pitch high eee x 20 Sandra. (3/3 sessions)  3) CTAR /modified shaker: sandra (2/2 sessions)  4) Mendelsohn maneuver x 20 Lawrence (2/3 sessions)  5) Chin tuck effortful swallow: x10 trials with  thin water sb (3/3 sessions)  6) breath hold against resistance: push/pull: (1/1 sessions)  (progessing)   Status at discharge: not met; approaching with Lawrence    Goal:2. Patient will recall/demonstrate aspiration precautions and safe swallowing strategies with 10/10 trials 100% acc ( small sips/bites, chin tuck with liquids, mixed consistency slow rate; oral care 3x daily; sit upright at 90 degree angle during po trials and for at least 30 minute post po intake) for thin liquids and mixed consistencies, regular solids, pills  in 5/5 sessions   to decrease the reported incidences of dysphagia and s/sx of aspiration/ globus complaints. Status at last note/certification: not met; 100% acc in 3/3 sessions with thin liquids and regular solids (progessing)  Status at discharge: met    Goal:3.  Patient will tolerate 10/10 trials of thin liquids +/- straw using comp strategies (chin tuck) independently without s/sx of aspiration/ penetration of 3/3 sessions. Status at last note/certification: met (advance goal)  Status at discharge: met        Long term goals:  1Patient will complete restorative swallowing exercises and utilize compensatory strategies to increase safe swallowing function with po intake with SLP present to the safety of PO intake and improved QOL during mealtimes. - with Lawrence     2. P atient will consume thin liquids and regular consistencies with 0 instances of aspiration/globus complaints utilizing  compensatory swallowing strategies with mod I  - Met withy use of chin tuck; seek GI f/u d/t excessive belching     3. The patient will implement safe swallowing techniques/strategies with 100% acc with mod I in order to improve the safety of oral intake and increase her , increase her QOL during mealtimes. -Met      G Codes:  Swallowing:  Swallow Goal Status CI= 1-19%   Swallow D/C Status CI= 1-19%    The severity rating is based on the following outcomes:    National Outcomes Measures (NOMS); clinical judgment    ASSESSMENT:  Pt has reached max rehab potential for dysphagia tx. She still warrants a chin tuck and effortful swallow to reduce overall risk of aspiration, in which she utilizes independently. She is able to complete her swallowing restorative exercises, and has been educated on the importance of compliance. She continues to exhibit excessive belching with po intake. SLP recommends d/c at this time instructions provided via handout and verbal instructed.  Pt recommended to f/u with GI.    RECOMMENDATIONS:  [x]Discontinue therapy: [x]Patient has reached or is progressing toward set goals      []Patient is non-compliant or has abdicated      []Due to lack of appreciable progress towards set goals    JUSTYNA Diamond 11/27/2018 10:54 AM  MA, [address]

## 2018-11-29 ENCOUNTER — APPOINTMENT (OUTPATIENT)
Dept: PHYSICAL THERAPY | Age: 68
End: 2018-11-29
Payer: MEDICARE

## 2018-12-04 ENCOUNTER — HOSPITAL ENCOUNTER (OUTPATIENT)
Dept: ULTRASOUND IMAGING | Age: 68
Discharge: HOME | End: 2018-12-04
Attending: INTERNAL MEDICINE
Payer: MEDICARE

## 2018-12-04 ENCOUNTER — HOSPITAL ENCOUNTER (OUTPATIENT)
Dept: LAB | Age: 68
Discharge: HOME | End: 2018-12-04
Payer: MEDICARE

## 2018-12-04 DIAGNOSIS — N25.81 SECONDARY HYPERPARATHYROIDISM OF RENAL ORIGIN (HCC): ICD-10-CM

## 2018-12-04 DIAGNOSIS — N18.30 CHRONIC KIDNEY DISEASE, STAGE III (MODERATE) (HCC): ICD-10-CM

## 2018-12-04 LAB
ALBUMIN SERPL-MCNC: 3.5 G/DL (ref 3.4–5)
ANION GAP SERPL CALC-SCNC: 6 MMOL/L (ref 3–18)
APPEARANCE UR: CLEAR
BASOPHILS # BLD: 0 K/UL (ref 0–0.1)
BASOPHILS NFR BLD: 1 % (ref 0–2)
BILIRUB UR QL: NEGATIVE
BUN SERPL-MCNC: 18 MG/DL (ref 7–18)
BUN/CREAT SERPL: 16 (ref 12–20)
CALCIUM SERPL-MCNC: 9 MG/DL (ref 8.5–10.1)
CALCIUM SERPL-MCNC: 9.2 MG/DL (ref 8.5–10.1)
CHLORIDE SERPL-SCNC: 110 MMOL/L (ref 100–108)
CO2 SERPL-SCNC: 29 MMOL/L (ref 21–32)
COLOR UR: YELLOW
CREAT SERPL-MCNC: 1.15 MG/DL (ref 0.6–1.3)
CREAT UR-MCNC: 279 MG/DL (ref 30–125)
DIFFERENTIAL METHOD BLD: ABNORMAL
EOSINOPHIL # BLD: 0.2 K/UL (ref 0–0.4)
EOSINOPHIL NFR BLD: 3 % (ref 0–5)
ERYTHROCYTE [DISTWIDTH] IN BLOOD BY AUTOMATED COUNT: 15 % (ref 11.6–14.5)
GLUCOSE SERPL-MCNC: 90 MG/DL (ref 74–99)
GLUCOSE UR STRIP.AUTO-MCNC: NEGATIVE MG/DL
HCT VFR BLD AUTO: 40.5 % (ref 35–45)
HGB BLD-MCNC: 12.9 G/DL (ref 12–16)
HGB UR QL STRIP: NEGATIVE
KETONES UR QL STRIP.AUTO: NEGATIVE MG/DL
LEUKOCYTE ESTERASE UR QL STRIP.AUTO: NEGATIVE
LYMPHOCYTES # BLD: 2.4 K/UL (ref 0.9–3.6)
LYMPHOCYTES NFR BLD: 39 % (ref 21–52)
MCH RBC QN AUTO: 25.1 PG (ref 24–34)
MCHC RBC AUTO-ENTMCNC: 31.9 G/DL (ref 31–37)
MCV RBC AUTO: 78.9 FL (ref 74–97)
MICROALBUMIN UR-MCNC: 3.04 MG/DL (ref 0–3)
MICROALBUMIN/CREAT UR-RTO: 11 MG/G (ref 0–30)
MONOCYTES # BLD: 0.7 K/UL (ref 0.05–1.2)
MONOCYTES NFR BLD: 12 % (ref 3–10)
NEUTS SEG # BLD: 2.9 K/UL (ref 1.8–8)
NEUTS SEG NFR BLD: 45 % (ref 40–73)
NITRITE UR QL STRIP.AUTO: NEGATIVE
PH UR STRIP: 5 [PH] (ref 5–8)
PHOSPHATE SERPL-MCNC: 3.6 MG/DL (ref 2.5–4.9)
PLATELET # BLD AUTO: 254 K/UL (ref 135–420)
PMV BLD AUTO: 11.2 FL (ref 9.2–11.8)
POTASSIUM SERPL-SCNC: 3.9 MMOL/L (ref 3.5–5.5)
PROT UR STRIP-MCNC: NEGATIVE MG/DL
PTH-INTACT SERPL-MCNC: 90.7 PG/ML (ref 18.4–88)
RBC # BLD AUTO: 5.13 M/UL (ref 4.2–5.3)
SODIUM SERPL-SCNC: 145 MMOL/L (ref 136–145)
SP GR UR REFRACTOMETRY: 1.02 (ref 1–1.03)
URATE SERPL-MCNC: 2 MG/DL (ref 2.6–7.2)
UROBILINOGEN UR QL STRIP.AUTO: 1 EU/DL (ref 0.2–1)
WBC # BLD AUTO: 6.2 K/UL (ref 4.6–13.2)

## 2018-12-04 PROCEDURE — 81003 URINALYSIS AUTO W/O SCOPE: CPT

## 2018-12-04 PROCEDURE — 82570 ASSAY OF URINE CREATININE: CPT

## 2018-12-04 PROCEDURE — 76770 US EXAM ABDO BACK WALL COMP: CPT

## 2018-12-04 PROCEDURE — 85025 COMPLETE CBC W/AUTO DIFF WBC: CPT

## 2018-12-04 PROCEDURE — 36415 COLL VENOUS BLD VENIPUNCTURE: CPT

## 2018-12-04 PROCEDURE — 83970 ASSAY OF PARATHORMONE: CPT

## 2018-12-04 PROCEDURE — 80069 RENAL FUNCTION PANEL: CPT

## 2018-12-04 PROCEDURE — 84550 ASSAY OF BLOOD/URIC ACID: CPT

## 2019-04-25 ENCOUNTER — OFFICE VISIT (OUTPATIENT)
Dept: ORTHOPEDIC SURGERY | Age: 69
End: 2019-04-25

## 2019-04-25 VITALS
HEART RATE: 78 BPM | OXYGEN SATURATION: 96 % | TEMPERATURE: 97.3 F | BODY MASS INDEX: 35.72 KG/M2 | SYSTOLIC BLOOD PRESSURE: 138 MMHG | WEIGHT: 177.2 LBS | HEIGHT: 59 IN | DIASTOLIC BLOOD PRESSURE: 78 MMHG | RESPIRATION RATE: 15 BRPM

## 2019-04-25 DIAGNOSIS — M25.561 RIGHT KNEE PAIN, UNSPECIFIED CHRONICITY: ICD-10-CM

## 2019-04-25 DIAGNOSIS — M17.0 PRIMARY OSTEOARTHRITIS OF BOTH KNEES: Primary | ICD-10-CM

## 2019-04-25 DIAGNOSIS — M25.562 LEFT KNEE PAIN, UNSPECIFIED CHRONICITY: ICD-10-CM

## 2019-04-25 PROBLEM — E66.01 SEVERE OBESITY (HCC): Status: ACTIVE | Noted: 2019-04-25

## 2019-04-25 RX ORDER — LOSARTAN POTASSIUM 50 MG/1
50 TABLET ORAL DAILY
COMMUNITY
End: 2022-02-10 | Stop reason: ALTCHOICE

## 2019-04-25 NOTE — PROGRESS NOTES
Patient: Katt Goins                MRN: [id number]       SSN: [id number] YOB: 1950        AGE: 77 y.o. SEX: female Body mass index is 35.79 kg/m². PCP: Nanette Durbin DO 
04/25/19 HISTORY:  I had the pleasure of reviewing the patient today. As you know, she is a very nice lady. I really appreciate the opportunity to share in this very pleasant patient's care and provide an opinion and advice regarding her management. She used to work in the [employer]. She has complaints of bilateral knee pain, perhaps a little bit worse on the left than on the right. She had been trying some CBD oil under the tongue and it has really made a big different in terms of her pain. She still has limited ambulation. She uses a pirate scooter at [employer], sometimes uses a cane as well. She has had some back problems in the past and some foot and ankle issues. She is going to see Dr. Mike Drake for her foot and ankle. She has tried injections in the knees and they really have not worked too well. She has been very happy with the CBD oil. PHYSICAL EXAMINATION:  On the examination today, she stands in bilateral varus. There is a mildly antalgic component to the gait but tends to smooth off. The hips rotate nicely and noncontributory. No cyanosis, peripheral edema, or clubbing. She appears younger than her stated age. She is alert and oriented. Affect normal.  The low back is only mildly tender. IT band and EHL seem to be 5/5. Good pulse present distally. RADIOGRAPHS:  X-rays confirm on AP, tunnel, lateral and skyline severe end-stage arthritis in both knees. PLAN:  I think she is heading towards a knee replacement. It is totally up to her as to the timing. She feels that she is doing fairly well right now with the CBD oil and so we will plan on revisiting in about 2 months or so. It will really be up to her as to the timing of the surgery.   I suspect she will want it and she is a tough lady. Having said this, there is no rush for surgery. It has been an absolute pleasure to share in her care. I wanted to thank you both for allowing me to share. CC:  MD Ace Concepcion MD 
 
 
 
 
REVIEW OF SYSTEMS:   
 
CON: negative for weight loss, fever EYE: negative for double vision ENT: negative for hoarseness RS:   negative for Tb 
GI:    negative for blood in stool :  negative for blood in urine Other systems reviewed and noted below. Past Medical History:  
Diagnosis Date  Arthritis  GERD (gastroesophageal reflux disease)  Hypertension  Ill-defined condition Positional Vertigo -  pt can not lie flat History reviewed. No pertinent family history. Current Outpatient Medications Medication Sig Dispense Refill  losartan (COZAAR) 50 mg tablet Take 100 mg by mouth daily.  Biotin 2,500 mcg cap Take  by mouth.  MELOXICAM PO Take  by mouth.  acetaminophen (TYLENOL) 325 mg tablet Take  by mouth every four (4) hours as needed for Pain.  diazePAM (VALIUM) 5 mg tablet Take 1 Tab by mouth every eight (8) hours as needed for Anxiety. Max Daily Amount: 15 mg. 20 Tab 0  
 triamterene-hydroCHLOROthiazide (MAXZIDE) 37.5-25 mg per tablet   0  
 ondansetron (ZOFRAN ODT) 8 mg disintegrating tablet Take 1 Tab by mouth every eight (8) hours as needed for Nausea for up to 12 doses. 12 Tab 0  
 metoprolol (LOPRESSOR) 50 mg tablet Take  by mouth daily.  allopurinol (ZYLOPRIM) 300 mg tablet Take  by mouth daily.  pantoprazole (PROTONIX) 40 mg tablet Take 40 mg by mouth daily.  folic acid (FOLVITE) 1 mg tablet Take  by mouth daily.  Cholecalciferol, Vitamin D3, (VITAMIN D3) 1,000 unit cap Take  by mouth.  cyanocobalamin (VITAMIN B-12) 1,000 mcg tablet Take 1,000 mcg by mouth daily.     
 traMADol (ULTRAM) 50 mg tablet Take 1 Tab by mouth two (2) times daily as needed for Pain. Max Daily Amount: 100 mg. 60 Tab 0  cyclobenzaprine (FLEXERIL) 10 mg tablet Take 1 Tab by mouth three (3) times daily as needed for Muscle Spasm(s). Indications: MUSCLE SPASM 90 Tab 1 Allergies Allergen Reactions  Cymbalta [Duloxetine] Anaphylaxis  Gabapentin Other (comments)  
  hallucinations  Tramadol Other (comments)  
  hallucinations Past Surgical History:  
Procedure Laterality Date  HX BREAST BIOPSY    
 6-8 yrs ago, scar marked  HX CARPAL TUNNEL RELEASE Right   
 mid to late [de-identified]  HX CARPAL TUNNEL RELEASE Left   
 mid 1990s  HX  SECTION    
 HX CHOLECYSTECTOMY  HX GYN Vaginal Hernia as a child  HX HYSTERECTOMY partial   
 
 
Social History Socioeconomic History  Marital status:  Spouse name: Not on file  Number of children: Not on file  Years of education: Not on file  Highest education level: Not on file Occupational History  Not on file Social Needs  Financial resource strain: Not on file  Food insecurity:  
  Worry: Not on file Inability: Not on file  Transportation needs:  
  Medical: Not on file Non-medical: Not on file Tobacco Use  Smoking status: Never Smoker  Smokeless tobacco: Never Used Substance and Sexual Activity  Alcohol use: Yes  Drug use: Not on file  Sexual activity: Not on file Lifestyle  Physical activity:  
  Days per week: Not on file Minutes per session: Not on file  Stress: Not on file Relationships  Social connections:  
  Talks on phone: Not on file Gets together: Not on file Attends Mos[employer] service: Not on file Active member of club or organization: Not on file Attends meetings of clubs or organizations: Not on file Relationship status: Not on file  Intimate partner violence:  
  Fear of current or ex partner: Not on file Emotionally abused: Not on file Physically abused: Not on file Forced sexual activity: Not on file Other Topics Concern  Not on file Social History Narrative  Not on file Visit Vitals /78 Pulse 78 Temp 97.3 °F (36.3 °C) (Oral) Resp 15 Ht 4' 11\" (1.499 m) Wt 177 lb 3.2 oz (80.4 kg) SpO2 96% BMI 35.79 kg/m² PHYSICAL EXAMINATION: 
GENERAL: Alert and oriented x3, in no acute distress, well-developed, well-nourished, afebrile. HEART: No JVD. EYES: No scleral icterus NECK: No significant lymphadenopathy LUNGS: No respiratory compromise or indrawing ABDOMEN: Soft, non-tender, non-distended. Electronically signed by:  Jaylyn Sorensen MD

## 2019-04-25 NOTE — PROGRESS NOTES
1. Have you been to the ER, urgent care clinic since your last visit? Hospitalized since your last visit? No 
 
2. Have you seen or consulted any other health care providers outside of the [address] [place] [address] since your last visit? Include any pap smears or colon screening.  No

## 2019-07-01 NOTE — PROGRESS NOTES
ST DAILY TREATMENT NOTE    Patient Name: Daisha Screen  Date:2018  : 1950  [x]  Patient  Verified  Payor: Payor: Wallace Ferrell / Plan: VA MEDICARE PART A & B / Product Type: Medicare /   In time: 8:15 Out time: 9:00  Total Treatment Time (min): 45  Visit #: 11 of 24    Treatment Diagnosis: Dysphagia [R13.10]    SUBJECTIVE  Pain Level (0-10 scale): 0 \"just sore\"  Any medication changes, allergies to medications, adverse drug reactions, diagnosis change, or new procedure performed?: [x] No    [] Yes (see summary sheet for update)    Subjective functional status/changes:   [] No changes reported  \" I know I need to chin tuck, or I'll choke\". Pt observed belching frequently and c/o globus. On one occasion pt belched so hard she stated, \"I thought I was gonna regurgitate that water\". OBJECTIVE  Treatment provided includes:  Increase/Improve:  []  Voice Quality []  Cognitive Linguistic Skills [x]  Laryngeal/Pharyngeal Exercises   []  Vocal Loudness []  Reading Comprehension [x]  Swallowing Skills    []  Vocal Cord Function []  Auditory Comprehension []  Oral Motor Skills   []  Resonance []  Writing Skills [x]  Compensatory strategies    []  Speech Intelligibility []  Expressive Language []  Attention   []  Breath Support/Coord.  []  Receptive language []  Memory   []  Articulation [x]  Safety Awareness [x] Pt education    []  Fluency []  Word Retrieval []        Treatment Provided:  Pharyngeal/laryngeal exercises; po trials with thin liquids and using comp strategies.     Patient/Caregiver  Education: [x] Review HEP      HEP/Handouts given: Continue swallowing HEP    Pain Level (0-10 scale) post treatment: 0    ASSESSMENT     []   Improving appropriately and progressing toward goals  [x]   Improving slowly and progressing toward goals  []   Approximating goals/maximum potential  [x]   Continues to benefit from skilled therapy to address remaining functional deficits  []   Not progressing toward goals and plan of care will be adjusted    Patient will continue to benefit from skilled therapy to address remaining functional deficits: dysphagia    Progress towards goals / Updated goals:  1. Patient will complete x20 pharyngeal/laryngeal  swallow exercises (ie: effortful swallow, produce  push/ pull,  mendelsohn, supraglottic, Waynetta Webster Levonia Balk, etc as appropriate) in therapy and at home in 5/5 trials with min given visual/verbal cues to increase pharyngeal muscle strength and airway protection. Current 9/21/18:   1) supraglottic swallow with water  x 20 Lawrence  2) falsetto pitch high eee x 20 Frantz. 4) Mendelsohn maneuver x 20 attempted with ~ 60% acc  And mod-max cues (tactile/verbal)  5) Chin tuck effortful swallow: x10 trials with  thin water sb    2. Patient will recall/demonstrate aspiration precautions and safe swallowing strategies with 10/10 trials 100% acc ( small sips/bites, chin tuck with liquids, mixed consistency slow rate; oral care 3x daily; sit upright at 90 degree angle during po trials and for at least 30 minute post po intake) for thin liquids and mixed consistencies, regular solids, pills  to decrease the reported incidences of dysphagia and s/sx of aspiration/ globus complaints. Current 9/21/18: recall and demo with 100% acc. 3. Patient will tolerate trials 10/10 trials of isolated small sips of thin liquids using comp strategies ( chin tuck) independently without s/sx of aspiration/ penetration of 5/5 sessions.    Current 9/21/18:  x10 trials with  thin water sb    PLAN  [x]  Continue plan of care  []  Modify Goals/Treatment Plan      []  Discharge due to:  [] Other:    JUSTYNA North 9/21/2018  8:22 AM  MA, [facility]-SL[facility]  Speech-Language Pathologist    Future Appointments  Date Time Provider Pricila Manuel   9/25/2018 9:45 AM JUSTYNA [place] [facility]   9/27/2018 11:30 AM [facility]   10/2/2018 10:30 AM JUSTYNA [place] [facility]   10/4/2018 11:30 AM Rusty Saleh, SLP [facility]   10/23/2018 11:00 AM [facility] DONNIE ESPINOSA [facility] 2

## 2019-07-12 ENCOUNTER — OFFICE VISIT (OUTPATIENT)
Dept: ORTHOPEDIC SURGERY | Age: 69
End: 2019-07-12

## 2019-07-12 VITALS
SYSTOLIC BLOOD PRESSURE: 136 MMHG | BODY MASS INDEX: 35.24 KG/M2 | OXYGEN SATURATION: 100 % | HEIGHT: 59 IN | DIASTOLIC BLOOD PRESSURE: 84 MMHG | WEIGHT: 174.8 LBS | TEMPERATURE: 97.7 F | HEART RATE: 78 BPM

## 2019-07-12 DIAGNOSIS — M17.10 PATELLOFEMORAL ARTHRITIS: ICD-10-CM

## 2019-07-12 DIAGNOSIS — M17.0 PRIMARY OSTEOARTHRITIS OF BOTH KNEES: Primary | ICD-10-CM

## 2019-07-12 NOTE — PROGRESS NOTES
Patient: Porfirio Tee                MRN: [id number]       SSN: [id number]  YOB: 1950        AGE: 68 y.o. SEX: female  Body mass index is 35.31 kg/m². PCP: Zita Macario DO  07/12/19    History:  I had the pleasure viewing Sivakumar Mishra. As you know, Sivakumar Mishra a tough lady and she is doing some CBD oil for bilateral knee pain, a little worse on the right than on the left. She does get night pain. When she goes to the grocery store she has to take the scooter for longer distances. Night pain is a feature. The pain can be moderate, aching. It is nonradicular. Injections have not been all that efficacious for her. She denies fevers, chills, night sweats or weight loss. One of her [employer] members has had a successful knee replacement and she is thinking about having surgery may be into the new year. She had a lot of questions that she wanted to ask as well. Physical examination:  Today, she is in bilateral varus, a couple of fixed flexion deformities. She bends fairly well to about 105 degrees. Hips rotate nicely. Calf nontender. Homans sign is negative. No cyanosis, peripheral edema or clubbing. Both feet warm and well perfused. Joint line tenderness in all 3 compartments, slight effusion and certainly no evidence for infection her DVT. She looks younger than her stated age. Radiographs:  Her x-rays confirm on AP, tunnel, lateral and skyline severe arthritis bilaterally. Overall impression:  We had a lengthy discussion regarding risks and benefits including, but not limited to, infection, DVT, pulmonary embolism, and ascetic complications, blood loss requiring transfusion as well as arthrofibrosis, stiffness, and the importance of bending and straightening the knee 10 times a day. We also discussed about home exercise program, physical therapy and duration for recovery as well. I think she will do quite well with surgery. She is going to think about things. We will see her back in September, sooner if there is any problem whatsoever, and I would be very happy to replace her knees when the timing is correct for her. At this point, she is thinking just right after Alfonso time, which I think she will do quite well with this as well. cc:  Varghese Perez, DO        REVIEW OF SYSTEMS:      CON: negative for weight loss, fever  EYE: negative for double vision  ENT: negative for hoarseness  RS:   negative for Tb  GI:    negative for blood in stool  :  negative for blood in urine  Other systems reviewed and noted below. Past Medical History:   Diagnosis Date    Arthritis     GERD (gastroesophageal reflux disease)     Hypertension     Ill-defined condition     Positional Vertigo -  pt can not lie flat       History reviewed. No pertinent family history. Current Outpatient Medications   Medication Sig Dispense Refill    losartan (COZAAR) 50 mg tablet Take 100 mg by mouth daily.  Biotin 2,500 mcg cap Take  by mouth.  MELOXICAM PO Take  by mouth.  acetaminophen (TYLENOL) 325 mg tablet Take  by mouth every four (4) hours as needed for Pain.  diazePAM (VALIUM) 5 mg tablet Take 1 Tab by mouth every eight (8) hours as needed for Anxiety. Max Daily Amount: 15 mg. 20 Tab 0    traMADol (ULTRAM) 50 mg tablet Take 1 Tab by mouth two (2) times daily as needed for Pain. Max Daily Amount: 100 mg. 60 Tab 0    cyclobenzaprine (FLEXERIL) 10 mg tablet Take 1 Tab by mouth three (3) times daily as needed for Muscle Spasm(s). Indications: MUSCLE SPASM 90 Tab 1    triamterene-hydroCHLOROthiazide (MAXZIDE) 37.5-25 mg per tablet   0    ondansetron (ZOFRAN ODT) 8 mg disintegrating tablet Take 1 Tab by mouth every eight (8) hours as needed for Nausea for up to 12 doses. 12 Tab 0    metoprolol (LOPRESSOR) 50 mg tablet Take  by mouth daily.  allopurinol (ZYLOPRIM) 300 mg tablet Take  by mouth daily.       pantoprazole (PROTONIX) 40 mg tablet Take 40 mg by mouth daily.  folic acid (FOLVITE) 1 mg tablet Take  by mouth daily.  Cholecalciferol, Vitamin D3, (VITAMIN D3) 1,000 unit cap Take  by mouth.  cyanocobalamin (VITAMIN B-12) 1,000 mcg tablet Take 1,000 mcg by mouth daily. Allergies   Allergen Reactions    Cymbalta [Duloxetine] Anaphylaxis    Gabapentin Other (comments)     hallucinations    Tramadol Other (comments)     hallucinations       Past Surgical History:   Procedure Laterality Date    HX BREAST BIOPSY      6-8 yrs ago, scar marked    HX CARPAL TUNNEL RELEASE Right     mid to late [de-identified]    HX CARPAL TUNNEL RELEASE Left     mid 1990s    HX  SECTION      HX CHOLECYSTECTOMY      HX GYN      Vaginal Hernia as a child     HX HYSTERECTOMY      partial        Social History     Socioeconomic History    Marital status:      Spouse name: Not on file    Number of children: Not on file    Years of education: Not on file    Highest education level: Not on file   Occupational History    Not on file   Social Needs    Financial resource strain: Not on file    Food insecurity:     Worry: Not on file     Inability: Not on file    Transportation needs:     Medical: Not on file     Non-medical: Not on file   Tobacco Use    Smoking status: Never Smoker    Smokeless tobacco: Never Used   Substance and Sexual Activity    Alcohol use:  Yes    Drug use: Not on file    Sexual activity: Not on file   Lifestyle    Physical activity:     Days per week: Not on file     Minutes per session: Not on file    Stress: Not on file   Relationships    Social connections:     Talks on phone: Not on file     Gets together: Not on file     Attends Denominational service: Not on file     Active member of club or organization: Not on file     Attends meetings of clubs or organizations: Not on file     Relationship status: Not on file    Intimate partner violence:     Fear of current or ex partner: Not on file     Emotionally abused: Not on file     Physically abused: Not on file     Forced sexual activity: Not on file   Other Topics Concern    Not on file   Social History Narrative    Not on file       Visit Vitals  /84 (BP 1 Location: Left arm, BP Patient Position: Sitting)   Pulse 78   Temp 97.7 °F (36.5 °C) (Oral)   Ht 4' 11\" (1.499 m)   Wt 174 lb 12.8 oz (79.3 kg)   SpO2 100%   BMI 35.31 kg/m²         PHYSICAL EXAMINATION:  GENERAL: Alert and oriented x3, in no acute distress, well-developed, well-nourished, afebrile. HEART: No JVD. EYES: No scleral icterus   NECK: No significant lymphadenopathy   LUNGS: No respiratory compromise or indrawing  ABDOMEN: Soft, non-tender, non-distended. Electronically signed by:  Katlyn Main MD

## 2019-07-19 ENCOUNTER — OFFICE VISIT (OUTPATIENT)
Dept: PAIN MANAGEMENT | Age: 69
End: 2019-07-19

## 2019-07-19 VITALS
OXYGEN SATURATION: 100 % | DIASTOLIC BLOOD PRESSURE: 77 MMHG | BODY MASS INDEX: 35.08 KG/M2 | TEMPERATURE: 96.5 F | WEIGHT: 174 LBS | SYSTOLIC BLOOD PRESSURE: 144 MMHG | RESPIRATION RATE: 15 BRPM | HEIGHT: 59 IN | HEART RATE: 71 BPM

## 2019-07-19 DIAGNOSIS — M54.50 CHRONIC BILATERAL LOW BACK PAIN WITHOUT SCIATICA: ICD-10-CM

## 2019-07-19 DIAGNOSIS — G89.29 CHRONIC BILATERAL LOW BACK PAIN WITHOUT SCIATICA: ICD-10-CM

## 2019-07-19 DIAGNOSIS — M17.0 OSTEOARTHRITIS OF BOTH KNEES, UNSPECIFIED OSTEOARTHRITIS TYPE: Primary | ICD-10-CM

## 2019-07-19 RX ORDER — GABAPENTIN 100 MG/1
100 CAPSULE ORAL
Qty: 30 CAP | Refills: 2 | Status: SHIPPED | OUTPATIENT
Start: 2019-07-19 | End: 2019-08-18

## 2019-07-19 RX ORDER — OMEPRAZOLE 40 MG/1
40 CAPSULE, DELAYED RELEASE ORAL DAILY
COMMUNITY

## 2019-07-19 RX ORDER — CAPSAICIN 0.07 G/100G
CREAM TOPICAL 3 TIMES DAILY
Qty: 60 G | Refills: 3 | Status: SHIPPED | OUTPATIENT
Start: 2019-07-19 | End: 2020-08-14

## 2019-07-19 RX ORDER — FEBUXOSTAT 40 MG/1
40 TABLET, FILM COATED ORAL DAILY
COMMUNITY
End: 2020-08-14

## 2019-07-19 NOTE — PROGRESS NOTES
Nursing Notes    Patient presents to the office today for a new pt consult with Dr. Corby Aguilera for her chronic back, knee and foot pain. Patient rates her pain at 6/10 on the numerical pain scale.  reviewed YES  Any aberrancies noted on  NO  Last opioid agreement N/A  Last urine drug screen N/A    Comments:     POC UDS was not performed in office today    Any new labs or imaging since last appointment? YES. Pt had some labs done with her pcp    Have you been to an emergency room (ER) or urgent care clinic since your last visit? NO            Have you been hospitalized since your last visit? NO     If yes, where, when, and reason for visit? Have you seen or consulted any other health care providers outside of the [address] [place] [address]  since your last visit? YES     If yes, where, when, and reason for visit? Orthopedic, pcp  Ms. Guillermo Lockett has a reminder for a \"due or due soon\" health maintenance. I have asked that she contact her primary care provider for follow-up on this health maintenance. Abuse Screening Questionnaire 7/19/2019   Do you ever feel afraid of your partner? N   Are you in a relationship with someone who physically or mentally threatens you? N   Is it safe for you to go home?  Pablo Gonzales

## 2019-07-19 NOTE — PROGRESS NOTES
ANANTH -incomplete    HPI:  Ana Ruiz is a 68 y.o. female here for initial visit referred by PCP, Dr. Mihai Rodriguez for evaluation of  knee pain. Pain began with insidious onset with unknown mechanism many years ago. She now has advanced osteoarthritis. She follows with orthopedics and has pending total knee arthroplasty bilaterally. The knee pain is described as a constant achiness with stiffness and sharp pain diffusely around both knees. The pain is currently 6/10. Patient reports increased pain that worsens with physical activity and improves with rest.  There is also  difficulty walking, stair climbing, or getting in and out of chairs, bath tubs, and cars. There is increased stiffness, especially in the morning and after prolonged sitting. Current treatment team includes:  - PCP, Dr. Garland Pang for the knees, Dr. Nia Lipscomb, They are planning bilat TKA which she wants to wait until early 2020.   - Physiatry/spine, , she no longer sees him. --Rheumatology, Dr. Trena Fragoso, she no longer see him  --Ortho Spine, Dr. Tristian Mack, She had some lumbar surgery Jan 2018. Last f/u was around August 2018. Interventional procedures include:  - She had a L1-L2 epidural steroid injection done on 3/30/2017 with Dr. Eliza Aguiar. This was not helpful she proceeded to surgery. Current/recent pain related medications include:  - She lists allergies to Cymbalta, gabapentin, and tramadol  -Tylenol 325 mg up to 6 times daily, not currently taking, not helpful. - Flexeril 10 mg up to 3 times daily, not currently taking, she had a bad reaction. - Valium 5 mg up to 3 times daily, helps a little with spasms.  - Meloxicam, not currently taking  - Tramadol 50 mg up to twice daily, not currently taking  -- CBD oil, has been very helpful  --cymbalta worked well for pain but gave her anxiety attacks.      ROS:Review of systems is negative for fever, chills, nausea, vomiting, diarrhea, constipation, chest pain, shortness of breath, abdominal pain, weakness, trouble swallowing, acute changes in vision, acute changes in hearing, falls,  bladder incontinence, bowel incontinence, depression, anxiety, suicidal ideation, homicidal ideation, alcohol use. Review of systems positive for dizziness from chronic vertigo    Opioid specific risk: Obesity, concurrent benzodiazepine use, GERD, alcohol use, LAITH    Opioid specific history: they have not been helpful for the pain. Vitals:    07/19/19 0952   BP: 144/77   Pulse: 71   Resp: 15   Temp: 96.5 °F (35.8 °C)   TempSrc: Oral   SpO2: 100%   Weight: 78.9 kg (174 lb)   Height: 4' 11\" (1.499 m)   PainSc:   6   PainLoc: Knee        PE:  AFVSS, no acute distress, endomorphic body habitus. A&OXs 3.  normocephalic, atraumatic. Conjugate gaze, clear sclerae. Speech is clear and appropriate. Mood is pleasant and appropriate. Patient is cooperative. Full active range of motion for bilateral knee extension. No tenderness to palpation of bilateral medial plica, bilateral pes anserine. There is tenderness to palpation of bilateral medial and lateral joint lines and bilateral peripatellar regions. Positive Andrea's bilaterally. Strength for bilateral knee extension and knee flexion is within functional limits. Increased bilateral knee pain at end range extension and endrange flexion. Negative seated straight leg raise bilaterally. Gait is within functional limits with antalgia and use of a Mono cane. Balance is within functional limits with use of a Mono cane.   Sensation to light touch is intact for bilateral knees      Primary Care Physician  [address]  [phone]      PHQ -- .  3 most recent [facility] 7/12/2019   PHQ Not Done Patient Decline   Little interest or pleasure in doing things -   Feeling down, depressed, irritable, or hopeless -   Total Score PHQ 2 -         Assessment/Plan:     ICD-10-CM ICD-9-CM    1. Osteoarthritis of both knees, unspecified osteoarthritis type M17.0 715.96 AMB SUPPLY ORDER      capsaicin 0.075 % topical cream      gabapentin (NEURONTIN) 100 mg capsule   2. Chronic bilateral low back pain without sciatica M54.5 724.2 gabapentin (NEURONTIN) 100 mg capsule    G89.29 338.29             Restorative and movement therapies:  - Patient was given educational handouts on home exercise program for knee osteoarthritis and advised to incorporate this into her daily routine. Complementary and integrative interventions:  - Development of this aspect of the plan of care is ongoing.  -Patient provided with a trial of H wave therapy for bilateral knees and back. Behavioral health/psychological interventions:  - Continue to follow closely with primary care provider for discussion on aggressive weight loss strategies. Interventional procedures:  - Patient is strongly considering bilateral total knee arthroplasties. Her first preference would be to wait until early 2020 to have this done. - She has been offered bilateral genicular nerve blocks/RFA to try and bridge the time between now and when she can have the total knee arthroplasties done. The patient will independently investigate the procedure further and will call to schedule if she decides prior to her scheduled follow-up.    medications:  --Patient expresses multiple times during the visit that she does not do well with medications of many classes and would like to minimize all classes of medications for her pain. --She has been advised to continue her Tylenol as needed  - We will repeat a trial of gabapentin at 100 mg nightly. She has a allergy listed to this but it was not a true allergy and only an adverse effect where she feels like she took too much too fast.  She wants to retrial the gabapentin. - She is been provided with capsaicin cream to use on bilateral knees.       Pt will return in 3 months for f/u with Tran/Jadon    GOALS:  To establish complementary and integrative plan of care to address chronic pain issues while minimizing pharmaceuticals to maximize patient's function improve quality of life. A total of 45 minutes were spent with the patient, of which more than half of the time was spent counseling and/or coordinating care. F/u:   Follow-up and Dispositions    · Return in about 3 months (around 10/19/2019) for 30 min. Social History     Socioeconomic History    Marital status:      Spouse name: Not on file    Number of children: Not on file    Years of education: Not on file    Highest education level: Not on file   Occupational History    Not on file   Social Needs    Financial resource strain: Not on file    Food insecurity:     Worry: Not on file     Inability: Not on file    Transportation needs:     Medical: Not on file     Non-medical: Not on file   Tobacco Use    Smoking status: Never Smoker    Smokeless tobacco: Never Used   Substance and Sexual Activity    Alcohol use: Yes    Drug use: Not on file    Sexual activity: Not on file   Lifestyle    Physical activity:     Days per week: Not on file     Minutes per session: Not on file    Stress: Not on file   Relationships    Social connections:     Talks on phone: Not on file     Gets together: Not on file     Attends Anabaptist service: Not on file     Active member of club or organization: Not on file     Attends meetings of clubs or organizations: Not on file     Relationship status: Not on file    Intimate partner violence:     Fear of current or ex partner: Not on file     Emotionally abused: Not on file     Physically abused: Not on file     Forced sexual activity: Not on file   Other Topics Concern    Not on file   Social History Narrative    Not on file     History reviewed. No pertinent family history.   Allergies   Allergen Reactions    Cymbalta [Duloxetine] Anaphylaxis    Gabapentin Other (comments) hallucinations    Tramadol Other (comments)     hallucinations     Past Medical History:   Diagnosis Date    Arthritis     GERD (gastroesophageal reflux disease)     Hypertension     Ill-defined condition     Positional Vertigo -  pt can not lie flat     Past Surgical History:   Procedure Laterality Date    HX BREAST BIOPSY      6-8 yrs ago, scar marked    HX CARPAL TUNNEL RELEASE Right     mid to late [de-identified]    HX CARPAL TUNNEL RELEASE Left     mid 1990s    HX  SECTION      HX CHOLECYSTECTOMY      HX GYN      Vaginal Hernia as a child     HX HYSTERECTOMY      partial      Current Outpatient Medications on File Prior to Visit   Medication Sig    febuxostat (ULORIC) 40 mg tab tablet Take 40 mg by mouth daily.  omeprazole (PRILOSEC) 40 mg capsule Take 40 mg by mouth daily.  losartan (COZAAR) 50 mg tablet Take 50 mg by mouth daily.  Biotin 2,500 mcg cap Take 10,000 mcg by mouth every other day.  diazePAM (VALIUM) 5 mg tablet Take 1 Tab by mouth every eight (8) hours as needed for Anxiety. Max Daily Amount: 15 mg.    metoprolol (LOPRESSOR) 50 mg tablet Take 50 mg by mouth daily.  folic acid (FOLVITE) 1 mg tablet Take 1 mg by mouth daily.  cyanocobalamin (VITAMIN B-12) 1,000 mcg tablet Take 1,000 mcg by mouth daily as needed.  MELOXICAM PO Take  by mouth.  acetaminophen (TYLENOL) 325 mg tablet Take  by mouth every four (4) hours as needed for Pain.  traMADol (ULTRAM) 50 mg tablet Take 1 Tab by mouth two (2) times daily as needed for Pain. Max Daily Amount: 100 mg.  cyclobenzaprine (FLEXERIL) 10 mg tablet Take 1 Tab by mouth three (3) times daily as needed for Muscle Spasm(s). Indications: MUSCLE SPASM    triamterene-hydroCHLOROthiazide (MAXZIDE) 37.5-25 mg per tablet     ondansetron (ZOFRAN ODT) 8 mg disintegrating tablet Take 1 Tab by mouth every eight (8) hours as needed for Nausea for up to 12 doses.  allopurinol (ZYLOPRIM) 300 mg tablet Take  by mouth daily.  pantoprazole (PROTONIX) 40 mg tablet Take 40 mg by mouth daily.  Cholecalciferol, Vitamin D3, (VITAMIN D3) 1,000 unit cap Take 1,000 Units by mouth daily. No current facility-administered medications on file prior to visit.

## 2019-07-19 NOTE — PATIENT INSTRUCTIONS
Knee Arthritis: Exercises  Your Care Instructions  Here are some examples of exercises for knee arthritis. Start each exercise slowly. Ease off the exercise if you start to have pain. Your doctor or physical therapist will tell you when you can start these exercises and which ones will work best for you. How to do the exercises  Knee flexion with heel slide    1. Lie on your back with your knees bent. 2. Slide your heel back by bending your affected knee as far as you can. Then hook your other foot around your ankle to help pull your heel even farther back. 3. Hold for about 6 seconds, then rest for up to 10 seconds. 4. Repeat 8 to 12 times. 5. Switch legs and repeat steps 1 through 4, even if only one knee is sore. Quad sets    1. Sit with your affected leg straight and supported on the floor or a firm bed. Place a small, rolled-up towel under your knee. Your other leg should be bent, with that foot flat on the floor. 2. Tighten the thigh muscles of your affected leg by pressing the back of your knee down into the towel. 3. Hold for about 6 seconds, then rest for up to 10 seconds. 4. Repeat 8 to 12 times. 5. Switch legs and repeat steps 1 through 4, even if only one knee is sore. Straight-leg raises to the front    1. Lie on your back with your good knee bent so that your foot rests flat on the floor. Your affected leg should be straight. Make sure that your low back has a normal curve. You should be able to slip your hand in between the floor and the small of your back, with your palm touching the floor and your back touching the back of your hand. 2. Tighten the thigh muscles in your affected leg by pressing the back of your knee flat down to the floor. Hold your knee straight. 3. Keeping the thigh muscles tight and your leg straight, lift your affected leg up so that your heel is about 12 inches off the floor. Hold for about 6 seconds, then lower slowly.   4. Relax for up to 10 seconds between repetitions. 5. Repeat 8 to 12 times. 6. Switch legs and repeat steps 1 through 5, even if only one knee is sore. Active knee flexion    1. Lie on your stomach with your knees straight. If your kneecap is uncomfortable, roll up a washcloth and put it under your leg just above your kneecap. 2. Lift the foot of your affected leg by bending the knee so that you bring the foot up toward your buttock. If this motion hurts, try it without bending your knee quite as far. This may help you avoid any painful motion. 3. Slowly move your leg up and down. 4. Repeat 8 to 12 times. 5. Switch legs and repeat steps 1 through 4, even if only one knee is sore. Quadriceps stretch (facedown)    1. Lie flat on your stomach, and rest your face on the floor. 2. Wrap a towel or belt strap around the lower part of your affected leg. Then use the towel or belt strap to slowly pull your heel toward your buttock until you feel a stretch. 3. Hold for about 15 to 30 seconds, then relax your leg against the towel or belt strap. 4. Repeat 2 to 4 times. 5. Switch legs and repeat steps 1 through 4, even if only one knee is sore. Stationary exercise bike    1. If you do not have a stationary exercise bike at home, you can find one to ride at your local health club or community center. 2. Adjust the height of the bike seat so that your knee is slightly bent when your leg is extended downward. If your knee hurts when the pedal reaches the top, you can raise the seat so that your knee does not bend as much. 3. Start slowly. At first, try to do 5 to 10 minutes of cycling with little to no resistance. Then increase your time and the resistance bit by bit until you can do 20 to 30 minutes without pain. 4. If you start to have pain, rest your knee until your pain gets back to the level that is normal for you. Or cycle for less time or with less effort. Follow-up care is a key part of your treatment and safety.  Be sure to make and go to all appointments, and call your doctor if you are having problems. It's also a good idea to know your test results and keep a list of the medicines you take. Where can you learn more? Go to http://jamison-ilya.info/. Enter C159 in the search box to learn more about \"Knee Arthritis: Exercises. \"  Current as of: September 20, 2018  Content Version: 11.9  © 0439-0494 [employer]. Care instructions adapted under license by [employer] (which disclaims liability or warranty for this information). If you have questions about a medical condition or this instruction, always ask your healthcare professional. [address] any warranty or liability for your use of this information. Learning About Sleeping Well  What does sleeping well mean? Sleeping well means getting enough sleep. How much sleep is enough varies among people. The number of hours you sleep is not as important as how you feel when you wake up. If you do not feel refreshed, you probably need more sleep. Another sign of not getting enough sleep is feeling tired during the day. The average total nightly sleep time is 7½ to 8 hours. Healthy adults may need a little more or a little less than this. Why is getting enough sleep important? Getting enough quality sleep is a basic part of good health. When your sleep suffers, your mood and your thoughts can suffer too. You may find yourself feeling more grumpy or stressed. Not getting enough sleep also can lead to serious problems, including injury, accidents, anxiety, and depression. What might cause poor sleeping? Many things can cause sleep problems, including:  · Stress. Stress can be caused by fear about a single event, such as giving a speech. Or you may have ongoing stress, such as worry about work or school. · Depression, anxiety, and other mental or emotional conditions.   · Changes in your sleep habits or surroundings. This includes changes that happen where you sleep, such as noise, light, or sleeping in a different bed. It also includes changes in your sleep pattern, such as having jet lag or working a late shift. · Health problems, such as pain, breathing problems, and restless legs syndrome. · Lack of regular exercise. How can you help yourself? Here are some tips that may help you sleep more soundly and wake up feeling more refreshed. Your sleeping area  · Use your bedroom only for sleeping and sex. A bit of light reading may help you fall asleep. But if it doesn't, do your reading elsewhere in the house. Don't watch TV in bed. · Be sure your bed is big enough to stretch out comfortably, especially if you have a sleep partner. · Keep your bedroom quiet, dark, and cool. Use curtains, blinds, or a sleep mask to block out light. To block out noise, use earplugs, soothing music, or a \"white noise\" machine. Your evening and bedtime routine  · Create a relaxing bedtime routine. You might want to take a warm shower or bath, listen to soothing music, or drink a cup of noncaffeinated tea. · Go to bed at the same time every night. And get up at the same time every morning, even if you feel tired. What to avoid  · Limit caffeine (coffee, tea, caffeinated sodas) during the day, and don't have any for at least 4 to 6 hours before bedtime. · Don't drink alcohol before bedtime. Alcohol can cause you to wake up more often during the night. · Don't smoke or use tobacco, especially in the evening. Nicotine can keep you awake. · Don't take naps during the day, especially close to bedtime. · Don't lie in bed awake for too long. If you can't fall asleep, or if you wake up in the middle of the night and can't get back to sleep within 15 minutes or so, get out of bed and go to another room until you feel sleepy. · Don't take medicine right before bed that may keep you awake or make you feel hyper or energized.  Your doctor can tell you if your medicine may do this and if you can take it earlier in the day. If you can't sleep  · Imagine yourself in a peaceful, pleasant scene. Focus on the details and feelings of being in a place that is relaxing. · Get up and do a quiet or boring activity until you feel sleepy. · Don't drink any liquids after 6 p.m. if you wake up often because you have to go to the bathroom. Where can you learn more? Go to http://jamison-ilya.info/. Enter B024 in the search box to learn more about \"Learning About Sleeping Well. \"  Current as of: September 11, 2018  Content Version: 11.9  © 9481-5239 [employer]. Care instructions adapted under license by [employer] [facility] [place] (which disclaims liability or warranty for this information). If you have questions about a medical condition or this instruction, always ask your healthcare professional. [place] [address] any warranty or liability for your use of this information.

## 2019-07-25 ENCOUNTER — DOCUMENTATION ONLY (OUTPATIENT)
Dept: PAIN MANAGEMENT | Age: 69
End: 2019-07-25

## 2019-07-25 NOTE — PROGRESS NOTES
The pt's order for H-wave and demographics were picked up by the [employer] rep due to issues with their fax. They will contact the pt to schedule an appt.

## 2019-07-26 ENCOUNTER — TELEPHONE (OUTPATIENT)
Dept: PAIN MANAGEMENT | Age: 69
End: 2019-07-26

## 2019-07-26 NOTE — TELEPHONE ENCOUNTER
Spoke with patient after getting 2 points of identity and informed patient Dr Thanh Arredondo wrote a script for Gabapentin she has at our office to pickup. She stated she will come Monday.

## 2019-08-14 ENCOUNTER — HOSPITAL ENCOUNTER (OUTPATIENT)
Dept: MRI IMAGING | Age: 69
Discharge: HOME | End: 2019-08-14
Attending: INTERNAL MEDICINE
Payer: MEDICARE

## 2019-08-14 DIAGNOSIS — M54.2 NECK PAIN: ICD-10-CM

## 2019-08-14 PROCEDURE — 72141 MRI NECK SPINE W/O DYE: CPT

## 2019-08-23 ENCOUNTER — HOSPITAL ENCOUNTER (OUTPATIENT)
Dept: LAB | Age: 69
Discharge: HOME | End: 2019-08-23
Payer: MEDICARE

## 2019-08-23 DIAGNOSIS — N18.30 CHRONIC KIDNEY DISEASE, STAGE III (MODERATE) (HCC): ICD-10-CM

## 2019-08-23 LAB
ALBUMIN SERPL-MCNC: 3.5 G/DL (ref 3.4–5)
ANION GAP SERPL CALC-SCNC: 7 MMOL/L (ref 3–18)
BUN SERPL-MCNC: 18 MG/DL (ref 7–18)
BUN/CREAT SERPL: 14 (ref 12–20)
CALCIUM SERPL-MCNC: 8.9 MG/DL (ref 8.5–10.1)
CHLORIDE SERPL-SCNC: 111 MMOL/L (ref 100–111)
CO2 SERPL-SCNC: 28 MMOL/L (ref 21–32)
CREAT SERPL-MCNC: 1.25 MG/DL (ref 0.6–1.3)
CREAT UR-MCNC: 230 MG/DL (ref 30–125)
GLUCOSE SERPL-MCNC: 102 MG/DL (ref 74–99)
MICROALBUMIN UR-MCNC: 1.87 MG/DL (ref 0–3)
MICROALBUMIN/CREAT UR-RTO: 8 MG/G (ref 0–30)
PHOSPHATE SERPL-MCNC: 2.8 MG/DL (ref 2.5–4.9)
POTASSIUM SERPL-SCNC: 3.9 MMOL/L (ref 3.5–5.5)
SODIUM SERPL-SCNC: 146 MMOL/L (ref 136–145)

## 2019-08-23 PROCEDURE — 82043 UR ALBUMIN QUANTITATIVE: CPT

## 2019-08-23 PROCEDURE — 36415 COLL VENOUS BLD VENIPUNCTURE: CPT

## 2019-08-23 PROCEDURE — 80069 RENAL FUNCTION PANEL: CPT

## 2019-09-13 ENCOUNTER — OFFICE VISIT (OUTPATIENT)
Dept: ORTHOPEDIC SURGERY | Age: 69
End: 2019-09-13

## 2019-09-13 VITALS
HEIGHT: 59 IN | SYSTOLIC BLOOD PRESSURE: 143 MMHG | WEIGHT: 176.6 LBS | BODY MASS INDEX: 35.6 KG/M2 | DIASTOLIC BLOOD PRESSURE: 81 MMHG | HEART RATE: 71 BPM | RESPIRATION RATE: 13 BRPM | OXYGEN SATURATION: 94 % | TEMPERATURE: 96.6 F

## 2019-09-13 DIAGNOSIS — M17.0 PRIMARY OSTEOARTHRITIS OF BOTH KNEES: Primary | ICD-10-CM

## 2019-09-13 DIAGNOSIS — M47.812 PRIMARY OSTEOARTHRITIS OF CERVICAL SPINE: ICD-10-CM

## 2019-09-13 RX ORDER — DICLOFENAC SODIUM 10 MG/G
GEL TOPICAL 4 TIMES DAILY
COMMUNITY
End: 2020-08-18

## 2019-09-13 NOTE — PROGRESS NOTES
Patient: Gisel Good                MRN: [id number]       SSN: [id number]  YOB: 1950        AGE: 68 y.o. SEX: female  Body mass index is 35.67 kg/m². PCP: Delano Marquez DO  09/13/19    HISTORY:  I had the pleasure of reviewing Ms. Guillermo Lockett. As you know, she is a delightful lady. She is very inquisitive and has arthritis all over her. We are planning on doing knee replacements for her when she would like. She is thinking of the new year, and that is absolutely fine. She also complains of foot pain on the right side and also complained of neck pain without radiculopathy. She has had some carpal tunnel issues in the past and Dr. Toni Mckeon obtained an MRI of the C-spine which confirmed multiple-level degenerative disk disease and some foraminal stenosis but no major disk problems. Apparently some therapy was ordered for her neck but she has not started that yet. REVIEW OF SYSTEMS:  Again reviewed, negative for ataxic gait. Denies fevers, chills, night sweats or weight loss; in fact, the 12-point review of systems confirmed pertinent positives noted. All other systems reviewed and otherwise negative. PHYSICAL EXAMINATION: On examination today she does have some midfoot arthritis. She has some mild swelling around the peroneal tendons as well and _______________  toe sign is equivocal.  The hips rotate adequately. Knees have findings consistent with severe arthritis and the neck itself, she has -10 degrees of rotation to the left. Rotation to the right is normal.  She flexes her chin fairly well and flexes her neck fairly well and extension is missing about 5 degrees as well. Calf nontender. Homans sign is negative. RADIOGRAPHS:  Review of the x-rays confirm severe arthritis in both knees. With regards to the neck, she has a moderate arthritis of the neck. I would recommend a visit to the [address] [address] for her neck.   I do not think it will be any contraindication for surgery and some therapy I think would probably be a good idea for the neck as well. For the foot I would like her to see Dr. Frank Allan, our foot and ankle specialist, and she may be a candidate for an orthotic as well. We will obtain the usual views of the foot and ankle. It has been an absolute pleasure to share in her care. I would be delighted to replace the knees when the timing is correct for her. Yours very sincerely,    cc:   MD Dr. Bela Hess:      CON: negative for weight loss, fever  EYE: negative for double vision  ENT: negative for hoarseness  RS:   negative for Tb  GI:    negative for blood in stool  :  negative for blood in urine  Other systems reviewed and noted below. Past Medical History:   Diagnosis Date    Arthritis     GERD (gastroesophageal reflux disease)     Hypertension     Ill-defined condition     Positional Vertigo -  pt can not lie flat       History reviewed. No pertinent family history. Current Outpatient Medications   Medication Sig Dispense Refill    diclofenac (VOLTAREN) 1 % gel Apply  to affected area four (4) times daily.  febuxostat (ULORIC) 40 mg tab tablet Take 40 mg by mouth daily.  omeprazole (PRILOSEC) 40 mg capsule Take 40 mg by mouth daily.  losartan (COZAAR) 50 mg tablet Take 50 mg by mouth daily.  Biotin 2,500 mcg cap Take 10,000 mcg by mouth every other day.  acetaminophen (TYLENOL) 325 mg tablet Take  by mouth every four (4) hours as needed for Pain.  diazePAM (VALIUM) 5 mg tablet Take 1 Tab by mouth every eight (8) hours as needed for Anxiety. Max Daily Amount: 15 mg. 20 Tab 0    cyclobenzaprine (FLEXERIL) 10 mg tablet Take 1 Tab by mouth three (3) times daily as needed for Muscle Spasm(s). Indications:  MUSCLE SPASM 90 Tab 1    ondansetron (ZOFRAN ODT) 8 mg disintegrating tablet Take 1 Tab by mouth every eight (8) hours as needed for Nausea for up to 12 doses. 12 Tab 0    metoprolol (LOPRESSOR) 50 mg tablet Take 50 mg by mouth daily.  allopurinol (ZYLOPRIM) 300 mg tablet Take  by mouth daily.  folic acid (FOLVITE) 1 mg tablet Take 1 mg by mouth daily.  Cholecalciferol, Vitamin D3, (VITAMIN D3) 1,000 unit cap Take 1,000 Units by mouth daily.  cyanocobalamin (VITAMIN B-12) 1,000 mcg tablet Take 1,000 mcg by mouth daily as needed.  capsaicin 0.075 % topical cream Apply  to affected area three (3) times daily. 60 g 3    MELOXICAM PO Take  by mouth.  traMADol (ULTRAM) 50 mg tablet Take 1 Tab by mouth two (2) times daily as needed for Pain. Max Daily Amount: 100 mg. 60 Tab 0    triamterene-hydroCHLOROthiazide (MAXZIDE) 37.5-25 mg per tablet   0    pantoprazole (PROTONIX) 40 mg tablet Take 40 mg by mouth daily. Allergies   Allergen Reactions    Cymbalta [Duloxetine] Anaphylaxis    Gabapentin Other (comments)     hallucinations    Tramadol Other (comments)     hallucinations       Past Surgical History:   Procedure Laterality Date    HX BREAST BIOPSY      6-8 yrs ago, scar marked    HX CARPAL TUNNEL RELEASE Right     mid to late [de-identified]    HX CARPAL TUNNEL RELEASE Left     mid     HX  SECTION      HX CHOLECYSTECTOMY      HX GYN      Vaginal Hernia as a child     HX HYSTERECTOMY      partial        Social History     Socioeconomic History    Marital status:      Spouse name: Not on file    Number of children: Not on file    Years of education: Not on file    Highest education level: Not on file   Occupational History    Not on file   Social Needs    Financial resource strain: Not on file    Food insecurity:     Worry: Not on file     Inability: Not on file    Transportation needs:     Medical: Not on file     Non-medical: Not on file   Tobacco Use    Smoking status: Never Smoker    Smokeless tobacco: Never Used   Substance and Sexual Activity    Alcohol use:  Yes    Drug use: Not on file  Sexual activity: Not on file   Lifestyle    Physical activity:     Days per week: Not on file     Minutes per session: Not on file    Stress: Not on file   Relationships    Social connections:     Talks on phone: Not on file     Gets together: Not on file     Attends Orthodox service: Not on file     Active member of club or organization: Not on file     Attends meetings of clubs or organizations: Not on file     Relationship status: Not on file    Intimate partner violence:     Fear of current or ex partner: Not on file     Emotionally abused: Not on file     Physically abused: Not on file     Forced sexual activity: Not on file   Other Topics Concern    Not on file   Social History Narrative    Not on file       Visit Vitals  /81   Pulse 71   Temp 96.6 °F (35.9 °C) (Oral)   Resp 13   Ht 4' 11\" (1.499 m)   Wt 176 lb 9.6 oz (80.1 kg)   SpO2 94%   BMI 35.67 kg/m²         PHYSICAL EXAMINATION:  GENERAL: Alert and oriented x3, in no acute distress, well-developed, well-nourished, afebrile. HEART: No JVD. EYES: No scleral icterus   NECK: No significant lymphadenopathy   LUNGS: No respiratory compromise or indrawing  ABDOMEN: Soft, non-tender, non-distended. Electronically signed by:  Creola Dandy, MD

## 2019-09-13 NOTE — PROGRESS NOTES
1. Have you been to the ER, urgent care clinic since your last visit? Hospitalized since your last visit? No    2. Have you seen or consulted any other health care providers outside of the [address] [place] [address] since your last visit? Include any pap smears or colon screening.  No

## 2019-10-01 ENCOUNTER — OFFICE VISIT (OUTPATIENT)
Dept: ORTHOPEDIC SURGERY | Age: 69
End: 2019-10-01

## 2019-10-01 VITALS
SYSTOLIC BLOOD PRESSURE: 142 MMHG | WEIGHT: 177 LBS | HEIGHT: 59 IN | HEART RATE: 67 BPM | BODY MASS INDEX: 35.68 KG/M2 | RESPIRATION RATE: 14 BRPM | OXYGEN SATURATION: 99 % | TEMPERATURE: 98.1 F | DIASTOLIC BLOOD PRESSURE: 80 MMHG

## 2019-10-01 DIAGNOSIS — M79.671 RIGHT FOOT PAIN: ICD-10-CM

## 2019-10-01 DIAGNOSIS — M21.41 PES PLANUS OF BOTH FEET: Primary | ICD-10-CM

## 2019-10-01 DIAGNOSIS — M21.42 PES PLANUS OF BOTH FEET: Primary | ICD-10-CM

## 2019-10-01 DIAGNOSIS — M79.672 LEFT FOOT PAIN: ICD-10-CM

## 2019-10-01 NOTE — PATIENT INSTRUCTIONS
Berna- please fax  Thanks! Look into [employer] or [employer] shoes. www. [employer]. [employer]      You have been provided with an order for durable medical equipment that you may  at an outside facility as our office does not carry the equipment you need. You may pick it up at any medical supply company you like. Listed below are a few different locations for your convenience:    [address]  [address], [address]  Phone: [phone] [address] [facility],[address]  [facility] [address], [facility], [facility] [address]  Phone: [phone]  Phone: [phone]:   [address], [address]  [place], [address] [place]:  [address] [place] [address] [address] [facility] [address]  [place]:  [address] [place], [address]    [facility] [place] and [place] [address], [address]  [place], [address]  Phone: [phone]       Tendon Injury (Tendinopathy): Care Instructions  Your Care Instructions    Tendons are tough, flexible tissues that connect muscle to bone. A tendon can hurt or get torn from overuse or aging, especially tendons in the shoulder, elbow, wrist, hip, knee, or ankle. Tendon injuries may be called tendinopathy or tendinitis. Tendon injuries can occur from any motion you have to repeat in a job, sports, or daily activities. Tennis elbow is one common tendon injury. You can treat most tendon problems with over-the-counter pain medicine, rest, changes in your activities, and physical therapy. Follow-up care is a key part of your treatment and safety. Be sure to make and go to all appointments, and call your doctor if you are having problems. It's also a good idea to know your test results and keep a list of the medicines you take. How can you care for yourself at home? · Rest the sore area. You may have to stop doing the activity that caused the tendon pain for a while.   · Take an over-the-counter pain medicine, such as acetaminophen (Tylenol), ibuprofen (Advil, Motrin), or naproxen (Aleve). Read and follow all instructions on the label. · Do not take two or more pain medicines at the same time unless the doctor told you to. Many pain medicines have acetaminophen, which is Tylenol. Too much acetaminophen (Tylenol) can be harmful. · Put ice or a cold pack on the sore area for 10 to 20 minutes at a time. Try to do this every 1 to 2 hours for the next 3 days (when you are awake) or until any swelling goes down. Put a thin cloth between the ice and your skin. · Prop up the sore area on a pillow when you ice it or anytime you sit or lie down during the next 3 days. Try to keep it above the level of your heart. This will help reduce swelling. · Follow your doctor's advice for wearing and caring for a sling, splint, or cast. In some cases, you may wear one of these for a while to help your tendon heal.  · Follow your doctor's advice for stretching and physical therapy. Gently move your joint through its full range of motion. This will prevent stiffness in your joint. · Go back to your activity slowly. Warm up before and stretch after the activity. You also can try making some changes. For example, if a sport caused your tendon pain, alternate the sport with another activity. If using a tool causes pain, switch hands or change your . Stop the activity if it hurts. After the activity, apply ice to prevent pain and swelling. · Do not smoke. Smoking can slow healing. If you need help quitting, talk to your doctor about stop-smoking programs and medicines. These can increase your chances of quitting for good. When should you call for help? Watch closely for changes in your health, and be sure to contact your doctor if:    · Your pain gets worse.     · You do not get better as expected. Where can you learn more? Go to http://jamison-ilya.info/.   Enter A157 in the search box to learn more about \"Tendon Injury (Tendinopathy): Care Instructions. \"  Current as of: June 26, 2019  Content Version: 12.2  © 9227-6740 [employer]. Care instructions adapted under license by [employer] (which disclaims liability or warranty for this information). If you have questions about a medical condition or this instruction, always ask your healthcare professional. [address] any warranty or liability for your use of this information.

## 2019-10-01 NOTE — PROGRESS NOTES
AMBULATORY PROGRESS NOTE      Patient: Golden Cruz             MRN: [id number]     SSN: [id number] Body mass index is 35.75 kg/m². YOB: 1950     AGE: 68 y.o. SEX: female    PCP: Prince Paget, DO     IMPRESSION/DIAGNOSIS AND TREATMENT PLAN     DIAGNOSES  1. Pes planus of both feet    2. Right foot pain    3. Left foot pain        Orders Placed This Encounter    AMB SUPPLY ORDER    [62655] Foot Min 3V    [98936] Foot Min 3V      Golden Cruz understands her diagnoses and the proposed plan. Plan:    1) DME Order: Spenco low profile medial arch supports (LMS). 2) Wear Spenco inserts in tennis shoes. 3) Continue activity modification as directed. 4) Look into [employer] or [employer] shoes. RTO - 3 weeks     HPI AND EXAMINATION     Golden Cruz IS A 68 y.o. female who presents to my outpatient office complaining of bilateral foot pain. Ms. Nhi Morse states that she has been experiencing pain in her bilateral anterior ankles and medial hindfoot, right worse than left. She reports that she has also been diagnosed with restless leg syndrome when bothers her mostly at night. She finds that pressure is soothing and relieves her pain. The patient denies any recent falls, twists, or trauma. She finds that on a typical day, she is only able to walk right away sometimes. She feels as though her ankles are weak at times. She notes that she experiences numbness and a feeling as though water is running down her feet occasionally. She also finds that she experiences swelling in her ankles. She denies h/o DM. She reports that she had back surgery in January 2018 and that she will be getting a TKR next year. She notes that she does not like wearing tennis shoes and that she only owns one pair. The patient is retired.      Visit Vitals  /80   Pulse 67   Temp 98.1 °F (36.7 °C) (Oral)   Resp 14   Ht 4' 11\" (1.499 m)   Wt 177 lb (80.3 kg)   SpO2 99%   BMI 35.75 kg/m² Appearance: Alert, well appearing and pleasant patient who is in no distress, oriented to person, place/time, and who follows commands. This patient is accompanied in the examination room by her self. Dementia: no dementia  Psychiatric: Affect and mood are appropriate. Patient arrives to office via: without assistive device  HEENT: Head normocephalic & atraumatic. Both pupils are round, non icteric sclera   Eye: EOM are intact and sclera are clear    Neck: ROM WNL and JVD neck is not present     Hearings Intact, does not require hearing aid device  Respiratory: Breathing is unlabored without accessory chest muscle use  Cardiovascular/Peripheral Vascular: Normal Pulses to each foot    ANKLE/FOOT bilateral     Gait: Slow  Tenderness: Mild tenderness to the posteromedial ankles. Cutaneous: Mild swelling to the posteromedial hindfoot, along the posterior tibial tendon, right worse than left    Palpable bony ridge over the 2/3 TMT  Joint Motion: WNL. Joint / Tendon Stability: No Ankle or Subtalar instability or joint laxity. No peroneal sublux ability or dislocation  Alignment: Pes planus, right worse than left, and flexible  Neuro Motor/Sensory: NL/NL. Can double stance heel rise  Vascular: NL foot/ankle pulses. Lymphatics: No extremity lymphedema, No calf swelling, no tenderness to calf muscles. CHART REVIEW     Past Medical History:   Diagnosis Date    Arthritis     GERD (gastroesophageal reflux disease)     Hypertension     Ill-defined condition     Positional Vertigo -  pt can not lie flat     Current Outpatient Medications   Medication Sig    diclofenac (VOLTAREN) 1 % gel Apply  to affected area four (4) times daily.  febuxostat (ULORIC) 40 mg tab tablet Take 40 mg by mouth daily.  omeprazole (PRILOSEC) 40 mg capsule Take 40 mg by mouth daily.  capsaicin 0.075 % topical cream Apply  to affected area three (3) times daily.     losartan (COZAAR) 50 mg tablet Take 50 mg by mouth daily.  Biotin 2,500 mcg cap Take 10,000 mcg by mouth every other day.  MELOXICAM PO Take  by mouth.  acetaminophen (TYLENOL) 325 mg tablet Take  by mouth every four (4) hours as needed for Pain.  diazePAM (VALIUM) 5 mg tablet Take 1 Tab by mouth every eight (8) hours as needed for Anxiety. Max Daily Amount: 15 mg.    cyclobenzaprine (FLEXERIL) 10 mg tablet Take 1 Tab by mouth three (3) times daily as needed for Muscle Spasm(s). Indications: MUSCLE SPASM    triamterene-hydroCHLOROthiazide (MAXZIDE) 37.5-25 mg per tablet     ondansetron (ZOFRAN ODT) 8 mg disintegrating tablet Take 1 Tab by mouth every eight (8) hours as needed for Nausea for up to 12 doses.  metoprolol (LOPRESSOR) 50 mg tablet Take 50 mg by mouth daily.  allopurinol (ZYLOPRIM) 300 mg tablet Take  by mouth daily.  pantoprazole (PROTONIX) 40 mg tablet Take 40 mg by mouth daily.  folic acid (FOLVITE) 1 mg tablet Take 1 mg by mouth daily.  Cholecalciferol, Vitamin D3, (VITAMIN D3) 1,000 unit cap Take 1,000 Units by mouth daily.  cyanocobalamin (VITAMIN B-12) 1,000 mcg tablet Take 1,000 mcg by mouth daily as needed.  traMADol (ULTRAM) 50 mg tablet Take 1 Tab by mouth two (2) times daily as needed for Pain. Max Daily Amount: 100 mg. No current facility-administered medications for this visit.       Allergies   Allergen Reactions    Cymbalta [Duloxetine] Anaphylaxis    Gabapentin Other (comments)     hallucinations    Tramadol Other (comments)     hallucinations     Past Surgical History:   Procedure Laterality Date    HX BREAST BIOPSY      6-8 yrs ago, scar marked    HX CARPAL TUNNEL RELEASE Right     mid to late [de-identified]    HX CARPAL TUNNEL RELEASE Left     mid     HX  SECTION      HX CHOLECYSTECTOMY      HX GYN      Vaginal Hernia as a child     HX HYSTERECTOMY      partial      Social History     Occupational History    Not on file   Tobacco Use    Smoking status: Never Smoker    Smokeless tobacco: Never Used   Substance and Sexual Activity    Alcohol use: Yes    Drug use: Not on file    Sexual activity: Not on file     No family history on file. REVIEW OF SYSTEMS : 10/1/2019  ALL BELOW ARE Negative except : SEE HPI      REVIEW OF SYSTEMS : Total of 12 systems reviewed as follows:            CONSTITUTIONAL: No weight loss. PSYCHOLOGICAL : No Feelings of anxiety, depression, agitation  EYES: No blurred vision and no eye discharge. NO eye pain, double vision  ENT: No nasal discharge. No ear pain. CARDIOVASCULAR: No chest pain and no diaphoresis. RESPIRATORY: No cough, no hemoptysis. GI: No vomiting, no diarrhea   : No urinary frequency and no dysuria. MUSCULOSKELETAL: see HPI  SKIN: No rashes. NEURO: Negative for : dizziness,weakness, headaches     Negative for : visual changes or confusion, or seizures,   ENDOCRINE: No polyphagia and no polydipsia. HEMATOLOGY: No bleeding tendencies. No calf throbbing, posterior knee throbbing pain     DIAGNOSTIC IMAGING       FOOT X RAYS 3 VIEWS Bilateral   10/1/2019    WEIGHT BEARING    X RAYS AT [address] [place] [address]  10/1/2019    {Bilateral FOOT:     Bones: No fractures or dislocations. No focal osteolytic or osteoblastic process   Bone Spurs: No significant bone spurs   Alignment: moderate Valgus Hindfoot alignment   Pes planovalgus alignment is present: Abduction at TN mild, Abduction at midfoot absent   Sagging present to:  no sag to TMT in lateral Xray projection  Joint:Mild OA changes present  OA changes present to: midfoot    Soft Tissues: Mild swelling dorsal midfoot   No ankle joint effusion in lateral projection. Mineralization: Suggests Osteopenia     I have personally reviewed the results of the above study. The interpretation of this study is my professional opinion    Written by Madison Cantrell, as dictated by Dr. Светлана Chan.  I, Dr. Светлана Chan, confirm that all documentation is accurate.

## 2019-10-01 NOTE — PROGRESS NOTES
Verbal order given by Dr. Guera Wheat to sign order for DME entered by [facility].

## 2019-10-01 NOTE — PROGRESS NOTES
1. Have you been to the ER, urgent care clinic since your last visit? Hospitalized since your last visit? No    2. Have you seen or consulted any other health care providers outside of the [address] [place] [address] since your last visit? Include any pap smears or colon screening.  No

## 2019-10-16 ENCOUNTER — HOSPITAL ENCOUNTER (OUTPATIENT)
Dept: PHYSICAL THERAPY | Age: 69
Discharge: HOME | End: 2019-10-16
Payer: MEDICARE

## 2019-10-16 PROCEDURE — 97162 PT EVAL MOD COMPLEX 30 MIN: CPT

## 2019-10-16 PROCEDURE — 97140 MANUAL THERAPY 1/> REGIONS: CPT

## 2019-10-16 NOTE — PROGRESS NOTES
PT DAILY TREATMENT NOTE/CERVICAL [id number]    Patient Name: Jackeline Caceres  Date:10/16/2019   : 1950  [x]  Patient  Verified  Payor: [place] MEDICARE / Plan: [place] MEDICARE PART A & B / Product Type: Medicare /    In time:308  Out time:350  Total Treatment Time (min): 42  Visit #: 1 of     Medicare/BCBS Only   Total Timed Codes (min):  20 1:1 Treatment Time:  20     Treatment Area: Cervical spinal stenosis [M48.02]    SUBJECTIVE  Pain Level (0-10 scale): 2-3/10  []constant []intermittent []improving []worsening []no change since onset    Any medication changes, allergies to medications, adverse drug reactions, diagnosis change, or new procedure performed?: [x] No    [] Yes (see summary sheet for update)  Subjective functional status/changes:     Patient is a 68year-old female who presents with chronic, worsening neck pain. She describes pain as pressure, stiffness, and tightness and has creputis. Her worst pain is at night and has trouble getting comfortable to sleeping. She reports pain radiates into left > right shoulder. Pt enjoys reading and playing games on laptop    OBJECTIVE/EXAMINATION        22 min [x]Eval                  []Re-Eval       10 min Therapeutic Exercise:  - See flow sheet :   Rationale: increase ROM and increase strength to improve the patients ability to increase ease of ADL's. 10 min Manual Therapy:  DTM B UT   Rationale: decrease pain, increase ROM and increase tissue extensibility to increase ease of daily activities.          With   [] TE   [] TA   [] neuro   [] other: Patient Education: [x] Review HEP    [] Progressed/Changed HEP based on:   [] positioning   [] body mechanics   [] transfers   [] heat/ice application    [] other:          Symptoms  Aggravated by:   [] Bending [] Sitting [] Standing [] Reaching Overhead   [] Moving [] Cough [] Sneeze [] Eating   [] AM  [x] PM  Lying:  [x] sup   [] pro   [x] sidelying   [] Other:     Eased by:    [] Bending [] Sitting [] Standing Lying: [] sup  [] pro  [] sidelying   [] Moving [] AM  [] PM  [] Other:         Diagnostic Tests: [] Lab work [] X-rays    [] CT [x] MRI     [] Other:  Results:C3-4: Disc bulge with focal small central protrusion. Moderate left and mild  right facet hypertrophy. Mild spinal canal stenosis. Limited visualization of  the neural foramen with suggested left greater than right foraminal narrowing.     C4-5: Diffusely bulging disc and osteophyte complex. Hypertrophic facets. Mild  spinal canal stenosis. Again there is limited visualization of the neural  foramen with estimated mild foraminal narrowing.     C5-6: Disc is narrowed with diffuse bulging disc and osteophyte complex. Mild  facet hypertrophy. Mild spinal canal stenosis. Limited visualization of the  neural foramen which are adequately patent.     C6-7: Disc is narrowed with diffuse bulging disc and osteophyte complex. Mild  spinal canal stenosis.  Adequately patent neural foramen.     C7-T1: Disc osteophyte complex without significant spinal canal or foraminal  stenosis    OBJECTIVE  Posture: [] WNL  Head Position: forward  C-Kyphosis:  [] increased   [] decreased   C-Lordosis:   [] increased   [x] decreased  T-Kyphosis:  [] increased   [] decreased  T-Lordosis:   [] increased   [] decreased         Cervical Retraction: [] WNL    [x] Abnormal:     Shoulder/Scapular Screen: [] WNL    [x] Abnormal:Left abduction MMT 3/5  Right abduction MMT 3+/5, increased UT activation with shoulder elevation      Active Movements: [] N/A   [] Too acute   [] Other:  ROM % AROM % PROM Comments:pain, area   Forward flexion 35 deg     Extension 36 deg  tight   SB right      SB left       Rotation right 40 deg  crepitus   Rotation left 37 deg  Pain left side, muscle        Palpation:  [] Min  [x] Mod  [] Severe    Location: B UT  [] Min  [] Mod  [] Severe    Location:  [] Min  [] Mod  [] Severe    Location:         Muscle Flexibility: [] N/A   Scalenes: [] WNL    [] Tight [] R    [] L   Upper Trap: [] WNL    [x] Tight    [] R    [] L   Levator: [] WNL    [x] Tight    [] R    [] L   Pect. Minor: [] WNL    [] Tight    [] R    [] L       Pain Level (0-10 scale) post treatment: 2/10    ASSESSMENT/Changes in Function: See POC. Patient will continue to benefit from skilled PT services to modify and progress therapeutic interventions, address ROM deficits, address strength deficits, analyze and address soft tissue restrictions, analyze and modify body mechanics/ergonomics and assess and modify postural abnormalities to attain remaining goals.      [x]  See Plan of Care  []  See progress note/recertification  []  See Discharge Summary         Progress towards goals / Updated goals:  See POC     PLAN  []  Upgrade activities as tolerated     [x]  Continue plan of care  []  Update interventions per flow sheet       []  Discharge due to:_  []  Other:_      Ashley Wheat, PT 10/17/2019  1:36 PM

## 2019-10-16 NOTE — PROGRESS NOTES
[facility] [facility]  [address] [facility]  [phone] fax    Plan of Care/ Statement of Necessity for Physical Therapy Services    Patient name: Cheryl Culver Start of Care: 10/16/2019   Referral source: Mansoor, Alexandria Soulier, MD : 1950    Medical Diagnosis: Cervical spinal stenosis [M48.02]  Payor: VA MEDICARE / Plan: VA MEDICARE PART A & B / Product Type: Medicare /  Onset Date:chronic, worsening    Treatment Diagnosis: neck pain   Prior Hospitalization: see medical history Provider#: [id number]   Medications: Verified on Patient summary List    Comorbidities: arthritis, HTN   Prior Level of Function: functionally independent      The Plan of Care and following information is based on the information from the initial evaluation. Assessment/ key information: Patient is a 68year-old female who presents with chronic, worsening neck pain. She describes pain as pressure, stiffness, and tightness and has creputis. Her worst pain is at night and has trouble getting comfortable to sleeping. She reports pain radiates into left > right shoulder. Pt enjoys reading and playing games on laptop. Patient presents with forward head posture with grossly limited c/s AROM. TTP and hypertonic B UT. She has decreased B shoulder strength left>right. + Cervical distraction test. Patient will benefit from skilled PT to address ROM, flexibility, and strength in order to increase ease of ADL's. Evaluation Complexity History MEDIUM  Complexity : 1-2 comorbidities / personal factors will impact the outcome/ POC ; Examination MEDIUM Complexity : 3 Standardized tests and measures addressing body structure, function, activity limitation and / or participation in recreation  ;Presentation MEDIUM Complexity : Evolving with changing characteristics  ; Clinical Decision Making MEDIUM Complexity : FOTO score of 26-74  Overall Complexity Rating: MEDIUM  Problem List: pain affecting function, decrease ROM, decrease strength, decrease ADL/ functional abilitiies, decrease activity tolerance and decrease flexibility/ joint mobility   Treatment Plan may include any combination of the following: Therapeutic exercise, Therapeutic activities, Neuromuscular re-education, Physical agent/modality, Manual therapy and Patient education  Patient / Family readiness to learn indicated by: asking questions and trying to perform skills  Persons(s) to be included in education: patient (P)  Barriers to Learning/Limitations: None  Patient Goal (s): no pain in neck when lying down   Patient Self Reported Health Status: good  Rehabilitation Potential: good    Short Term Goals: To be accomplished in 1 weeks:  1. Patient will be compliant with HEP to improve neck ROM and reduce stiffness in order to increase ease of ADL's. Long Term Goals: To be accomplished in 8 weeks:  1. Patient will improve increase gross c/s AROM by 10 degrees in order to improve c/s mobility to complete daily activities with ease. 2. Patient will increase FOTO score by 7 pts indicating improvement in function and QOL. 3. Patient will report 50% improvement in overall symptoms to increase ability to sleep and read comfortably. 4. Patient will increase B shoulder flexion/abduction strength to a 4/5 for improved scapulohumeral rhythm and reduced load on c/s when performing household activities. Frequency / Duration: Patient to be seen 2-3 times per week for 8 weeks. Patient/ Caregiver education and instruction: Diagnosis, prognosis, exercises and other posture   [x]  Plan of care has been reviewed with PTA    Certification Period: 10/16/2019 to 12/14/2019  Nicole Wolf, PT 10/16/2019 3:56 PM    ________________________________________________________________________    I certify that the above Therapy Services are being furnished while the patient is under my care.  I agree with the treatment plan and certify that this therapy is necessary.     Physician's Signature:____________Date:_________TIME:________    ** Signature, Date and Time must be completed for valid certification **    Please sign and return to [facility] [facility]  [address] [place] [address]  [phone] fax

## 2019-10-21 ENCOUNTER — HOSPITAL ENCOUNTER (OUTPATIENT)
Dept: PHYSICAL THERAPY | Age: 69
Discharge: HOME | End: 2019-10-21
Payer: MEDICARE

## 2019-10-21 PROCEDURE — 97140 MANUAL THERAPY 1/> REGIONS: CPT

## 2019-10-21 PROCEDURE — 97110 THERAPEUTIC EXERCISES: CPT

## 2019-10-21 NOTE — PROGRESS NOTES
PT DAILY TREATMENT NOTE 10-18    Patient Name: Lyndsey Marina  Date:10/21/2019  : 1950  [x]  Patient  Verified  Payor: Dede Clas / Plan: VA MEDICARE PART A & B / Product Type: Medicare /    In time: 1:30  Out time: 2:28  Total Treatment Time (min): 62  Visit #: 2 of -    Medicare/BCBS Only   Total Timed Codes (min):  43 1:1 Treatment Time:  30       Treatment Area: Cervical spinal stenosis [M48.02]    SUBJECTIVE  Pain Level (0-10 scale): 3-4/10  Any medication changes, allergies to medications, adverse drug reactions, diagnosis change, or new procedure performed?: [x] No    [] Yes (see summary sheet for update)  Subjective functional status/changes:   [] No changes reported  Pt reports doing ok today. She reports no numbness/tingling symptoms.      OBJECTIVE    Modality rationale: decrease pain and increase tissue extensibility to improve the patients ability to tolerate ADLs   Min Type Additional Details    [] Estim:  []Unatt       []IFC  []Premod                        []Other:  []w/ice   []w/heat  Position:  Location:    [] Estim: []Att    []TENS instruct  []NMES                    []Other:  []w/US   []w/ice   []w/heat  Position:  Location:    []  Traction: [] Cervical       []Lumbar                       [] Prone          []Supine                       []Intermittent   []Continuous Lbs:  [] before manual  [] after manual    []  Ultrasound: []Continuous   [] Pulsed                           []1MHz   []3MHz W/cm2:  Location:    []  Iontophoresis with dexamethasone         Location: [] Take home patch   [] In clinic   15 []  Ice     [x]  heat  []  Ice massage  []  Laser   []  Anodyne Position: sitting  Location: B shoulders/neck    []  Laser with stim  []  Other:  Position:  Location:    []  Vasopneumatic Device Pressure:       [] lo [] med [] hi   Temperature: [] lo [] med [] hi   [] Skin assessment post-treatment:  []intact []redness- no adverse reaction    []redness - adverse reaction: 23 min Therapeutic Exercise:  [x] See flow sheet :   Rationale: increase ROM, increase strength, improve coordination and increase proprioception to improve the patients ability to perform ADLs with ease and safety    20 (10 min 1:1) min Manual Therapy:  STM/DTM for c/s paraspinal muscles, SOR, gentle distraction    And done by ATC with no charge: KT for Left UT inhibition   Rationale: decrease pain, increase ROM, increase tissue extensibility, decrease trigger points and increase postural awareness to improve pt's tolerance for ALDs       With   [] TE   [] TA   [] neuro   [] other: Patient Education: [x] Review HEP    [] Progressed/Changed HEP based on:   [] positioning   [] body mechanics   [] transfers   [] heat/ice application    [] other:      Other Objective/Functional Measures:    No pain with therex, poor form with DNF strengthening     Unable to abd Right arm 90 degs in sidelying, lack end range    No significant relief of pain with distraction today    Pain Level (0-10 scale) post treatment: 1/10    ASSESSMENT/Changes in Function: Initiated treatment as POC, pt demonstrated fair motivation, good form with therex and awareness of posture. Pt reports that she can feel her neck tensing up with stress. Perform trail of KT for UT to promote relaxation and appropriate posture. Will progress therex as tolerated. Patient will continue to benefit from skilled PT services to modify and progress therapeutic interventions, address functional mobility deficits, address ROM deficits, address strength deficits, analyze and address soft tissue restrictions, analyze and cue movement patterns, analyze and modify body mechanics/ergonomics, assess and modify postural abnormalities and instruct in home and community integration to attain remaining goals. [x]  See Plan of Care  []  See progress note/recertification  []  See Discharge Summary         Progress towards goals / Updated goals:  Short Term Goals:  To be accomplished in 1 weeks:  1. Patient will be compliant with HEP to improve neck ROM and reduce stiffness in order to increase ease of ADL's.      Long Term Goals: To be accomplished in 8 weeks:  1. Patient will improve increase gross c/s AROM by 10 degrees in order to improve c/s mobility to complete daily activities with ease. 2. Patient will increase FOTO score by 7 pts indicating improvement in function and QOL. 3. Patient will report 50% improvement in overall symptoms to increase ability to sleep and read comfortably.   4. Patient will increase B shoulder flexion/abduction strength to a 4/5 for improved scapulohumeral rhythm and reduced load on c/s when performing household activities.       PLAN  [x]  Upgrade activities as tolerated     [x]  Continue plan of care  []  Update interventions per flow sheet       []  Discharge due to:_  []  Other:_      Beto Parson, PT 10/21/2019  9:52 AM    Future Appointments   Date Time Provider Pricila Martinoi   10/21/2019  1:30 PM Miky Vargas PFRADEI [facility]   10/22/2019  1:00 PM [facility]   10/24/2019  3:30 PM Juan Manuel Garcia  [address]   10/28/2019 10:30 AM [facility] DONNIE RM 1 2D [facility]   10/29/2019  3:00 PM Alvina Dupont PTA [id number] [facility]   10/31/2019 11:00 AM Krissy Antony PT [id number] [facility]   11/4/2019  2:30 PM Alvina Dupont PTA [id number] [facility]   11/7/2019  2:30 PM Krissy Antony PT HEQSOUC [facility]   11/8/2019  1:30 PM Alvina Dupont PTA [id number] [facility]   11/11/2019  2:00 PM Alvina Dupont PTA [id number] [facility]   11/13/2019  3:00 PM Krissy Antony PT [id number] [facility]   11/15/2019 11:00 AM Krissy Antony, PT [id number] [facility]   11/18/2019  2:00 PM Krissy Antony, PT PTRTPFI [facility]   11/22/2019  2:00 PM Alvina Dupont, SEAN SZJPURK [facility]   11/25/2019  3:30 PM Sofia Martinez, PT [id number] [facility]   11/27/2019  2:30 PM Alvina Dupont, SEAN [id number] [facility]

## 2019-10-23 ENCOUNTER — HOSPITAL ENCOUNTER (OUTPATIENT)
Dept: PHYSICAL THERAPY | Age: 69
Discharge: HOME | End: 2019-10-23
Payer: MEDICARE

## 2019-10-23 PROCEDURE — 97140 MANUAL THERAPY 1/> REGIONS: CPT

## 2019-10-23 PROCEDURE — 97110 THERAPEUTIC EXERCISES: CPT

## 2019-10-23 NOTE — PROGRESS NOTES
PT DAILY TREATMENT NOTE 10-18    Patient Name: Laura Spencer  Date:10/23/2019  : 1950  [x]  Patient  Verified  Payor: VA MEDICARE / Plan: VA MEDICARE PART A & B / Product Type: Medicare /    In time:900  Out time:940  Total Treatment Time (min): 40  Visit #: 3 of     Medicare/BCBS Only   Total Timed Codes (min):  30 1:1 Treatment Time:  30       Treatment Area: Spinal stenosis, cervical region [M48.02]    SUBJECTIVE  Pain Level (0-10 scale): 1-2/10  Any medication changes, allergies to medications, adverse drug reactions, diagnosis change, or new procedure performed?: [x] No    [] Yes (see summary sheet for update)  Subjective functional status/changes:   [] No changes reported  Pt stated that she is doing better today    OBJECTIVE    Modality rationale: decrease pain and increase tissue extensibility to improve the patients ability to increase ease with ADLs   Min Type Additional Details    [] Estim:  []Unatt       []IFC  []Premod                        []Other:  []w/ice   []w/heat  Position:  Location:    [] Estim: []Att    []TENS instruct  []NMES                    []Other:  []w/US   []w/ice   []w/heat  Position:  Location:    []  Traction: [] Cervical       []Lumbar                       [] Prone          []Supine                       []Intermittent   []Continuous Lbs:  [] before manual  [] after manual    []  Ultrasound: []Continuous   [] Pulsed                           []1MHz   []3MHz W/cm2:  Location:    []  Iontophoresis with dexamethasone         Location: [] Take home patch   [] In clinic   10 []  Ice     [x]  heat  []  Ice massage  []  Laser   []  Anodyne Position: seated  Location:neck    []  Laser with stim  []  Other:  Position:  Location:    []  Vasopneumatic Device Pressure:       [] lo [] med [] hi   Temperature: [] lo [] med [] hi   [x] Skin assessment post-treatment:  [x]intact []redness- no adverse reaction    []redness - adverse reaction:     20 min Therapeutic Exercise:  [x] See flow sheet :   Rationale: increase ROM and increase strength to improve the patients ability to increase ease with ADLs    10 min Manual Therapy:  DTM to cervical paraspinals and manual traction   Rationale: decrease pain, increase ROM and increase tissue extensibility to increase ease with reaching    With   [x] TE   [] TA   [] neuro   [] other: Patient Education: [x] Review HEP    [] Progressed/Changed HEP based on:   [] positioning   [] body mechanics   [] transfers   [] heat/ice application    [] other:      Other Objective/Functional Measures:   Had no difficulty with exercises  No complaint of increased pain during session   Had minimal tightness in cervical musculature     Pain Level (0-10 scale) post treatment: 1/10    ASSESSMENT/Changes in Function:   Pt is slowly progressing toward goals. Pain level cont to decrease. Pt cont with decreased strength and range of motion in B sh. Cervical range of motion is slowly improving    Patient will continue to benefit from skilled PT services to modify and progress therapeutic interventions, address functional mobility deficits, address ROM deficits, address strength deficits, analyze and address soft tissue restrictions, analyze and cue movement patterns, analyze and modify body mechanics/ergonomics, assess and modify postural abnormalities and instruct in home and community integration to attain remaining goals. [x]  See Plan of Care  []  See progress note/recertification  []  See Discharge Summary         Progress towards goals / Updated goals:  Short Term Goals: To be accomplished in 1 weeks:  1. Patient will be compliant with HEP to improve neck ROM and reduce stiffness in order to increase ease of ADL's.    goal met. 10/23/19    Long Term Goals: To be accomplished in 8 weeks:  1.  Patient will improve increase gross c/s AROM by 10 degrees in order to improve c/s mobility to complete daily activities with ease.   2. Patient will increase FOTO score by 7 pts indicating improvement in function and QOL.    3. Patient will report 50% improvement in overall symptoms to increase ability to sleep and read comfortably. 4. Patient will increase B shoulder flexion/abduction strength to a 4/5 for improved scapulohumeral rhythm and reduced load on c/s when performing household activities.     PLAN  []  Upgrade activities as tolerated     [x]  Continue plan of care  []  Update interventions per flow sheet       []  Discharge due to:_  []  Other:_      Peggy Silva PTA 10/23/2019  9:00 AM    Future Appointments   Date Time Provider Pricila Manuel   10/24/2019  3:30 PM Marylu Caruso  [address]   10/28/2019 10:30 AM [facility] RM 1 2D [facility]   10/29/2019  3:00 PM Regina Karlaut, PTA [id number] [facility]   10/31/2019 11:00 AM Mateusz Rizvi, PT [id number] [facility]   11/4/2019  2:30 PM Regina Lout, PTA [id number] [facility]   11/7/2019  2:30 PM Mateusz Rizvi, PT [id number] [facility]   11/8/2019  1:30 PM Regina Lout, PTA [id number] [facility]   11/11/2019  2:00 PM Regina Lout, PTA [id number] [facility]   11/13/2019  3:00 PM Mateusz Rizvi, PT [id number] [facility]   11/15/2019 11:00 AM Mateusz Rizvi, PT [id number] [facility]   11/18/2019  2:00 PM Mateusz Rizvi, PT [id number] [facility]   11/22/2019  2:00 PM Regina Lout, PTA CEVLVVL [facility]   11/25/2019  3:30 PM Isai Gutierrez, PT MMCPTPB [facility]   11/27/2019  2:30 PM Regina José PTA [id number] [facility]

## 2019-10-28 ENCOUNTER — HOSPITAL ENCOUNTER (OUTPATIENT)
Dept: MAMMOGRAPHY | Age: 69
Discharge: HOME | End: 2019-10-28
Attending: FAMILY MEDICINE
Payer: MEDICARE

## 2019-10-28 DIAGNOSIS — Z12.31 VISIT FOR SCREENING MAMMOGRAM: ICD-10-CM

## 2019-10-28 PROCEDURE — 77063 BREAST TOMOSYNTHESIS BI: CPT

## 2019-10-29 ENCOUNTER — HOSPITAL ENCOUNTER (OUTPATIENT)
Dept: PHYSICAL THERAPY | Age: 69
Discharge: HOME | End: 2019-10-29
Payer: MEDICARE

## 2019-10-29 PROCEDURE — 97110 THERAPEUTIC EXERCISES: CPT

## 2019-10-29 PROCEDURE — 97140 MANUAL THERAPY 1/> REGIONS: CPT

## 2019-10-29 NOTE — PROGRESS NOTES
PT DAILY TREATMENT NOTE 10-18    Patient Name: Cheryl Culver  Date:10/29/2019  : 1950  [x]  Patient  Verified  Payor: [place] MED[place]ARE / Plan: [place] MEDICARE PART A & B / Product Type: Medicare /    In time:300  Out time:343  Total Treatment Time (min): 43  Visit #: 4 of 24    Medicare/BCBS Only   Total Timed Codes (min):  33 1:1 Treatment Time:  33       Treatment Area: Cervical spinal stenosis [M48.02]    SUBJECTIVE  Pain Level (0-10 scale): 10  Any medication changes, allergies to medications, adverse drug reactions, diagnosis change, or new procedure performed?: [x] No    [] Yes (see summary sheet for update)  Subjective functional status/changes:   [] No changes reported  Pt stated that she had to take the tape off and has had increased pain since.  Had difficulty sleeping and has had increased muscle spasms in her back as well    OBJECTIVE    Modality rationale: decrease pain and increase tissue extensibility to improve the patients ability to increase ease with ADLs   Min Type Additional Details    [] Estim:  []Unatt       []IFC  []Premod                        []Other:  []w/ice   []w/heat  Position:  Location:    [] Estim: []Att    []TENS instruct  []NMES                    []Other:  []w/US   []w/ice   []w/heat  Position:  Location:    []  Traction: [] Cervical       []Lumbar                       [] Prone          []Supine                       []Intermittent   []Continuous Lbs:  [] before manual  [] after manual    []  Ultrasound: []Continuous   [] Pulsed                           []1MHz   []3MHz W/cm2:  Location:    []  Iontophoresis with dexamethasone         Location: [] Take home patch   [] In clinic   10 []  Ice     [x]  heat  []  Ice massage  []  Laser   []  Anodyne Position: seated  Location:neck    []  Laser with stim  []  Other:  Position:  Location:    []  Vasopneumatic Device Pressure:       [] lo [] med [] hi   Temperature: [] lo [] med [] hi   [x] Skin assessment post-treatment: [x]intact []redness- no adverse reaction    []redness - adverse reaction:     23 min Therapeutic Exercise:  [x] See flow sheet :   Rationale: increase ROM and increase strength to improve the patients ability to increase ease with ADLs    10 min Manual Therapy:  DTM to left UT and Malia Sauce, KT to inhibit left UT   Rationale: decrease pain, increase ROM and increase tissue extensibility to increase ease with ADLs    With   [x] TE   [] TA   [] neuro   [] other: Patient Education: [x] Review HEP    [] Progressed/Changed HEP based on:   [] positioning   [] body mechanics   [] transfers   [] heat/ice application    [] other:      Other Objective/Functional Measures:   Had no difficulty with exercises  Had significant tightness in left UT and Lev, but no discernible trigger points  Had no complaint of increased pain during session     Pain Level (0-10 scale) post treatment: 2/10    ASSESSMENT/Changes in Function:   Pt is making slow progress toward goals. Pt cont with inconsistent reported pain levels. Cont to complain of difficulty with sleeping. Had difficulty with performing some of her ADLs. Cont with decreased strength in B sh    Patient will continue to benefit from skilled PT services to modify and progress therapeutic interventions, address functional mobility deficits, address ROM deficits, address strength deficits, analyze and address soft tissue restrictions, analyze and cue movement patterns, analyze and modify body mechanics/ergonomics, assess and modify postural abnormalities and instruct in home and community integration to attain remaining goals. [x]  See Plan of Care  []  See progress note/recertification  []  See Discharge Summary         Progress towards goals / Updated goals:  Short Term Goals: To be accomplished in 1 weeks:  1. Patient will be compliant with HEP to improve neck ROM and reduce stiffness in order to increase ease of ADL's.    goal met.  10/23/19     Long Term Goals: To be accomplished in 8 weeks:  1. Patient will improve increase gross c/s AROM by 10 degrees in order to improve c/s mobility to complete daily activities with ease.   2. Patient will increase FOTO score by 7 pts indicating improvement in function and QOL.    3. Patient will report 50% improvement in overall symptoms to increase ability to sleep and read comfortably. 4. Patient will increase B shoulder flexion/abduction strength to a 4/5 for improved scapulohumeral rhythm and reduced load on c/s when performing household activities.     PLAN  []  Upgrade activities as tolerated     [x]  Continue plan of care  []  Update interventions per flow sheet       []  Discharge due to:_  []  Other:_      Boni Cha, SEAN 10/29/2019  3:07 PM    Future Appointments   Date Time Provider Pricila Manuel   10/31/2019 11:00 AM Dyea Mccloud, PT [id number] [facility]   11/4/2019  2:30 PM Sara Parra, PTA [id number] [facility]   11/7/2019  2:30 PM Deya Mccloud, PT [email or web address] [facility]   11/8/2019  1:30 PM Sara Parra, PTA [id number] [facility]   11/11/2019  2:00 PM Sara Parra, PTA [id number] [facility]   11/13/2019  3:00 PM Deya Mccloud, PT [id number] [facility]   11/15/2019 11:00 AM Deya Mccloud, PT [email or web address] [facility]   11/18/2019  2:00 PM Deya Mccloud, PT [email or web address] [facility]   11/22/2019  2:00 PM Sara Parra, PTA [id number] [facility]   11/25/2019  3:30 PM Prashanth Schmidt PT [id number] [facility]   11/27/2019  2:30 PM Sara Parra, PTA [email or web address] [facility]

## 2019-10-31 ENCOUNTER — HOSPITAL ENCOUNTER (OUTPATIENT)
Dept: PHYSICAL THERAPY | Age: 69
Discharge: HOME | End: 2019-10-31
Payer: MEDICARE

## 2019-10-31 PROCEDURE — 97140 MANUAL THERAPY 1/> REGIONS: CPT

## 2019-10-31 PROCEDURE — 97110 THERAPEUTIC EXERCISES: CPT

## 2019-10-31 NOTE — PROGRESS NOTES
PT DAILY TREATMENT NOTE 10-18    Patient Name: Lula Lechuga  Date:10/31/2019  : 1950  [x]  Patient  Verified  Payor: [place] MEDICARE / Plan: [place] MEDICARE PART A & B / Product Type: Medicare /    In time:1100  Out time:1155  Total Treatment Time (min): 55  Visit #: 5 of 24    Medicare/BCBS Only   Total Timed Codes (min):  40 1:1 Treatment Time:  40       Treatment Area: Cervical spinal stenosis [M48.02]    SUBJECTIVE  Pain Level (0-10 scale): 2/10  Any medication changes, allergies to medications, adverse drug reactions, diagnosis change, or new procedure performed?: [x] No    [] Yes (see summary sheet for update)  Subjective functional status/changes:   [] No changes reported  Patient states she has noticed less crepitus in neck since starting therapy. The KT really helps reduce pain, but not as well as the first time. It felt better when the side of her neck was also tapped with UT. Pain is mostly along left UT. OBJECTIVE    Modality rationale: decrease pain and increase tissue extensibility to improve the patients ability to increase ease of c/s mobility.     Min Type Additional Details    [] Estim:  []Unatt       []IFC  []Premod                        []Other:  []w/ice   []w/heat  Position:  Location:    [] Estim: []Att    []TENS instruct  []NMES                    []Other:  []w/US   []w/ice   []w/heat  Position:  Location:    []  Traction: [] Cervical       []Lumbar                       [] Prone          []Supine                       []Intermittent   []Continuous Lbs:  [] before manual  [] after manual    []  Ultrasound: []Continuous   [] Pulsed                           []1MHz   []3MHz W/cm2:  Location:    []  Iontophoresis with dexamethasone         Location: [] Take home patch   [] In clinic   15 []  Ice     [x]  heat  []  Ice massage  []  Laser   []  Anodyne Position: sitting  Location: B UT    []  Laser with stim  []  Other:  Position:  Location:    []  Vasopneumatic Device Pressure: [] lo [] med [] hi   Temperature: [] lo [] med [] hi   [] Skin assessment post-treatment:  [x]intact []redness- no adverse reaction    []redness - adverse reaction:         32 min Therapeutic Exercise:  [x] See flow sheet :   Rationale: increase ROM and increase strength to improve the patients ability to increase ease of ADL\"s. 8 min Manual Therapy:  TPR/ DTM left UT    Rationale: decrease pain, increase ROM, increase tissue extensibility and decrease trigger points to increase ease/tolerance to c/s mobility. With   [] TE   [] TA   [] neuro   [] other: Patient Education: [x] Review HEP    [] Progressed/Changed HEP based on:   [] positioning   [] body mechanics   [] transfers   [] heat/ice application    [] other:      Other Objective/Functional Measures:   Left UT tightness, Added left UT stretch   Educated pt on how use blue theraband at home for rows/ext  Increased upper back pain with 2# full can, able to perform without weights  Cues for chin tuck during there-ex    Pain Level (0-10 scale) post treatment: 0/10    ASSESSMENT/Changes in Function: Patient is slowly progressing towards goals. Her c/s ROM is improving. She has left> right UT tightness; educated on stretching for HEP. Pt needs cues for chin tuck during there-ex to maintain more neutral c/s. Will continue to address ROM, flexibility, and posture in order to reduce pain with daily activities. Patient will continue to benefit from skilled PT services to modify and progress therapeutic interventions, address ROM deficits, address strength deficits, analyze and address soft tissue restrictions and assess and modify postural abnormalities to attain remaining goals. [x]  See Plan of Care  []  See progress note/recertification  []  See Discharge Summary         Progress towards goals / Updated goals:  Short Term Goals: To be accomplished in 1 weeks:  1.  Patient will be compliant with HEP to improve neck ROM and reduce stiffness in order to increase ease of ADL's.    goal met. 10/23/19     Long Term Goals: To be accomplished in 8 weeks:  1. Patient will improve increase gross c/s AROM by 10 degrees in order to improve c/s mobility to complete daily activities with ease.   2. Patient will increase FOTO score by 7 pts indicating improvement in function and QOL.    3. Patient will report 50% improvement in overall symptoms to increase ability to sleep and read comfortably.   4. Patient will increase B shoulder flexion/abduction strength to a 4/5 for improved scapulohumeral rhythm and reduced load on c/s when performing household activities.       PLAN  []  Upgrade activities as tolerated     []  Continue plan of care  []  Update interventions per flow sheet       []  Discharge due to:_  []  Other:_      Parvin Pineda, BONNIE 10/31/2019  11:05 AM    Future Appointments   Date Time Provider Pricila Manuel   11/4/2019  2:30 PM Leah Salcedo PTA [id number] [facility]   11/7/2019  2:30 PM Rut Townsend, PT [id number] [facility]   11/8/2019  1:30 PM Leah Salcedo PTA [id number] [facility]   11/11/2019  2:00 PM Leah Salcedo PTA [id number] [facility]   11/13/2019  3:00 PM Rut Townsend, PT [email or web address] [facility]   11/15/2019 11:00 AM Rut Townsend, PT [email or web address] [facility]   11/18/2019  2:00 PM Rut Townsend, PT [id number] [facility]   11/22/2019  2:00 PM Leah Salcedo PTA [email or web address] [facility]   11/25/2019  3:30 PM Alex Russell, BONNIE [email or web address] [facility]   11/27/2019  2:30 PM Leah Salcedo PTA [id number] [facility]

## 2019-11-04 ENCOUNTER — HOSPITAL ENCOUNTER (OUTPATIENT)
Dept: PHYSICAL THERAPY | Age: 69
Discharge: HOME | End: 2019-11-04
Payer: MEDICARE

## 2019-11-04 PROCEDURE — 97110 THERAPEUTIC EXERCISES: CPT

## 2019-11-04 PROCEDURE — 97140 MANUAL THERAPY 1/> REGIONS: CPT

## 2019-11-04 NOTE — PROGRESS NOTES
PT DAILY TREATMENT NOTE 10-18    Patient Name: Christian Marie  Date:2019  : 1950  [x]  Patient  Verified  Payor: [place] MEDICARE / Plan: [place] MEDICARE PART A & B / Product Type: Medicare /    In time:230  Out time:314  Total Treatment Time (min): 44  Visit #: 6 of 24    Medicare/BCBS Only   Total Timed Codes (min):  34 1:1 Treatment Time:  34       Treatment Area: Cervical spinal stenosis [M48.02]    SUBJECTIVE  Pain Level (0-10 scale): 6/10  Any medication changes, allergies to medications, adverse drug reactions, diagnosis change, or new procedure performed?: [x] No    [] Yes (see summary sheet for update)  Subjective functional status/changes:   [] No changes reported  Pt stated that she cont to have spasms in the left side of her neck    OBJECTIVE    Modality rationale: decrease pain and increase tissue extensibility to improve the patients ability to increase ease with ADls   Min Type Additional Details    [] Estim:  []Unatt       []IFC  []Premod                        []Other:  []w/ice   []w/heat  Position:  Location:    [] Estim: []Att    []TENS instruct  []NMES                    []Other:  []w/US   []w/ice   []w/heat  Position:  Location:    []  Traction: [] Cervical       []Lumbar                       [] Prone          []Supine                       []Intermittent   []Continuous Lbs:  [] before manual  [] after manual    []  Ultrasound: []Continuous   [] Pulsed                           []1MHz   []3MHz W/cm2:  Location:    []  Iontophoresis with dexamethasone         Location: [] Take home patch   [] In clinic   10 []  Ice     [x]  heat  []  Ice massage  []  Laser   []  Anodyne Position:seated  Location:neck    []  Laser with stim  []  Other:  Position:  Location:    []  Vasopneumatic Device Pressure:       [] lo [] med [] hi   Temperature: [] lo [] med [] hi   [x] Skin assessment post-treatment:  [x]intact []redness- no adverse reaction    []redness - adverse reaction:     24 min Therapeutic Exercise:  [x] See flow sheet :   Rationale: increase ROM and increase strength to improve the patients ability to increase ease with ADLs    10 min Manual Therapy:  DTM to left UT and cervical paraspinals, KT to inhibit left UT and paraspinals   Rationale: decrease pain, increase ROM and increase tissue extensibility to increase ease with ADLs    With   [x] TE   [] TA   [] neuro   [] other: Patient Education: [x] Review HEP    [] Progressed/Changed HEP based on:   [] positioning   [] body mechanics   [] transfers   [] heat/ice application    [] other:      Other Objective/Functional Measures:   Had increased pain on the left side of her neck with most exercises  Cont to report decreased discomfort with KT tape  Had minimal tightness in left UT and slight tightness with one trigger point in left cervical paraspinals which resolved with manual     Pain Level (0-10 scale) post treatment: 5/10    ASSESSMENT/Changes in Function:   Pt is making slow progress toward goals. Pt cont to report having spasms on the left side of her neck when the KT tape comes off. Cont to report difficulty with sleeping. Pt cont with decreased cervical range of motion. Patient will continue to benefit from skilled PT services to modify and progress therapeutic interventions, address functional mobility deficits, address ROM deficits, address strength deficits, analyze and address soft tissue restrictions, analyze and cue movement patterns, analyze and modify body mechanics/ergonomics, assess and modify postural abnormalities and instruct in home and community integration to attain remaining goals. [x]  See Plan of Care  []  See progress note/recertification  []  See Discharge Summary         Progress towards goals / Updated goals:  Short Term Goals: To be accomplished in 1 weeks:  1. Patient will be compliant with HEP to improve neck ROM and reduce stiffness in order to increase ease of ADL's.    goal met. 10/23/19     Long Term Goals: To be accomplished in 8 weeks:  1. Patient will improve increase gross c/s AROM by 10 degrees in order to improve c/s mobility to complete daily activities with ease.   2. Patient will increase FOTO score by 7 pts indicating improvement in function and QOL.    3. Patient will report 50% improvement in overall symptoms to increase ability to sleep and read comfortably. 4. Patient will increase B shoulder flexion/abduction strength to a 4/5 for improved scapulohumeral rhythm and reduced load on c/s when performing household activities.     PLAN  []  Upgrade activities as tolerated     [x]  Continue plan of care  []  Update interventions per flow sheet       []  Discharge due to:_  []  Other:_      Flor Robin PTA 11/4/2019  2:30 PM    Future Appointments   Date Time Provider Pricila Manuel   11/7/2019  2:30 PM Denis Osborne, PT [id number] [facility]   11/8/2019  1:30 PM Pearl Rogers PTA [id number] [facility]   11/11/2019  2:00 PM Pearl Rogers PTA [email or web address] [facility]   11/13/2019  3:00 PM BONNIE BrittonRSWO [facility]   11/15/2019 11:00 AM Yessenia Benjamin, PT [email or web address] [facility]   11/18/2019  2:00 PM Denis Osborne PT [id number] [facility]   11/22/2019  2:00 PM Akhil Santana [id number] [facility]   11/25/2019  3:30 PM Ysesenia Benjamin, PT [id number] [facility]   11/27/2019  2:30 PM Pearl Rogers PTA [id number] [facility]   12/16/2019  1:45 PM Marcela Aleman  [address]

## 2019-11-07 ENCOUNTER — APPOINTMENT (OUTPATIENT)
Dept: PHYSICAL THERAPY | Age: 69
End: 2019-11-07
Payer: MEDICARE

## 2019-11-07 ENCOUNTER — TELEPHONE (OUTPATIENT)
Dept: PHYSICAL THERAPY | Age: 69
End: 2019-11-07

## 2019-11-08 ENCOUNTER — HOSPITAL ENCOUNTER (OUTPATIENT)
Dept: PHYSICAL THERAPY | Age: 69
Discharge: HOME | End: 2019-11-08
Payer: MEDICARE

## 2019-11-08 PROCEDURE — 97110 THERAPEUTIC EXERCISES: CPT

## 2019-11-08 NOTE — PROGRESS NOTES
PT DAILY TREATMENT NOTE 10-18    Patient Name: Lula Lechuga  Date:2019  : 1950  [x]  Patient  Verified  Payor: [place] MEDICARE / Plan: [place] MEDICARE PART A & B / Product Type: Medicare /    In time:130  Out time:205  Total Treatment Time (min): 35  Visit #: 7 of 24    Medicare/BCBS Only   Total Timed Codes (min):  25 1:1 Treatment Time:  25       Treatment Area: Cervical spinal stenosis [M48.02]    SUBJECTIVE  Pain Level (0-10 scale): 3/10  Any medication changes, allergies to medications, adverse drug reactions, diagnosis change, or new procedure performed?: [x] No    [] Yes (see summary sheet for update)  Subjective functional status/changes:   [] No changes reported  Pt stated that she is having less pain today    OBJECTIVE    Modality rationale: decrease pain and increase tissue extensibility to improve the patients ability to increase ease with ADLs   Min Type Additional Details    [] Estim:  []Unatt       []IFC  []Premod                        []Other:  []w/ice   []w/heat  Position:  Location:    [] Estim: []Att    []TENS instruct  []NMES                    []Other:  []w/US   []w/ice   []w/heat  Position:  Location:    []  Traction: [] Cervical       []Lumbar                       [] Prone          []Supine                       []Intermittent   []Continuous Lbs:  [] before manual  [] after manual    []  Ultrasound: []Continuous   [] Pulsed                           []1MHz   []3MHz W/cm2:  Location:    []  Iontophoresis with dexamethasone         Location: [] Take home patch   [] In clinic   10 []  Ice     [x]  heat  []  Ice massage  []  Laser   []  Anodyne Position:seated  Location:left neck    []  Laser with stim  []  Other:  Position:  Location:    []  Vasopneumatic Device Pressure:       [] lo [] med [] hi   Temperature: [] lo [] med [] hi   [x] Skin assessment post-treatment:  [x]intact []redness- no adverse reaction    []redness - adverse reaction:     25 min Therapeutic Exercise:  [x] See flow sheet :   Rationale: increase ROM and increase strength to improve the patients ability to increase ease with ADLs    With   [x] TE   [] TA   [] neuro   [] other: Patient Education: [x] Review HEP    [] Progressed/Changed HEP based on:   [] positioning   [] body mechanics   [] transfers   [] heat/ice application    [] other:      Other Objective/Functional Measures:   KT was intact from last visit  Had increased tightness in the left neck with scap squeezes  No other difficulty with exercises     Pain Level (0-10 scale) post treatment: 0/10    ASSESSMENT/Changes in Function:   Pt is slowly progressing toward goals pain level cont to fluctuate in left neck. KT is improving pain levels and decreasing tightness in left neck. B sh strength is slowly improving. Patient will continue to benefit from skilled PT services to modify and progress therapeutic interventions, address functional mobility deficits, address ROM deficits, address strength deficits, analyze and address soft tissue restrictions, analyze and cue movement patterns, analyze and modify body mechanics/ergonomics, assess and modify postural abnormalities and instruct in home and community integration to attain remaining goals. [x]  See Plan of Care  []  See progress note/recertification  []  See Discharge Summary         Progress towards goals / Updated goals:  Short Term Goals: To be accomplished in 1 weeks:  1. Patient will be compliant with HEP to improve neck ROM and reduce stiffness in order to increase ease of ADL's.    goal met. 10/23/19     Long Term Goals: To be accomplished in 8 weeks:  1. Patient will improve increase gross c/s AROM by 10 degrees in order to improve c/s mobility to complete daily activities with ease.   2. Patient will increase FOTO score by 7 pts indicating improvement in function and QOL.    3. Patient will report 50% improvement in overall symptoms to increase ability to sleep and read comfortably. Progressing. 11/8/19  4. Patient will increase B shoulder flexion/abduction strength to a 4/5 for improved scapulohumeral rhythm and reduced load on c/s when performing household activities.     PLAN  []  Upgrade activities as tolerated     [x]  Continue plan of care  []  Update interventions per flow sheet       []  Discharge due to:_  []  Other:_      Jennifer Hannah, SEAN 11/8/2019  1:29 PM    Future Appointments   Date Time Provider Pricila Manuel   11/8/2019  1:30 PM Raji Shantelto, PTA [id number] [facility]   11/11/2019  2:00 PM Raji Danelle, PTA [id number] [facility]   11/13/2019  3:00 PM Kulwinder Kirk, PT [id number] [facility]   11/15/2019 11:00 AM Lauryn Perry, PT [id number] [facility]   11/18/2019  2:00 PM Kulwinder Kirk, PT [id number] [facility]   11/22/2019  2:00 PM Heavenly Knapp [id number] [facility]   11/25/2019  3:30 PM Lauryn Perry, PT [email or web address] [facility]   11/27/2019  2:30 PM Raji Mcclendon, PTA [email or web address] [facility]   12/16/2019  1:45 PM Nilesh Cheney  [address]

## 2019-11-11 ENCOUNTER — HOSPITAL ENCOUNTER (OUTPATIENT)
Dept: PHYSICAL THERAPY | Age: 69
Discharge: HOME | End: 2019-11-11
Payer: MEDICARE

## 2019-11-11 PROCEDURE — 97110 THERAPEUTIC EXERCISES: CPT

## 2019-11-11 NOTE — PROGRESS NOTES
PT DAILY TREATMENT NOTE 10-18    Patient Name: Jeff Tobin  Date:2019  : 1950  [x]  Patient  Verified  Payor: [place] MEDICARE / Plan: [place] MEDICARE PART A & B / Product Type: Medicare /    In time:220  Out time:250  Total Treatment Time (min): 30  Visit #: 8 of 24    Medicare/BCBS Only   Total Timed Codes (min):  20 1:1 Treatment Time:  10       Treatment Area: Cervical spinal stenosis [M48.02]    SUBJECTIVE  Pain Level (0-10 scale): 210  Any medication changes, allergies to medications, adverse drug reactions, diagnosis change, or new procedure performed?: [x] No    [] Yes (see summary sheet for update)  Subjective functional status/changes:   [] No changes reported  Pt stated that she thought her appointment was at 0    OBJECTIVE    Modality rationale: decrease pain and increase tissue extensibility to improve the patients ability to increase ease with ADLs   Min Type Additional Details    [] Estim:  []Unatt       []IFC  []Premod                        []Other:  []w/ice   []w/heat  Position:  Location:    [] Estim: []Att    []TENS instruct  []NMES                    []Other:  []w/US   []w/ice   []w/heat  Position:  Location:    []  Traction: [] Cervical       []Lumbar                       [] Prone          []Supine                       []Intermittent   []Continuous Lbs:  [] before manual  [] after manual    []  Ultrasound: []Continuous   [] Pulsed                           []1MHz   []3MHz W/cm2:  Location:    []  Iontophoresis with dexamethasone         Location: [] Take home patch   [] In clinic   10 []  Ice     [x]  heat  []  Ice massage  []  Laser   []  Anodyne Position:seated  Location:neck    []  Laser with stim  []  Other:  Position:  Location:    []  Vasopneumatic Device Pressure:       [] lo [] med [] hi   Temperature: [] lo [] med [] hi   [x] Skin assessment post-treatment:  [x]intact []redness- no adverse reaction    []redness - adverse reaction:     10 min Therapeutic Exercise: [x] See flow sheet :   Rationale: increase ROM and increase strength to improve the patients ability to increase ease with ADLs    10 min Manual Therapy:  KT to inhibit left UT and Lev by ATC NO CHARGE   Rationale: decrease pain, increase ROM and increase tissue extensibility to increase ease with ADLs    With   [x] TE   [] TA   [] neuro   [] other: Patient Education: [x] Review HEP    [] Progressed/Changed HEP based on:   [] positioning   [] body mechanics   [] transfers   [] heat/ice application    [] other:      Other Objective/Functional Measures:   Pt was 20 minutes late for session  No complaint of increased pain during session   KT applied by ATC    Pain Level (0-10 scale) post treatment: 2-3/10    ASSESSMENT/Changes in Function:   Pt is making slow progress toward goals. Pt cont to complain of increased pain at night and having disturbed sleep. Cervical range of motion is improving. B shoulder strength is increasing    Patient will continue to benefit from skilled PT services to modify and progress therapeutic interventions, address functional mobility deficits, address ROM deficits, address strength deficits, analyze and address soft tissue restrictions, analyze and cue movement patterns, analyze and modify body mechanics/ergonomics, assess and modify postural abnormalities and instruct in home and community integration to attain remaining goals. [x]  See Plan of Care  []  See progress note/recertification  []  See Discharge Summary         Progress towards goals / Updated goals:  Short Term Goals: To be accomplished in 1 weeks:  1. Patient will be compliant with HEP to improve neck ROM and reduce stiffness in order to increase ease of ADL's.    goal met. 10/23/19     Long Term Goals: To be accomplished in 8 weeks:  1.  Patient will improve increase gross c/s AROM by 10 degrees in order to improve c/s mobility to complete daily activities with ease.   2. Patient will increase FOTO score by 7 pts indicating improvement in function and QOL.    3. Patient will report 50% improvement in overall symptoms to increase ability to sleep and read comfortably. Progressing. 11/8/19  4. Patient will increase B shoulder flexion/abduction strength to a 4/5 for improved scapulohumeral rhythm and reduced load on c/s when performing household activities.     PLAN  []  Upgrade activities as tolerated     [x]  Continue plan of care  []  Update interventions per flow sheet       []  Discharge due to:_  []  Other:_      Latasha Saleh PTA 11/11/2019  3:46 PM    Future Appointments   Date Time Provider Pricila Manuel   11/13/2019  3:00 PM Beny Bourgeois PT [id number] [facility]   11/15/2019 11:00 AM Ritesh Lee PT [id number] [facility]   11/18/2019  2:00 PM Beny Bourgeois PT [id number] [facility]   11/22/2019  2:00 PM Hersey Hodgkin [id number] [facility]   11/25/2019  3:30 PM Ritesh Lee, PT [email or web address] [facility]   11/27/2019  2:30 PM Ashly Mccall PTA [id number] [facility]   12/16/2019  1:45 PM Keegan Harrell  [address]

## 2019-11-13 ENCOUNTER — HOSPITAL ENCOUNTER (OUTPATIENT)
Dept: PHYSICAL THERAPY | Age: 69
Discharge: HOME | End: 2019-11-13
Payer: MEDICARE

## 2019-11-13 PROCEDURE — 97110 THERAPEUTIC EXERCISES: CPT

## 2019-11-13 PROCEDURE — 97164 PT RE-EVAL EST PLAN CARE: CPT

## 2019-11-13 NOTE — PROGRESS NOTES
PT DAILY TREATMENT NOTE 10-18    Patient Name: Mildred Moyer  Date:2019  : 1950  [x]  Patient  Verified  Payor: Sherren [place] / Plan: [place] MEDICARE PART A & B / Product Type: Medicare /    In time:300  Out time:358  Total Treatment Time (min): 62  Visit #: 9 of 24    Medicare/BCBS Only   Total Timed Codes (min):  28 1:1 Treatment Time:  30       Treatment Area: Cervical spinal stenosis [M48.02]    SUBJECTIVE  Pain Level (0-10 scale): 3/10  Any medication changes, allergies to medications, adverse drug reactions, diagnosis change, or new procedure performed?: [x] No    [] Yes (see summary sheet for update)  Subjective functional status/changes:   [] No changes reported  \"It worked better than I thought it was going to, less crepitus. Worst when sleeping. \"    OBJECTIVE    Modality rationale: decrease pain and increase tissue extensibility to improve the patients ability to increase tolerance to c/ls mobility.     Min Type Additional Details    [] Estim:  []Unatt       []IFC  []Premod                        []Other:  []w/ice   []w/heat  Position:  Location:    [] Estim: []Att    []TENS instruct  []NMES                    []Other:  []w/US   []w/ice   []w/heat  Position:  Location:    []  Traction: [] Cervical       []Lumbar                       [] Prone          []Supine                       []Intermittent   []Continuous Lbs:  [] before manual  [] after manual    []  Ultrasound: []Continuous   [] Pulsed                           []1MHz   []3MHz W/cm2:  Location:    []  Iontophoresis with dexamethasone         Location: [] Take home patch   [] In clinic   15 []  Ice     [x]  heat  []  Ice massage  []  Laser   []  Anodyne Position: sitting  Location:shoulders    []  Laser with stim  []  Other:  Position:  Location:    []  Vasopneumatic Device Pressure:       [] lo [] med [] hi   Temperature: [] lo [] med [] hi   [] Skin assessment post-treatment:  []intact []redness- no adverse reaction    []redness - adverse reaction:     15 min []Eval                  [x]Re-Eval       28 min Therapeutic Exercise:  [x] See flow sheet :   Rationale: increase ROM and increase strength to improve the patients ability to increase ease of ADL\"s. With   [] TE   [] TA   [] neuro   [] other: Patient Education: [x] Review HEP    [] Progressed/Changed HEP based on:   [] positioning   [] body mechanics   [] transfers   [] heat/ice application    [] other:      Other Objective/Functional Measures: refer to goals       Pain Level (0-10 scale) post treatment: 0/10    ASSESSMENT/Changes in Function:  See Re-certification. Patient will continue to benefit from skilled PT services to modify and progress therapeutic interventions, address ROM deficits, address strength deficits and analyze and address soft tissue restrictions to attain remaining goals. []  See Plan of Care  [x]  See progress note/recertification  []  See Discharge Summary         Progress towards goals / Updated goals:  Short Term Goals: To be accomplished in 1 weeks:  1. Patient will be compliant with HEP to improve neck ROM and reduce stiffness in order to increase ease of ADL's.    goal met. 10/23/19     Long Term Goals: To be accomplished in 8 weeks:  1. Patient will improve increase gross c/s AROM by 10 degrees in order to improve c/s mobility to complete daily activities with ease.   55 deg extension   35 degrees flexion   60 degrees left rotation  62 degrees right rotation   2. Patient will increase FOTO score by 7 pts indicating improvement in function and QOL.    51//100 initial score  3. Patient will report 50% improvement in overall symptoms to increase ability to sleep and read comfortably.   75% improvement  4. Patient will increase B shoulder flexion/abduction strength to a 4/5 for improved scapulohumeral rhythm and reduced load on c/s when performing household activities.   B shoulder flexion 4/5    PLAN  []  Upgrade activities as tolerated     [x] Continue plan of care  []  Update interventions per flow sheet       []  Discharge due to:_  []  Other:_      Wesly Shoemaker, PT 11/13/2019  2:58 PM    Future Appointments   Date Time Provider Pricila Manuel   11/13/2019  3:00 PM Keerthi Ramirez, PT CPRAHEW [facility]   11/15/2019 11:00 AM Jessica Marroquin, PT FCLJAII [facility]   11/18/2019  2:00 PM Onita Null, PTA THHFFYP [facility]   11/22/2019  2:00 PM Tyrone Bright UGHOWFI [facility]   11/25/2019  3:30 PM Jessica Marroquin, PT OVEIYEV [facility]   11/27/2019  2:30 PM Onita Null, PTA MMCPTPB [facility]   12/16/2019  1:45 PM Iram Talavera  [address]

## 2019-11-13 NOTE — PROGRESS NOTES
[facility] - [facility] [employer]  [address] [facility]  [phone] fax    Continued Plan of Care/ Re-certification for Physical Therapy Services    Patient name: Anne Marie Carpenter Start of Care: 10/16/2019   Referral source: Ashvin Mathew MD : 1950               Medical Diagnosis: Cervical spinal stenosis [M48.02]  Payor: [place] MEDICARE / Plan: VA MEDICARE PART A & B / Product Type: Medicare /  Onset Date:chronic, worsening               Treatment Diagnosis: neck pain   Prior Hospitalization: see medical history Provider#: [id number]   Medications: Verified on Patient summary List    Comorbidities: arthritis, HTN   Prior Level of Function: functionally independent                            Visits from Start of Care: 9    Missed Visits: 0    The Plan of Care and following information is based on the patient's current status:  Goal:Patient will improve increase gross c/s AROM by 10 degrees in order to improve c/s mobility to complete daily activities with ease.   Status at last note/certification: flexion 35 deg, ext 36 degrees, right rotation 40 deg and left rotation 37 deg  Current Status: not met, 55 deg extension   35 degrees flexion   60 degrees left rotation  62 degrees right rotation     Goal:Patient will increase FOTO score by 7 pts indicating improvement in function and QOL. Status at last note/certification: not initially assessed   Current Status: not met, 51 pts initial score    Goal:Patient will report 50% improvement in overall symptoms to increase ability to sleep and read comfortably. Status at last note/certification:n/a  Current Status: met, 75% improvement    Goal:Patient will increase B shoulder flexion/abduction strength to a 4/5 for improved scapulohumeral rhythm and reduced load on c/s when performing household activities.   Status at last note/certification: left abduction 3/5 and right abduction 3+/5  Current Status: met, B shoulder flexion 4/5    Key functional changes: improved c/s ROM, improved B shoulder strength, decreased pain, improved tissue extensibility     Problems/ barriers to goal attainment:  none     Problem List: pain affecting function, decrease ROM, decrease strength and decrease flexibility/ joint mobility    Treatment Plan: Therapeutic exercise, Therapeutic activities, Neuromuscular re-education, Physical agent/modality, Manual therapy and Patient education     Patient Goal (s) has been updated and includes: \" no pain when lying down\"     Goals for this certification period to be accomplished in 4 weeks: 1. Patient will increase FOTO score by 710 pts indicating improvement in function and QOL. 2. Patient will report little difficulty turning to look behind to check blind spot when driving indicating improved ease of c/s rotation AROM. 3. Patient will be independent with HEP to maintain progress and manage symptoms at home. Frequency / Duration: Patient to be seen 2 times per week for 4 weeks:    Assessment / Recommendations:Patient is making steady progress with physical therapy. She reports 75% improvement in overall symptoms since start of care. Pt pleased with use of KT to inhibit left UT and would like to have her family member present during a session to teach on application method. Her c/o AROM has improved in all rages, except limited improvement in forward flexion. She continues to have increased pain at night and impaired posture. Pt will continue to benefit form skilled PT to address posture, ROM, and flexibility in order to increase ease of driving and household activities. Certification Period: 11/14/2019 to 12/13/2019    Brissa Richardson, PT 11/13/2019 3:02 PM    ________________________________________________________________________  I certify that the above Therapy Services are being furnished while the patient is under my care.  I agree with the treatment plan and certify that this therapy is necessary. [] I have read the above and request that my patient continue as recommended.   [] I have read the above report and request that my patient continue therapy with the following changes/special instructions: _______________________________________  [] I have read the above report and request that my patient be discharged from therapy    Physician's Signature:____________Date:_________TIME:________    ** Signature, Date and Time must be completed for valid certification **    Please sign and return to [facility] [employer]  [address] [place] [address]  [phone] fax

## 2019-11-15 ENCOUNTER — HOSPITAL ENCOUNTER (OUTPATIENT)
Dept: PHYSICAL THERAPY | Age: 69
Discharge: HOME | End: 2019-11-15
Payer: MEDICARE

## 2019-11-15 PROCEDURE — 97140 MANUAL THERAPY 1/> REGIONS: CPT

## 2019-11-15 PROCEDURE — 97110 THERAPEUTIC EXERCISES: CPT

## 2019-11-15 NOTE — PROGRESS NOTES
PT DAILY TREATMENT NOTE 10-18    Patient Name: Fernando Linda  Date:11/15/2019  : 1950  [x]  Patient  Verified  Payor: VA MEDICARE / Plan: VA MEDICARE PART A & B / Product Type: Medicare /    In time:1103  Out time:1146  Total Treatment Time (min): 43  Visit #: 1 of 8    Medicare/BCBS Only   Total Timed Codes (min):  33 1:1 Treatment Time:  33       Treatment Area: Cervical spinal stenosis [M48.02]    SUBJECTIVE  Pain Level (0-10 scale): 210  Any medication changes, allergies to medications, adverse drug reactions, diagnosis change, or new procedure performed?: [x] No    [] Yes (see summary sheet for update)  Subjective functional status/changes:   [] No changes reported  Pt stated that her pain is low today    OBJECTIVE    Modality rationale: decrease pain and increase tissue extensibility to improve the patients ability to increase ease with ADLs   Min Type Additional Details    [] Estim:  []Unatt       []IFC  []Premod                        []Other:  []w/ice   []w/heat  Position:  Location:    [] Estim: []Att    []TENS instruct  []NMES                    []Other:  []w/US   []w/ice   []w/heat  Position:  Location:    []  Traction: [] Cervical       []Lumbar                       [] Prone          []Supine                       []Intermittent   []Continuous Lbs:  [] before manual  [] after manual    []  Ultrasound: []Continuous   [] Pulsed                           []1MHz   []3MHz W/cm2:  Location:    []  Iontophoresis with dexamethasone         Location: [] Take home patch   [] In clinic   10 []  Ice     [x]  heat  []  Ice massage  []  Laser   []  Anodyne Position: seated  Location: left UT    []  Laser with stim  []  Other:  Position:  Location:    []  Vasopneumatic Device Pressure:       [] lo [] med [] hi   Temperature: [] lo [] med [] hi   [x] Skin assessment post-treatment:  [x]intact []redness- no adverse reaction    []redness - adverse reaction:     25 min Therapeutic Exercise:  [x] See flow sheet :   Rationale: increase ROM and increase strength to improve the patients ability to increase ease with ADLs    8 min Manual Therapy:  Removal and replacement of KT to inhibit left UT and Lev   Rationale: decrease pain, increase ROM and increase tissue extensibility to increase ease with ADLs    With   [x] TE   [] TA   [] neuro   [] other: Patient Education: [x] Review HEP    [] Progressed/Changed HEP based on:   [] positioning   [] body mechanics   [] transfers   [] heat/ice application    [] other:      Other Objective/Functional Measures:   Had slight increase in pain with UT stretch  Left hand cramped with UBE     Pain Level (0-10 scale) post treatment: 0/10    ASSESSMENT/Changes in Function:   Pt is making limited progress toward goals. Pt cont with mild pain in the left UT. Cont to report difficulty with driving and sleeping    Patient will continue to benefit from skilled PT services to modify and progress therapeutic interventions, address functional mobility deficits, address ROM deficits, address strength deficits, analyze and address soft tissue restrictions, analyze and cue movement patterns, assess and modify postural abnormalities and instruct in home and community integration to attain remaining goals. []  See Plan of Care  [x]  See progress note/recertification  []  See Discharge Summary         Progress towards goals / Updated goals: 1. Patient will increase FOTO score by 710 pts indicating improvement in function and QOL. 2. Patient will report little difficulty turning to look behind to check blind spot when driving indicating improved ease of c/s rotation AROM. 3. Patient will be independent with HEP to maintain progress and manage symptoms at home.     PLAN  []  Upgrade activities as tolerated     [x]  Continue plan of care  []  Update interventions per flow sheet       []  Discharge due to:_  []  Other:_      Woody Alfaro PTA 11/15/2019  11:06 AM    Future Appointments Date Time Provider Pricila Manuel   11/18/2019  2:00 PM Carole Fought MMCPTPB [facility]   11/22/2019  2:00 PM Carole Fought MMCPTPB [facility]   11/25/2019  3:30 PM Brenda Fitzgerald, PT [id number] [facility]   11/27/2019  2:30 PM Jyotsna Wade PTA MMCPTPB [facility]   12/16/2019  1:45 PM Gwendolyn Lott  [address]

## 2019-11-18 ENCOUNTER — APPOINTMENT (OUTPATIENT)
Dept: PHYSICAL THERAPY | Age: 69
End: 2019-11-18
Payer: MEDICARE

## 2019-11-20 ENCOUNTER — APPOINTMENT (OUTPATIENT)
Dept: PHYSICAL THERAPY | Age: 69
End: 2019-11-20
Payer: MEDICARE

## 2019-11-25 ENCOUNTER — APPOINTMENT (OUTPATIENT)
Dept: PHYSICAL THERAPY | Age: 69
End: 2019-11-25
Payer: MEDICARE

## 2019-11-27 ENCOUNTER — APPOINTMENT (OUTPATIENT)
Dept: PHYSICAL THERAPY | Age: 69
End: 2019-11-27
Payer: MEDICARE

## 2019-12-02 ENCOUNTER — HOSPITAL ENCOUNTER (OUTPATIENT)
Dept: PHYSICAL THERAPY | Age: 69
Discharge: HOME | End: 2019-12-02
Payer: MEDICARE

## 2019-12-02 PROCEDURE — 97110 THERAPEUTIC EXERCISES: CPT

## 2019-12-02 PROCEDURE — 97140 MANUAL THERAPY 1/> REGIONS: CPT

## 2019-12-02 NOTE — PROGRESS NOTES
PT DAILY TREATMENT NOTE 10-18    Patient Name: Lyndsey Marina  Date:2019  : 1950  [x]  Patient  Verified  Payor: VA MEDICARE / Plan: VA MEDICARE PART A & B / Product Type: Medicare /    In time:1100  Out time:1138  Total Treatment Time (min): 38  Visit #: 2 of 8    Medicare/BCBS Only   Total Timed Codes (min):  38 1:1 Treatment Time:  38       Treatment Area: Cervical spinal stenosis [M48.02]    SUBJECTIVE  Pain Level (0-10 scale): 3-4/10  Any medication changes, allergies to medications, adverse drug reactions, diagnosis change, or new procedure performed?: [x] No    [] Yes (see summary sheet for update)  Subjective functional status/changes:   [] No changes reported  Pt stated that she is better than when she first started. Reported that she has been out of therapy for a respiratory infection    OBJECTIVE      28 min Therapeutic Exercise:  [x] See flow sheet :   Rationale: increase ROM and increase strength to improve the patients ability to increase ease with ADLs    10 min Manual Therapy:  DTM to left UT and Reji Jordan KT to inhibit left UT   Rationale: decrease pain, increase ROM and increase tissue extensibility to increase ease with ADLs    With   [x] TE   [] TA   [] neuro   [] other: Patient Education: [x] Review HEP    [] Progressed/Changed HEP based on:   [] positioning   [] body mechanics   [] transfers   [] heat/ice application    [] other:      Other Objective/Functional Measures:   Had no difficulty with exercises  Stated that she still feels tightness on left side of neck with UT stretch     Pain Level (0-10 scale) post treatment: 0/10    ASSESSMENT/Changes in Function:   Pt is making slow progress toward goals. Pt cont to have mild pain in the left neck. Cont with significant tightness in the left UT and Lev. Pt stated that she cont with pain when looking behind her, but it is improving.      Patient will continue to benefit from skilled PT services to modify and progress therapeutic interventions, address functional mobility deficits, address ROM deficits, address strength deficits, analyze and address soft tissue restrictions, analyze and cue movement patterns, analyze and modify body mechanics/ergonomics, assess and modify postural abnormalities and instruct in home and community integration to attain remaining goals. []  See Plan of Care  [x]  See progress note/recertification  []  See Discharge Summary         Progress towards goals / Updated goals: 1. Patient will increase FOTO score by 710 pts indicating improvement in function and QOL. 2. Patient will report little difficulty turning to look behind to check blind spot when driving indicating improved ease of c/s rotation AROM. Progressing. 12/2/19  3. Patient will be independent with HEP to maintain progress and manage symptoms at home.     PLAN  []  Upgrade activities as tolerated     [x]  Continue plan of care  []  Update interventions per flow sheet       []  Discharge due to:_  []  Other:_      Jez Linares PTA 12/2/2019  10:58 AM    Future Appointments   Date Time Provider Pricila Shethisti   12/2/2019 11:00 AM Nitesh Choi PTA MMCPTPB [facility]   12/4/2019 11:00 AM Jailene Chavez PTA MMCPTPB [facility]   12/6/2019  1:00 PM Danay Pantoja MMCPTPB [facility]   12/16/2019  1:45 PM Tomeka Wolfe  [address]

## 2019-12-04 ENCOUNTER — HOSPITAL ENCOUNTER (OUTPATIENT)
Dept: PHYSICAL THERAPY | Age: 69
Discharge: HOME | End: 2019-12-04
Payer: MEDICARE

## 2019-12-04 PROCEDURE — 97110 THERAPEUTIC EXERCISES: CPT

## 2019-12-04 NOTE — PROGRESS NOTES
PT DAILY TREATMENT NOTE 10-18    Patient Name: Dillon Baxter  Date:2019  : 1950  [x]  Patient  Verified  Payor: [place] MEDICARE / Plan: [place] MEDICARE PART A & B / Product Type: Medicare /    In time:1101  Out time:1137  Total Treatment Time (min): 36  Visit #: 3 of 8    Medicare/BCBS Only   Total Timed Codes (min):  26 1:1 Treatment Time:  26       Treatment Area: Cervical spinal stenosis [M48.02]    SUBJECTIVE  Pain Level (0-10 scale): 2-3/10  Any medication changes, allergies to medications, adverse drug reactions, diagnosis change, or new procedure performed?: [x] No    [] Yes (see summary sheet for update)  Subjective functional status/changes:   [] No changes reported  Pt stated that her pain is less today    OBJECTIVE    Modality rationale: decrease pain and increase tissue extensibility to improve the patients ability to increase ease with ADLs   Min Type Additional Details    [] Estim:  []Unatt       []IFC  []Premod                        []Other:  []w/ice   []w/heat  Position:  Location:    [] Estim: []Att    []TENS instruct  []NMES                    []Other:  []w/US   []w/ice   []w/heat  Position:  Location:    []  Traction: [] Cervical       []Lumbar                       [] Prone          []Supine                       []Intermittent   []Continuous Lbs:  [] before manual  [] after manual    []  Ultrasound: []Continuous   [] Pulsed                           []1MHz   []3MHz W/cm2:  Location:    []  Iontophoresis with dexamethasone         Location: [] Take home patch   [] In clinic   10 []  Ice     [x]  heat  []  Ice massage  []  Laser   []  Anodyne Position: seated  Location:neck    []  Laser with stim  []  Other:  Position:  Location:    []  Vasopneumatic Device Pressure:       [] lo [] med [] hi   Temperature: [] lo [] med [] hi   [x] Skin assessment post-treatment:  [x]intact []redness- no adverse reaction    []redness - adverse reaction:     26 min Therapeutic Exercise:  [x] See flow sheet :   Rationale: increase ROM and increase strength to improve the patients ability to increase ease with ADLs    With   [x] TE   [] TA   [] neuro   [] other: Patient Education: [x] Review HEP    [] Progressed/Changed HEP based on:   [] positioning   [] body mechanics   [] transfers   [] heat/ice application    [] other:      Other Objective/Functional Measures:   Pt had no difficulty with exercises  Cont with tightness in the left UT  KT was intact  Cervical ROM is improving     Pain Level (0-10 scale) post treatment: 2-3/10    ASSESSMENT/Changes in Function:   Pt is progressing slowly toward goals. Cervical range of motion is improving. Pt cont with increased pain when performing prolonged activity. Pt requested to be discharged in 2 visits due to the busy holiday season    Patient will continue to benefit from skilled PT services to modify and progress therapeutic interventions, address functional mobility deficits, address ROM deficits, address strength deficits, analyze and address soft tissue restrictions, analyze and cue movement patterns, assess and modify postural abnormalities and instruct in home and community integration to attain remaining goals. []  See Plan of Care  [x]  See progress note/recertification  []  See Discharge Summary         Progress towards goals / Updated goals: 1. Patient will increase FOTO score by 710 pts indicating improvement in function and QOL. 2. Patient will report little difficulty turning to look behind to check blind spot when driving indicating improved ease of c/s rotation AROM. Progressing. 12/2/19  3. Patient will be independent with HEP to maintain progress and manage symptoms at home.     PLAN  []  Upgrade activities as tolerated     [x]  Continue plan of care  []  Update interventions per flow sheet       []  Discharge due to:_  []  Other:_      Jez Linares PTA 12/4/2019  10:59 AM    Future Appointments   Date Time Provider Pricila Manuel 12/4/2019 11:00 AM Venancio Carterort, PTA [facility]   12/6/2019  1:00 PM Amy Raineye [facility]   12/16/2019  1:45 PM Nataliia Pelaez  [address]   1/9/2020  3:45 PM Sarah Duarte MD [facility] 69

## 2019-12-06 ENCOUNTER — HOSPITAL ENCOUNTER (OUTPATIENT)
Dept: PHYSICAL THERAPY | Age: 69
Discharge: HOME | End: 2019-12-06
Payer: MEDICARE

## 2019-12-06 PROCEDURE — 97110 THERAPEUTIC EXERCISES: CPT

## 2019-12-06 PROCEDURE — 97140 MANUAL THERAPY 1/> REGIONS: CPT

## 2019-12-06 NOTE — PROGRESS NOTES
PT DAILY TREATMENT NOTE 10-18    Patient Name: Tuan Lyn  Date:2019  : 1950  [x]  Patient  Verified  Payor: VA MEDICARE / Plan: VA MEDICARE PART A & B / Product Type: Medicare /    In time:831  Out time:915  Total Treatment Time (min): 44  Visit #: 4 of 8    Medicare/BCBS Only   Total Timed Codes (min):  34 1:1 Treatment Time:  34       Treatment Area: Cervical spinal stenosis [M48.02]    SUBJECTIVE  Pain Level (0-10 scale): 210  Any medication changes, allergies to medications, adverse drug reactions, diagnosis change, or new procedure performed?: [x] No    [] Yes (see summary sheet for update)  Subjective functional status/changes:   [] No changes reported  Pt stated that she is still having some neck pain, but it is getting better    OBJECTIVE    Modality rationale: decrease pain and increase tissue extensibility to improve the patients ability to increase ease with ADLs   Min Type Additional Details    [] Estim:  []Unatt       []IFC  []Premod                        []Other:  []w/ice   []w/heat  Position:  Location:    [] Estim: []Att    []TENS instruct  []NMES                    []Other:  []w/US   []w/ice   []w/heat  Position:  Location:    []  Traction: [] Cervical       []Lumbar                       [] Prone          []Supine                       []Intermittent   []Continuous Lbs:  [] before manual  [] after manual    []  Ultrasound: []Continuous   [] Pulsed                           []1MHz   []3MHz W/cm2:  Location:    []  Iontophoresis with dexamethasone         Location: [] Take home patch   [] In clinic   10 []  Ice     [x]  heat  []  Ice massage  []  Laser   []  Anodyne Position: seated  Location:left neck    []  Laser with stim  []  Other:  Position:  Location:    []  Vasopneumatic Device Pressure:       [] lo [] med [] hi   Temperature: [] lo [] med [] hi   [x] Skin assessment post-treatment:  [x]intact []redness- no adverse reaction    []redness - adverse reaction:     26 min Therapeutic Exercise:  [x] See flow sheet :   Rationale: increase ROM and increase strength to improve the patients ability to increase ease with ADLs    8 min Manual Therapy:  KT to inhibit left UT   Rationale: decrease pain, increase ROM and increase tissue extensibility to increase ease with ADLs    With   [x] TE   [] TA   [] neuro   [] other: Patient Education: [x] Review HEP    [] Progressed/Changed HEP based on:   [] positioning   [] body mechanics   [] transfers   [] heat/ice application    [] other:      Other Objective/Functional Measures:   Had no difficulty with exercises  Pt was issued final HEP and TB  Showed understanding of all exercises     Pain Level (0-10 scale) post treatment: 2/10    ASSESSMENT/Changes in Function:   Pt is progressing well and is to be discharged next visit    Patient will continue to benefit from skilled PT services to modify and progress therapeutic interventions, address functional mobility deficits, address ROM deficits, address strength deficits, analyze and address soft tissue restrictions, analyze and cue movement patterns, assess and modify postural abnormalities and instruct in home and community integration to attain remaining goals. []  See Plan of Care  [x]  See progress note/recertification  []  See Discharge Summary         Progress towards goals / Updated goals:  . Patient will increase FOTO score by 710 pts indicating improvement in function and QOL. 2. Patient will report little difficulty turning to look behind to check blind spot when driving indicating improved ease of c/s rotation AROM. Progressing. 12/2/19  3. Patient will be independent with HEP to maintain progress and manage symptoms at home.     PLAN  []  Upgrade activities as tolerated     [x]  Continue plan of care  []  Update interventions per flow sheet       [x]  Discharge next visit  []  Other:Debora Fraser PTA 12/6/2019  8:33 AM    Future Appointments   Date Time Provider Pricila Manuel   12/11/2019 10:00 AM Sea Smyth PTA [facility]   12/16/2019  1:45 PM Lul England  [address] [facility]   1/9/2020  3:45 PM Staci Ellsworth MD [place] [id number]

## 2019-12-11 ENCOUNTER — HOSPITAL ENCOUNTER (OUTPATIENT)
Dept: PHYSICAL THERAPY | Age: 69
Discharge: HOME | End: 2019-12-11
Payer: MEDICARE

## 2019-12-11 PROCEDURE — 97110 THERAPEUTIC EXERCISES: CPT

## 2019-12-11 NOTE — PROGRESS NOTES
PT DAILY TREATMENT NOTE 10-18    Patient Name: Jean Billing  Date:2019  : 1950  [x]  Patient  Verified  Payor: VA MEDICARE / Plan: VA MEDICARE PART A & B / Product Type: Medicare /    In time:1000  Out time:1045  Total Treatment Time (min): 45  Visit #: 5 of 8    Medicare/BCBS Only   Total Timed Codes (min):  35 1:1 Treatment Time:  30       Treatment Area: Cervical spinal stenosis [M48.02]    SUBJECTIVE  Pain Level (0-10 scale): 4/10  Any medication changes, allergies to medications, adverse drug reactions, diagnosis change, or new procedure performed?: [x] No    [] Yes (see summary sheet for update)  Subjective functional status/changes:   [] No changes reported  Pt reports more pain today which she attributes to sleeping wrong last night. She complains of increased tightness on left side of neck.      OBJECTIVE    Modality rationale: decrease pain and increase tissue extensibility to improve the patients ability to increase ease of ADLs   Min Type Additional Details    [] Estim:  []Unatt       []IFC  []Premod                        []Other:  []w/ice   []w/heat  Position:  Location:    [] Estim: []Att    []TENS instruct  []NMES                    []Other:  []w/US   []w/ice   []w/heat  Position:  Location:    []  Traction: [] Cervical       []Lumbar                       [] Prone          []Supine                       []Intermittent   []Continuous Lbs:  [] before manual  [] after manual    []  Ultrasound: []Continuous   [] Pulsed                           []1MHz   []3MHz W/cm2:  Location:    []  Iontophoresis with dexamethasone         Location: [] Take home patch   [] In clinic   10 min []  Ice     [x]  heat  []  Ice massage  []  Laser   []  Anodyne Position: seated  Location: B UT    []  Laser with stim  []  Other:  Position:  Location:    []  Vasopneumatic Device Pressure:       [] lo [] med [] hi   Temperature: [] lo [] med [] hi   [x] Skin assessment post-treatment:  [x]intact []redness- no adverse reaction    []redness - adverse reaction:     35 min Therapeutic Exercise:  [x] See flow sheet :   Rationale: increase ROM, increase strength and increase proprioception to improve the patients ability to increase ease of ADLs. With   [x] TE   [] TA   [] neuro   [] other: Patient Education: [x] Review Final HEP    [] Progressed/Changed HEP based on:   [x] positioning   [] body mechanics   [] transfers   [] heat/ice application    [x] other:      Other Objective/Functional Measures:   Education for neutral cervical spine when sleeping  Verbal/tactile cues to decrease UT compensation during rows/ext  Demos understanding of final HEP  Continues to be agreeable for DC today    Pain Level (0-10 scale) post treatment: 2/10    ASSESSMENT/Changes in Function:   Pt has made appreciable progress towards goals, despite exacerbation of pain symptoms this session. Will discharge from skilled therapy at this time as pt is able to independently manage symptoms at this time and demos understanding of final HEP for continued progress at home. Progress towards goals / Updated goals:  1. Patient will increase FOTO score by 7-10 pts indicating improvement in function and QOL. Unable to locate FOTO survey 12/11/19.   2. Patient will report little difficulty turning to look behind to check blind spot when driving indicating improved ease of c/s rotation AROM.    Progressing. 12/11/19, reports moderate difficulty turning to left and little difficulty with turning to right. 3. Patient will be independent with HEP to maintain progress and manage symptoms at home.  Progressing 12/11/19    PLAN  []  Upgrade activities as tolerated     []  Continue plan of care  []  Update interventions per flow sheet       [x]  Discharge due to ability to independently manage symptoms at home  []  Other:_      Danial Dee PTA 12/11/2019  10:11 AM    Future Appointments   Date Time Provider Pricila Manuel   12/16/2019 1:45 PM Gwendolyn Lott  [address]   1/9/2020  3:45 PM Gurpreet Schuler MD [facility] 69

## 2020-01-09 ENCOUNTER — OFFICE VISIT (OUTPATIENT)
Dept: ORTHOPEDIC SURGERY | Age: 70
End: 2020-01-09

## 2020-01-09 VITALS
TEMPERATURE: 97.8 F | DIASTOLIC BLOOD PRESSURE: 82 MMHG | RESPIRATION RATE: 15 BRPM | OXYGEN SATURATION: 97 % | SYSTOLIC BLOOD PRESSURE: 143 MMHG | BODY MASS INDEX: 35.8 KG/M2 | HEART RATE: 78 BPM | WEIGHT: 177.6 LBS | HEIGHT: 59 IN

## 2020-01-09 DIAGNOSIS — M17.0 PRIMARY OSTEOARTHRITIS OF BOTH KNEES: Primary | ICD-10-CM

## 2020-01-09 NOTE — PROGRESS NOTES
1. Have you been to the ER, urgent care clinic since your last visit? Hospitalized since your last visit? No    2. Have you seen or consulted any other health care providers outside of the [address] [place] [address] since your last visit? Include any pap smears or colon screening.  No

## 2020-01-09 NOTE — PROGRESS NOTES
Patient: Agata Diaz                MRN: [id number]       SSN: [id number]  YOB: 1950        AGE: 68 y.o. SEX: female  Body mass index is 35.87 kg/m². PCP: Shelly Hogue DO  01/09/20    HISTORY:  I had the pleasure of reviewing the patient. She is finally at her wits' end with regard to her knees. She would like to have her knee replaced on the right side initially. She has always been bowed and it has gotten worse. She has pain at night. She has pain with activities of daily living. She does describe discomfort that goes all the way down the leg to the top of the foot. We did discuss that he pain that radiates below the mid shin level or so towards the foot is not coming from the knee and it will not be improved with a knee replacement. She has tried all sorts of nonoperative measures to no avail. A 12-point review of systems reviewed. All pertinent positives noted. All other systems reviewed and otherwise negative. PHYSICAL EXAMINATION:  On examination today, she is alert and oriented. Affect is normal.  She has about a 5 degree fixed flexion deformity. She is in considerable varus on the knee itself with about 10-12 degrees with a lateral thrust.  She bends fairly well to about 110 degrees. Calf nontender. Homans sign is negative. RADIOGRAPHS:  Review of the x-rays confirm end-stage arthritis involving the right knee. She has mild decreased sensation to L4-5 on that right side, although no foot drop. Tibialis anterior and EHL are 5/5. Review of the x-rays confirm end-stage arthritis, really of both knees. PLAN:  I had a lengthy discussion regarding  risks and benefits involving total joint replacement including, but not limited to, infection, DVT, pulmonary embolism, anesthetic complications, blood loss requiring transfusion, as well as the importance to sit with the leg out straight 10 times a day, and bending it as well.   Also, chronic pain, implant longevity and neurologic problems exacerbation. All questions were answered. She would like to proceed. I kindly ask for medical clearance. I look forward to helping her surgically. We will see her back just prior to her surgery as well. Thank you for allowing me to share in her care. She was not really too keen on having more injections in the back. CC: Jodene Severs, DO             REVIEW OF SYSTEMS:      CON: negative for weight loss, fever  EYE: negative for double vision  ENT: negative for hoarseness  RS:   negative for Tb  GI:    negative for blood in stool  :  negative for blood in urine  Other systems reviewed and noted below. Past Medical History:   Diagnosis Date    Arthritis     GERD (gastroesophageal reflux disease)     Hypertension     Ill-defined condition     Positional Vertigo -  pt can not lie flat       History reviewed. No pertinent family history. Current Outpatient Medications   Medication Sig Dispense Refill    febuxostat (ULORIC) 40 mg tab tablet Take 40 mg by mouth daily.  omeprazole (PRILOSEC) 40 mg capsule Take 40 mg by mouth daily.  losartan (COZAAR) 50 mg tablet Take 50 mg by mouth daily.  Biotin 2,500 mcg cap Take 10,000 mcg by mouth every other day.  acetaminophen (TYLENOL) 325 mg tablet Take  by mouth every four (4) hours as needed for Pain.  diazePAM (VALIUM) 5 mg tablet Take 1 Tab by mouth every eight (8) hours as needed for Anxiety. Max Daily Amount: 15 mg. 20 Tab 0    folic acid (FOLVITE) 1 mg tablet Take 1 mg by mouth daily.  Cholecalciferol, Vitamin D3, (VITAMIN D3) 1,000 unit cap Take 1,000 Units by mouth daily.  cyanocobalamin (VITAMIN B-12) 1,000 mcg tablet Take 1,000 mcg by mouth daily as needed.  diclofenac (VOLTAREN) 1 % gel Apply  to affected area four (4) times daily.  capsaicin 0.075 % topical cream Apply  to affected area three (3) times daily. 60 g 3    MELOXICAM PO Take  by mouth.  traMADol (ULTRAM) 50 mg tablet Take 1 Tab by mouth two (2) times daily as needed for Pain. Max Daily Amount: 100 mg. 60 Tab 0    cyclobenzaprine (FLEXERIL) 10 mg tablet Take 1 Tab by mouth three (3) times daily as needed for Muscle Spasm(s). Indications: MUSCLE SPASM 90 Tab 1    triamterene-hydroCHLOROthiazide (MAXZIDE) 37.5-25 mg per tablet   0    ondansetron (ZOFRAN ODT) 8 mg disintegrating tablet Take 1 Tab by mouth every eight (8) hours as needed for Nausea for up to 12 doses. 12 Tab 0    metoprolol (LOPRESSOR) 50 mg tablet Take 50 mg by mouth daily.  allopurinol (ZYLOPRIM) 300 mg tablet Take  by mouth daily.  pantoprazole (PROTONIX) 40 mg tablet Take 40 mg by mouth daily. Allergies   Allergen Reactions    Cymbalta [Duloxetine] Anaphylaxis    Gabapentin Other (comments)     hallucinations    Tramadol Other (comments)     hallucinations       Past Surgical History:   Procedure Laterality Date    HX BREAST BIOPSY      6-8 yrs ago, scar marked    HX CARPAL TUNNEL RELEASE Right     mid to late [de-identified]    HX CARPAL TUNNEL RELEASE Left     mid     HX  SECTION      HX CHOLECYSTECTOMY      HX GYN      Vaginal Hernia as a child     HX HYSTERECTOMY      partial        Social History     Socioeconomic History    Marital status:      Spouse name: Not on file    Number of children: Not on file    Years of education: Not on file    Highest education level: Not on file   Occupational History    Not on file   Social Needs    Financial resource strain: Not on file    Food insecurity:     Worry: Not on file     Inability: Not on file    Transportation needs:     Medical: Not on file     Non-medical: Not on file   Tobacco Use    Smoking status: Never Smoker    Smokeless tobacco: Never Used   Substance and Sexual Activity    Alcohol use:  Yes    Drug use: Not on file    Sexual activity: Not on file   Lifestyle    Physical activity:     Days per week: Not on file Minutes per session: Not on file    Stress: Not on file   Relationships    Social connections:     Talks on phone: Not on file     Gets together: Not on file     Attends Christianity service: Not on file     Active member of club or organization: Not on file     Attends meetings of clubs or organizations: Not on file     Relationship status: Not on file    Intimate partner violence:     Fear of current or ex partner: Not on file     Emotionally abused: Not on file     Physically abused: Not on file     Forced sexual activity: Not on file   Other Topics Concern    Not on file   Social History Narrative    Not on file       Visit Vitals  /82 (BP 1 Location: Left arm, BP Patient Position: Sitting)   Pulse 78   Temp 97.8 °F (36.6 °C) (Oral)   Resp 15   Ht 4' 11\" (1.499 m)   Wt 177 lb 9.6 oz (80.6 kg)   SpO2 97%   BMI 35.87 kg/m²         PHYSICAL EXAMINATION:  GENERAL: Alert and oriented x3, in no acute distress, well-developed, well-nourished, afebrile. HEART: No JVD. EYES: No scleral icterus   NECK: No significant lymphadenopathy   LUNGS: No respiratory compromise or indrawing  ABDOMEN: Soft, non-tender, non-distended. Electronically signed by:  Nahed Gardner MD

## 2020-01-17 NOTE — PROGRESS NOTES
[facility] - Manjit Mattson  [address]  [phone] fax    Physical Therapy Discharge Summary    Patient name: Sara Bruner Start of Care: 10/16/19   Referral source: Aditya Mathew MD : 1950   Medical/Treatment Diagnosis: Cervical spinal stenosis [M48.02]  Payor: [place] MEDICARE / Plan: VA MEDICARE PART A & B / Product Type: Medicare /  Onset Date:chronic, worsening           Prior Hospitalization: see medical history Provider#: [id number]   Medications: Verified on Patient Summary List    Comorbidities: arthritis, HTN   Prior Level of Function: functionally independent                Visits from Start of Care: 14    Missed Visits: 3    Reporting Period : 10/16/19 to 19    Summary of Care:  Goal: Patient will increase FOTO score by 7-10 pts indicating improvement in function and QOL. Status at last note/certification: Unable to locate FOTO survey   Status at discharge: not met    Goal: Patient will report little difficulty turning to look behind to check blind spot when driving indicating improved ease of c/s rotation AROM.    Status at last note/certification: Progressing. Pt reports moderate difficulty turning to left and little difficulty with turning to right. Status at discharge: not met    Goal: Patient will be independent with HEP to maintain progress and manage symptoms at home. Status at last note/certification: Progressing. Status at discharge: not met      ASSESSMENT/RECOMMENDATIONS:  Pt has made steady progress in therapy. She reported reducing pain levels and improving cervical mobility was evident with therapy interventions. Pain was increased at final therapy session, which pt attributes to sleeping wrong; however, she is able to independently manage her symptoms at this time. Pt demonstrates understanding of updated HEP and is DC as skilled intervention is no longer required.     [x]Discontinue therapy: [x]Patient has reached or is progressing toward set goals      []Patient is non-compliant or has abdicated      []Due to lack of appreciable progress towards set goals    Vonda Lefort, PT 1/17/2020 1:35 PM

## 2020-02-24 ENCOUNTER — HOSPITAL ENCOUNTER (OUTPATIENT)
Dept: LAB | Age: 70
Discharge: HOME | End: 2020-02-24
Payer: MEDICARE

## 2020-02-24 DIAGNOSIS — N18.30 CHRONIC KIDNEY DISEASE, STAGE III (MODERATE) (HCC): ICD-10-CM

## 2020-02-24 LAB
ALBUMIN SERPL-MCNC: 3.5 G/DL (ref 3.4–5)
ANION GAP SERPL CALC-SCNC: 7 MMOL/L (ref 3–18)
APPEARANCE UR: CLEAR
BILIRUB UR QL: NEGATIVE
BUN SERPL-MCNC: 19 MG/DL (ref 7–18)
BUN/CREAT SERPL: 12 (ref 12–20)
CALCIUM SERPL-MCNC: 8.7 MG/DL (ref 8.5–10.1)
CALCIUM SERPL-MCNC: 9.1 MG/DL (ref 8.5–10.1)
CHLORIDE SERPL-SCNC: 111 MMOL/L (ref 100–111)
CO2 SERPL-SCNC: 26 MMOL/L (ref 21–32)
COLOR UR: YELLOW
CREAT SERPL-MCNC: 1.56 MG/DL (ref 0.6–1.3)
CREAT UR-MCNC: 244 MG/DL (ref 30–125)
GLUCOSE SERPL-MCNC: 91 MG/DL (ref 74–99)
GLUCOSE UR STRIP.AUTO-MCNC: NEGATIVE MG/DL
HGB UR QL STRIP: NEGATIVE
KETONES UR QL STRIP.AUTO: ABNORMAL MG/DL
LEUKOCYTE ESTERASE UR QL STRIP.AUTO: NEGATIVE
MICROALBUMIN UR-MCNC: 1.82 MG/DL (ref 0–3)
MICROALBUMIN/CREAT UR-RTO: 7 MG/G (ref 0–30)
NITRITE UR QL STRIP.AUTO: NEGATIVE
PH UR STRIP: 5 [PH] (ref 5–8)
PHOSPHATE SERPL-MCNC: 3.3 MG/DL (ref 2.5–4.9)
POTASSIUM SERPL-SCNC: 4.1 MMOL/L (ref 3.5–5.5)
PROT UR STRIP-MCNC: NEGATIVE MG/DL
PTH-INTACT SERPL-MCNC: 86.1 PG/ML (ref 18.4–88)
SODIUM SERPL-SCNC: 144 MMOL/L (ref 136–145)
SP GR UR REFRACTOMETRY: 1.02 (ref 1–1.03)
UROBILINOGEN UR QL STRIP.AUTO: 0.2 EU/DL (ref 0.2–1)

## 2020-02-24 PROCEDURE — 82043 UR ALBUMIN QUANTITATIVE: CPT

## 2020-02-24 PROCEDURE — 83970 ASSAY OF PARATHORMONE: CPT

## 2020-02-24 PROCEDURE — 36415 COLL VENOUS BLD VENIPUNCTURE: CPT

## 2020-02-24 PROCEDURE — 80069 RENAL FUNCTION PANEL: CPT

## 2020-02-24 PROCEDURE — 81003 URINALYSIS AUTO W/O SCOPE: CPT

## 2020-07-21 DIAGNOSIS — Z01.818 PREOPERATIVE TESTING: Primary | ICD-10-CM

## 2020-07-21 DIAGNOSIS — M17.11 PRIMARY OSTEOARTHRITIS OF RIGHT KNEE: ICD-10-CM

## 2020-08-03 ENCOUNTER — HOSPITAL ENCOUNTER (OUTPATIENT)
Dept: PREADMISSION TESTING | Age: 70
Discharge: HOME | End: 2020-08-03
Payer: MEDICARE

## 2020-08-03 ENCOUNTER — HOSPITAL ENCOUNTER (OUTPATIENT)
Dept: GENERAL RADIOLOGY | Age: 70
Discharge: HOME | End: 2020-08-03
Payer: MEDICARE

## 2020-08-03 DIAGNOSIS — M17.11 PRIMARY OSTEOARTHRITIS OF RIGHT KNEE: ICD-10-CM

## 2020-08-03 DIAGNOSIS — Z01.818 PREOPERATIVE TESTING: ICD-10-CM

## 2020-08-03 LAB
ABO + RH BLD: NORMAL
ALBUMIN SERPL-MCNC: 3.4 G/DL (ref 3.4–5)
ANION GAP SERPL CALC-SCNC: 7 MMOL/L (ref 3–18)
APPEARANCE UR: CLEAR
APTT PPP: 32.4 SEC (ref 23–36.4)
ATRIAL RATE: 70 BPM
BASOPHILS # BLD: 0 K/UL (ref 0–0.1)
BASOPHILS NFR BLD: 0 % (ref 0–2)
BILIRUB UR QL: NEGATIVE
BLOOD GROUP ANTIBODIES SERPL: NORMAL
BUN SERPL-MCNC: 17 MG/DL (ref 7–18)
BUN/CREAT SERPL: 13 (ref 12–20)
CALCIUM SERPL-MCNC: 8.9 MG/DL (ref 8.5–10.1)
CALCULATED P AXIS, ECG09: 51 DEGREES
CALCULATED R AXIS, ECG10: 2 DEGREES
CALCULATED T AXIS, ECG11: 36 DEGREES
CHLORIDE SERPL-SCNC: 109 MMOL/L (ref 100–111)
CO2 SERPL-SCNC: 26 MMOL/L (ref 21–32)
COLOR UR: YELLOW
CREAT SERPL-MCNC: 1.29 MG/DL (ref 0.6–1.3)
DIAGNOSIS, 93000: NORMAL
DIFFERENTIAL METHOD BLD: ABNORMAL
EOSINOPHIL # BLD: 0.2 K/UL (ref 0–0.4)
EOSINOPHIL NFR BLD: 3 % (ref 0–5)
ERYTHROCYTE [DISTWIDTH] IN BLOOD BY AUTOMATED COUNT: 14.7 % (ref 11.6–14.5)
EST. AVERAGE GLUCOSE BLD GHB EST-MCNC: 114 MG/DL
GLUCOSE SERPL-MCNC: 69 MG/DL (ref 74–99)
GLUCOSE UR STRIP.AUTO-MCNC: NEGATIVE MG/DL
HBA1C MFR BLD: 5.6 % (ref 4.2–5.6)
HCT VFR BLD AUTO: 39.3 % (ref 35–45)
HGB BLD-MCNC: 12.5 G/DL (ref 12–16)
HGB UR QL STRIP: NEGATIVE
INR PPP: 1 (ref 0.8–1.2)
KETONES UR QL STRIP.AUTO: NEGATIVE MG/DL
LEUKOCYTE ESTERASE UR QL STRIP.AUTO: NEGATIVE
LYMPHOCYTES # BLD: 2.3 K/UL (ref 0.9–3.6)
LYMPHOCYTES NFR BLD: 40 % (ref 21–52)
MCH RBC QN AUTO: 25.3 PG (ref 24–34)
MCHC RBC AUTO-ENTMCNC: 31.8 G/DL (ref 31–37)
MCV RBC AUTO: 79.4 FL (ref 74–97)
MONOCYTES # BLD: 0.7 K/UL (ref 0.05–1.2)
MONOCYTES NFR BLD: 13 % (ref 3–10)
NEUTS SEG # BLD: 2.6 K/UL (ref 1.8–8)
NEUTS SEG NFR BLD: 44 % (ref 40–73)
NITRITE UR QL STRIP.AUTO: NEGATIVE
P-R INTERVAL, ECG05: 130 MS
PH UR STRIP: 5 [PH] (ref 5–8)
PLATELET # BLD AUTO: 242 K/UL (ref 135–420)
PMV BLD AUTO: 10.5 FL (ref 9.2–11.8)
POTASSIUM SERPL-SCNC: 4.1 MMOL/L (ref 3.5–5.5)
PROT UR STRIP-MCNC: NEGATIVE MG/DL
PROTHROMBIN TIME: 13.2 SEC (ref 11.5–15.2)
Q-T INTERVAL, ECG07: 382 MS
QRS DURATION, ECG06: 76 MS
QTC CALCULATION (BEZET), ECG08: 412 MS
RBC # BLD AUTO: 4.95 M/UL (ref 4.2–5.3)
SODIUM SERPL-SCNC: 142 MMOL/L (ref 136–145)
SP GR UR REFRACTOMETRY: 1.01 (ref 1–1.03)
SPECIMEN EXP DATE BLD: NORMAL
UROBILINOGEN UR QL STRIP.AUTO: 0.2 EU/DL (ref 0.2–1)
VENTRICULAR RATE, ECG03: 70 BPM
WBC # BLD AUTO: 5.7 K/UL (ref 4.6–13.2)

## 2020-08-03 PROCEDURE — 93005 ELECTROCARDIOGRAM TRACING: CPT

## 2020-08-03 PROCEDURE — 36415 COLL VENOUS BLD VENIPUNCTURE: CPT

## 2020-08-03 PROCEDURE — 85025 COMPLETE CBC W/AUTO DIFF WBC: CPT

## 2020-08-03 PROCEDURE — 86900 BLOOD TYPING SEROLOGIC ABO: CPT

## 2020-08-03 PROCEDURE — 81003 URINALYSIS AUTO W/O SCOPE: CPT

## 2020-08-03 PROCEDURE — 71046 X-RAY EXAM CHEST 2 VIEWS: CPT

## 2020-08-03 PROCEDURE — 83036 HEMOGLOBIN GLYCOSYLATED A1C: CPT

## 2020-08-03 PROCEDURE — 82040 ASSAY OF SERUM ALBUMIN: CPT

## 2020-08-03 PROCEDURE — 85610 PROTHROMBIN TIME: CPT

## 2020-08-03 PROCEDURE — 87086 URINE CULTURE/COLONY COUNT: CPT

## 2020-08-03 PROCEDURE — 85730 THROMBOPLASTIN TIME PARTIAL: CPT

## 2020-08-03 PROCEDURE — 80048 BASIC METABOLIC PNL TOTAL CA: CPT

## 2020-08-04 LAB
BACTERIA SPEC CULT: NORMAL
SERVICE CMNT-IMP: NORMAL

## 2020-08-06 DIAGNOSIS — M17.11 PRIMARY OSTEOARTHRITIS OF RIGHT KNEE: ICD-10-CM

## 2020-08-06 DIAGNOSIS — Z01.818 PREOPERATIVE TESTING: Primary | ICD-10-CM

## 2020-08-10 ENCOUNTER — OFFICE VISIT (OUTPATIENT)
Dept: ORTHOPEDIC SURGERY | Facility: CLINIC | Age: 70
End: 2020-08-10

## 2020-08-10 DIAGNOSIS — Z01.818 ENCOUNTER FOR PREOPERATIVE EXAMINATION FOR GENERAL SURGICAL PROCEDURE: ICD-10-CM

## 2020-08-10 DIAGNOSIS — M17.11 PRIMARY OSTEOARTHRITIS OF RIGHT KNEE: Primary | ICD-10-CM

## 2020-08-12 ENCOUNTER — OFFICE VISIT (OUTPATIENT)
Dept: ORTHOPEDIC SURGERY | Facility: CLINIC | Age: 70
End: 2020-08-12

## 2020-08-12 ENCOUNTER — HOSPITAL ENCOUNTER (OUTPATIENT)
Dept: PREADMISSION TESTING | Age: 70
Discharge: HOME | End: 2020-08-12
Payer: MEDICARE

## 2020-08-12 VITALS
WEIGHT: 181.4 LBS | BODY MASS INDEX: 36.57 KG/M2 | HEIGHT: 59 IN | SYSTOLIC BLOOD PRESSURE: 150 MMHG | HEART RATE: 76 BPM | DIASTOLIC BLOOD PRESSURE: 95 MMHG | TEMPERATURE: 97.1 F

## 2020-08-12 DIAGNOSIS — M17.11 PRIMARY OSTEOARTHRITIS OF RIGHT KNEE: ICD-10-CM

## 2020-08-12 DIAGNOSIS — Z01.818 ENCOUNTER FOR PREOPERATIVE EXAMINATION FOR GENERAL SURGICAL PROCEDURE: Primary | ICD-10-CM

## 2020-08-12 DIAGNOSIS — Z01.818 PREOPERATIVE TESTING: ICD-10-CM

## 2020-08-12 PROCEDURE — 87635 SARS-COV-2 COVID-19 AMP PRB: CPT

## 2020-08-12 RX ORDER — CELECOXIB 100 MG/1
400 CAPSULE ORAL ONCE
Status: CANCELLED | OUTPATIENT
Start: 2020-08-12 | End: 2020-08-12

## 2020-08-12 RX ORDER — METOCLOPRAMIDE 10 MG/1
10 TABLET ORAL
COMMUNITY

## 2020-08-12 RX ORDER — ACETAMINOPHEN 325 MG/1
1000 TABLET ORAL ONCE
Status: CANCELLED | OUTPATIENT
Start: 2020-08-12 | End: 2020-08-12

## 2020-08-12 NOTE — PATIENT INSTRUCTIONS
Patient: Wilmer Sainz                MRN: [id number]       SSN: [id number]  YOB: 1950        AGE: 65 y.o. SEX: female  There is no height or weight on file to calculate BMI.    08/12/20    DO:  1:  Sit with the leg out straight 5-10 min every hour while awake. Keep the toes pointed       up towards the ivette. 2.  Bend your knee 5-10 min every hour. Bend it to the point of pain and hold it for 5-10       Min. 3.  ICE your knee 20 min every hour    DO NOT:    1. Do not place anything under your knee to prop it up  2. Do not sit in the recliner chair.

## 2020-08-12 NOTE — H&P (VIEW-ONLY)
[address], [address] [place] [address] 
[phone] HISTORY & PHYSICAL Patient: Ricardo Sage                MRN: [id number]       SSN: [id number] YOB: 1950        AGE: 65 y.o. SEX: female Body mass index is 36.64 kg/m². PCP: Anselmo Villa DO 
08/12/20 CC: right knee end stage OA Problem List Items Addressed This Visit None Visit Diagnoses Encounter for preoperative examination for general surgical procedure    -  Primary Primary osteoarthritis of right knee HPI:  The patient is a pleasant 65 y.o. whom has end stage OA of their Right knee and has failed conservative treatment including but not limited to NSAIDS, cortisone injections, viscosupplementation, PT, and pain medicine. Due to the current findings and affected activities of daily living, surgical intervention is indicated. The alternatives, risks, complications, as well as expected outcome were discussed. These include but are not limited to infection, blood loss, need for blood transfusion, neurovascular damage, DVT, PE,  post-op stiffness and pain, leg length discrepancy, dislocation, anesthetic complications, prothesis longevity, need for more surgery, MI, stroke, and even death. The patient understands and wishes to proceed with surgery. Past Medical History:  
Diagnosis Date  Arthritis  GERD (gastroesophageal reflux disease)  Hypertension  Ill-defined condition Positional Vertigo -  pt can not lie flat Current Outpatient Medications:  
  metoclopramide HCl (Reglan) 10 mg tablet, Take 10 mg by mouth Before breakfast, lunch, dinner and at bedtime. , Disp: , Rfl:  
  diclofenac (VOLTAREN) 1 % gel, Apply  to affected area four (4) times daily. , Disp: , Rfl:  
  omeprazole (PRILOSEC) 40 mg capsule, Take 40 mg by mouth daily. , Disp: , Rfl:  
   losartan (COZAAR) 50 mg tablet, Take 50 mg by mouth daily. , Disp: , Rfl:  
  acetaminophen (TYLENOL) 325 mg tablet, Take  by mouth every four (4) hours as needed for Pain., Disp: , Rfl:  
  diazePAM (VALIUM) 5 mg tablet, Take 1 Tab by mouth every eight (8) hours as needed for Anxiety. Max Daily Amount: 15 mg., Disp: 20 Tab, Rfl: 0 
  metoprolol (LOPRESSOR) 50 mg tablet, Take 50 mg by mouth daily. , Disp: , Rfl:  
  folic acid (FOLVITE) 1 mg tablet, Take 1 mg by mouth daily. , Disp: , Rfl:  
  Cholecalciferol, Vitamin D3, (VITAMIN D3) 1,000 unit cap, Take 1,000 Units by mouth daily. , Disp: , Rfl:  
  febuxostat (ULORIC) 40 mg tab tablet, Take 40 mg by mouth daily. , Disp: , Rfl:  
  capsaicin 0.075 % topical cream, Apply  to affected area three (3) times daily. , Disp: 60 g, Rfl: 3   Biotin 2,500 mcg cap, Take 10,000 mcg by mouth every other day., Disp: , Rfl:   MELOXICAM PO, Take  by mouth., Disp: , Rfl:  
  traMADol (ULTRAM) 50 mg tablet, Take 1 Tab by mouth two (2) times daily as needed for Pain. Max Daily Amount: 100 mg., Disp: 60 Tab, Rfl: 0 
  cyclobenzaprine (FLEXERIL) 10 mg tablet, Take 1 Tab by mouth three (3) times daily as needed for Muscle Spasm(s). Indications: MUSCLE SPASM, Disp: 90 Tab, Rfl: 1 
  triamterene-hydroCHLOROthiazide (MAXZIDE) 37.5-25 mg per tablet, , Disp: , Rfl: 0 
  ondansetron (ZOFRAN ODT) 8 mg disintegrating tablet, Take 1 Tab by mouth every eight (8) hours as needed for Nausea for up to 12 doses. , Disp: 12 Tab, Rfl: 0 
  allopurinol (ZYLOPRIM) 300 mg tablet, Take  by mouth daily. , Disp: , Rfl:  
  pantoprazole (PROTONIX) 40 mg tablet, Take 40 mg by mouth daily. , Disp: , Rfl:  
  cyanocobalamin (VITAMIN B-12) 1,000 mcg tablet, Take 1,000 mcg by mouth daily as needed. , Disp: , Rfl:  
 
Allergies Allergen Reactions  Cymbalta [Duloxetine] Anaphylaxis  Gabapentin Other (comments)  
  hallucinations  Silicone Unknown (comments)  Tramadol Other (comments) hallucinations Social History Socioeconomic History  Marital status:  Spouse name: Not on file  Number of children: Not on file  Years of education: Not on file  Highest education level: Not on file Occupational History  Not on file Social Needs  Financial resource strain: Not on file  Food insecurity Worry: Not on file Inability: Not on file  Transportation needs Medical: Not on file Non-medical: Not on file Tobacco Use  Smoking status: Never Smoker  Smokeless tobacco: Never Used Substance and Sexual Activity  Alcohol use: Yes Comment: occasionally  Drug use: Not on file  Sexual activity: Not on file Lifestyle  Physical activity Days per week: Not on file Minutes per session: Not on file  Stress: Not on file Relationships  Social connections Talks on phone: Not on file Gets together: Not on file Attends [employer] service: Not on file Active member of club or organization: Not on file Attends meetings of clubs or organizations: Not on file Relationship status: Not on file  Intimate partner violence Fear of current or ex partner: Not on file Emotionally abused: Not on file Physically abused: Not on file Forced sexual activity: Not on file Other Topics Concern  Not on file Social History Narrative  Not on file Past Surgical History:  
Procedure Laterality Date  HX BREAST BIOPSY    
 6-8 yrs ago, scar marked  HX CARPAL TUNNEL RELEASE Right   
 mid to late [de-identified]  HX CARPAL TUNNEL RELEASE Left   
 mid   HX  SECTION    
 HX CHOLECYSTECTOMY  HX GYN Vaginal Hernia as a child  HX HYSTERECTOMY partial   
 
 
Family History:  Non-contributory. PE: 
Visit Vitals BP (!) 150/95 Pulse 76 Temp 97.1 °F (36.2 °C) Ht 4' 11\" (1.499 m) Wt 181 lb 6.4 oz (82.3 kg) BMI 36.64 kg/m² A&O X3, NAD, well develop, well nourished Heart: S1-S2, rrr 
Lungs: CTA bilat Abd: soft, nt, nt, + bs in all quadrants Ext:  Pos distal pulses to DP, PT 
 
 
X-ray: right knee shows end stage OA Labs: labs were reviewed and wnl.  ua neg A:  Right  knee end stage OA 
 
P:  At this point we will move forward with surgery. Again, the alternatives, risks, complications, as well as expected outcome were discussed and the patient wishes to proceed with surgery. Pt has been instructed to stop aspirin, nsaids, rheumatologic medications and blood thinners. They have also been instructed to continue on any heart and bp meds and to take them the morning of surgery with sips of water. The patient will require in-patient admission. Admission as an in-patient is reasonable and necessary due to increased risk of surgery due to the factors indicated as well as the possible need for prolonged in-hospital or skilled post-acute care in order to improve this patient's functional ability. The patient was counseled at length about the risks of ryan Covid-19 during their perioperative period and any recovery window from their procedure. The patient was made aware that ryan Covid-19  may worsen their prognosis for recovering from their procedure and lend to a higher morbidity and/or mortality risk. All material risks, benefits, and reasonable alternatives including postponing the procedure were discussed. The patient does  wish to proceed with the procedure at this time. Salma Rodriguez

## 2020-08-12 NOTE — PROGRESS NOTES
Pt states she is still recovering from COVID testing; reports head hurting. Pt notes silicone allergy b/c not sure if anything with surgery uses silicones.

## 2020-08-12 NOTE — H&P
[address], [address] [facility]  [phone]           HISTORY & PHYSICAL      Patient: Clint Sanches                MRN: [id number]       SSN: [id number]  YOB: 1950        AGE: 65 y.o. SEX: female  Body mass index is 36.64 kg/m². PCP: Ottoniel Handy DO  08/12/20      CC: right knee end stage OA  Problem List Items Addressed This Visit     None      Visit Diagnoses     Encounter for preoperative examination for general surgical procedure    -  Primary    Primary osteoarthritis of right knee                HPI:  The patient is a pleasant 65 y.o. whom has end stage OA of their Right knee and has failed conservative treatment including but not limited to NSAIDS, cortisone injections, viscosupplementation, PT, and pain medicine. Due to the current findings and affected activities of daily living, surgical intervention is indicated. The alternatives, risks, complications, as well as expected outcome were discussed. These include but are not limited to infection, blood loss, need for blood transfusion, neurovascular damage, DVT, PE,  post-op stiffness and pain, leg length discrepancy, dislocation, anesthetic complications, prothesis longevity, need for more surgery, MI, stroke, and even death. The patient understands and wishes to proceed with surgery. Past Medical History:   Diagnosis Date    Arthritis     GERD (gastroesophageal reflux disease)     Hypertension     Ill-defined condition     Positional Vertigo -  pt can not lie flat         Current Outpatient Medications:     metoclopramide HCl (Reglan) 10 mg tablet, Take 10 mg by mouth Before breakfast, lunch, dinner and at bedtime. , Disp: , Rfl:     diclofenac (VOLTAREN) 1 % gel, Apply  to affected area four (4) times daily. , Disp: , Rfl:     omeprazole (PRILOSEC) 40 mg capsule, Take 40 mg by mouth daily. , Disp: , Rfl:     losartan (COZAAR) 50 mg tablet, Take 50 mg by mouth daily. , Disp: , Rfl:     acetaminophen (TYLENOL) 325 mg tablet, Take  by mouth every four (4) hours as needed for Pain., Disp: , Rfl:     diazePAM (VALIUM) 5 mg tablet, Take 1 Tab by mouth every eight (8) hours as needed for Anxiety. Max Daily Amount: 15 mg., Disp: 20 Tab, Rfl: 0    metoprolol (LOPRESSOR) 50 mg tablet, Take 50 mg by mouth daily. , Disp: , Rfl:     folic acid (FOLVITE) 1 mg tablet, Take 1 mg by mouth daily. , Disp: , Rfl:     Cholecalciferol, Vitamin D3, (VITAMIN D3) 1,000 unit cap, Take 1,000 Units by mouth daily. , Disp: , Rfl:     febuxostat (ULORIC) 40 mg tab tablet, Take 40 mg by mouth daily. , Disp: , Rfl:     capsaicin 0.075 % topical cream, Apply  to affected area three (3) times daily. , Disp: 60 g, Rfl: 3    Biotin 2,500 mcg cap, Take 10,000 mcg by mouth every other day., Disp: , Rfl:     MELOXICAM PO, Take  by mouth., Disp: , Rfl:     traMADol (ULTRAM) 50 mg tablet, Take 1 Tab by mouth two (2) times daily as needed for Pain. Max Daily Amount: 100 mg., Disp: 60 Tab, Rfl: 0    cyclobenzaprine (FLEXERIL) 10 mg tablet, Take 1 Tab by mouth three (3) times daily as needed for Muscle Spasm(s). Indications: MUSCLE SPASM, Disp: 90 Tab, Rfl: 1    triamterene-hydroCHLOROthiazide (MAXZIDE) 37.5-25 mg per tablet, , Disp: , Rfl: 0    ondansetron (ZOFRAN ODT) 8 mg disintegrating tablet, Take 1 Tab by mouth every eight (8) hours as needed for Nausea for up to 12 doses. , Disp: 12 Tab, Rfl: 0    allopurinol (ZYLOPRIM) 300 mg tablet, Take  by mouth daily. , Disp: , Rfl:     pantoprazole (PROTONIX) 40 mg tablet, Take 40 mg by mouth daily. , Disp: , Rfl:     cyanocobalamin (VITAMIN B-12) 1,000 mcg tablet, Take 1,000 mcg by mouth daily as needed. , Disp: , Rfl:     Allergies   Allergen Reactions    Cymbalta [Duloxetine] Anaphylaxis    Gabapentin Other (comments)     hallucinations    Silicone Unknown (comments)    Tramadol Other (comments)     hallucinations       Social History     Socioeconomic History    Marital status:      Spouse name: Not on file    Number of children: Not on file    Years of education: Not on file    Highest education level: Not on file   Occupational History    Not on file   Social Needs    Financial resource strain: Not on file    Food insecurity     Worry: Not on file     Inability: Not on file    Transportation needs     Medical: Not on file     Non-medical: Not on file   Tobacco Use    Smoking status: Never Smoker    Smokeless tobacco: Never Used   Substance and Sexual Activity    Alcohol use: Yes     Comment: occasionally    Drug use: Not on file    Sexual activity: Not on file   Lifestyle    Physical activity     Days per week: Not on file     Minutes per session: Not on file    Stress: Not on file   Relationships    Social connections     Talks on phone: Not on file     Gets together: Not on file     Attends Anabaptist service: Not on file     Active member of club or organization: Not on file     Attends meetings of clubs or organizations: Not on file     Relationship status: Not on file    Intimate partner violence     Fear of current or ex partner: Not on file     Emotionally abused: Not on file     Physically abused: Not on file     Forced sexual activity: Not on file   Other Topics Concern    Not on file   Social History Narrative    Not on file       Past Surgical History:   Procedure Laterality Date    HX BREAST BIOPSY      6-8 yrs ago, scar marked    HX CARPAL TUNNEL RELEASE Right     mid to late [de-identified]    HX [address] Left     mid [address] HX CHOLECYSTECTOMY      HX GYN      Vaginal Hernia as a child     HX HYSTERECTOMY      partial        Family History:  Non-contributory.      PE:  Visit Vitals  BP (!) 150/95   Pulse 76   Temp 97.1 °F (36.2 °C)   Ht 4' 11\" (1.499 m)   Wt 181 lb 6.4 oz (82.3 kg)   BMI 36.64 kg/m²     A&O X3, NAD, well develop, well nourished  Heart: S1-S2, rrr  Lungs: CTA bilat  Abd: soft, nt, nt, + bs in all quadrants  Ext:  Pos distal pulses to DP, PT      X-ray: right knee shows end stage OA    Labs: labs were reviewed and wnl.  ua neg    A:  Right  knee end stage OA    P:  At this point we will move forward with surgery. Again, the alternatives, risks, complications, as well as expected outcome were discussed and the patient wishes to proceed with surgery. Pt has been instructed to stop aspirin, nsaids, rheumatologic medications and blood thinners. They have also been instructed to continue on any heart and bp meds and to take them the morning of surgery with sips of water. The patient will require in-patient admission. Admission as an in-patient is reasonable and necessary due to increased risk of surgery due to the factors indicated as well as the possible need for prolonged in-hospital or skilled post-acute care in order to improve this patient's functional ability. The patient was counseled at length about the risks of ryan Covid-19 during their perioperative period and any recovery window from their procedure. The patient was made aware that ryan Covid-19  may worsen their prognosis for recovering from their procedure and lend to a higher morbidity and/or mortality risk. All material risks, benefits, and reasonable alternatives including postponing the procedure were discussed. The patient does  wish to proceed with the procedure at this time.             Mitzy Rsuhing

## 2020-08-13 LAB — SARS-COV-2, COV2NT: NOT DETECTED

## 2020-08-15 ENCOUNTER — ANESTHESIA EVENT (OUTPATIENT)
Dept: SURGERY | Age: 70
DRG: 470 | End: 2020-08-15
Payer: MEDICARE

## 2020-08-17 ENCOUNTER — HOSPITAL ENCOUNTER (INPATIENT)
Age: 70
LOS: 1 days | Discharge: HOME HEALTH | DRG: 470 | End: 2020-08-18
Attending: ORTHOPAEDIC SURGERY | Admitting: ORTHOPAEDIC SURGERY
Payer: MEDICARE

## 2020-08-17 ENCOUNTER — APPOINTMENT (OUTPATIENT)
Dept: GENERAL RADIOLOGY | Age: 70
DRG: 470 | End: 2020-08-17
Attending: ORTHOPAEDIC SURGERY
Payer: MEDICARE

## 2020-08-17 ENCOUNTER — HOME HEALTH ADMISSION (OUTPATIENT)
Dept: HOME HEALTH SERVICES | Facility: HOME HEALTH | Age: 70
End: 2020-08-17
Payer: MEDICARE

## 2020-08-17 ENCOUNTER — ANESTHESIA (OUTPATIENT)
Dept: SURGERY | Age: 70
DRG: 470 | End: 2020-08-17
Payer: MEDICARE

## 2020-08-17 DIAGNOSIS — M17.10 ARTHRITIS OF KNEE: Primary | ICD-10-CM

## 2020-08-17 PROCEDURE — 77030000032 HC CUF TRNQT ZIMM -B: Performed by: ORTHOPAEDIC SURGERY

## 2020-08-17 PROCEDURE — 76010000131 HC OR TIME 2 TO 2.5 HR: Performed by: ORTHOPAEDIC SURGERY

## 2020-08-17 PROCEDURE — 74011250636 HC RX REV CODE- 250/636: Performed by: ORTHOPAEDIC SURGERY

## 2020-08-17 PROCEDURE — 76942 ECHO GUIDE FOR BIOPSY: CPT | Performed by: ANESTHESIOLOGY

## 2020-08-17 PROCEDURE — 77030019605: Performed by: ORTHOPAEDIC SURGERY

## 2020-08-17 PROCEDURE — C1776 JOINT DEVICE (IMPLANTABLE): HCPCS | Performed by: ORTHOPAEDIC SURGERY

## 2020-08-17 PROCEDURE — 77030040361 HC SLV COMPR DVT MDII -B: Performed by: ORTHOPAEDIC SURGERY

## 2020-08-17 PROCEDURE — 77030003029 HC SUT VCRL J&J -B: Performed by: ORTHOPAEDIC SURGERY

## 2020-08-17 PROCEDURE — 77030002933 HC SUT MCRYL J&J -A: Performed by: ORTHOPAEDIC SURGERY

## 2020-08-17 PROCEDURE — 77030027138 HC INCENT SPIROMETER -A: Performed by: ORTHOPAEDIC SURGERY

## 2020-08-17 PROCEDURE — 76060000035 HC ANESTHESIA 2 TO 2.5 HR: Performed by: ORTHOPAEDIC SURGERY

## 2020-08-17 PROCEDURE — 77030012890: Performed by: ORTHOPAEDIC SURGERY

## 2020-08-17 PROCEDURE — 77030012935 HC DRSG AQUACEL BMS -B: Performed by: ORTHOPAEDIC SURGERY

## 2020-08-17 PROCEDURE — 65270000029 HC RM PRIVATE

## 2020-08-17 PROCEDURE — 77030008462 HC STPLR SKN PROX J&J -A: Performed by: ORTHOPAEDIC SURGERY

## 2020-08-17 PROCEDURE — 97116 GAIT TRAINING THERAPY: CPT

## 2020-08-17 PROCEDURE — 74011250637 HC RX REV CODE- 250/637: Performed by: PHYSICIAN ASSISTANT

## 2020-08-17 PROCEDURE — 74011000250 HC RX REV CODE- 250: Performed by: ORTHOPAEDIC SURGERY

## 2020-08-17 PROCEDURE — 74011250636 HC RX REV CODE- 250/636: Performed by: ANESTHESIOLOGY

## 2020-08-17 PROCEDURE — 77030013708 HC HNDPC SUC IRR PULS STRY –B: Performed by: ORTHOPAEDIC SURGERY

## 2020-08-17 PROCEDURE — 77030040922 HC BLNKT HYPOTHRM STRY -A: Performed by: ORTHOPAEDIC SURGERY

## 2020-08-17 PROCEDURE — 77030019557 HC ELECTRD VES SEAL MEDT -F: Performed by: ORTHOPAEDIC SURGERY

## 2020-08-17 PROCEDURE — 76210000017 HC OR PH I REC 1.5 TO 2 HR: Performed by: ORTHOPAEDIC SURGERY

## 2020-08-17 PROCEDURE — 77030031139 HC SUT VCRL2 J&J -A: Performed by: ORTHOPAEDIC SURGERY

## 2020-08-17 PROCEDURE — 74011250637 HC RX REV CODE- 250/637: Performed by: ORTHOPAEDIC SURGERY

## 2020-08-17 PROCEDURE — 0SRC0J9 REPLACEMENT OF RIGHT KNEE JOINT WITH SYNTHETIC SUBSTITUTE, CEMENTED, OPEN APPROACH: ICD-10-PCS | Performed by: ORTHOPAEDIC SURGERY

## 2020-08-17 PROCEDURE — 74011250636 HC RX REV CODE- 250/636: Performed by: NURSE ANESTHETIST, CERTIFIED REGISTERED

## 2020-08-17 PROCEDURE — 77030010785: Performed by: ORTHOPAEDIC SURGERY

## 2020-08-17 PROCEDURE — 97530 THERAPEUTIC ACTIVITIES: CPT

## 2020-08-17 PROCEDURE — 74011000258 HC RX REV CODE- 258: Performed by: ORTHOPAEDIC SURGERY

## 2020-08-17 PROCEDURE — 77030006835 HC BLD SAW SAG STRY -B: Performed by: ORTHOPAEDIC SURGERY

## 2020-08-17 PROCEDURE — 97162 PT EVAL MOD COMPLEX 30 MIN: CPT

## 2020-08-17 PROCEDURE — 73560 X-RAY EXAM OF KNEE 1 OR 2: CPT

## 2020-08-17 PROCEDURE — 77030002922 HC SUT FBRWRE ARTH -B: Performed by: ORTHOPAEDIC SURGERY

## 2020-08-17 PROCEDURE — 74011250637 HC RX REV CODE- 250/637: Performed by: NURSE ANESTHETIST, CERTIFIED REGISTERED

## 2020-08-17 PROCEDURE — 97110 THERAPEUTIC EXERCISES: CPT

## 2020-08-17 PROCEDURE — 74011000258 HC RX REV CODE- 258: Performed by: ANESTHESIOLOGY

## 2020-08-17 PROCEDURE — C9290 INJ, BUPIVACAINE LIPOSOME: HCPCS | Performed by: ORTHOPAEDIC SURGERY

## 2020-08-17 PROCEDURE — 77030006812 HC BLD SAW RECIP STRY -B: Performed by: ORTHOPAEDIC SURGERY

## 2020-08-17 PROCEDURE — 64450 NJX AA&/STRD OTHER PN/BRANCH: CPT | Performed by: ANESTHESIOLOGY

## 2020-08-17 DEVICE — COMPNT FEM PS CEM TRIATHLN 3 R --: Type: IMPLANTABLE DEVICE | Site: KNEE | Status: FUNCTIONAL

## 2020-08-17 DEVICE — CEMENT BNE 20ML 41GM FULL DOSE PMMA W/ TOBRA M VISC RADPQ: Type: IMPLANTABLE DEVICE | Site: KNEE | Status: FUNCTIONAL

## 2020-08-17 DEVICE — BASEPLT TIB UNIV TRIATHLN 3 --: Type: IMPLANTABLE DEVICE | Site: KNEE | Status: FUNCTIONAL

## 2020-08-17 DEVICE — INSERT TIB PS TRIATHLN X3 3 11 --: Type: IMPLANTABLE DEVICE | Site: KNEE | Status: FUNCTIONAL

## 2020-08-17 DEVICE — PAT ASYM TRITHLON X3 32X10MM -- TRIATHLON ASYMMETRIC X3: Type: IMPLANTABLE DEVICE | Site: KNEE | Status: FUNCTIONAL

## 2020-08-17 RX ORDER — WARFARIN 3 MG/1
3 TABLET ORAL DAILY
Qty: 30 TAB | Refills: 1 | Status: SHIPPED | OUTPATIENT
Start: 2020-08-17 | End: 2020-09-16

## 2020-08-17 RX ORDER — ONDANSETRON 2 MG/ML
4 INJECTION INTRAMUSCULAR; INTRAVENOUS
Status: DISCONTINUED | OUTPATIENT
Start: 2020-08-17 | End: 2020-08-18 | Stop reason: HOSPADM

## 2020-08-17 RX ORDER — METOPROLOL TARTRATE 25 MG/1
50 TABLET, FILM COATED ORAL DAILY
Status: DISCONTINUED | OUTPATIENT
Start: 2020-08-18 | End: 2020-08-18 | Stop reason: HOSPADM

## 2020-08-17 RX ORDER — HYDRALAZINE HYDROCHLORIDE 20 MG/ML
INJECTION INTRAMUSCULAR; INTRAVENOUS AS NEEDED
Status: DISCONTINUED | OUTPATIENT
Start: 2020-08-17 | End: 2020-08-17 | Stop reason: HOSPADM

## 2020-08-17 RX ORDER — CEPHALEXIN 500 MG/1
500 CAPSULE ORAL 4 TIMES DAILY
Qty: 12 CAP | Refills: 0 | Status: SHIPPED | OUTPATIENT
Start: 2020-08-17 | End: 2020-12-08 | Stop reason: ALTCHOICE

## 2020-08-17 RX ORDER — OXYCODONE HYDROCHLORIDE 5 MG/1
5-15 TABLET ORAL
Status: DISCONTINUED | OUTPATIENT
Start: 2020-08-17 | End: 2020-08-18 | Stop reason: HOSPADM

## 2020-08-17 RX ORDER — SODIUM CHLORIDE 9 MG/ML
75 INJECTION, SOLUTION INTRAVENOUS CONTINUOUS
Status: DISCONTINUED | OUTPATIENT
Start: 2020-08-17 | End: 2020-08-18 | Stop reason: HOSPADM

## 2020-08-17 RX ORDER — WARFARIN 7.5 MG/1
7.5 TABLET ORAL ONCE
Status: DISPENSED | OUTPATIENT
Start: 2020-08-17 | End: 2020-08-18

## 2020-08-17 RX ORDER — CEFAZOLIN SODIUM 2 G/50ML
2 SOLUTION INTRAVENOUS
Status: DISCONTINUED | OUTPATIENT
Start: 2020-08-17 | End: 2020-08-17 | Stop reason: HOSPADM

## 2020-08-17 RX ORDER — AMOXICILLIN 250 MG
1 CAPSULE ORAL 2 TIMES DAILY
Status: DISCONTINUED | OUTPATIENT
Start: 2020-08-17 | End: 2020-08-18 | Stop reason: HOSPADM

## 2020-08-17 RX ORDER — DOCUSATE SODIUM 100 MG/1
100 CAPSULE, LIQUID FILLED ORAL 2 TIMES DAILY
Qty: 60 CAP | Refills: 2 | Status: SHIPPED | OUTPATIENT
Start: 2020-08-17 | End: 2020-11-15

## 2020-08-17 RX ORDER — SUCCINYLCHOLINE CHLORIDE 20 MG/ML INJECTION SOLUTION
SOLUTION AS NEEDED
Status: DISCONTINUED | OUTPATIENT
Start: 2020-08-17 | End: 2020-08-17 | Stop reason: HOSPADM

## 2020-08-17 RX ORDER — FLUMAZENIL 0.1 MG/ML
0.2 INJECTION INTRAVENOUS AS NEEDED
Status: DISCONTINUED | OUTPATIENT
Start: 2020-08-17 | End: 2020-08-18 | Stop reason: HOSPADM

## 2020-08-17 RX ORDER — ACETAMINOPHEN 500 MG
1000 TABLET ORAL EVERY 6 HOURS
Status: DISCONTINUED | OUTPATIENT
Start: 2020-08-17 | End: 2020-08-18 | Stop reason: HOSPADM

## 2020-08-17 RX ORDER — ONDANSETRON 2 MG/ML
4 INJECTION INTRAMUSCULAR; INTRAVENOUS ONCE
Status: COMPLETED | OUTPATIENT
Start: 2020-08-17 | End: 2020-08-17

## 2020-08-17 RX ORDER — SODIUM CHLORIDE 0.9 % (FLUSH) 0.9 %
5-40 SYRINGE (ML) INJECTION EVERY 8 HOURS
Status: DISCONTINUED | OUTPATIENT
Start: 2020-08-17 | End: 2020-08-17 | Stop reason: HOSPADM

## 2020-08-17 RX ORDER — ONDANSETRON 2 MG/ML
INJECTION INTRAMUSCULAR; INTRAVENOUS
Status: DISPENSED
Start: 2020-08-17 | End: 2020-08-17

## 2020-08-17 RX ORDER — MIDAZOLAM HYDROCHLORIDE 1 MG/ML
INJECTION, SOLUTION INTRAMUSCULAR; INTRAVENOUS
Status: COMPLETED | OUTPATIENT
Start: 2020-08-17 | End: 2020-08-17

## 2020-08-17 RX ORDER — LOSARTAN POTASSIUM 50 MG/1
50 TABLET ORAL DAILY
Status: DISCONTINUED | OUTPATIENT
Start: 2020-08-18 | End: 2020-08-18 | Stop reason: HOSPADM

## 2020-08-17 RX ORDER — ACETAMINOPHEN 500 MG
1000 TABLET ORAL ONCE
Status: COMPLETED | OUTPATIENT
Start: 2020-08-17 | End: 2020-08-17

## 2020-08-17 RX ORDER — ROPIVACAINE HYDROCHLORIDE 2 MG/ML
30 INJECTION, SOLUTION EPIDURAL; INFILTRATION; PERINEURAL
Status: COMPLETED | OUTPATIENT
Start: 2020-08-17 | End: 2020-08-17

## 2020-08-17 RX ORDER — PANTOPRAZOLE SODIUM 40 MG/1
40 TABLET, DELAYED RELEASE ORAL
Status: DISCONTINUED | OUTPATIENT
Start: 2020-08-18 | End: 2020-08-18 | Stop reason: HOSPADM

## 2020-08-17 RX ORDER — METOCLOPRAMIDE 5 MG/1
5 TABLET ORAL
Status: DISCONTINUED | OUTPATIENT
Start: 2020-08-17 | End: 2020-08-18 | Stop reason: HOSPADM

## 2020-08-17 RX ORDER — ZOLPIDEM TARTRATE 5 MG/1
5 TABLET ORAL
Status: DISCONTINUED | OUTPATIENT
Start: 2020-08-17 | End: 2020-08-18 | Stop reason: HOSPADM

## 2020-08-17 RX ORDER — FENTANYL CITRATE 50 UG/ML
100 INJECTION, SOLUTION INTRAMUSCULAR; INTRAVENOUS ONCE
Status: COMPLETED | OUTPATIENT
Start: 2020-08-17 | End: 2020-08-17

## 2020-08-17 RX ORDER — LABETALOL HCL 20 MG/4 ML
10 SYRINGE (ML) INTRAVENOUS AS NEEDED
Status: DISCONTINUED | OUTPATIENT
Start: 2020-08-17 | End: 2020-08-17 | Stop reason: HOSPADM

## 2020-08-17 RX ORDER — METOCLOPRAMIDE 10 MG/1
10 TABLET ORAL
Status: DISCONTINUED | OUTPATIENT
Start: 2020-08-17 | End: 2020-08-17

## 2020-08-17 RX ORDER — LABETALOL HCL 20 MG/4 ML
SYRINGE (ML) INTRAVENOUS AS NEEDED
Status: DISCONTINUED | OUTPATIENT
Start: 2020-08-17 | End: 2020-08-17 | Stop reason: HOSPADM

## 2020-08-17 RX ORDER — OXYCODONE AND ACETAMINOPHEN 7.5; 325 MG/1; MG/1
1-2 TABLET ORAL
Qty: 56 TAB | Refills: 0 | Status: SHIPPED | OUTPATIENT
Start: 2020-08-17 | End: 2020-08-25 | Stop reason: SDUPTHER

## 2020-08-17 RX ORDER — VANCOMYCIN HYDROCHLORIDE 1 G/20ML
INJECTION, POWDER, LYOPHILIZED, FOR SOLUTION INTRAVENOUS AS NEEDED
Status: DISCONTINUED | OUTPATIENT
Start: 2020-08-17 | End: 2020-08-17 | Stop reason: HOSPADM

## 2020-08-17 RX ORDER — SODIUM CHLORIDE 0.9 % (FLUSH) 0.9 %
5-40 SYRINGE (ML) INJECTION EVERY 8 HOURS
Status: DISCONTINUED | OUTPATIENT
Start: 2020-08-17 | End: 2020-08-18 | Stop reason: HOSPADM

## 2020-08-17 RX ORDER — SODIUM CHLORIDE 0.9 % (FLUSH) 0.9 %
5-40 SYRINGE (ML) INJECTION AS NEEDED
Status: DISCONTINUED | OUTPATIENT
Start: 2020-08-17 | End: 2020-08-18 | Stop reason: HOSPADM

## 2020-08-17 RX ORDER — FAMOTIDINE 20 MG/1
20 TABLET, FILM COATED ORAL ONCE
Status: COMPLETED | OUTPATIENT
Start: 2020-08-17 | End: 2020-08-17

## 2020-08-17 RX ORDER — CEFAZOLIN SODIUM 2 G/50ML
2 SOLUTION INTRAVENOUS EVERY 8 HOURS
Status: COMPLETED | OUTPATIENT
Start: 2020-08-17 | End: 2020-08-18

## 2020-08-17 RX ORDER — SODIUM CHLORIDE, SODIUM LACTATE, POTASSIUM CHLORIDE, CALCIUM CHLORIDE 600; 310; 30; 20 MG/100ML; MG/100ML; MG/100ML; MG/100ML
75 INJECTION, SOLUTION INTRAVENOUS CONTINUOUS
Status: DISCONTINUED | OUTPATIENT
Start: 2020-08-17 | End: 2020-08-17 | Stop reason: HOSPADM

## 2020-08-17 RX ORDER — SODIUM CHLORIDE 0.9 % (FLUSH) 0.9 %
5-40 SYRINGE (ML) INJECTION AS NEEDED
Status: DISCONTINUED | OUTPATIENT
Start: 2020-08-17 | End: 2020-08-17 | Stop reason: HOSPADM

## 2020-08-17 RX ORDER — NALOXONE HYDROCHLORIDE 0.4 MG/ML
0.4 INJECTION, SOLUTION INTRAMUSCULAR; INTRAVENOUS; SUBCUTANEOUS AS NEEDED
Status: DISCONTINUED | OUTPATIENT
Start: 2020-08-17 | End: 2020-08-18 | Stop reason: HOSPADM

## 2020-08-17 RX ORDER — HYDROMORPHONE HYDROCHLORIDE 2 MG/ML
INJECTION, SOLUTION INTRAMUSCULAR; INTRAVENOUS; SUBCUTANEOUS AS NEEDED
Status: DISCONTINUED | OUTPATIENT
Start: 2020-08-17 | End: 2020-08-17 | Stop reason: HOSPADM

## 2020-08-17 RX ORDER — LIDOCAINE HYDROCHLORIDE 10 MG/ML
0.1 INJECTION, SOLUTION EPIDURAL; INFILTRATION; INTRACAUDAL; PERINEURAL AS NEEDED
Status: DISCONTINUED | OUTPATIENT
Start: 2020-08-17 | End: 2020-08-17 | Stop reason: HOSPADM

## 2020-08-17 RX ORDER — ONDANSETRON 2 MG/ML
INJECTION INTRAMUSCULAR; INTRAVENOUS AS NEEDED
Status: DISCONTINUED | OUTPATIENT
Start: 2020-08-17 | End: 2020-08-17 | Stop reason: HOSPADM

## 2020-08-17 RX ORDER — DEXAMETHASONE SODIUM PHOSPHATE 4 MG/ML
INJECTION, SOLUTION INTRA-ARTICULAR; INTRALESIONAL; INTRAMUSCULAR; INTRAVENOUS; SOFT TISSUE AS NEEDED
Status: DISCONTINUED | OUTPATIENT
Start: 2020-08-17 | End: 2020-08-17 | Stop reason: HOSPADM

## 2020-08-17 RX ORDER — POVIDONE-IODINE 10 %
SOLUTION, NON-ORAL TOPICAL AS NEEDED
Status: DISCONTINUED | OUTPATIENT
Start: 2020-08-17 | End: 2020-08-17 | Stop reason: HOSPADM

## 2020-08-17 RX ORDER — LANOLIN ALCOHOL/MO/W.PET/CERES
1 CREAM (GRAM) TOPICAL 2 TIMES DAILY WITH MEALS
Status: DISCONTINUED | OUTPATIENT
Start: 2020-08-17 | End: 2020-08-18 | Stop reason: HOSPADM

## 2020-08-17 RX ORDER — POLYMYXIN B 500000 [USP'U]/1
INJECTION, POWDER, LYOPHILIZED, FOR SOLUTION INTRAMUSCULAR; INTRATHECAL; INTRAVENOUS; OPHTHALMIC AS NEEDED
Status: DISCONTINUED | OUTPATIENT
Start: 2020-08-17 | End: 2020-08-17 | Stop reason: HOSPADM

## 2020-08-17 RX ORDER — FENTANYL CITRATE 50 UG/ML
INJECTION, SOLUTION INTRAMUSCULAR; INTRAVENOUS
Status: COMPLETED | OUTPATIENT
Start: 2020-08-17 | End: 2020-08-17

## 2020-08-17 RX ORDER — SODIUM CHLORIDE, SODIUM LACTATE, POTASSIUM CHLORIDE, CALCIUM CHLORIDE 600; 310; 30; 20 MG/100ML; MG/100ML; MG/100ML; MG/100ML
INJECTION, SOLUTION INTRAVENOUS
Status: DISCONTINUED | OUTPATIENT
Start: 2020-08-17 | End: 2020-08-17 | Stop reason: HOSPADM

## 2020-08-17 RX ORDER — CELECOXIB 400 MG/1
400 CAPSULE ORAL ONCE
Status: DISCONTINUED | OUTPATIENT
Start: 2020-08-17 | End: 2020-08-17

## 2020-08-17 RX ORDER — PROPOFOL 10 MG/ML
INJECTION, EMULSION INTRAVENOUS AS NEEDED
Status: DISCONTINUED | OUTPATIENT
Start: 2020-08-17 | End: 2020-08-17 | Stop reason: HOSPADM

## 2020-08-17 RX ORDER — MIDAZOLAM HYDROCHLORIDE 1 MG/ML
2 INJECTION, SOLUTION INTRAMUSCULAR; INTRAVENOUS ONCE
Status: COMPLETED | OUTPATIENT
Start: 2020-08-17 | End: 2020-08-17

## 2020-08-17 RX ADMIN — CEFAZOLIN SODIUM 2 G: 2 SOLUTION INTRAVENOUS at 13:59

## 2020-08-17 RX ADMIN — HYDROMORPHONE HYDROCHLORIDE 1 MG: 2 INJECTION, SOLUTION INTRAMUSCULAR; INTRAVENOUS; SUBCUTANEOUS at 07:55

## 2020-08-17 RX ADMIN — CEFAZOLIN SODIUM 2 G: 2 SOLUTION INTRAVENOUS at 22:23

## 2020-08-17 RX ADMIN — SODIUM CHLORIDE, SODIUM LACTATE, POTASSIUM CHLORIDE, AND CALCIUM CHLORIDE: 600; 310; 30; 20 INJECTION, SOLUTION INTRAVENOUS at 07:21

## 2020-08-17 RX ADMIN — METOCLOPRAMIDE 5 MG: 5 TABLET ORAL at 22:23

## 2020-08-17 RX ADMIN — Medication 10 ML: at 22:23

## 2020-08-17 RX ADMIN — Medication 10 ML: at 14:00

## 2020-08-17 RX ADMIN — MIDAZOLAM HYDROCHLORIDE 2 MG: 2 INJECTION, SOLUTION INTRAMUSCULAR; INTRAVENOUS at 07:11

## 2020-08-17 RX ADMIN — MIDAZOLAM 2 MG: 1 INJECTION INTRAMUSCULAR; INTRAVENOUS at 07:09

## 2020-08-17 RX ADMIN — ACETAMINOPHEN 1000 MG: 500 TABLET, FILM COATED ORAL at 17:58

## 2020-08-17 RX ADMIN — ROPIVACAINE HYDROCHLORIDE 25 ML: 2 INJECTION, SOLUTION EPIDURAL; INFILTRATION at 07:11

## 2020-08-17 RX ADMIN — PROPOFOL 150 MG: 10 INJECTION, EMULSION INTRAVENOUS at 07:40

## 2020-08-17 RX ADMIN — DEXAMETHASONE SODIUM PHOSPHATE 8 MG: 4 INJECTION, SOLUTION INTRAMUSCULAR; INTRAVENOUS at 09:00

## 2020-08-17 RX ADMIN — DOCUSATE SODIUM 50 MG AND SENNOSIDES 8.6 MG 1 TABLET: 8.6; 5 TABLET, FILM COATED ORAL at 17:59

## 2020-08-17 RX ADMIN — OXYCODONE 10 MG: 5 TABLET ORAL at 16:33

## 2020-08-17 RX ADMIN — METOCLOPRAMIDE 5 MG: 5 TABLET ORAL at 16:33

## 2020-08-17 RX ADMIN — ONDANSETRON 4 MG: 2 INJECTION INTRAMUSCULAR; INTRAVENOUS at 09:00

## 2020-08-17 RX ADMIN — LABETALOL 20 MG/4 ML (5 MG/ML) INTRAVENOUS SYRINGE 5 MG: at 08:27

## 2020-08-17 RX ADMIN — ACETAMINOPHEN 1000 MG: 500 TABLET, FILM COATED ORAL at 06:32

## 2020-08-17 RX ADMIN — SODIUM CHLORIDE, SODIUM LACTATE, POTASSIUM CHLORIDE, AND CALCIUM CHLORIDE 75 ML/HR: 600; 310; 30; 20 INJECTION, SOLUTION INTRAVENOUS at 06:32

## 2020-08-17 RX ADMIN — FENTANYL CITRATE 100 MCG: 50 INJECTION, SOLUTION INTRAMUSCULAR; INTRAVENOUS at 07:10

## 2020-08-17 RX ADMIN — HYDROMORPHONE HYDROCHLORIDE 0.5 MG: 2 INJECTION, SOLUTION INTRAMUSCULAR; INTRAVENOUS; SUBCUTANEOUS at 07:35

## 2020-08-17 RX ADMIN — PHENYLEPHRINE HYDROCHLORIDE 100 MCG: 10 INJECTION INTRAVENOUS at 07:56

## 2020-08-17 RX ADMIN — SODIUM CHLORIDE 75 ML/HR: 900 INJECTION, SOLUTION INTRAVENOUS at 12:19

## 2020-08-17 RX ADMIN — Medication 180 MG: at 07:40

## 2020-08-17 RX ADMIN — FAMOTIDINE 20 MG: 20 TABLET ORAL at 06:32

## 2020-08-17 RX ADMIN — ONDANSETRON 4 MG: 2 INJECTION INTRAMUSCULAR; INTRAVENOUS at 14:07

## 2020-08-17 RX ADMIN — HYDRALAZINE HYDROCHLORIDE 10 MG: 20 INJECTION INTRAMUSCULAR; INTRAVENOUS at 08:13

## 2020-08-17 RX ADMIN — ACETAMINOPHEN 1000 MG: 500 TABLET, FILM COATED ORAL at 23:55

## 2020-08-17 RX ADMIN — FENTANYL CITRATE 50 MCG: 50 INJECTION, SOLUTION INTRAMUSCULAR; INTRAVENOUS at 07:11

## 2020-08-17 RX ADMIN — HYDROMORPHONE HYDROCHLORIDE 0.5 MG: 2 INJECTION, SOLUTION INTRAMUSCULAR; INTRAVENOUS; SUBCUTANEOUS at 08:25

## 2020-08-17 RX ADMIN — ROPIVACAINE HYDROCHLORIDE 50 MG: 2 INJECTION, SOLUTION EPIDURAL; INFILTRATION at 07:22

## 2020-08-17 RX ADMIN — ONDANSETRON 4 MG: 2 INJECTION INTRAMUSCULAR; INTRAVENOUS at 10:23

## 2020-08-17 NOTE — ANESTHESIA PREPROCEDURE EVALUATION
Relevant Problems   No relevant active problems       Anesthetic History   No history of anesthetic complications            Review of Systems / Medical History  Patient summary reviewed, nursing notes reviewed and pertinent labs reviewed    Pulmonary        Sleep apnea           Neuro/Psych   Within defined limits           Cardiovascular    Hypertension                   GI/Hepatic/Renal     GERD           Endo/Other        Morbid obesity and arthritis     Other Findings            Physical Exam    Airway  Mallampati: II  TM Distance: 4 - 6 cm  Neck ROM: normal range of motion   Mouth opening: Normal     Cardiovascular  Regular rate and rhythm,  S1 and S2 normal,  no murmur, click, rub, or gallop  Rhythm: regular  Rate: normal         Dental  No notable dental hx       Pulmonary  Breath sounds clear to auscultation               Abdominal  GI exam deferred       Other Findings            Anesthetic Plan    ASA: 3  Anesthesia type: general          Induction: Intravenous  Anesthetic plan and risks discussed with: Patient

## 2020-08-17 NOTE — INTERVAL H&P NOTE
Update History & Physical    The Patient's History and Physical of August 17,   It was reviewed with the patient and I examined the patient. There was no change. The surgical site was confirmed by the patient and me. Plan:  The risk, benefits, expected outcome, and alternative to the recommended procedure have been discussed with the patient. Patient understands and wants to proceed with the procedure.     Electronically signed by Parvez Mccracken MD on 8/17/2020 at 6:56 AM

## 2020-08-17 NOTE — PROGRESS NOTES
Problem: Mobility Impaired (Adult and Pediatric)  Goal: *Acute Goals and Plan of Care (Insert Text)  Description: Physical Therapy Goals  Initiated 8/17/2020 and to be accomplished within 7 day(s) on 8/24/2020  1. Patient will move from supine to sit and sit to supine , scoot up and down, and roll side to side in bed with supervision/set-up. 2.  Patient will transfer from bed to chair and chair to bed with supervision/set-up using the least restrictive device. 3.  Patient will perform sit to stand with supervision/set-up. 4.  Patient will ambulate with supervision/set-up for 100 feet with the least restrictive device. Prior Level of Function:   Patient was independent for all mobility including gait using no assistive device. Patient lives alone in a single story home with good family support. She reports having a rolling walker and shower chair at home. Outcome: Progressing Towards Goal     PHYSICAL THERAPY EVALUATION    Patient: Jackeline Caceres (71 y.o. female)  Date: 8/17/2020  Primary Diagnosis: Osteoarthritis of right knee, unspecified osteoarthritis type [M17.11]  Arthritis of knee [M17.10]  Procedure(s) (LRB):  RIGHT TOTAL KNEE ARTHROPLASTY (Right) Day of Surgery   Precautions: Fall, WBAT(right TKA)  WBAT - right LE      ASSESSMENT :  Based on the objective data described below, the patient presents status post right TKA on 8/17/2020 by Dr. Kathryn Carl. Patient was cleared by nursing for PT evaluation. She presents in supine with HOB elevated and complaining of nausea earlier this afternoon but is agreeable to therapy evaluation. She was given blue education handbook and was educated on WBAT through right LE with ambulation. She demonstrates decreased right LE range of motion, strength, impaired balance, decreased endurance/activity tolerance, and impaired functional mobility. She was minimal assist with rolling and supine<>sit requiring use of side rails and increased time to complete task. Patient demonstrated intact sitting balance and denied dizziness, headache, or lightheaded sitting edge of bed. Patient reporting she needs a new gown and fresh sheets. Patient educated on sit<>stand with use of RW and proper hand placement. She performed sit<>stand with RW and appropriate hand placement with minimal assist. She completed lateral weight shifting in standing with bilateral support on RW and contact guard assist for safety. She ambulated 5 ft with rolling walker and minimal assist before requesting to sit in chair. She demonstrates decreased right foot clearance, step-to gait pattern, and wide base of support. Pt declining sitting up in chair at end of therapy session and requesting to return to bed due to nausea. She was encouraged to perform heel slides and ankle pumps to assist with blood clot prevention and improve LE range of motion. She was repositioned in supine with HOB elevated, knees flat, call button/phone within reach, and nursing notified of patient's status. Today's session was limited due to nausea however, currently recommending home health PT at discharge. Patient will benefit from skilled intervention to address the above impairments.   Patient's rehabilitation potential is considered to be Fair  Factors which may influence rehabilitation potential include:   []         None noted  []         Mental ability/status  [x]         Medical condition  [x]         Home/family situation and support systems  []         Safety awareness  []         Pain tolerance/management  []         Other:      PLAN :  Recommendations and Planned Interventions:   [x]           Bed Mobility Training             [x]    Neuromuscular Re-Education  [x]           Transfer Training                   []    Orthotic/Prosthetic Training  [x]           Gait Training                          []    Modalities  [x]           Therapeutic Exercises           []    Edema Management/Control  [x]           Therapeutic Activities            []    Family Training/Education  [x]           Patient Education  []           Other (comment):    Frequency/Duration: Patient will be followed by physical therapy 1-2 times per day/4-7 days per week to address goals. Discharge Recommendations: Home Health  Further Equipment Recommendations for Discharge: has rolling walker      SUBJECTIVE:   Patient stated I haven't eaten anything but I would really like some broth.     OBJECTIVE DATA SUMMARY:     Past Medical History:   Diagnosis Date    Arthritis     Chronic kidney disease     GERD (gastroesophageal reflux disease)     Hypertension     Ill-defined condition     Positional Vertigo -  pt can not lie flat    Sleep apnea      Past Surgical History:   Procedure Laterality Date    HX BREAST BIOPSY      6-8 yrs ago, scar marked    HX CARPAL TUNNEL RELEASE Right     mid to late [de-identified]    HX CARPAL TUNNEL RELEASE Left     mid     HX  SECTION      HX CHOLECYSTECTOMY      HX GYN      Vaginal Hernia as a child     HX HERNIA REPAIR      age [de-identified]    HX HYSTERECTOMY      partial     NEUROLOGICAL PROCEDURE UNLISTED  2017    lower lumbar     Barriers to Learning/Limitations: None  Compensate with: Visual Cues and Verbal Cues  Home Situation:  Home Situation  Home Environment: Private residence  # Steps to Enter: 0  One/Two Story Residence: One story  Living Alone: Yes  Support Systems: Family member(s)  Patient Expects to be Discharged to[de-identified] Private residence  Current DME Used/Available at Home: Marlyne Oiler, rolling, Shower chair    Critical Behavior:  Neurologic State: Alert  Orientation Level: Oriented X4  Cognition: Follows commands  Safety/Judgement: Fall prevention  Psychosocial  Purposeful Interaction: Yes  Pt Identified Daily Priority: Clinical issues (comment)  Caritas Process: Nurture loving kindness;Establish trust;Attend basic human needs; Teaching/learning;Create healing environment  Caring Interventions: Reassure  Reassure:  Therapeutic listening; Informing  Skin Condition/Temp: Warm  Skin Integrity: Incision (comment)  Skin Integumentary  Skin Color: Appropriate for ethnicity  Skin Condition/Temp: Warm  Skin Integrity: Incision (comment)  Turgor: Non-tenting     Strength:    Strength: Generally decreased, functional(right LE decreased )    Tone & Sensation:   Tone: Normal  Sensation: Intact    Range Of Motion:  AROM: Generally decreased, functional(right LE decreased )  PROM: Generally decreased, functional(right LE decreased)    Posture:  Posture (WDL): Exceptions to WDL  Posture Assessment: Trunk flexion; Increased; Forward head;Rounded shoulders  Functional Mobility:  Bed Mobility:  Rolling: Minimum assistance  Supine to Sit: Minimum assistance(at LE)  Sit to Supine: Minimum assistance(at LE)  Scooting: Contact guard assistance    Transfers:  Sit to Stand: Minimum assistance  Stand to Sit: Minimum assistance  Bed to Chair: Minimum assistance    Balance:   Sitting: Intact; With support  Standing: Impaired; With support  Standing - Static: Good  Standing - Dynamic : Fair    Ambulation/Gait Training:  Distance (ft): 5 Feet (ft)  Assistive Device: Walker, rolling  Ambulation - Level of Assistance: Minimal assistance  Gait Description (WDL): Exceptions to WDL  Gait Abnormalities: Decreased step clearance; Step to gait; Shuffling gait  Right Side Weight Bearing: As tolerated  Left Side Weight Bearing: Full  Base of Support: Widened  Stance: Right decreased; Left increased  Speed/Jayda: Slow;Shuffled  Step Length: Left shortened;Right shortened    Therapeutic Exercises:   Patient completed 10x heel slides x1 set sitting edge of bed to assist with knee flexion. Patient completed 15x ankle pumps x1 set in supine and encouraged to complete every hour to assist with DVT prevention. Pain:  Pain level pre-treatment: right knee discomfort - not rated   Pain level post-treatment: 6/10 in right knee  Pain Intervention(s) : Medication (see MAR);  Rest, Ice, Repositioning  Response to intervention: Nurse notified, See doc flow    Activity Tolerance:   Fair, limited due to nausea today but SpO2 remained >96% on room air with mobility   Please refer to the flowsheet for vital signs taken during this treatment. After treatment:   []         Patient left in no apparent distress sitting up in chair  [x]         Patient left in no apparent distress in bed  [x]         Call bell left within reach  [x]         Nursing notified  []         Caregiver present  []         Bed alarm activated  []         SCDs applied    COMMUNICATION/EDUCATION:   [x]         Role of Physical Therapy in the acute care setting. [x]         Fall prevention education was provided and the patient/caregiver indicated understanding. [x]         Patient/family have participated as able in goal setting and plan of care. [x]         Patient/family agree to work toward stated goals and plan of care. []         Patient understands intent and goals of therapy, but is neutral about his/her participation. []         Patient is unable to participate in goal setting/plan of care: ongoing with therapy staff.  []         Other:     Thank you for this referral.  Ary Kurtz, PT, DPT    Time Calculation: 58 mins      Eval Complexity: History: MEDIUM  Complexity : 1-2 comorbidities / personal factors will impact the outcome/ POC Exam:MEDIUM Complexity : 3 Standardized tests and measures addressing body structure, function, activity limitation and / or participation in recreation  Presentation: MEDIUM Complexity : Evolving with changing characteristics  Clinical Decision Making:Medium Complexity    Overall Complexity:MEDIUM

## 2020-08-17 NOTE — DISCHARGE SUMMARY
8/17/2020  5:31 AM    8/18/2020, 9:30 AM    Primary Dx:right Orthopedic / Rheumatologic: Total Knee Replacement  Secondary Dx: Etiological Diagnoses: none    HPI:  Pt has end stage OA and had failed conservative treatment. Due to the current findings and affected activity of daily living surgical intervention is indicated.   The alternatives, risks, complications as well as expected outcome were discussed, the patient understands and wishes to proceed with surgery    Past Medical History:   Diagnosis Date    Arthritis     Chronic kidney disease     GERD (gastroesophageal reflux disease)     Hypertension     Ill-defined condition     Positional Vertigo -  pt can not lie flat    Sleep apnea          Current Facility-Administered Medications:     lidocaine (PF) (XYLOCAINE) 10 mg/mL (1 %) injection 0.1 mL, 0.1 mL, SubCUTAneous, PRN, Katlyn Rising CRNA    lactated Ringers infusion, 75 mL/hr, IntraVENous, CONTINUOUS, Katlyn Rising, CRNA, Last Rate: 75 mL/hr at 08/17/20 0632, 75 mL/hr at 08/17/20 9970    sodium chloride (NS) flush 5-40 mL, 5-40 mL, IntraVENous, Q8H, Qasim Pastor CRNA    sodium chloride (NS) flush 5-40 mL, 5-40 mL, IntraVENous, PRN, Katlyn Rising, CRNA    bupivacaine liposome (PF) susp (EXPAREL) infiltration 266 mg, 20 mL, Infiltration, ONCE, Saul Barrios PA-C    ceFAZolin (ANCEF) 2g IVPB in 50 mL D5W, 2 g, IntraVENous, ON CALL TO OR, Saul Barrios PA-C    bupivacaine (PF) 0.5 % (5 mg/mL) 25 mL, EPINEPHrine HCl (PF) 0.5 mg, ketorolac 30 mg, bupivacaine liposome (PF) susp 20 mL in 0.9% sodium chloride 50 mL iv solution, , , PRN, Roger Marina MD, 96.5 mL at 08/17/20 0847    vancomycin (VANCOCIN) injection, , , PRN, Roger Marina MD, 3 g at 08/17/20 0811    sodium chloride 0.9 % bolus infusion, , , CONTINUOUS, Roger Marina MD, 500 mL at 08/17/20 0811    polymyxin B 500,000 unit injection, , , PRN, Roger Marina MD, 750,000 Units at 08/17/20 8839   povidone-iodine (BETADINE) 10 % topical solution, , , PRN, Silviano Smith MD, 17.5 mL at 08/17/20 0439    Facility-Administered Medications Ordered in Other Encounters:     lactated Ringers infusion, , IntraVENous, CONTINUOUS, Tank Hou MD    PHENYLephrine (RENNY-SYNEPHRINE) 10,000 mcg in 0.9% sodium chloride 100 mL infusion, , IntraVENous, CONTINUOUS, Tank Steve MD, 100 mcg at 08/17/20 0756    HYDROmorphone (PF) (DILAUDID) injection, , , PRN, Tank Steve MD, 0.5 mg at 08/17/20 0825    propofoL (DIPRIVAN) 10 mg/mL injection, , IntraVENous, PRN, Shankar POTTER MD, 150 mg at 08/17/20 0740    succinylcholine chloride 200 mg/10 ml syringe, , IntraVENous, PRN, Shankar POTTER MD, 180 mg at 08/17/20 0740    hydrALAZINE (APRESOLINE) 20 mg/mL injection, , , PRN, Tank Steve MD, 10 mg at 08/17/20 0813    labetaloL (NORMODYNE;TRANDATE) 20 mg/4 mL (5 mg/mL) injection, , , PRN, Tank Steve MD, 5 mg at 08/17/20 0827    dexamethasone (DECADRON) 4 mg/mL injection, , , PRN, Tank Steve MD, 8 mg at 08/17/20 0900    ondansetron (ZOFRAN) injection, , IntraVENous, PRN, Shankar POTTER MD, 4 mg at 08/17/20 0900    Cymbalta [duloxetine]; Gabapentin; Nsaids (non-steroidal anti-inflammatory drug); Silicone; and Tramadol    Physical Exam:  General A&O x3 NAD, well developed, well nourished, normal affect  Heart: S1-S2, RRR  Lungs: CTA Bilat  Abd: soft NT, ND  Ext: n/v intact    Hospital Course:    Pt. Had rightOrthopedic / Rheumatologic: Total Knee Replacement    Post -op Course: The patient tolerated the procedure well. They were followed by internal medicine for help with medical management. Pt. Was place on Abx pre and post-op for prophylaxis against infection as well as coumadin pre and post-op for prophylaxis against DVT. Vitals signs remained stable, remained af. The wound wasclean, dry, no drainage. Pain was well controlled.   Pt. Had negative calf tenderness or swelling, no evidence for DVT.  Patient had PT/OT consult for evaluation and treatment. CBC  Lab Results   Component Value Date/Time    WBC 5.7 08/03/2020 08:27 AM    RBC 4.95 08/03/2020 08:27 AM    HCT 39.3 08/03/2020 08:27 AM    MCV 79.4 08/03/2020 08:27 AM    MCH 25.3 08/03/2020 08:27 AM    MCHC 31.8 08/03/2020 08:27 AM    RDW 14.7 (H) 08/03/2020 08:27 AM     Coagulation  Lab Results   Component Value Date    INR 1.0 08/03/2020    APTT 32.4 08/03/2020      Basic Metabolic Profile  Lab Results   Component Value Date     08/03/2020    CO2 26 08/03/2020    BUN 17 08/03/2020       Discharge Meds:  Current Discharge Medication List      START taking these medications    Details   oxyCODONE-acetaminophen (Percocet) 7.5-325 mg per tablet Take 1-2 Tabs by mouth every six (6) hours as needed for Pain for up to 7 days. Max Daily Amount: 8 Tabs. Qty: 56 Tab, Refills: 0    Associated Diagnoses: Arthritis of knee      docusate sodium (Colace) 100 mg capsule Take 1 Cap by mouth two (2) times a day for 90 days. Qty: 60 Cap, Refills: 2    Associated Diagnoses: Arthritis of knee      warfarin (Coumadin) 3 mg tablet Take 1 Tab by mouth daily for 30 days. Qty: 30 Tab, Refills: 1    Associated Diagnoses: Arthritis of knee      cephALEXin (Keflex) 500 mg capsule Take 1 Cap by mouth four (4) times daily. Qty: 12 Cap, Refills: 0    Associated Diagnoses: Arthritis of knee         CONTINUE these medications which have NOT CHANGED    Details   !! OTHER [employer] -8 gtts daily OTC      !! OTHER CBD oil as needed      metoclopramide HCl (Reglan) 10 mg tablet Take 10 mg by mouth Before breakfast, lunch, dinner and at bedtime. omeprazole (PRILOSEC) 40 mg capsule Take 40 mg by mouth daily. losartan (COZAAR) 50 mg tablet Take 50 mg by mouth daily. acetaminophen (TYLENOL) 325 mg tablet Take  by mouth every four (4) hours as needed for Pain.      diazePAM (VALIUM) 5 mg tablet Take 1 Tab by mouth every eight (8) hours as needed for Anxiety.  Max Daily Amount: 15 mg.  Qty: 20 Tab, Refills: 0      metoprolol (LOPRESSOR) 50 mg tablet Take 50 mg by mouth daily. folic acid (FOLVITE) 1 mg tablet Take 1 mg by mouth daily. Cholecalciferol, Vitamin D3, (VITAMIN D3) 1,000 unit cap Take 1,000 Units by mouth daily. cyanocobalamin (VITAMIN B-12) 1,000 mcg tablet Take 1,000 mcg by mouth daily as needed. !! - Potential duplicate medications found. Please discuss with provider. STOP taking these medications       diclofenac (VOLTAREN) 1 % gel Comments:   Reason for Stopping:               Discharge Plan:  The patient will be d/c'd to home, total knee protocol, WBAT. She will have [facility] PT and nursing. Total joint protocol. Pt safe for homebound transfer, sp Total joint replacement. A walker, bedside commode, and shower chair will be utilized for ADL's. Follow up with Dr. Olegario Hawley in 10-12 days. Call with any questions or concerns.

## 2020-08-17 NOTE — ANESTHESIA PROCEDURE NOTES
Peripheral Block    Start time: 8/17/2020 7:10 AM  Performed by: Aleta Hirsch MD  Authorized by: Aleta Hirsch MD       Pre-procedure: Indications: at surgeon's request, post-op pain management, procedure for pain and primary anesthetic    Preanesthetic Checklist: patient identified, risks and benefits discussed, site marked, timeout performed, anesthesia consent given and patient being monitored    Timeout Time: 07:10          Block Type:   Block Type:   Adductor canal  Monitoring:  Standard ASA monitoring, responsive to questions, continuous pulse ox, oxygen, frequent vital sign checks and heart rate  Injection Technique:  Single shot  Procedures: ultrasound guided and nerve stimulator    Location:  Upper thigh  Needle Type:  Stimuplex  Needle Gauge:  21 G  Needle Localization:  Ultrasound guidance, nerve stimulator, infiltration and anatomical landmarks  Motor Response: minimal motor response >0.4 mA      Assessment:    Injection Assessment:  Incremental injection every 5 mL, local visualized surrounding nerve on ultrasound, negative aspiration for blood, no intravascular symptoms and ultrasound image on chart  Patient tolerance:  Patient tolerated the procedure well with no immediate complications

## 2020-08-17 NOTE — ROUTINE PROCESS
End of Shift Note     Bedside and verbal shift change report given to CINDY Mcginnis (On coming nurse) by Salma Madrigal RN (Off going nurse).   Report included the following information:      --Procedure Summary     --MAR,     --Recent Results     --Med Rec Status    SBAR Recommendations: Monitor nausea    Issues for Provider to address N/A          Activity This Shift     [] Bed Rest Order   [] Refused   [] Dangled    [] TDWB         Ambulating:     [x] Bathroom     [] BSC     [] Room/Hallway      Up in Chair for meals    [x]Yes [] No   Voiding       [x] Yes  [] No  [place]          [] Yes  [x] No  Incontinent [x] Yes  [] No    DUE TO VOID N/A POUR        [] Yes [x] No  [place]urewick    [] Yes [x] No  New Onset [] Yes [x] No Straight Cath   []Yes  [x] No  Condom Cath  [] Yes [x] No  MD Called      [] Yes  [x] No   Blood Sugars Managed []Yes [x] No    Bowels Moved [] Yes [x] No    Incontinent     [] Yes [x] No Passed Gas []Yes [x] No    New Onset  []Yes [x] No        MD Called []Yes  [x] No     CHG Bath Done     Before Surgery     After Surgery      [] Yes  [] No  [] Yes  [x] No       Drain Removed [] Yes  [] No [x] N/A    Dressing Changed [] Yes   [x] No [] N/A      Nausea/Vomiting [x] Yes   [] No     Ice Packs Changed [x] Yes   [] No  [] N/A    Incentive Spirometer  [x] Yes  [] No      SCD Pumps On     Ankle Pumping  [x] Yes   [] No      [x] Yes   [] No        Telemetry Monitoring [] Yes   [x] No   Rhythm NSR

## 2020-08-17 NOTE — HOME CARE
Received referral for [address]. Attempted to verify demographics with patient however she stated she was eating and requested that I speak with her another time. Will follow up tomorrow. [address] will follow for SN and PT - [place] knee protocol.     Alejandrina Duarte LPN   [facility] Liaison  [phone]

## 2020-08-17 NOTE — PERIOP NOTES
TRANSFER - OUT REPORT:    Verbal report given to Manjinder Foster RN on Erica Jean  being transferred to [facility] Surgical for routine post - op       Report consisted of patients Situation, Background, Assessment and   Recommendations(SBAR). Information from the following report(s) SBAR and MAR was reviewed with the receiving nurse. Lines:   Peripheral IV 08/17/20 Left Antecubital (Active)   Site Assessment Clean, dry, & intact 08/17/20 1000   Phlebitis Assessment 0 08/17/20 1000   Infiltration Assessment 0 08/17/20 1000   Dressing Status Clean, dry, & intact 08/17/20 1000   Dressing Type Tape;Transparent 08/17/20 1000   Hub Color/Line Status Pink; Infusing 08/17/20 1000        Opportunity for questions and clarification was provided.       Patient transported with:   O2 3L

## 2020-08-17 NOTE — ROUTINE PROCESS
TRANSFER - IN REPORT:    Verbal report received from Brian Palacio RN(name) on Mago Avila  being received from [facility]) for routine post - op      Report consisted of patients Situation, Background, Assessment and   Recommendations(SBAR). Information from the following report(s) SBAR, Kardex, Procedure Summary, Intake/Output, MAR, Recent Results and Med Rec Status was reviewed with the receiving nurse. Opportunity for questions and clarification was provided. Assessment completed upon patients arrival to unit and care assumed.

## 2020-08-17 NOTE — BRIEF OP NOTE
Brief Postoperative Note    Patient: Gorge Gonzalez  YOB: 1950  MRN: [id number]    Date of Procedure: 8/17/2020     Pre-Op Diagnosis: Osteoarthritis of right knee, unspecified osteoarthritis type [M17.11]    Post-Op Diagnosis: Same as preoperative diagnosis. Procedure(s):  RIGHT TOTAL KNEE ARTHROPLASTY    Surgeon(s):  Sherly Rodriguez MD    Surgical Assistant: Physician Assistant: Vinay Townsend PA-C    Anesthesia: General     Estimated Blood Loss (mL): less than 50     Complications: None    Specimens: * No specimens in log *     Implants:   Implant Name Type Inv.  Item Serial No.  Lot No. LRB No. Used Action   CEMENT BONE SIMPLEX P 1/PACK - [id number]  CEMENT BONE SIMPLEX P 1/PACK  [employer] HOW [id number] Right 1 Implanted   CEMENT BONE SIMPLEX P 1/PACK - [id number]  CEMENT BONE SIMPLEX P 1/PACK  [employer] HOW [id number] Right 1 Implanted   COMPNT FEM PS SERGIO TRIATHLN 3 R --  - [id number]  COMPNT FEM PS SERGIO TRIATHLN 3 R --   [employer] [facility] [id number] Right 1 Implanted   BASEPLT TIB UNIV TRIATHLN 3 --  - [id number]  BASEPLT TIB UNIV TRIATHLN 3 --   [employer] [facility] [id number] Right 1 Implanted   PAT ASYM TRITHLON X3 [id number] -- TRIATHLON ASYMMETRIC X3 - [id number]  PAT ASYM TRITHLON X3 [id number] -- TRIATHLON ASYMMETRIC X3  [employer] [facility] [id number] Right 1 Implanted   INSERT TIB PS TRIATHLN X3 3 11 --  - [id number]  INSERT TIB PS TRIATHLN X3 3 11 --   [employer] HOW [id number] Right 1 Implanted       Drains: * No LDAs found *    Findings: same    Electronically Signed by Zan Arita MD on 8/17/2020 at 9:18 AM

## 2020-08-17 NOTE — PROGRESS NOTES
Reason for Admission:  Osteoarthritis of right knee, unspecified osteoarthritis type [M17.11]  Arthritis of knee [M17.10]                 RUR Score:    9%            Plan for utilizing home health:    Yes, FOC for [facility]                      Likelihood of Readmission:   LOW                         Transition of Care Plan:              Initial assessment completed with patient. Cognitive status of patient: oriented to time, place, person and situation. Face sheet information confirmed:  yes. The patient designates sister, Alireza Chappell to participate in her discharge plan and to receive any needed information. This patient lives in a single family home alone, but sister to stay with pt. .  Patient is able to navigate steps as needed. Prior to hospitalization, patient was considered to be independent with ADLs/IADLS : yes . Patient has a current ACP document on file: no  The patient and sister will be available to transport patient home upon discharge. The patient already has Noralyn Tamara, and  medical equipment available in the home. Patient is not currently active with home health. Patient has not stayed in a skilled nursing facility or rehab. This patient is on dialysis :no      List of available Home Health agencies were provided and reviewed with the patient prior to discharge. Freedom of choice signed: yes, for [facility] and referral sent to [facility] que and spoke with Sanjeev . Currently, the discharge plan is Home with [address] [place] [address] Viral Garcia. The patient states that she can obtain her medications from the pharmacy, and take her medications as directed. Patient's current insurance is Medicare/[employer]       Care Management Interventions  PCP Verified by CM:  Yes  Mode of Transport at Discharge: Self  Transition of Care Consult (CM Consult): [address]: Yes  Discharge Durable Medical Equipment: No  Physical Therapy Consult: Yes  Occupational Therapy Consult: Yes  Current Support Network: Lives Alone(sister to stay with pt)  Confirm Follow Up Transport: Family  The Plan for Transition of Care is Related to the Following Treatment Goals : home health  The Patient and/or Patient Representative was Provided with a Choice of Provider and Agrees with the Discharge Plan?: Yes  Freedom of Choice List was Provided with Basic Dialogue that Supports the Patient's Individualized Plan of Care/Goals, Treatment Preferences and Shares the Quality Data Associated with the Providers?: Yes  Discharge Location  Discharge Placement: Home with home health        RONEL Frost, [place]- [phone]

## 2020-08-17 NOTE — PHYSICIAN ADVISORY
Letter of admission status determination     Lula Lechuga   Age: 65 y.o. MRN: [id number]  Admitting physician:   Insurance: Payor: [place] MEDICARE / Plan: VA MEDICARE PART A & B / Product Type: Medicare /     Date of admission:  8/17/2020    I have reviewed this case as it involves a Medicare patient not meeting criteria for Inpatient services. The patient underwent elective total knee arthroplasty today and tolerated the procedure well, without any documented complications. The procedure is not on the current Medicare Inpatient Only List.    A discharge order was placed today. There is no indication that patient's hospital care will extend to the POD #2. Patient's modest burden of comorbidities does not increase the operative risk enough to justify Inpatient status. Therefore, Outpatient status is appropriate for this hospital stay. The final decision regarding the patient's hospitalization status depends on the attending physician's judgment.        Sue Cabral MD, DAVID, [address] [facility], [address] [place] [address]  [phone]

## 2020-08-17 NOTE — OP NOTES
[address] [place] [address]   OPERATIVE REPORT    Name:  Cesar Moe  MR#:   [id number]  :  1950  ACCOUNT #:  [de-identified]  DATE OF SERVICE:  2020    PREOPERATIVE DIAGNOSIS:  End-stage arthritis of the right knee with varus malalignment and mid flexion instability. POSTOPERATIVE DIAGNOSIS:  End-stage arthritis of the right knee with varus malalignment and mid flexion instability. PROCEDURE PERFORMED:  Right total knee replacement using the Sandy Triathlon system with a size 3 right posterior stabilized femoral component, size 3 tibia, size 3 - 11 tibial bearing insert posterior stabilized, and a 32 asymmetric patella. SURGEON:  Will Mora. Ras Lara MD    ASSISTANT:  Lizeth Masterson, first assistant. ANESTHESIA:  Preoperative femoral nerve block with light general.    COMPLICATIONS:  None. SPECIMENS REMOVED:  None. IMPLANTS:  As above mentioned. ESTIMATED BLOOD LOSS:  Less than 50. SECOND ASSISTANT:  Sharon Mclean. ANESTHETIST:  Dr. Lucy Smith. Lizeth Masterson was the first assistant who assisted with all phases of surgery commencing with patient positioning, patient prep, patient drape, leg positioning during the surgery, retracting, assisting with the surgery itself, closure, dressing placement, and transfer. PROCEDURE:  After the anesthetic was successfully induced, it was confirmed the patient did receive preoperative antibiotics and time-out was performed, midline incision. The knee debrided in the usual fashion. Femoral canal aspirated, lavaged, and re-aspirated prior to instrumentation, cut for 5 degrees for the appropriate size. The crab claw was utilized to prevent undercutting. All cuts were checked for trueness and squareness, and all soft tissue structures including neurovascular bundle protected at all times. Modified gap balancing technique would be performed.   After preliminary femoral cuts, we switched our attention to the tibia, used the external alignment guide with appropriate landmarks and resected enough to get a decent clean-up cut. Removed posterior osteophytes while protecting the neurovascular bundle and used the Aquamantys and Exparel cocktail. Modified gap balancing technique, confirmed correct femoral rotation. We then did our femoral finishing followed by placement of the trial components to set our tibial rotation, which was marked and later re-punched. We then resurfaced the patella restoring patellar fitness anatomically and using a rongeur to smooth out the edges. With all the trial components in place, we checked the overall alignment, range of motion, soft tissue balance, patellar tracking, and stability, all of which we were delighted with. Fashioned bone plug for the femoral canal.  Further control of hemostasis, cemented in the knee removing all extraneous cement. Holding the knee in full extension, the cement was fully cured. Further cement removal, further pulse lavage, further trialing. I was happiest with the 11, locked it in place, again reducing the knee, let the tourniquet down. Routine closure. Fully flexed the knee prior to closure of the skin. At the end of the case, instrument, sponge, and needle count was correct. No complications. The patient tolerated the procedure well. Blood loss less than 50. Excellent outcome of the case.       Dong Marin MD [id number]  D:  08/17/2020 9:30  T:  08/17/2020 13:01  JOB #:  [id number]

## 2020-08-17 NOTE — PROGRESS NOTES
Problem: Falls - Risk of  Goal: *Absence of Falls  Description: Document Sabra Juan Fall Risk and appropriate interventions in the flowsheet. Outcome: Progressing Towards Goal  Note: Fall Risk Interventions:  Mobility Interventions: Communicate number of staff needed for ambulation/transfer, Patient to call before getting OOB, Utilize walker, cane, or other assistive device         Medication Interventions: Assess postural VS orthostatic hypotension, Patient to call before getting OOB, Teach patient to arise slowly    Elimination Interventions: Call light in reach, Patient to call for help with toileting needs, Toileting schedule/hourly rounds              Problem: Patient Education: Go to Patient Education Activity  Goal: Patient/Family Education  Outcome: Progressing Towards Goal     Problem: Pain  Goal: *Control of Pain  Outcome: Progressing Towards Goal     Problem: Patient Education: Go to Patient Education Activity  Goal: Patient/Family Education  Outcome: Progressing Towards Goal     Problem: Infection - Risk of, Surgical Site Infection  Goal: *Absence of surgical site infection signs and symptoms  Outcome: Progressing Towards Goal     Problem: Patient Education: Go to Patient Education Activity  Goal: Patient/Family Education  Outcome: Progressing Towards Goal     Problem: Pressure Injury - Risk of  Goal: *Prevention of pressure injury  Description: Document Bari Scale and appropriate interventions in the flowsheet.   Outcome: Progressing Towards Goal  Note: Pressure Injury Interventions:       Moisture Interventions: Absorbent underpads, Minimize layers, Moisture barrier    Activity Interventions: Assess need for specialty bed, Pressure redistribution bed/mattress(bed type), PT/OT evaluation    Mobility Interventions: Assess need for specialty bed, PT/OT evaluation, Pressure redistribution bed/mattress (bed type)    Nutrition Interventions: Document food/fluid/supplement intake, Offer support with meals,snacks and hydration                     Problem: Patient Education: Go to Patient Education Activity  Goal: Patient/Family Education  Outcome: Progressing Towards Goal

## 2020-08-17 NOTE — PROGRESS NOTES
conducted a pre-surgery visit with Cheryl Culver, who is a 65 y.o.,female. The  provided the following Interventions:  Initiated a relationship of care and support. Offered prayer and assurance of continued prayers on patient's behalf. Plan:  Chaplains will continue to follow and will provide pastoral care on an as needed/requested basis.  recommends bedside caregivers page  on duty if patient shows signs of acute spiritual or emotional distress.     [address] [place], [place] [address]  [phone]

## 2020-08-18 VITALS
SYSTOLIC BLOOD PRESSURE: 130 MMHG | RESPIRATION RATE: 14 BRPM | WEIGHT: 182 LBS | DIASTOLIC BLOOD PRESSURE: 76 MMHG | HEART RATE: 89 BPM | HEIGHT: 59 IN | OXYGEN SATURATION: 98 % | BODY MASS INDEX: 36.69 KG/M2 | TEMPERATURE: 98.5 F

## 2020-08-18 LAB
INR PPP: 1.1 (ref 0.8–1.2)
PROTHROMBIN TIME: 13.8 SEC (ref 11.5–15.2)

## 2020-08-18 PROCEDURE — 85610 PROTHROMBIN TIME: CPT

## 2020-08-18 PROCEDURE — 74011250636 HC RX REV CODE- 250/636: Performed by: ORTHOPAEDIC SURGERY

## 2020-08-18 PROCEDURE — 97530 THERAPEUTIC ACTIVITIES: CPT

## 2020-08-18 PROCEDURE — 97110 THERAPEUTIC EXERCISES: CPT

## 2020-08-18 PROCEDURE — 97165 OT EVAL LOW COMPLEX 30 MIN: CPT

## 2020-08-18 PROCEDURE — 74011250637 HC RX REV CODE- 250/637: Performed by: ORTHOPAEDIC SURGERY

## 2020-08-18 PROCEDURE — 74011250637 HC RX REV CODE- 250/637: Performed by: PHYSICIAN ASSISTANT

## 2020-08-18 PROCEDURE — 97116 GAIT TRAINING THERAPY: CPT

## 2020-08-18 PROCEDURE — 36415 COLL VENOUS BLD VENIPUNCTURE: CPT

## 2020-08-18 PROCEDURE — 97535 SELF CARE MNGMENT TRAINING: CPT

## 2020-08-18 RX ORDER — WARFARIN 7.5 MG/1
7.5 TABLET ORAL
Status: COMPLETED | OUTPATIENT
Start: 2020-08-18 | End: 2020-08-18

## 2020-08-18 RX ORDER — FLUCONAZOLE 150 MG/1
150 TABLET ORAL DAILY
Qty: 1 TAB | Refills: 0 | Status: SHIPPED | OUTPATIENT
Start: 2020-08-18 | End: 2020-08-19

## 2020-08-18 RX ADMIN — CEFAZOLIN SODIUM 2 G: 2 SOLUTION INTRAVENOUS at 04:25

## 2020-08-18 RX ADMIN — SODIUM CHLORIDE 75 ML/HR: 900 INJECTION, SOLUTION INTRAVENOUS at 00:10

## 2020-08-18 RX ADMIN — Medication 10 ML: at 06:50

## 2020-08-18 RX ADMIN — METOCLOPRAMIDE 5 MG: 5 TABLET ORAL at 06:50

## 2020-08-18 RX ADMIN — FERROUS SULFATE TAB 325 MG (65 MG ELEMENTAL FE) 325 MG: 325 (65 FE) TAB at 08:17

## 2020-08-18 RX ADMIN — DOCUSATE SODIUM 50 MG AND SENNOSIDES 8.6 MG 1 TABLET: 8.6; 5 TABLET, FILM COATED ORAL at 08:18

## 2020-08-18 RX ADMIN — METOPROLOL TARTRATE 50 MG: 25 TABLET, FILM COATED ORAL at 08:17

## 2020-08-18 RX ADMIN — ACETAMINOPHEN 1000 MG: 500 TABLET, FILM COATED ORAL at 06:49

## 2020-08-18 RX ADMIN — PANTOPRAZOLE SODIUM 40 MG: 40 TABLET, DELAYED RELEASE ORAL at 06:50

## 2020-08-18 RX ADMIN — WARFARIN SODIUM 7.5 MG: 7.5 TABLET ORAL at 10:42

## 2020-08-18 RX ADMIN — OXYCODONE 10 MG: 5 TABLET ORAL at 08:17

## 2020-08-18 RX ADMIN — LOSARTAN POTASSIUM 50 MG: 50 TABLET, FILM COATED ORAL at 08:17

## 2020-08-18 NOTE — PROGRESS NOTES
Ortho    Pt. Seen and evaluated. Doing well, pain well controlled, progressing well with PT  Denies cp, sob, abd pain    Blood pressure 130/76, pulse 89, temperature 98.5 °F (36.9 °C), resp. rate 14, height 4' 11\" (1.499 m), weight 182 lb (82.6 kg), SpO2 98 %. rightKnee woundclean, dry, no drainage  Sensory intact to LT  Motor intact  nv intact  Neg calf tenderness    Labs:  CBC  @  CBC:   Lab Results   Component Value Date/Time    WBC 5.7 08/03/2020 08:27 AM    RBC 4.95 08/03/2020 08:27 AM    HGB 12.5 08/03/2020 08:27 AM    HCT 39.3 08/03/2020 08:27 AM    PLATELET 415 19/16/8407 08:27 AM     BMP:   Lab Results   Component Value Date/Time    Glucose 69 (L) 08/03/2020 08:27 AM    Sodium 142 08/03/2020 08:27 AM    Potassium 4.1 08/03/2020 08:27 AM    Chloride 109 08/03/2020 08:27 AM    CO2 26 08/03/2020 08:27 AM    BUN 17 08/03/2020 08:27 AM    Creatinine 1.29 08/03/2020 08:27 AM    Calcium 8.9 08/03/2020 08:27 AM   @  Coagulation  Lab Results   Component Value Date    INR 1.1 08/18/2020    APTT 32.4 08/03/2020      Basic Metabolic Profile  Lab Results   Component Value Date     08/03/2020    CO2 26 08/03/2020    BUN 17 08/03/2020       Assesment: rightOrthopedic / Rheumatologic: Total Knee Replacement  Past Medical History:   Diagnosis Date    Arthritis     Chronic kidney disease     GERD (gastroesophageal reflux disease)     Hypertension     Ill-defined condition     Positional Vertigo -  pt can not lie flat    Sleep apnea      ASA: 3    Status post joint replacement pt with risk of bleeding, blood clots, and infection. Plan: coumadin, PT, DC to home if cleared by PT and ok with medicine.

## 2020-08-18 NOTE — ROUTINE PROCESS
I have reviewed discharge instructions with the patient. The patient verbalized understanding. Discharge medications reviewed with patient and appropriate educational materials and side effects teaching were provided. Current Discharge Medication List      START taking these medications    Details   oxyCODONE-acetaminophen (Percocet) 7.5-325 mg per tablet Take 1-2 Tabs by mouth every six (6) hours as needed for Pain for up to 7 days. Max Daily Amount: 8 Tabs. Qty: 56 Tab, Refills: 0    Associated Diagnoses: Arthritis of knee      docusate sodium (Colace) 100 mg capsule Take 1 Cap by mouth two (2) times a day for 90 days. Qty: 60 Cap, Refills: 2    Associated Diagnoses: Arthritis of knee      warfarin (Coumadin) 3 mg tablet Take 1 Tab by mouth daily for 30 days. Qty: 30 Tab, Refills: 1    Associated Diagnoses: Arthritis of knee      cephALEXin (Keflex) 500 mg capsule Take 1 Cap by mouth four (4) times daily. Qty: 12 Cap, Refills: 0    Associated Diagnoses: Arthritis of knee         CONTINUE these medications which have NOT CHANGED    Details   !! OTHER Cell Power -8 gtts daily OTC      !! OTHER CBD oil as needed      metoclopramide HCl (Reglan) 10 mg tablet Take 10 mg by mouth Before breakfast, lunch, dinner and at bedtime. omeprazole (PRILOSEC) 40 mg capsule Take 40 mg by mouth daily. losartan (COZAAR) 50 mg tablet Take 50 mg by mouth daily. acetaminophen (TYLENOL) 325 mg tablet Take  by mouth every four (4) hours as needed for Pain.      diazePAM (VALIUM) 5 mg tablet Take 1 Tab by mouth every eight (8) hours as needed for Anxiety. Max Daily Amount: 15 mg.  Qty: 20 Tab, Refills: 0      metoprolol (LOPRESSOR) 50 mg tablet Take 50 mg by mouth daily. folic acid (FOLVITE) 1 mg tablet Take 1 mg by mouth daily. Cholecalciferol, Vitamin D3, (VITAMIN D3) 1,000 unit cap Take 1,000 Units by mouth daily.       cyanocobalamin (VITAMIN B-12) 1,000 mcg tablet Take 1,000 mcg by mouth daily as needed. !! - Potential duplicate medications found. Please discuss with provider. STOP taking these medications       diclofenac (VOLTAREN) 1 % gel Comments:   Reason for Stopping:             Patient armband removed and shredded.

## 2020-08-18 NOTE — PROGRESS NOTES
Problem: Falls - Risk of  Goal: *Absence of Falls  Description: Document Conrado Turnerener Fall Risk and appropriate interventions in the flowsheet. Outcome: Progressing Towards Goal  Note: Fall Risk Interventions:  Mobility Interventions: Communicate number of staff needed for ambulation/transfer, Patient to call before getting OOB, Utilize walker, cane, or other assistive device         Medication Interventions: Assess postural VS orthostatic hypotension, Patient to call before getting OOB, Teach patient to arise slowly    Elimination Interventions: Call light in reach, Patient to call for help with toileting needs, Toileting schedule/hourly rounds              Problem: Patient Education: Go to Patient Education Activity  Goal: Patient/Family Education  Outcome: Progressing Towards Goal     Problem: Pain  Goal: *Control of Pain  Outcome: Progressing Towards Goal     Problem: Patient Education: Go to Patient Education Activity  Goal: Patient/Family Education  Outcome: Progressing Towards Goal     Problem: Infection - Risk of, Surgical Site Infection  Goal: *Absence of surgical site infection signs and symptoms  Outcome: Progressing Towards Goal     Problem: Patient Education: Go to Patient Education Activity  Goal: Patient/Family Education  Outcome: Progressing Towards Goal     Problem: Pressure Injury - Risk of  Goal: *Prevention of pressure injury  Description: Document Bari Scale and appropriate interventions in the flowsheet.   Outcome: Progressing Towards Goal  Note: Pressure Injury Interventions:  Sensory Interventions: Check visual cues for pain, Assess changes in LOC    Moisture Interventions: Absorbent underpads, Minimize layers    Activity Interventions: Increase time out of bed, Pressure redistribution bed/mattress(bed type), PT/OT evaluation    Mobility Interventions: Assess need for specialty bed, PT/OT evaluation, Pressure redistribution bed/mattress (bed type)    Nutrition Interventions: Document food/fluid/supplement intake, Offer support with meals,snacks and hydration                     Problem: Patient Education: Go to Patient Education Activity  Goal: Patient/Family Education  Outcome: Progressing Towards Goal     Problem: Patient Education: Go to Patient Education Activity  Goal: Patient/Family Education  Outcome: Progressing Towards Goal

## 2020-08-18 NOTE — PROGRESS NOTES
Important Message from Medicare\" reviewed and explained with the patient via telephone. Patient states she is fine with the discharge and is ready to go home. A copy provided to patient/representative. Original signed document placed in patient's chart.

## 2020-08-18 NOTE — PROGRESS NOTES
Problem: Mobility Impaired (Adult and Pediatric)  Goal: *Acute Goals and Plan of Care (Insert Text)  Description: Physical Therapy Goals  Initiated 8/17/2020 and to be accomplished within 7 day(s) on 8/24/2020  1. Patient will move from supine to sit and sit to supine , scoot up and down, and roll side to side in bed with supervision/set-up. 2.  Patient will transfer from bed to chair and chair to bed with supervision/set-up using the least restrictive device. 3.  Patient will perform sit to stand with supervision/set-up. 4.  Patient will ambulate with supervision/set-up for 100 feet with the least restrictive device. Prior Level of Function:   Patient was independent for all mobility including gait using no assistive device. Patient lives alone in a single story home with good family support. She reports having a rolling walker and shower chair at home. Outcome: Progressing Towards Goal     PHYSICAL THERAPY TREATMENT    Patient: Lisa Griffin (54 y.o. female)  Date: 8/18/2020  Diagnosis: Osteoarthritis of right knee, unspecified osteoarthritis type [M17.11]  Arthritis of knee [M17.10]   <principal problem not specified>  Procedure(s) (LRB):  RIGHT TOTAL KNEE ARTHROPLASTY (Right) 1 Day Post-Op  Precautions: Fall, WBAT(right TKA)  PLOF: see above    ASSESSMENT:  Pt cleared for PT treatment session per nursing. Pt received in supine with HOB elevated and agreeable to treatment session. Pt required SBA and additional time for supine to sit to achieve full sit on EOB. Pt expressed concerns over elevated bed height, therefore sit to stand transfers (SBA) and scooting (CGA) performed with elevated EOB to practice for transition home. SBA for ambulation of 150 ft with RW; 2 standing rest breaks and 1 seated rest break required secondary to shoulder pain from UE weight bearing on RW. Pt c/o 4-5/10 pain in knee with mobility.  Pt states she has no stairs at home and only has a small landing step to enter her home. Educated on curb negotiation to ascend/descend landing step at home, pt verbalized and demos understanding. Returned to sitting and instructed in seated TherEx. Pt with many questions and expressed discomfort with towel roll under ankle, additional to answer all questions and find a position of comfort with ankle propped. Pt left sitting up in recliner, R ankle propped, and all needs met/within reach. Progression toward goals:   [x]      Improving appropriately and progressing toward goals  []      Improving slowly and progressing toward goals  []      Not making progress toward goals and plan of care will be adjusted     PLAN:  Patient continues to benefit from skilled intervention to address the above impairments. Continue treatment per established plan of care. Discharge Recommendations:  Home Health  Further Equipment Recommendations for Discharge:  rolling walker     SUBJECTIVE:   Patient stated My left knee isn't too good either. I feel pain in my shoulders when I'm walking.     OBJECTIVE DATA SUMMARY:   Critical Behavior:  Neurologic State: Alert, Appropriate for age  Orientation Level: Oriented X4  Cognition: Appropriate decision making, Appropriate for age attention/concentration, Appropriate safety awareness, Follows commands  Safety/Judgement: Fall prevention  Functional Mobility Training:  Bed Mobility:  Supine to Sit: Stand-by assistance; Additional time  Scooting: Contact guard assistance  Transfers:  Sit to Stand: Stand-by assistance  Stand to Sit: Stand-by assistance  Balance:  Sitting: Intact; With support  Standing: Impaired; With support  Standing - Static: Good  Standing - Dynamic : Fair   Ambulation/Gait Training:  Distance (ft): 150 Feet (ft)  Assistive Device: Walker, rolling  Ambulation - Level of Assistance: Stand-by assistance  Gait Abnormalities: Antalgic;Decreased step clearance; Step to gait  Right Side Weight Bearing: As tolerated  Left Side Weight Bearing: Full  Speed/Jayda: Slow  Step Length: Right shortened;Left shortened     Therapeutic Exercises:   Reviewed towel roll under the ankle, nothing under the knee, ankle pumps, QS, and heel slides 00V/IC, ice application no greater than 20 min or until numbness is reached. EXERCISE   Sets   Reps   Active Active Assist   Passive Self ROM   Comments   Ankle Pumps 1 20  [x] [] [] []    Quad Sets/Glut Sets 1 10/10  [x] [] [] [] Hold for 5 secs   Hamstring Sets   [] [] [] []    Short Arc Quads   [] [] [] []    Heel Slides 1 10 [x] [] [] []    Straight Leg Raises 1 1 [x] [] [] [] Attempted   Hip Add   [] [] [] [] Hold for 5 secs, w/ pillow squeeze   Long Arc Quads   [] [] [] []    Seated Marching   [] [] [] []    Standing Marching   [] [] [] []       [] [] [] []        Pain:  Pain level pre-treatment: 4-5/10  Pain level post-treatment: 4-5/10   Pain Intervention(s): Medication (see MAR); Rest, Ice, Repositioning  Response to intervention: Nurse notified    Activity Tolerance:   Fair  Please refer to the flowsheet for vital signs taken during this treatment. After treatment:   [x] Patient left in no apparent distress sitting up in chair  [] Patient left in no apparent distress in bed  [x] Call bell left within reach  [x] Nursing notified  [] Caregiver present  [] Bed alarm activated  [] SCDs applied      COMMUNICATION/EDUCATION:   [x]         Role of Physical Therapy in the acute care setting. [x]         Fall prevention education was provided and the patient/caregiver indicated understanding. [x]         Patient/family have participated as able in working toward goals and plan of care. []         Patient/family agree to work toward stated goals and plan of care. []         Patient understands intent and goals of therapy, but is neutral about his/her participation.   []         Patient is unable to participate in stated goals/plan of care: ongoing with therapy staff.  []         Other:        Bear Martini, PTA   Time Calculation: 59 mins

## 2020-08-18 NOTE — PROGRESS NOTES
OCCUPATIONAL THERAPY EVALUATION/DISCHARGE    Patient: Mago Avila (13 y.o. female)  Date: 2020  Primary Diagnosis: Osteoarthritis of right knee, unspecified osteoarthritis type [M17.11]  Arthritis of knee [M17.10]  Procedure(s) (LRB):  RIGHT TOTAL KNEE ARTHROPLASTY (Right) 1 Day Post-Op   Precautions:   Fall, WBAT  PLOF: Pt was independent with basic self care tasks and used a [facility] for functional mobility PTA. ASSESSMENT AND RECOMMENDATIONS:  Based on the objective data described below, the patient is able to perform basic self care tasks without assistance. Supervision given for functional standing and transfers. Will defer to PT for mobility training. Patient lives alone but has a supportive sister coming to assist her prn. She has all needed DME (shower seat, BSC, RW) for home safety. Skilled occupational therapy is not indicated at this time. Discharge Recommendations: None  Further Equipment Recommendations for Discharge: N/A      SUBJECTIVE:   Patient stated I'm wearing a dress because it's easier.     OBJECTIVE DATA SUMMARY:     Past Medical History:   Diagnosis Date    Arthritis     Chronic kidney disease     GERD (gastroesophageal reflux disease)     Hypertension     Ill-defined condition     Positional Vertigo -  pt can not lie flat    Sleep apnea      Past Surgical History:   Procedure Laterality Date    HX BREAST BIOPSY      6-8 yrs ago, scar marked    HX CARPAL TUNNEL RELEASE Right     mid to late [de-identified]    HX CARPAL TUNNEL RELEASE Left     mid     HX  SECTION      HX CHOLECYSTECTOMY      HX GYN      Vaginal Hernia as a child     HX HERNIA REPAIR      age [de-identified]    HX HYSTERECTOMY      partial     NEUROLOGICAL PROCEDURE UNLISTED      lower lumbar     Barriers to Learning/Limitations: None  Compensate with: visual, verbal, tactile, kinesthetic cues/model    Home Situation:   Home Situation  Home Environment: Private residence  # Steps to Enter: 0  One/Two Story Residence: One story  Living Alone: Yes  Support Systems: Family member(s)  Patient Expects to be Discharged to[de-identified] Private residence  Current DME Used/Available at Home: suraj Abbott, [address] chair  Tub or Shower Type: Tub/Shower combination(with seat)  [x]     Right hand dominant   []     Left hand dominant    Cognitive/Behavioral Status:  Neurologic State: Alert  Orientation Level: Oriented X4  Cognition: Appropriate decision making; Follows commands  Safety/Judgement: Awareness of environment; Fall prevention    Skin: Intact on UEs  Edema: None noted in UEs    Vision/Perceptual:     Acuity: Within Defined Limits      Coordination: BUE  Fine Motor Skills-Upper: Left Intact; Right Intact    Gross Motor Skills-Upper: Left Intact; Right Intact    Balance:  Sitting: Intact; With support  Standing: Impaired; With support  Standing - Static: Good  Standing - Dynamic : Fair    Strength: BUE  Strength: Within functional limits    Tone & Sensation: BUE  Tone: Normal  Sensation: Intact    Range of Motion: BUE  AROM: Within functional limits    Functional Mobility and Transfers for ADLs:  Bed Mobility:  Supine to Sit: Stand-by assistance; Additional time  Scooting: Contact guard assistance  Transfers:  Sit to Stand: Stand-by assistance  Stand to Sit: Stand-by assistance   Toilet Transfer : Supervision    ADL Assessment:  Feeding: Independent    Oral Facial Hygiene/Grooming: Independent    Bathing: Supervision    Upper Body Dressing: Independent    Lower Body Dressing: Modified independent    Toileting: Modified independent    ADL Intervention:  Patient practiced UB/LB bathing (CHG wipes) and dressing while seated and in standing. No assist given after initial VCs for safety/ease of performance. Supervision given for standing balance. No LOB noted. Cognitive Retraining  Safety/Judgement: Awareness of environment; Fall prevention    Pain:  Pain level pre-treatment: 4/10, in right knee   Pain level post-treatment: 4/10, in right knee   Pain Intervention(s): Medication (see MAR); Rest, Ice, Repositioning   Response to intervention: Nurse notified, See doc flow    Activity Tolerance:   Good  Please refer to the flowsheet for vital signs taken during this treatment. After treatment:   [x]  Patient left in no apparent distress sitting up in chair  []  Patient left in no apparent distress in bed  [x]  Call bell left within reach  [x]  Nursing notified  []  Caregiver present  []  Bed alarm activated    COMMUNICATION/EDUCATION:   [x]      Role of Occupational Therapy in the acute care setting  [x]      Home safety education was provided and the patient/caregiver indicated understanding. [x]      Patient/family have participated as able and agree with findings and recommendations. []      Patient is unable to participate in plan of care at this time. Thank you for this referral.  Bunny Suarez MS OTR/L   Time Calculation: 24 mins      Eval Complexity: History: LOW Complexity : Brief history review ; Examination: LOW Complexity : 1-3 performance deficits relating to physical, cognitive , or psychosocial skils that result in activity limitations and / or participation restrictions ;    Decision Making:LOW Complexity : No comorbidities that affect functional and no verbal or physical assistance needed to complete eval tasks

## 2020-08-18 NOTE — PROGRESS NOTES
Pt seen for PT treatment at 838. Pt safe with all mobility including ambulation of 150 ft with SBA, 1 seated rest break secondary to shoulder pain. From a PT perspective, pt is safe for discharge and would benefit from home health  PT upon discharge, when appropriate. Recommend RW. Full note to follow. Jenae Dove

## 2020-08-18 NOTE — ANESTHESIA POSTPROCEDURE EVALUATION
Procedure(s):  RIGHT TOTAL KNEE ARTHROPLASTY. general    Anesthesia Post Evaluation      Multimodal analgesia: multimodal analgesia used between 6 hours prior to anesthesia start to PACU discharge  Patient location during evaluation: bedside  Patient participation: complete - patient participated  Level of consciousness: awake  Pain score: 0  Pain management: adequate  Airway patency: patent  Anesthetic complications: no  Cardiovascular status: stable  Respiratory status: acceptable  Hydration status: acceptable  Post anesthesia nausea and vomiting:  controlled  Final Post Anesthesia Temperature Assessment:  Normothermia (36.0-37.5 degrees C)      INITIAL Post-op Vital signs:   Vitals Value Taken Time   /63 8/17/2020 11:25 AM   Temp 36.3 °C (97.3 °F) 8/17/2020 11:31 AM   Pulse 80 8/17/2020 11:33 AM   Resp 12 8/17/2020 11:33 AM   SpO2 100 % 8/17/2020 11:33 AM   Vitals shown include unvalidated device data.

## 2020-08-18 NOTE — ROUTINE PROCESS
Bedside shift change report given to [phone] (oncoming nurse) by Hong Duarte RN (offgoing nurse). Report included the following information SBAR, Kardex, Procedure Summary, Intake/Output, MAR and Recent Results.

## 2020-08-18 NOTE — HOME CARE
Discharge noted for today. Received home health referral for [address] for SN and PT - [place] protocol. Spoke with patient, explained services and answered all questions. Demographics verified. Referral processed and emailed to central office. Patient has the following DME: front wheeled walker, bedside commode, and shower chair.  Serafin Officer, [facility] Liaison

## 2020-08-18 NOTE — ROUTINE PROCESS
Bedside shift change report given to Pratik Camacho RN (oncoming nurse) by Hero Grossman RN (offgoing nurse). Report included the following information SBAR, Kardex, Intake/Output, MAR, Recent Results and Med Rec Status.

## 2020-08-19 ENCOUNTER — HOME CARE VISIT (OUTPATIENT)
Dept: SCHEDULING | Facility: HOME HEALTH | Age: 70
End: 2020-08-19
Payer: MEDICARE

## 2020-08-19 ENCOUNTER — HOME CARE VISIT (OUTPATIENT)
Dept: HOME HEALTH SERVICES | Facility: HOME HEALTH | Age: 70
End: 2020-08-19
Payer: MEDICARE

## 2020-08-19 PROCEDURE — 3331090001 HH PPS REVENUE CREDIT

## 2020-08-19 PROCEDURE — G0299 HHS/HOSPICE OF RN EA 15 MIN: HCPCS

## 2020-08-19 PROCEDURE — 3331090002 HH PPS REVENUE DEBIT

## 2020-08-19 PROCEDURE — 400013 HH SOC

## 2020-08-19 PROCEDURE — G0151 HHCP-SERV OF PT,EA 15 MIN: HCPCS

## 2020-08-20 ENCOUNTER — HOME CARE VISIT (OUTPATIENT)
Dept: HOME HEALTH SERVICES | Facility: HOME HEALTH | Age: 70
End: 2020-08-20
Payer: MEDICARE

## 2020-08-20 ENCOUNTER — TELEPHONE (OUTPATIENT)
Dept: ORTHOPEDIC SURGERY | Age: 70
End: 2020-08-20

## 2020-08-20 ENCOUNTER — HOME CARE VISIT (OUTPATIENT)
Dept: SCHEDULING | Facility: HOME HEALTH | Age: 70
End: 2020-08-20
Payer: MEDICARE

## 2020-08-20 VITALS
SYSTOLIC BLOOD PRESSURE: 130 MMHG | HEART RATE: 97 BPM | TEMPERATURE: 97.8 F | OXYGEN SATURATION: 98 % | DIASTOLIC BLOOD PRESSURE: 70 MMHG | RESPIRATION RATE: 16 BRPM

## 2020-08-20 VITALS
DIASTOLIC BLOOD PRESSURE: 76 MMHG | HEART RATE: 79 BPM | TEMPERATURE: 98.1 F | SYSTOLIC BLOOD PRESSURE: 126 MMHG | RESPIRATION RATE: 18 BRPM | OXYGEN SATURATION: 97 %

## 2020-08-20 VITALS
TEMPERATURE: 98.1 F | HEART RATE: 93 BPM | OXYGEN SATURATION: 95 % | SYSTOLIC BLOOD PRESSURE: 148 MMHG | DIASTOLIC BLOOD PRESSURE: 84 MMHG

## 2020-08-20 LAB
INR BLD: 1.6 (ref 0.9–1.1)
PT POC: 18.6 SECONDS (ref 11.8–14.9)

## 2020-08-20 PROCEDURE — G0299 HHS/HOSPICE OF RN EA 15 MIN: HCPCS

## 2020-08-20 PROCEDURE — 3331090001 HH PPS REVENUE CREDIT

## 2020-08-20 PROCEDURE — 3331090002 HH PPS REVENUE DEBIT

## 2020-08-20 PROCEDURE — G0157 HHC PT ASSISTANT EA 15: HCPCS

## 2020-08-20 NOTE — TELEPHONE ENCOUNTER
Spoke with Mary Kay and notified her of coumadin dosing instructions and INR recheck per KAITLIN Barrios. She verbalized understanding.

## 2020-08-21 ENCOUNTER — HOME CARE VISIT (OUTPATIENT)
Dept: SCHEDULING | Facility: HOME HEALTH | Age: 70
End: 2020-08-21
Payer: MEDICARE

## 2020-08-21 VITALS
SYSTOLIC BLOOD PRESSURE: 138 MMHG | HEART RATE: 68 BPM | DIASTOLIC BLOOD PRESSURE: 80 MMHG | RESPIRATION RATE: 20 BRPM | TEMPERATURE: 97 F | OXYGEN SATURATION: 97 %

## 2020-08-21 VITALS
HEART RATE: 86 BPM | DIASTOLIC BLOOD PRESSURE: 80 MMHG | OXYGEN SATURATION: 97 % | SYSTOLIC BLOOD PRESSURE: 120 MMHG | RESPIRATION RATE: 16 BRPM | TEMPERATURE: 97.6 F

## 2020-08-21 PROCEDURE — G0157 HHC PT ASSISTANT EA 15: HCPCS

## 2020-08-21 PROCEDURE — 3331090001 HH PPS REVENUE CREDIT

## 2020-08-21 PROCEDURE — 3331090002 HH PPS REVENUE DEBIT

## 2020-08-22 ENCOUNTER — HOME CARE VISIT (OUTPATIENT)
Dept: SCHEDULING | Facility: HOME HEALTH | Age: 70
End: 2020-08-22
Payer: MEDICARE

## 2020-08-22 VITALS
TEMPERATURE: 99.1 F | OXYGEN SATURATION: 99 % | DIASTOLIC BLOOD PRESSURE: 74 MMHG | HEART RATE: 84 BPM | SYSTOLIC BLOOD PRESSURE: 125 MMHG | RESPIRATION RATE: 13 BRPM

## 2020-08-22 PROCEDURE — G0157 HHC PT ASSISTANT EA 15: HCPCS

## 2020-08-22 PROCEDURE — 3331090002 HH PPS REVENUE DEBIT

## 2020-08-22 PROCEDURE — 3331090001 HH PPS REVENUE CREDIT

## 2020-08-23 ENCOUNTER — HOME CARE VISIT (OUTPATIENT)
Dept: SCHEDULING | Facility: HOME HEALTH | Age: 70
End: 2020-08-23
Payer: MEDICARE

## 2020-08-23 PROCEDURE — 3331090001 HH PPS REVENUE CREDIT

## 2020-08-23 PROCEDURE — G0157 HHC PT ASSISTANT EA 15: HCPCS

## 2020-08-23 PROCEDURE — 3331090002 HH PPS REVENUE DEBIT

## 2020-08-24 ENCOUNTER — TELEPHONE (OUTPATIENT)
Dept: ORTHOPEDIC SURGERY | Age: 70
End: 2020-08-24

## 2020-08-24 ENCOUNTER — HOME CARE VISIT (OUTPATIENT)
Dept: SCHEDULING | Facility: HOME HEALTH | Age: 70
End: 2020-08-24
Payer: MEDICARE

## 2020-08-24 VITALS
SYSTOLIC BLOOD PRESSURE: 160 MMHG | TEMPERATURE: 98.3 F | DIASTOLIC BLOOD PRESSURE: 100 MMHG | RESPIRATION RATE: 18 BRPM | HEART RATE: 87 BPM | OXYGEN SATURATION: 96 %

## 2020-08-24 VITALS
RESPIRATION RATE: 13 BRPM | DIASTOLIC BLOOD PRESSURE: 78 MMHG | TEMPERATURE: 98.8 F | HEART RATE: 91 BPM | SYSTOLIC BLOOD PRESSURE: 125 MMHG | OXYGEN SATURATION: 97 %

## 2020-08-24 VITALS
HEART RATE: 88 BPM | OXYGEN SATURATION: 98 % | SYSTOLIC BLOOD PRESSURE: 160 MMHG | TEMPERATURE: 98.4 F | DIASTOLIC BLOOD PRESSURE: 90 MMHG

## 2020-08-24 LAB
INR BLD: 1.9 (ref 0.9–1.1)
PT POC: 22.2 SECONDS (ref 11.8–14.9)

## 2020-08-24 PROCEDURE — 3331090001 HH PPS REVENUE CREDIT

## 2020-08-24 PROCEDURE — G0300 HHS/HOSPICE OF LPN EA 15 MIN: HCPCS

## 2020-08-24 PROCEDURE — 3331090002 HH PPS REVENUE DEBIT

## 2020-08-24 PROCEDURE — G0157 HHC PT ASSISTANT EA 15: HCPCS

## 2020-08-24 NOTE — TELEPHONE ENCOUNTER
Mary Kay from [address] [place] [address] called in [facility] of 22.2 INR 1.9. Patient has been taking 3  mg coumadin daily.       [phone]

## 2020-08-25 ENCOUNTER — HOME CARE VISIT (OUTPATIENT)
Dept: HOME HEALTH SERVICES | Facility: HOME HEALTH | Age: 70
End: 2020-08-25
Payer: MEDICARE

## 2020-08-25 ENCOUNTER — HOME CARE VISIT (OUTPATIENT)
Dept: SCHEDULING | Facility: HOME HEALTH | Age: 70
End: 2020-08-25
Payer: MEDICARE

## 2020-08-25 VITALS
SYSTOLIC BLOOD PRESSURE: 140 MMHG | HEART RATE: 79 BPM | TEMPERATURE: 97.5 F | OXYGEN SATURATION: 100 % | DIASTOLIC BLOOD PRESSURE: 90 MMHG | RESPIRATION RATE: 16 BRPM

## 2020-08-25 DIAGNOSIS — M17.10 ARTHRITIS OF KNEE: ICD-10-CM

## 2020-08-25 PROCEDURE — 3331090001 HH PPS REVENUE CREDIT

## 2020-08-25 PROCEDURE — 3331090002 HH PPS REVENUE DEBIT

## 2020-08-25 PROCEDURE — G0157 HHC PT ASSISTANT EA 15: HCPCS

## 2020-08-25 NOTE — TELEPHONE ENCOUNTER
Last Visit: 8/17/20 post-op with MD Morales  Next Appointment: 9/3/20 with KAITLIN Hernandez  Previous Refill Encounter(s): 8/17/20 #56    Requested Prescriptions     Pending Prescriptions Disp Refills    oxyCODONE-acetaminophen (Percocet) 7.5-325 mg per tablet 56 Tab 0     Sig: Take 1-2 Tabs by mouth every six (6) hours as needed for Pain for up to 7 days. Max Daily Amount: 8 Tabs.

## 2020-08-26 ENCOUNTER — HOME CARE VISIT (OUTPATIENT)
Dept: SCHEDULING | Facility: HOME HEALTH | Age: 70
End: 2020-08-26
Payer: MEDICARE

## 2020-08-26 VITALS
OXYGEN SATURATION: 98 % | SYSTOLIC BLOOD PRESSURE: 120 MMHG | TEMPERATURE: 98 F | DIASTOLIC BLOOD PRESSURE: 80 MMHG | RESPIRATION RATE: 16 BRPM | HEART RATE: 95 BPM

## 2020-08-26 PROCEDURE — G0157 HHC PT ASSISTANT EA 15: HCPCS

## 2020-08-26 PROCEDURE — 3331090001 HH PPS REVENUE CREDIT

## 2020-08-26 PROCEDURE — 3331090002 HH PPS REVENUE DEBIT

## 2020-08-26 RX ORDER — OXYCODONE AND ACETAMINOPHEN 7.5; 325 MG/1; MG/1
1-2 TABLET ORAL
Qty: 56 TAB | Refills: 0 | Status: SHIPPED | OUTPATIENT
Start: 2020-08-26 | End: 2020-09-02

## 2020-08-27 ENCOUNTER — HOME CARE VISIT (OUTPATIENT)
Dept: SCHEDULING | Facility: HOME HEALTH | Age: 70
End: 2020-08-27
Payer: MEDICARE

## 2020-08-27 ENCOUNTER — HOME CARE VISIT (OUTPATIENT)
Dept: HOME HEALTH SERVICES | Facility: HOME HEALTH | Age: 70
End: 2020-08-27
Payer: MEDICARE

## 2020-08-27 ENCOUNTER — TELEPHONE (OUTPATIENT)
Dept: ORTHOPEDIC SURGERY | Age: 70
End: 2020-08-27

## 2020-08-27 VITALS
HEART RATE: 75 BPM | OXYGEN SATURATION: 97 % | TEMPERATURE: 98.5 F | DIASTOLIC BLOOD PRESSURE: 80 MMHG | SYSTOLIC BLOOD PRESSURE: 130 MMHG

## 2020-08-27 LAB
INR BLD: 2.1 (ref 0.9–1.1)
PT POC: 25.7 SECONDS (ref 11.8–14.9)

## 2020-08-27 PROCEDURE — 3331090002 HH PPS REVENUE DEBIT

## 2020-08-27 PROCEDURE — 3331090001 HH PPS REVENUE CREDIT

## 2020-08-27 PROCEDURE — G0300 HHS/HOSPICE OF LPN EA 15 MIN: HCPCS

## 2020-08-27 PROCEDURE — G0151 HHCP-SERV OF PT,EA 15 MIN: HCPCS

## 2020-08-27 NOTE — TELEPHONE ENCOUNTER
Spoke with Mary Kay and notified her of coumadin dosing and INR recheck instructions per KAITLIN Vo. She verbalized understanding.

## 2020-08-28 ENCOUNTER — HOME CARE VISIT (OUTPATIENT)
Dept: SCHEDULING | Facility: HOME HEALTH | Age: 70
End: 2020-08-28
Payer: MEDICARE

## 2020-08-28 VITALS
TEMPERATURE: 97.8 F | SYSTOLIC BLOOD PRESSURE: 130 MMHG | HEART RATE: 75 BPM | DIASTOLIC BLOOD PRESSURE: 80 MMHG | OXYGEN SATURATION: 97 %

## 2020-08-28 PROCEDURE — 3331090001 HH PPS REVENUE CREDIT

## 2020-08-28 PROCEDURE — G0157 HHC PT ASSISTANT EA 15: HCPCS

## 2020-08-28 PROCEDURE — 3331090002 HH PPS REVENUE DEBIT

## 2020-08-29 ENCOUNTER — HOME CARE VISIT (OUTPATIENT)
Dept: SCHEDULING | Facility: HOME HEALTH | Age: 70
End: 2020-08-29
Payer: MEDICARE

## 2020-08-29 VITALS
SYSTOLIC BLOOD PRESSURE: 120 MMHG | DIASTOLIC BLOOD PRESSURE: 80 MMHG | OXYGEN SATURATION: 98 % | TEMPERATURE: 98.2 F | HEART RATE: 81 BPM

## 2020-08-29 PROCEDURE — G0157 HHC PT ASSISTANT EA 15: HCPCS

## 2020-08-29 PROCEDURE — 3331090001 HH PPS REVENUE CREDIT

## 2020-08-29 PROCEDURE — 3331090002 HH PPS REVENUE DEBIT

## 2020-08-30 ENCOUNTER — HOME CARE VISIT (OUTPATIENT)
Dept: SCHEDULING | Facility: HOME HEALTH | Age: 70
End: 2020-08-30
Payer: MEDICARE

## 2020-08-30 VITALS
SYSTOLIC BLOOD PRESSURE: 126 MMHG | RESPIRATION RATE: 14 BRPM | DIASTOLIC BLOOD PRESSURE: 76 MMHG | TEMPERATURE: 98.4 F | OXYGEN SATURATION: 99 % | HEART RATE: 83 BPM

## 2020-08-30 PROCEDURE — G0157 HHC PT ASSISTANT EA 15: HCPCS

## 2020-08-30 PROCEDURE — 3331090001 HH PPS REVENUE CREDIT

## 2020-08-30 PROCEDURE — 3331090002 HH PPS REVENUE DEBIT

## 2020-08-31 ENCOUNTER — HOME CARE VISIT (OUTPATIENT)
Dept: SCHEDULING | Facility: HOME HEALTH | Age: 70
End: 2020-08-31
Payer: MEDICARE

## 2020-08-31 ENCOUNTER — TELEPHONE (OUTPATIENT)
Dept: ORTHOPEDIC SURGERY | Age: 70
End: 2020-08-31

## 2020-08-31 VITALS
DIASTOLIC BLOOD PRESSURE: 85 MMHG | RESPIRATION RATE: 18 BRPM | TEMPERATURE: 98 F | HEART RATE: 75 BPM | OXYGEN SATURATION: 98 % | SYSTOLIC BLOOD PRESSURE: 145 MMHG

## 2020-08-31 VITALS
DIASTOLIC BLOOD PRESSURE: 90 MMHG | TEMPERATURE: 98.5 F | HEART RATE: 85 BPM | OXYGEN SATURATION: 98 % | SYSTOLIC BLOOD PRESSURE: 140 MMHG

## 2020-08-31 VITALS
TEMPERATURE: 97.7 F | SYSTOLIC BLOOD PRESSURE: 110 MMHG | OXYGEN SATURATION: 99 % | DIASTOLIC BLOOD PRESSURE: 74 MMHG | RESPIRATION RATE: 13 BRPM | HEART RATE: 78 BPM

## 2020-08-31 LAB
INR BLD: 1.7 (ref 0.9–1.1)
PT POC: 20.4 SECONDS (ref 11.8–14.9)

## 2020-08-31 PROCEDURE — G0157 HHC PT ASSISTANT EA 15: HCPCS

## 2020-08-31 PROCEDURE — 3331090001 HH PPS REVENUE CREDIT

## 2020-08-31 PROCEDURE — G0300 HHS/HOSPICE OF LPN EA 15 MIN: HCPCS

## 2020-08-31 PROCEDURE — 3331090002 HH PPS REVENUE DEBIT

## 2020-08-31 NOTE — TELEPHONE ENCOUNTER
PT - 20.4  INR - 1.7    Pt was taking 3mg since 8/28.   Please advise Sinan Cm when patient is to take next dose, [phone]

## 2020-09-01 ENCOUNTER — HOME CARE VISIT (OUTPATIENT)
Dept: HOME HEALTH SERVICES | Facility: HOME HEALTH | Age: 70
End: 2020-09-01
Payer: MEDICARE

## 2020-09-01 ENCOUNTER — HOME CARE VISIT (OUTPATIENT)
Dept: SCHEDULING | Facility: HOME HEALTH | Age: 70
End: 2020-09-01
Payer: MEDICARE

## 2020-09-01 VITALS
SYSTOLIC BLOOD PRESSURE: 140 MMHG | RESPIRATION RATE: 16 BRPM | TEMPERATURE: 97.8 F | OXYGEN SATURATION: 98 % | HEART RATE: 78 BPM | DIASTOLIC BLOOD PRESSURE: 80 MMHG

## 2020-09-01 PROCEDURE — 3331090001 HH PPS REVENUE CREDIT

## 2020-09-01 PROCEDURE — G0157 HHC PT ASSISTANT EA 15: HCPCS

## 2020-09-01 PROCEDURE — 3331090002 HH PPS REVENUE DEBIT

## 2020-09-02 PROCEDURE — 3331090001 HH PPS REVENUE CREDIT

## 2020-09-02 PROCEDURE — 3331090002 HH PPS REVENUE DEBIT

## 2020-09-03 ENCOUNTER — OFFICE VISIT (OUTPATIENT)
Dept: ORTHOPEDIC SURGERY | Facility: CLINIC | Age: 70
End: 2020-09-03

## 2020-09-03 VITALS
TEMPERATURE: 96.8 F | SYSTOLIC BLOOD PRESSURE: 136 MMHG | DIASTOLIC BLOOD PRESSURE: 77 MMHG | HEIGHT: 59 IN | OXYGEN SATURATION: 98 % | RESPIRATION RATE: 16 BRPM | WEIGHT: 175.4 LBS | HEART RATE: 86 BPM | BODY MASS INDEX: 35.36 KG/M2

## 2020-09-03 DIAGNOSIS — Z96.651 STATUS POST TOTAL RIGHT KNEE REPLACEMENT: Primary | ICD-10-CM

## 2020-09-03 DIAGNOSIS — M25.561 RIGHT KNEE PAIN, UNSPECIFIED CHRONICITY: ICD-10-CM

## 2020-09-03 DIAGNOSIS — R11.0 NAUSEA: ICD-10-CM

## 2020-09-03 PROCEDURE — 3331090002 HH PPS REVENUE DEBIT

## 2020-09-03 PROCEDURE — 3331090001 HH PPS REVENUE CREDIT

## 2020-09-03 RX ORDER — ONDANSETRON 4 MG/1
4 TABLET, ORALLY DISINTEGRATING ORAL EVERY 6 HOURS
Qty: 30 TAB | Refills: 1 | Status: SHIPPED | OUTPATIENT
Start: 2020-09-03 | End: 2020-09-03 | Stop reason: SDUPTHER

## 2020-09-03 RX ORDER — HYDROCODONE BITARTRATE AND ACETAMINOPHEN 7.5; 325 MG/1; MG/1
1-2 TABLET ORAL
Qty: 42 TAB | Refills: 0 | Status: SHIPPED | OUTPATIENT
Start: 2020-09-03 | End: 2020-09-03 | Stop reason: SDUPTHER

## 2020-09-03 NOTE — PROGRESS NOTES
[address] [place] [address]  [phone]           Patient: Ryley Kay                MRN: [id number]       SSN: [id number]  YOB: 1950        AGE: 65 y.o. SEX: female  Body mass index is 35.43 kg/m². PCP: Isaac Cruz,   09/03/20      This office note has been dictated. REVIEW OF SYSTEMS:  Constitutional: Negative for fever, chills, weight loss and malaise/fatigue. HENT: Negative. Eyes: Negative. Respiratory: Negative. Cardiovascular: Negative. Gastrointestinal: No bowel incontinence or constipation. Genitourinary: No bladder incontinence or saddle anesthesia. Skin: Negative. Neurological: Negative. Endo/Heme/Allergies: Negative. Psychiatric/Behavioral: Negative. Musculoskeletal: As per HPI above. Past Medical History:   Diagnosis Date    Arthritis     Chronic kidney disease     GERD (gastroesophageal reflux disease)     Hypertension     Ill-defined condition     Positional Vertigo -  pt can not lie flat    Sleep apnea          Current Outpatient Medications:     ferrous sulfate (IRON) 325 mg (65 mg iron) EC tablet, Take 1 Tab by mouth two (2) times daily (with meals). , Disp: 60 Tab, Rfl: 1    docusate sodium (Colace) 100 mg capsule, Take 1 Cap by mouth two (2) times a day for 90 days. , Disp: 60 Cap, Rfl: 2    warfarin (Coumadin) 3 mg tablet, Take 1 Tab by mouth daily for 30 days. , Disp: 30 Tab, Rfl: 1    cephALEXin (Keflex) 500 mg capsule, Take 1 Cap by mouth four (4) times daily. , Disp: 12 Cap, Rfl: 0    OTHER, [employer] -8 gtts daily OTC, Disp: , Rfl:     OTHER, CBD oil as needed, Disp: , Rfl:     metoclopramide HCl (Reglan) 10 mg tablet, Take 10 mg by mouth Before breakfast, lunch, dinner and at bedtime. , Disp: , Rfl:     omeprazole (PRILOSEC) 40 mg capsule, Take 40 mg by mouth daily. , Disp: , Rfl:     losartan (COZAAR) 50 mg tablet, Take 50 mg by mouth daily., Disp: , Rfl:     acetaminophen (TYLENOL) 325 mg tablet, Take  by mouth every four (4) hours as needed for Pain., Disp: , Rfl:     diazePAM (VALIUM) 5 mg tablet, Take 1 Tab by mouth every eight (8) hours as needed for Anxiety. Max Daily Amount: 15 mg., Disp: 20 Tab, Rfl: 0    metoprolol (LOPRESSOR) 50 mg tablet, Take 50 mg by mouth daily. , Disp: , Rfl:     folic acid (FOLVITE) 1 mg tablet, Take 1 mg by mouth daily. , Disp: , Rfl:     Cholecalciferol, Vitamin D3, (VITAMIN D3) 1,000 unit cap, Take 1,000 Units by mouth daily. , Disp: , Rfl:     cyanocobalamin (VITAMIN B-12) 1,000 mcg tablet, Take 1,000 mcg by mouth daily as needed. , Disp: , Rfl:     Allergies   Allergen Reactions    Cymbalta [Duloxetine] Anaphylaxis    Gabapentin Other (comments)     hallucinations    Nsaids (Non-Steroidal Anti-Inflammatory Drug) Other (comments)     History \"gastric issues\" and kidney issues per pt.     Silicone Unknown (comments)    Tramadol Other (comments)     hallucinations       Social History     Socioeconomic History    Marital status:      Spouse name: Not on file    Number of children: Not on file    Years of education: Not on file    Highest education level: Not on file   Occupational History    Not on file   Social Needs    Financial resource strain: Not on file    Food insecurity     Worry: Not on file     Inability: Not on file    Transportation needs     Medical: Not on file     Non-medical: Not on file   Tobacco Use    Smoking status: Never Smoker    Smokeless tobacco: Never Used   Substance and Sexual Activity    Alcohol use: Yes     Comment: occasionally    Drug use: Never    Sexual activity: Not on file   Lifestyle    Physical activity     Days per week: Not on file     Minutes per session: Not on file    Stress: Not on file   Relationships    Social connections     Talks on phone: Not on file     Gets together: Not on file     Attends Zoroastrian service: Not on file     Active member of club or organization: Not on file     Attends meetings of clubs or organizations: Not on file     Relationship status: Not on file    Intimate partner violence     Fear of current or ex partner: Not on file     Emotionally abused: Not on file     Physically abused: Not on file     Forced sexual activity: Not on file   Other Topics Concern    Not on file   Social History Narrative    Not on file       Past Surgical History:   Procedure Laterality Date    HX BREAST BIOPSY      6-8 yrs ago, scar marked    HX CARPAL TUNNEL RELEASE Right     mid to late [de-identified]    HX [address] Left     mid [address] HX CHOLECYSTECTOMY      HX GYN      Vaginal Hernia as a child     HX HERNIA REPAIR      age [de-identified] HX HYSTERECTOMY      partial     NEUROLOGICAL PROCEDURE UNLISTED  2017    lower lumbar         Patient seen and evaluated for her right knee. She has now 17 days status post total knee replacement surgery. She is doing well. Pain is well controlled however Percocet is too strong for her. She like to try something different. Troubles with the wound. She has discontinued her Coumadin. Patient denies recent fevers, chills, chest pain, SOB, or injuries. No recent systemic changes noted. A 12-point review of systems is performed today. Pertinent positives are noted. All other systems reviewed and otherwise are negative. Physical exam: General: Alert and oriented x3, nad.  well-developed, well nourished. normal affect, AF. NC/AT, EOMI, neck supple, trachea midline, no JVD present. Breathing is non-labored. Examination of the right knee reveals skin intact. There is no erythema, ecchymosis, warmth. There are no signs for infection or cellulitis present. Range of motion is -3-115. Patella tracks nicely without rubs or crepitus noted. Stability is quite good. She has negative calf tenderness. Negative Homans. No signs of DVT present.     Assessment: Status post right knee replacement    Plan: At this point, the staples were removed and replaced with Steri-Strips without complications. She will be set up with outpatient physical therapy. She is instructed to continue range of motion activities on an hourly basis both flexion and extension. These were demonstrated for the patient today in office. We will send a prescription for Norco as well as Zofran to the pharmacy. We will see her back in 2 weeks time for evaluation and range of motion check. She will continue with ice therapy.               JR Denis CARPENTER, PA-C, ATC

## 2020-09-04 ENCOUNTER — HOME CARE VISIT (OUTPATIENT)
Dept: SCHEDULING | Facility: HOME HEALTH | Age: 70
End: 2020-09-04
Payer: MEDICARE

## 2020-09-04 VITALS
TEMPERATURE: 97.6 F | SYSTOLIC BLOOD PRESSURE: 132 MMHG | HEART RATE: 72 BPM | OXYGEN SATURATION: 97 % | DIASTOLIC BLOOD PRESSURE: 70 MMHG

## 2020-09-04 PROCEDURE — G0151 HHCP-SERV OF PT,EA 15 MIN: HCPCS

## 2020-09-04 PROCEDURE — 3331090001 HH PPS REVENUE CREDIT

## 2020-09-04 PROCEDURE — 3331090002 HH PPS REVENUE DEBIT

## 2020-09-05 PROCEDURE — 3331090001 HH PPS REVENUE CREDIT

## 2020-09-05 PROCEDURE — 3331090002 HH PPS REVENUE DEBIT

## 2020-09-06 PROCEDURE — 3331090002 HH PPS REVENUE DEBIT

## 2020-09-06 PROCEDURE — 3331090001 HH PPS REVENUE CREDIT

## 2020-09-07 PROCEDURE — 3331090001 HH PPS REVENUE CREDIT

## 2020-09-07 PROCEDURE — 3331090002 HH PPS REVENUE DEBIT

## 2020-09-10 ENCOUNTER — TELEPHONE (OUTPATIENT)
Dept: ORTHOPEDIC SURGERY | Age: 70
End: 2020-09-10

## 2020-09-10 RX ORDER — COLCHICINE 0.6 MG/1
0.6 TABLET ORAL 2 TIMES DAILY
Qty: 60 TAB | Refills: 1 | Status: SHIPPED | OUTPATIENT
Start: 2020-09-10

## 2020-09-10 NOTE — TELEPHONE ENCOUNTER
Could also be nerve issues but sounds like gout. Start the colchicine to see if it helps with her discomfort. Safe to take as prescribed. F/u in office if it is not improving.

## 2020-09-10 NOTE — TELEPHONE ENCOUNTER
Patient is asking should she continue taking the iron pills. Also, she is having some numbness in the toes and there is some soreness to the touch around the ankle and top of the foot. She states even when her gown touches the right leg it makes her shiver. She is asking if this is normal status post knee replacement surgery.       Patient [phone]

## 2020-09-10 NOTE — TELEPHONE ENCOUNTER
Continue on iron    Not normal to have the LE symptoms she is complaining of. Sounds like she is having a gouty flare.       rx for colchicine to the pharmacy done

## 2020-09-10 NOTE — TELEPHONE ENCOUNTER
Patient contacted and told this. She states that she does have a history of gout and that she was on allopurinol and it caused kidney problems. Will the colchicine do the same? She also stated that she has had nerve pain in the past and was wondering if that could be the problem now.

## 2020-09-14 ENCOUNTER — HOSPITAL ENCOUNTER (OUTPATIENT)
Dept: PHYSICAL THERAPY | Age: 70
Discharge: HOME | End: 2020-09-14
Payer: MEDICARE

## 2020-09-14 PROCEDURE — 97161 PT EVAL LOW COMPLEX 20 MIN: CPT

## 2020-09-14 PROCEDURE — 97535 SELF CARE MNGMENT TRAINING: CPT

## 2020-09-14 PROCEDURE — 97110 THERAPEUTIC EXERCISES: CPT

## 2020-09-14 NOTE — PROGRESS NOTES
PT DAILY TREATMENT NOTE  10-18    Patient Name: Robert Hope  Date:2020  : 1950  [x]  Patient  Verified  Payor: [place] MEDICARE / Plan: [place] MEDICARE PART A & B / Product Type: Medicare /    In time:11:20  Out time:12:02  Total Treatment Time (min): 42  Visit #: 1 of 12    Medicare/BCBS Only   Total Timed Codes (min):  28 1:1 Treatment Time:  42     Treatment Area: Pain in right knee [M25.561]  Presence of right artificial knee joint [Z96.651]    SUBJECTIVE  Pain Level (0-10 scale): 7  Any medication changes, allergies to medications, adverse drug reactions, diagnosis change, or new procedure performed?: [x] No    [] Yes (see summary sheet for update)  Subjective functional status/changes:   [] No changes reported  See POC    OBJECTIVE    14 min [x]Eval                  []Re-Eval     10 min Therapeutic Exercise:  [] See flow sheet : HEP instruction and demonstration   Rationale: increase ROM and increase strength to improve the patients ability to tolerate ADLs    10 min Self Care/Home Management: []  See flow sheet : pt education regarding anatomy and physiology of the LEs and how it relates to the pt's condition, pt education on using ice for 15-20 mins as needed to decrease pain/symptoms, pt education on signs/symptoms of a DVT and infection and to go to the ER if she does exhibit these signs, pt education on asking the MD regarding putting cocoa butter/lotion over the incision. Rationale: increase ROM, increase strength and decrease pain/symptoms  to improve the patients ability to tolerate functional tasks.     8 min Manual Therapy: in supine: right patellar superior grade 2-3 mobs at end range knee EXT, right tibial anterior grade 2-3 mobs with slight ER with posterior femur mobs at end range knee EXT, STM around right knee for swelling, right patellar inferior grade 2-3 mobs at end range knee flex, right posterior tibial grade 2-3 mobs at end range knee flex, PROM right knee flex/EXT after performing above   Rationale: decrease pain, increase ROM, increase tissue extensibility, decrease edema  and increase postural awareness to improve activity tolerance. With   [x] TE   [] TA   [] neuro   [x] Other: Self Care/Home management Patient Education: [x] Review HEP    [] Progressed/Changed HEP based on:   [] positioning   [] body mechanics   [] transfers   [] heat/ice application    [] other:      Other Objective/Functional Measures: See evaluation. Mild sensitivity to palpation to the right medial lower leg region. Pain Level (0-10 scale) post treatment: 5    ASSESSMENT/Changes in Function: Pt given HEP handout to perform. Pt understood exercises in HEP handout. Pt demonstrated decreased AROM, decreased strength, muscle tightness, tenderness to palpation, increased swelling, and impaired mobility. Right knee AROM  degs. Pt would benefit from physical therapy to improve the above impairments to help the pt return to performing ADLs and functional activities. Patient will continue to benefit from skilled PT services to modify and progress therapeutic interventions, address functional mobility deficits, address ROM deficits, address strength deficits, analyze and address soft tissue restrictions, analyze and cue movement patterns, analyze and modify body mechanics/ergonomics, address imbalance/dizziness and instruct in home and community integration to attain remaining goals. [x]  See Plan of Care  []  See progress note/recertification  []  See Discharge Summary         Progress towards goals / Updated goals:  Short Term Goals: To be accomplished in 1 weeks:  1. Pt will report compliance and independence to HEP to help the pt manage their pain and symptoms. Eval: established   Long Term Goals: To be accomplished in 4 weeks:  1. Pt will increase FOTO score to 59 points to improve ability to perform ADLs. Eval: 33 points  2.  Pt will report an improvement in at best pain to 0/10 to improve ease of bed transfers. Eval: 4/10 at best  3. Pt will increase AROM right knee to 0-115 degs to improve pt's ability to negotiate stairs. Eval:  degs AROM  4. Pt will be able to ambulate 100ft in the clinic with no AD, no LOB/assist, and mild to no gait deviations to improve safety and efficiency with gait.   Eval: Uses SPC for gait, slow gait speed, unable to fully EXT right knee in stance phase     PLAN  [x]  Upgrade activities as tolerated     [x]  Continue plan of care  [x]  Update interventions per flow sheet       []  Discharge due to:_  []  Other:_      Betty Segura PT 9/14/2020  12:20 PM    Future Appointments   Date Time Provider Pricila Manuel   9/16/2020 11:15 AM Avilaherlinda Shelley [facility]   9/17/2020  9:10 AM Rohan Avitia PA-C [facility] LOLA JERRY   9/22/2020  1:30 PM Avila Shelley [facility]   9/24/2020 10:30 AM Avila Shelley [facility]   9/28/2020 12:00 PM Nicholas Mccann, PT [facility]   9/30/2020 11:15 AM Avila Shelley [facility]   10/2/2020 12:00 PM Nicholas Mccann, PT [facility]   10/5/2020  3:45 PM Nicholas Ramy, PT [facility]   10/7/2020  9:45 AM Nicholas Ramy, PT [facility]   10/9/2020 10:30 AM Nicholas Ramy, PT [facility]

## 2020-09-14 NOTE — PROGRESS NOTES
[facility]  [facility]  JAYME  [address] [place] [address]  [phone] fax    Plan of Care/ Statement of Necessity for Physical Therapy Services  Patient name: Zenia Hood Start of Care: 2020   Referral source: Orly Pinto : 1950    Medical Diagnosis: Pain in right knee [M25.561]  Presence of right artificial knee joint [Z96.651]  Payor: [place] MEDICARE / Plan: VA MEDICARE PART A & B / Product Type: Medicare /  Onset Date: DOS 2020    Treatment Diagnosis: right knee pain   Prior Hospitalization: see medical history Provider#: [id number]   Medications: Verified on Patient summary List    Comorbidities: arthritis, HTN, reports having vertigo   Prior Level of Function: Independent with ADLs and functional tasks with pain in the knee before surgery. The Plan of Care and following information is based on the information from the initial evaluation. Assessment/ key information:   Pt is a 65year old female who presents to therapy today with right knee pain s/p right TKR DOS 2020. Pt reports having home health physical therapy after her surgery and denies having any complications with the surgery. Pt presents to the clinic with a SPC. She reports having some sensitivity around the right knee and shin region, and states that this has been responding well to the medication the MD gave her. Pt demonstrated decreased AROM, decreased strength, muscle tightness, tenderness to palpation, increased swelling, and impaired mobility. Right knee AROM  degs. Pt would benefit from physical therapy to improve the above impairments to help the pt return to performing ADLs and functional activities.      Evaluation Complexity History MEDIUM  Complexity : 1-2 comorbidities / personal factors will impact the outcome/ POC ; Examination HIGH Complexity : 4+ Standardized tests and measures addressing body structure, function, activity limitation and / or participation in recreation  ;Presentation LOW Complexity : Stable, uncomplicated  ;Clinical Decision Making MEDIUM Complexity : FOTO score of 26-74  Overall Complexity Rating: LOW   Problem List: pain affecting function, decrease ROM, decrease strength, edema affecting function, impaired gait/ balance, decrease ADL/ functional abilitiies, decrease activity tolerance, decrease flexibility/ joint mobility and decrease transfer abilities   Treatment Plan may include any combination of the following: Therapeutic exercise, Therapeutic activities, Neuromuscular re-education, Physical agent/modality, Gait/balance training, Manual therapy, Patient education, Self Care training, Functional mobility training, Home safety training and Stair training  Patient / Family readiness to learn indicated by: asking questions, trying to perform skills and interest  Persons(s) to be included in education: patient (P)  Barriers to Learning/Limitations: None  Patient Goal (s): use of right leg normally  Patient Self Reported Health Status: good  Rehabilitation Potential: good    Short Term Goals: To be accomplished in 1 weeks:  1. Pt will report compliance and independence to [facility] to help the pt manage their pain and symptoms. Eval: established   Long Term Goals: To be accomplished in 4 weeks:  1. Pt will increase FOTO score to 59 points to improve ability to perform ADLs. Eval: 33 points  2. Pt will report an improvement in at best pain to 0/10 to improve ease of bed transfers. Eval: 4/10 at best  3. Pt will increase AROM right knee to 0-115 degs to improve pt's ability to negotiate stairs. Eval:  degs AROM  4. Pt will be able to ambulate 100ft in the clinic with no AD, no LOB/assist, and mild to no gait deviations to improve safety and efficiency with gait.   Eval: Uses SPC for gait, slow gait speed, unable to fully EXT right knee in stance phase     Frequency / Duration: Patient to be seen 3 times per week for 4 weeks. Patient/ Caregiver education and instruction: Diagnosis, prognosis, self care, activity modification and exercises   [x]  Plan of care has been reviewed with SEAN Seth, PT 9/14/2020 12:20 PM  _____________________________________________________________________  I certify that the above Therapy Services are being furnished while the patient is under my care. I agree with the treatment plan and certify that this therapy is necessary.     Physician's Signature:____________________  Date:__________Time:______    Please sign and return to [facility] [address] [facility]  [phone] fax

## 2020-09-15 ENCOUNTER — TELEPHONE (OUTPATIENT)
Dept: ORTHOPEDIC SURGERY | Facility: CLINIC | Age: 70
End: 2020-09-15

## 2020-09-15 DIAGNOSIS — R19.7 DIARRHEA IN ADULT PATIENT: Primary | ICD-10-CM

## 2020-09-15 DIAGNOSIS — R35.0 FREQUENT URINATION: ICD-10-CM

## 2020-09-15 NOTE — TELEPHONE ENCOUNTER
Post Op Patient called for  or Melchor Uriostegui . Patient said she has had Diarrhea for over 2 weeks and frequent urination. Patient does not know what is wrong . Patient is requesting to speak with someone as soon as possible. Patient had Right Knee sx on 08/17/2020. Patient tel [phone]. Note : patient aware  and Simone Barrios are in sx. Patient has an appt to see Melchor Uriostegui on 9/17/2020.

## 2020-09-16 ENCOUNTER — APPOINTMENT (OUTPATIENT)
Dept: PHYSICAL THERAPY | Age: 70
End: 2020-09-16
Payer: MEDICARE

## 2020-09-17 ENCOUNTER — OFFICE VISIT (OUTPATIENT)
Dept: ORTHOPEDIC SURGERY | Facility: CLINIC | Age: 70
End: 2020-09-17

## 2020-09-17 VITALS
BODY MASS INDEX: 35.4 KG/M2 | RESPIRATION RATE: 18 BRPM | WEIGHT: 175.6 LBS | SYSTOLIC BLOOD PRESSURE: 149 MMHG | TEMPERATURE: 96.9 F | DIASTOLIC BLOOD PRESSURE: 87 MMHG | OXYGEN SATURATION: 97 % | HEIGHT: 59 IN | HEART RATE: 86 BPM

## 2020-09-17 DIAGNOSIS — M25.561 RIGHT KNEE PAIN, UNSPECIFIED CHRONICITY: ICD-10-CM

## 2020-09-17 DIAGNOSIS — Z96.651 STATUS POST TOTAL RIGHT KNEE REPLACEMENT: Primary | ICD-10-CM

## 2020-09-17 RX ORDER — ACETAMINOPHEN AND CODEINE PHOSPHATE 300; 30 MG/1; MG/1
1-2 TABLET ORAL EVERY 8 HOURS
Qty: 42 TAB | Refills: 0 | Status: SHIPPED | OUTPATIENT
Start: 2020-09-17 | End: 2020-09-24

## 2020-09-17 NOTE — PROGRESS NOTES
1. Have you been to the ER, urgent care clinic since your last visit? Hospitalized since your last visit? No    2. Have you seen or consulted any other health care providers outside of the [address] [place] [address] since your last visit? Include any pap smears or colon screening.  No

## 2020-09-17 NOTE — PROGRESS NOTES
[address] [place] [address]  [phone]           Patient: Fernando Linda                MRN: [id number]       SSN: [id number]  YOB: 1950        AGE: 65 y.o. SEX: female  Body mass index is 35.47 kg/m². PCP: Shaheen Michel DO  09/17/20      This office note has been dictated. REVIEW OF SYSTEMS:  Constitutional: Negative for fever, chills, weight loss and malaise/fatigue. HENT: Negative. Eyes: Negative. Respiratory: Negative. Cardiovascular: Negative. Gastrointestinal: No bowel incontinence or constipation. Genitourinary: No bladder incontinence or saddle anesthesia. Skin: Negative. Neurological: Negative. Endo/Heme/Allergies: Negative. Psychiatric/Behavioral: Negative. Musculoskeletal: As per HPI above. Past Medical History:   Diagnosis Date    Arthritis     Chronic kidney disease     GERD (gastroesophageal reflux disease)     Hypertension     Ill-defined condition     Positional Vertigo -  pt can not lie flat    Sleep apnea          Current Outpatient Medications:     colchicine 0.6 mg tablet, Take 1 Tab by mouth two (2) times a day., Disp: 60 Tab, Rfl: 1    ondansetron (ZOFRAN ODT) 4 mg disintegrating tablet, Take 1 Tab by mouth every six (6) hours. , Disp: 30 Tab, Rfl: 1    ferrous sulfate (IRON) 325 mg (65 mg iron) EC tablet, Take 1 Tab by mouth two (2) times daily (with meals). , Disp: 60 Tab, Rfl: 1    docusate sodium (Colace) 100 mg capsule, Take 1 Cap by mouth two (2) times a day for 90 days. , Disp: 60 Cap, Rfl: 2    OTHER, [employer] -8 gtts daily OTC, Disp: , Rfl:     OTHER, CBD oil as needed, Disp: , Rfl:     metoclopramide HCl (Reglan) 10 mg tablet, Take 10 mg by mouth Before breakfast, lunch, dinner and at bedtime. , Disp: , Rfl:     omeprazole (PRILOSEC) 40 mg capsule, Take 40 mg by mouth daily. , Disp: , Rfl:     losartan (COZAAR) 50 mg tablet, Take 50 mg by mouth daily. , Disp: , Rfl:     acetaminophen (TYLENOL) 325 mg tablet, Take 650 mg by mouth every four (4) hours as needed for Pain., Disp: , Rfl:     diazePAM (VALIUM) 5 mg tablet, Take 1 Tab by mouth every eight (8) hours as needed for Anxiety. Max Daily Amount: 15 mg., Disp: 20 Tab, Rfl: 0    metoprolol (LOPRESSOR) 50 mg tablet, Take 50 mg by mouth daily. , Disp: , Rfl:     folic acid (FOLVITE) 1 mg tablet, Take 1 mg by mouth daily. , Disp: , Rfl:     Cholecalciferol, Vitamin D3, (VITAMIN D3) 1,000 unit cap, Take 1,000 Units by mouth daily. , Disp: , Rfl:     cyanocobalamin (VITAMIN B-12) 1,000 mcg tablet, Take 1,000 mcg by mouth daily as needed. , Disp: , Rfl:     cephALEXin (Keflex) 500 mg capsule, Take 1 Cap by mouth four (4) times daily. , Disp: 12 Cap, Rfl: 0    Allergies   Allergen Reactions    Cymbalta [Duloxetine] Anaphylaxis    Gabapentin Other (comments)     hallucinations    Nsaids (Non-Steroidal Anti-Inflammatory Drug) Other (comments)     History \"gastric issues\" and kidney issues per pt.     Silicone Unknown (comments)    Tramadol Other (comments)     hallucinations       Social History     Socioeconomic History    Marital status:      Spouse name: Not on file    Number of children: Not on file    Years of education: Not on file    Highest education level: Not on file   Occupational History    Not on file   Social Needs    Financial resource strain: Not on file    Food insecurity     Worry: Not on file     Inability: Not on file    Transportation needs     Medical: Not on file     Non-medical: Not on file   Tobacco Use    Smoking status: Never Smoker    Smokeless tobacco: Never Used   Substance and Sexual Activity    Alcohol use: Yes     Comment: occasionally    Drug use: Never    Sexual activity: Not on file   Lifestyle    Physical activity     Days per week: Not on file     Minutes per session: Not on file    Stress: Not on file   Relationships    Social connections Talks on phone: Not on file     Gets together: Not on file     Attends Restorationism service: Not on file     Active member of club or organization: Not on file     Attends meetings of clubs or organizations: Not on file     Relationship status: Not on file    Intimate partner violence     Fear of current or ex partner: Not on file     Emotionally abused: Not on file     Physically abused: Not on file     Forced sexual activity: Not on file   Other Topics Concern    Not on file   Social History Narrative    Not on file       Past Surgical History:   Procedure Laterality Date    HX BREAST BIOPSY      6-8 yrs ago, scar marked    HX CARPAL TUNNEL RELEASE Right     mid to late [de-identified]    HX [address] Left     mid [address] HX CHOLECYSTECTOMY      HX GYN      Vaginal Hernia as a child     HX HERNIA REPAIR      age [de-identified]    HX HYSTERECTOMY      partial     NEUROLOGICAL PROCEDURE UNLISTED  2017    lower lumbar           Patient seen evaluate today for her right knee. She is now 4-week status post right total knee replacement surgery. She is progressing well from the knee replacement. She is been a little noncompliant with sitting her leg out straight. She does continue with outpatient physical therapy. She has had some frequent  urination as well as some diarrhea. Both have improved. We ordered a urinalysis as well as a C. difficile test.  Neither of these have been done. Patient denies recent fevers, chills, chest pain, SOB, or injuries. No recent systemic changes noted. A 12-point review of systems is performed today. Pertinent positives are noted. All other systems reviewed and otherwise are negative. Physical exam: General: Alert and oriented x3, nad.  well-developed, well nourished. normal affect, AF. NC/AT, EOMI, neck supple, trachea midline, no JVD present. Breathing is non-labored. Examination of the right knee reveals skin intact.   Surgical wound healed nicely. There is no erythema, ecchymosis, warmth. There are no signs of infection or cellulitis present. Range of motion is -3 -120 degrees. Patella tracks nice without rubs or correction. Stability is quite good. She has negative calf tenderness. Negative Homans. No signs of DVT present. Assessment: Status post right knee replacement    Plan: At this point, we will give her a refill of Tylenol with codeine. We have ordered the urinalysis as well as C. difficile stool culture and which she will get done. We will plan on seeing her back in office in 2 weeks time for evaluation. We will obtain x-rays of the right knee at that point.           JR Denis CARPENTER PA-C, ATC

## 2020-09-21 ENCOUNTER — HOSPITAL ENCOUNTER (OUTPATIENT)
Dept: LAB | Age: 70
Discharge: HOME | End: 2020-09-21
Payer: MEDICARE

## 2020-09-21 DIAGNOSIS — R35.0 FREQUENT URINATION: ICD-10-CM

## 2020-09-21 DIAGNOSIS — R19.7 DIARRHEA IN ADULT PATIENT: ICD-10-CM

## 2020-09-21 LAB
APPEARANCE UR: CLEAR
BILIRUB UR QL: NEGATIVE
COLOR UR: YELLOW
GLUCOSE UR STRIP.AUTO-MCNC: NEGATIVE MG/DL
HGB UR QL STRIP: NEGATIVE
KETONES UR QL STRIP.AUTO: NEGATIVE MG/DL
LEUKOCYTE ESTERASE UR QL STRIP.AUTO: NEGATIVE
NITRITE UR QL STRIP.AUTO: NEGATIVE
PH UR STRIP: 5.5 [PH] (ref 5–8)
PROT UR STRIP-MCNC: NEGATIVE MG/DL
SP GR UR REFRACTOMETRY: 1.02 (ref 1–1.03)
UROBILINOGEN UR QL STRIP.AUTO: 0.2 EU/DL (ref 0.2–1)

## 2020-09-21 PROCEDURE — 81003 URINALYSIS AUTO W/O SCOPE: CPT

## 2020-09-21 PROCEDURE — 36415 COLL VENOUS BLD VENIPUNCTURE: CPT

## 2020-09-21 PROCEDURE — 87086 URINE CULTURE/COLONY COUNT: CPT

## 2020-09-22 ENCOUNTER — HOSPITAL ENCOUNTER (OUTPATIENT)
Dept: LAB | Age: 70
Discharge: HOME | End: 2020-09-22
Payer: MEDICARE

## 2020-09-22 ENCOUNTER — HOSPITAL ENCOUNTER (OUTPATIENT)
Dept: PHYSICAL THERAPY | Age: 70
Discharge: HOME | End: 2020-09-22
Payer: MEDICARE

## 2020-09-22 DIAGNOSIS — R19.7 DIARRHEA IN ADULT PATIENT: ICD-10-CM

## 2020-09-22 LAB
BACTERIA SPEC CULT: NORMAL
CC UR VC: NORMAL
SERVICE CMNT-IMP: NORMAL

## 2020-09-22 PROCEDURE — 97110 THERAPEUTIC EXERCISES: CPT

## 2020-09-22 PROCEDURE — 87046 STOOL CULTR AEROBIC BACT EA: CPT

## 2020-09-22 PROCEDURE — 97530 THERAPEUTIC ACTIVITIES: CPT

## 2020-09-22 NOTE — PROGRESS NOTES
PT DAILY TREATMENT NOTE 10-18    Patient Name: Jeff Tobin  Date:2020  : 1950  [x]  Patient  Verified  Payor: VA MEDICARE / Plan: VA MEDICARE PART A & B / Product Type: Medicare /    In time:1:36  Out time:2:15  Total Treatment Time (min): 39  Visit #: 2 of 12    Medicare/BCBS Only   Total Timed Codes (min):  39 1:1 Treatment Time:  39       Treatment Area: Pain in right knee [M25.561]  Presence of right artificial knee joint [Z96.651]    SUBJECTIVE  Pain Level (0-10 scale): 6-710   Any medication changes, allergies to medications, adverse drug reactions, diagnosis change, or new procedure performed?: [x] No    [] Yes (see summary sheet for update)  Subjective functional status/changes:   [] No changes reported  Pt reports that she has been doing her hamstring stretch at home, but she hasn't really been doing the rest of the exercises in her HEP. Her knee was really swollen today, and she believes it was swollen because she was standing in front of the oven for a while. She still has a lot of swelling in her knee, especially later in the day. Her knee is very sensitive to certain textures. She hates the sensation of cold on her knee. Her MD told her to keep using the ace wrap. She has a history of vertigo. She had therapy for this a couple times it the past.  Therapy helped one time and didn't help the other time. OBJECTIVE      29 min Therapeutic Exercise:  [x] See flow sheet :   Rationale: increase ROM and increase strength to improve the patients ability to normalize gait for increased ease/safety with ambulation     10 min Therapeutic Activity:  []  See flow sheet : screen of BLe d/t pt's reports of swelling, patient education    Rationale:  To  optimize pt safety             With   [] TE   [] TA   [] neuro   [] other: Patient Education: [x] Review HEP    [] Progressed/Changed HEP based on:   [] positioning   [] body mechanics   [] transfers   [] heat/ice application    [x] other: reviewed ankle pumps to assist with swelling d/t reports of pt not liking ice      Other Objective/Functional Measures:     Girth at joint line (cm): Left 39.0, Right 41.5  Girth 6\" distal to patella (cm): Left 33, Right 32.5     No redness, pitting edema, tenderness, or increased temp B calves  No findings indicative of DVT using Well's Clinical Prediction Rules  Reviewed how to wrap knee with ace wrap     Knee extension AROM following quad sets: 3*  Performed gentle over-pressure with last 5 reps of quad sets     Knee flexion AAROM with last rep of heel slide: 106*    Required cues to avoid rocking substitution with standing heel raises/toe raises    Pt reported feeling slightly unsteady with her sit<>stands when cued to lean forward d/t vertigo per subjective reports, no unsteadiness reported when pt leaned forward less     Pain Level (0-10 scale) post treatment: 6/10    ASSESSMENT/Changes in Function:     Initiated treatment per POC. Pt was motivated in therapy and put forth good effort with therapy interventions. Knee AROM is improving. She is noncompliant with HEP and was educated regarding importance of HEP compliance. Will continue to address strength, ROM, flexibility, balance, and functional mobility in order to improve ease/safety with ambulation and daily tasks. Patient will continue to benefit from skilled PT services to modify and progress therapeutic interventions, address functional mobility deficits, address ROM deficits, address strength deficits, analyze and address soft tissue restrictions, analyze and cue movement patterns, analyze and modify body mechanics/ergonomics, assess and modify postural abnormalities, address imbalance/dizziness and instruct in home and community integration to attain remaining goals. Progress towards goals / Updated goals:  Short Term Goals: To be accomplished in 1 weeks:  1.  Pt will report compliance and independence to HEP to help the pt manage their pain and symptoms. Eval: established   Current:  Pt reports partial HEP compliance 9/22/20   Long Term Goals: To be accomplished in 4 weeks:  1. Pt will increase FOTO score to 59 points to improve ability to perform ADLs. Eval: 33 points  2. Pt will report an improvement in at best pain to 0/10 to improve ease of bed transfers. Eval: 4/10 at best  3. Pt will increase AROM right knee to 0-115 degs to improve pt's ability to negotiate stairs. Eval:  degs AROM  Current:  Progressing. 3-106* 9/22/20   4. Pt will be able to ambulate 100ft in the clinic with no AD, no LOB/assist, and mild to no gait deviations to improve safety and efficiency with gait.   Eval: Uses SPC for gait, slow gait speed, unable to fully EXT right knee in stance phase        PLAN  [x]  Upgrade activities as tolerated     [x]  Continue plan of care  []  Update interventions per flow sheet       []  Discharge due to:_  [x]  Other: Trial desensitization kit next visit       Phuc López PT 9/22/2020  1:46 PM    Future Appointments   Date Time Provider Pricila Manuel   9/24/2020 10:30 AM Lawsteve Rocher [facility]   9/28/2020 12:00 PM Deirdre Elmore PT [facility]   9/30/2020 11:15 AM Lawerence Rocher [facility]   10/2/2020 12:00 PM Deirdre Elmore PT [facility]   10/5/2020  3:45 PM Deirdre Elmore PT [facility]   10/7/2020  9:45 AM Deirdre Elmore PT [facility]   10/9/2020 10:30 AM Deirdre Elmore PT [facility]

## 2020-09-24 ENCOUNTER — HOSPITAL ENCOUNTER (OUTPATIENT)
Dept: PHYSICAL THERAPY | Age: 70
Discharge: HOME | End: 2020-09-24
Payer: MEDICARE

## 2020-09-24 PROCEDURE — 97140 MANUAL THERAPY 1/> REGIONS: CPT

## 2020-09-24 PROCEDURE — 97110 THERAPEUTIC EXERCISES: CPT

## 2020-09-24 NOTE — PROGRESS NOTES
PT DAILY TREATMENT NOTE 10-18    Patient Name: Ashlyn Richardson  Date:2020  : 1950  [x]  Patient  Verified  Payor: VA MEDICARE / Plan: VA MEDICARE PART A & B / Product Type: Medicare /    In time:1030  Out time:1124  Total Treatment Time (min): 47  Visit #: 3 of 12    Medicare/BCBS Only   Total Timed Codes (min):  44 1:1 Treatment Time:  40       Treatment Area: Pain in right knee [M25.561]  Presence of right artificial knee joint [Z96.651]    SUBJECTIVE  Pain Level (0-10 scale): 6-7  Any medication changes, allergies to medications, adverse drug reactions, diagnosis change, or new procedure performed?: [x] No    [] Yes (see summary sheet for update)  Subjective functional status/changes:   [] No changes reported  Patient noted pain in right hip and knee upon arrival    OBJECTIVE    Modality rationale: decrease inflammation and decrease pain to improve the patients ability to perform ADLs   Min Type Additional Details    [] Estim:  []Unatt       []IFC  []Premod                        []Other:  []w/ice   []w/heat  Position:  Location:    [] Estim: []Att    []TENS instruct  []NMES                    []Other:  []w/US   []w/ice   []w/heat  Position:  Location:    []  Traction: [] Cervical       []Lumbar                       [] Prone          []Supine                       []Intermittent   []Continuous Lbs:  [] before manual  [] after manual    []  Ultrasound: []Continuous   [] Pulsed                           []1MHz   []3MHz W/cm2:  Location:    []  Iontophoresis with dexamethasone         Location: [] Take home patch   [] In clinic   10 [x]  Ice     []  heat  []  Ice massage  []  Laser   []  Anodyne Position:long sitting  Location:right knee and lateral hip    []  Laser with stim  []  Other:  Position:  Location:    []  Vasopneumatic Device Pressure:       [] lo [] med [] hi   Temperature: [] lo [] med [] hi   [] Skin assessment post-treatment:  []intact []redness- no adverse reaction    []redness  adverse reaction:     34 min Therapeutic Exercise:  [x] See flow sheet :   Rationale: increase ROM and increase strength to improve the patients ability to perform ADLs      10 min Manual Therapy:  Patellar mobs and femoral-tibial AP mobs to increase right knee flexion and extension, edema massage and    Rationale: increase ROM and increase tissue extensibility to perform ADLs        With   [] TE   [] TA   [] neuro   [] other: Patient Education: [x] Review HEP    [] Progressed/Changed HEP based on:   [] positioning   [] body mechanics   [] transfers   [] heat/ice application    [] other:      Other Objective/Functional Measures: progressed/introduced therex as able and per flow sheet - patient noted pain in right hip as well B knees throughout activities, limiting completion of entire therex program per flow sheet. Post manual AROM right knee 2-108 degs     Pain Level (0-10 scale) post treatment: 5    ASSESSMENT/Changes in Function: patient had limited standing activity tolerance due to pain in involved LE as well as left knee today. Patient is progressing in right knee AROM     Patient will continue to benefit from skilled PT services to modify and progress therapeutic interventions, address functional mobility deficits, address ROM deficits, address strength deficits, analyze and address soft tissue restrictions and analyze and cue movement patterns to attain remaining goals. [x]  See Plan of Care  []  See progress note/recertification  []  See Discharge Summary         Progress towards goals / Updated goals:  Short Term Goals: To be accomplished in 1 weeks:  1. Pt will report compliance and independence to HEP to help the pt manage their pain and symptoms.             Eval: established   Current:  Pt reports partial HEP compliance 9/22/20   Long Term Goals: To be accomplished in 4 weeks:  1. Pt will increase FOTO score to 59 points to improve ability to perform ADLs. Eval: 33 points  2.  Pt will report an improvement in at best pain to 0/10 to improve ease of bed transfers. Eval: 4/10 at best  3. Pt will increase AROM right knee to 0-115 degs to improve pt's ability to negotiate stairs. Eval:  degs AROM  Current:  Progressing. 2-108 degs 9/24/20  4. Pt will be able to ambulate 100ft in the clinic with no AD, no LOB/assist, and mild to no gait deviations to improve safety and efficiency with gait.   Eval: Uses SPC for gait, slow gait speed, unable to fully EXT right knee in stance phase     PLAN  []  Upgrade activities as tolerated     [x]  Continue plan of care  []  Update interventions per flow sheet       []  Discharge due to:_  []  Other:_      Gaby Connell PTA 9/24/2020  10:47 AM    Future Appointments   Date Time Provider Pricila Manuel   9/28/2020 12:00 PM Juvencio Milton, PT [facility]   9/30/2020 11:15 AM Darlys Cable [id number]   10/2/2020 12:00 PM Juvencio Nab, PT [facility]   10/5/2020  3:45 PM Juvencio Nab, PT [facility]   10/7/2020  9:45 AM Juvencio Nab, PT [facility]   10/9/2020 10:30 AM Juvencio Nab, PT [facility]

## 2020-09-28 ENCOUNTER — HOSPITAL ENCOUNTER (OUTPATIENT)
Dept: PHYSICAL THERAPY | Age: 70
Discharge: HOME | End: 2020-09-28
Payer: MEDICARE

## 2020-09-28 LAB
BACTERIA SPEC CULT: NORMAL
SPECIMEN SOURCE: NORMAL
VIBRIO STL CULT: NORMAL

## 2020-09-28 PROCEDURE — 97110 THERAPEUTIC EXERCISES: CPT

## 2020-09-28 PROCEDURE — 97016 VASOPNEUMATIC DEVICE THERAPY: CPT

## 2020-09-28 NOTE — PROGRESS NOTES
PT DAILY TREATMENT NOTE 10-18    Patient Name: Tanesha Rogel  Date:2020  : 1950  [x]  Patient  Verified  Payor: Karina Linker / Plan: [place] MED[place] PART A & B / Product Type: Medicare /    In time: 12:00  Out time: 1:01  Total Treatment Time (min): 61  Visit #: 4 of 12    Medicare/BCBS Only   Total Timed Codes (min):  51 1:1 Treatment Time:  46       Treatment Area: Pain in right knee [M25.561]  Presence of right artificial knee joint [Z96.651]    SUBJECTIVE  Pain Level (0-10 scale):  7/10  Any medication changes, allergies to medications, adverse drug reactions, diagnosis change, or new procedure performed?: [x] No    [] Yes (see summary sheet for update)  Subjective functional status/changes:   [] No changes reported  Pt. Reports her knee is going good but she is having pain everywhere else.      OBJECTIVE    Modality rationale: decrease edema, decrease inflammation and decrease pain to improve the patients ability to increase ease of ADLs   Min Type Additional Details    [] Estim:  []Unatt       []IFC  []Premod                        []Other:  []w/ice   []w/heat  Position:  Location:    [] Estim: []Att    []TENS instruct  []NMES                    []Other:  []w/US   []w/ice   []w/heat  Position:  Location:    []  Traction: [] Cervical       []Lumbar                       [] Prone          []Supine                       []Intermittent   []Continuous Lbs:  [] before manual  [] after manual    []  Ultrasound: []Continuous   [] Pulsed                           []1MHz   []3MHz W/cm2:  Location:    []  Iontophoresis with dexamethasone         Location: [] Take home patch   [] In clinic    []  Ice     []  heat  []  Ice massage  []  Laser   []  Anodyne Position:  Location:    []  Laser with stim  []  Other:  Position:  Location:   10 [x]  Vasopneumatic Device Pressure:       [x] lo [] med [] hi   Temperature: [] lo [] med [x] hi   [x] Skin assessment post-treatment:  [x]intact []redness- no adverse reaction    []redness - adverse reaction:     46 min Therapeutic Exercise:  [x] See flow sheet :   Rationale: increase ROM and increase strength to improve the patients ability to increase ease of ADLs    5 min Manual Therapy:  Patella mobs   Rationale: decrease pain, increase ROM and increase tissue extensibility to increase ease of ambulation           With   [x] TE   [] TA   [] neuro   [] other: Patient Education: [x] Review HEP    [] Progressed/Changed HEP based on:   [] positioning   [] body mechanics   [] transfers   [] heat/ice application    [] other:      Other Objective/Functional Measures:   Right knee AROM: 2-109 degrees  Pt. Tolerated PT well with no reports of increased pain  She was challenged with small andi step overs  She continues to demonstrate decreased hamstring flexibility      Pain Level (0-10 scale) post treatment: 4/10    ASSESSMENT/Changes in Function:  Pt. Is progressing slowly towards goals. She continues to have decreased right knee mobility and decreased quad strength. Pt. Ambulation is gradually improving, but she continues to require a [facility] for ambulation. Patient will continue to benefit from skilled PT services to modify and progress therapeutic interventions, address functional mobility deficits, address ROM deficits, address strength deficits, analyze and address soft tissue restrictions, analyze and cue movement patterns and analyze and modify body mechanics/ergonomics to attain remaining goals. Progress towards goals / Updated goals:  Short Term Goals: To be accomplished in 1 weeks:  1. Pt will report compliance and independence to HEP to help the pt manage their pain and symptoms.             Eval: established   Current:  Pt reports partial HEP compliance 9/22/20     Long Term Goals: To be accomplished in 4 weeks:  1. Pt will increase FOTO score to 59 points to improve ability to perform ADLs. Eval: 33 points  2.  Pt will report an improvement in at best pain to 0/10 to improve ease of bed transfers. Eval: 4/10 at best  3. Pt will increase AROM right knee to 0-115 degs to improve pt's ability to negotiate stairs. Eval:  degs AROM  Current:  Progressing.  2-109 degs 9/28/20  4. Pt will be able to ambulate 100ft in the clinic with no AD, no LOB/assist, and mild to no gait deviations to improve safety and efficiency with gait.   Eval: Uses SPC for gait, slow gait speed, unable to fully EXT right knee in stance phase     PLAN  []  Upgrade activities as tolerated     [x]  Continue plan of care  []  Update interventions per flow sheet       []  Discharge due to:_  []  Other:_      Love Stevens PT 9/28/2020  8:02 AM    Future Appointments   Date Time Provider Pricila Manuel   9/28/2020 12:00 PM Gudelia Cope, PT [id number]   9/30/2020 11:15 AM Cintia Hilton [id number]   10/2/2020 12:00 PM Gudelia Cope, PT [id number]   10/5/2020  3:45 PM Gudelia Cope, PT [email or web address]   10/7/2020  9:45 AM Gudelia Cope, PT [email or web address]   10/9/2020 10:30 AM Gudelia Cope, PT [facility]

## 2020-09-30 ENCOUNTER — HOSPITAL ENCOUNTER (OUTPATIENT)
Dept: PHYSICAL THERAPY | Age: 70
Discharge: HOME | End: 2020-09-30
Payer: MEDICARE

## 2020-09-30 PROCEDURE — 97110 THERAPEUTIC EXERCISES: CPT

## 2020-09-30 PROCEDURE — 97116 GAIT TRAINING THERAPY: CPT

## 2020-09-30 PROCEDURE — 97112 NEUROMUSCULAR REEDUCATION: CPT

## 2020-09-30 NOTE — PROGRESS NOTES
PT DAILY TREATMENT NOTE 10-18    Patient Name: Cindy Berry  Date:2020  : 1950  [x]  Patient  Verified  Payor: [place] MEDICARE / Plan: [place] MEDICARE PART A & B / Product Type: Medicare /    In time: 11:20  Out time:12:12  Total Treatment Time (min): 52  Visit #: 7 of 10    Medicare/BCBS Only   Total Timed Codes (min):  42 1:1 Treatment Time:  42       Treatment Area: Pain in right knee [M25.561]  Presence of right artificial knee joint [Z96.651]    SUBJECTIVE  Pain Level (0-10 scale): 7  Any medication changes, allergies to medications, adverse drug reactions, diagnosis change, or new procedure performed?: [x] No    [] Yes (see summary sheet for update)  Subjective functional status/changes:   [] No changes reported  \"I woke up around 5:30 and by 6:00 it was sore and swollen. I usually have a general misery pain, but today is a shooting pain and the misery pain. \"   She states some of the swelling may have something to do with her kidney issues or possible gout. OBJECTIVE    Modality rationale: decrease edema, decrease inflammation and decrease pain to improve the patients ability to  tolerate exercises and return to daily activity with ease.    Min Type Additional Details    [] Estim:  []Unatt       []IFC  []Premod                        []Other:  []w/ice   []w/heat  Position:  Location:    [] Estim: []Att    []TENS instruct  []NMES                    []Other:  []w/US   []w/ice   []w/heat  Position:  Location:    []  Traction: [] Cervical       []Lumbar                       [] Prone          []Supine                       []Intermittent   []Continuous Lbs:  [] before manual  [] after manual    []  Ultrasound: []Continuous   [] Pulsed                           []1MHz   []3MHz W/cm2:  Location:    []  Iontophoresis with dexamethasone         Location: [] Take home patch   [] In clinic    []  Ice     []  heat  []  Ice massage  []  Laser   []  Anodyne Position:  Location:    []  Laser with stim  [] Other:  Position:  Location:   10 [x]  Vasopneumatic Device Pressure:       [x] lo [] med [] hi   Temperature: [x] lo [] med [] hi   [] Skin assessment post-treatment:  []intact []redness- no adverse reaction    []redness - adverse reaction:     20 min Therapeutic Exercise:  [x] See flow sheet :   Rationale: increase ROM and increase strength to improve the patients ability to perform ADLs with ease     14 min Neuromuscular Re-education:  []  See flow sheet :   Rationale: improve coordination and increase proprioception  to improve the patients ability to stabilize, use proper body mechanics, and promote proper postural awareness     8 min Gait Trainin feet x2 with no device on level surfaces with no level of assist   Rationale: With   [x] TE   [] TA   [x] neuro   [] other: Patient Education: [x] Review HEP    [] Progressed/Changed HEP based on:   [x] positioning   [x] body mechanics   [] transfers   [x] heat/ice application    [] other:      Other Objective/Functional Measures:   Pt declines the bike today, however states she will eventually try it. 5-108* knee ext/flex    Pain Level (0-10 scale) post treatment: 3    ASSESSMENT/Changes in Function: Patient is progressing towards goals; she is able to ambulate short distances without use of AD. She continues to c/o pain and swelling and states she stays busy throughout the day, not taking much time to rest. Educated pt on importance of allowing the knee to recuperate and resting, icing, and elevating are important for recovery and healing.      Patient will continue to benefit from skilled PT services to modify and progress therapeutic interventions, address functional mobility deficits, address ROM deficits, address strength deficits, analyze and address soft tissue restrictions, analyze and cue movement patterns, analyze and modify body mechanics/ergonomics, assess and modify postural abnormalities and instruct in home and community integration to attain remaining goals. [x]  See Plan of Care  []  See progress note/recertification  []  See Discharge Summary         Progress towards goals / Updated goals:  Short Term Goals: To be accomplished in 1 weeks:  1. Pt will report compliance and independence to HEP to help the pt manage their pain and symptoms.             Eval: established   Current:  Pt reports partial HEP compliance 9/22/20      Long Term Goals: To be accomplished in 4 weeks:  1. Pt will increase FOTO score to 59 points to improve ability to perform ADLs. Eval: 33 points  2. Pt will report an improvement in at best pain to 0/10 to improve ease of bed transfers. Eval: 4/10 at best  3. Pt will increase AROM right knee to 0-115 degs to improve pt's ability to negotiate stairs. Eval:  degs AROM  Current:  Progressing.  2-109 degs 9/28/20  4. Pt will be able to ambulate 100ft in the clinic with no AD, no LOB/assist, and mild to no gait deviations to improve safety and efficiency with gait.   Eval: Uses SPC for gait, slow gait speed, unable to fully EXT right knee in stance phase     PLAN  [x]  Upgrade activities as tolerated     [x]  Continue plan of care  []  Update interventions per flow sheet       []  Discharge due to:_  []  Other:_      TACHO Brown 9/30/2020  11:24 AM    Future Appointments   Date Time Provider Pricila Manuel   10/2/2020 12:00 PM Keyon Duran PT [id number]   10/5/2020  3:45 PM Keyon Duran PT [id number]   10/7/2020  9:45 AM Keyon Duran PT [id number]   10/9/2020 10:30 AM Keyon Duran PT [id number]

## 2020-10-01 ENCOUNTER — TELEPHONE (OUTPATIENT)
Dept: ORTHOPEDIC SURGERY | Age: 70
End: 2020-10-01

## 2020-10-01 NOTE — TELEPHONE ENCOUNTER
Spoke with patient, notified her per PA Theador Amend both labs were negative. Patient states she is still using the restroom frequently. She does have an appointment with an NP at her primary care's office next week. Patient also inquired if she should see her Nephrologist and GI. Notified her it is probably a good idea. Patient verbalized understanding.

## 2020-10-02 ENCOUNTER — HOSPITAL ENCOUNTER (OUTPATIENT)
Dept: PHYSICAL THERAPY | Age: 70
Discharge: HOME | End: 2020-10-02
Payer: MEDICARE

## 2020-10-02 PROCEDURE — 97110 THERAPEUTIC EXERCISES: CPT

## 2020-10-02 PROCEDURE — 97140 MANUAL THERAPY 1/> REGIONS: CPT

## 2020-10-02 PROCEDURE — 97016 VASOPNEUMATIC DEVICE THERAPY: CPT

## 2020-10-02 NOTE — PROGRESS NOTES
PT DAILY TREATMENT NOTE 10-18    Patient Name: Hair Amor  Date:10/2/2020  : 1950  [x]  Patient  Verified  Payor: [place] MEDICARE / Plan: [place] MEDICARE PART A & B / Product Type: Medicare /    In time: 12:00  Out time: 1:03  Total Treatment Time (min): 63  Visit #: 8 of 10    Medicare/BCBS Only   Total Timed Codes (min):  53 1:1 Treatment Time:  50       Treatment Area: Pain in right knee [M25.561]  Presence of right artificial knee joint [Z96.651]    SUBJECTIVE  Pain Level (0-10 scale):  7/10  Any medication changes, allergies to medications, adverse drug reactions, diagnosis change, or new procedure performed?: [x] No    [] Yes (see summary sheet for update)  Subjective functional status/changes:   [] No changes reported  Reports she she was stretching her leg out straight last night and had increased pain and it hurts today. Reports pain is sharp.      OBJECTIVE    Modality rationale: decrease edema, decrease inflammation and decrease pain to improve the patients ability to increase ease of ADLs   Min Type Additional Details    [] Estim:  []Unatt       []IFC  []Premod                        []Other:  []w/ice   []w/heat  Position:  Location:    [] Estim: []Att    []TENS instruct  []NMES                    []Other:  []w/US   []w/ice   []w/heat  Position:  Location:    []  Traction: [] Cervical       []Lumbar                       [] Prone          []Supine                       []Intermittent   []Continuous Lbs:  [] before manual  [] after manual    []  Ultrasound: []Continuous   [] Pulsed                           []1MHz   []3MHz W/cm2:  Location:    []  Iontophoresis with dexamethasone         Location: [] Take home patch   [] In clinic    []  Ice     []  heat  []  Ice massage  []  Laser   []  Anodyne Position:  Location:    []  Laser with stim  []  Other:  Position:  Location:   10 [x]  Vasopneumatic Device Pressure:       [x] lo [] med [] hi   Temperature: [] lo [] med [x] hi   [x] Skin assessment post-treatment:  [x]intact []redness- no adverse reaction    []redness - adverse reaction:     38 min Therapeutic Exercise:  [x] See flow sheet :   Rationale: increase ROM and increase strength to improve the patients ability to increase ease of ADLs. 15 min Manual Therapy:  Patella mobs, trigger point release and STM to distal quads   Rationale: decrease pain, increase ROM and increase tissue extensibility to increase ease of ADLs          With   [] TE   [] TA   [] neuro   [] other: Patient Education: [x] Review HEP    [] Progressed/Changed HEP based on:   [] positioning   [] body mechanics   [] transfers   [] heat/ice application    [] other:      Other Objective/Functional Measures:   Right knee AROM: 5-105 degrees  Decreased pain and improved extension mobility following manual  She was educated on self massage and trigger point release to distal quads. Pain Level (0-10 scale) post treatment:  4/10    ASSESSMENT/Changes in Function: pt. Is progressing slowly towards goals. She continues to have decreased right knee mobility and strength that is affecting her ambulation. She continues to have significant pain at 7/10. She would benefit from continued use of cane for ambulation. Patient will continue to benefit from skilled PT services to modify and progress therapeutic interventions, address functional mobility deficits, address ROM deficits, address strength deficits, analyze and address soft tissue restrictions, analyze and cue movement patterns and analyze and modify body mechanics/ergonomics to attain remaining goals. Progress towards goals / Updated goals:  Short Term Goals: To be accomplished in 1 weeks:  1. Pt will report compliance and independence to HEP to help the pt manage their pain and symptoms.             Eval: established   Current:  Pt reports partial HEP compliance 9/22/20      Long Term Goals: To be accomplished in 4 weeks:  1.  Pt will increase FOTO score to 59 points to improve ability to perform ADLs. Eval: 33 points  2. Pt will report an improvement in at best pain to 0/10 to improve ease of bed transfers. Eval: 4/10 at best  3. Pt will increase AROM right knee to 0-115 degs to improve pt's ability to negotiate stairs. Eval:  degs AROM  Current:  Progressing.  2-109 degs 9/28/20  4. Pt will be able to ambulate 100ft in the clinic with no AD, no LOB/assist, and mild to no gait deviations to improve safety and efficiency with gait.   Eval: Uses SPC for gait, slow gait speed, unable to fully EXT right knee in stance phase     PLAN  []  Upgrade activities as tolerated     [x]  Continue plan of care  []  Update interventions per flow sheet       []  Discharge due to:_  []  Other:_      Chely Tripp, PT 10/2/2020  7:57 AM    Future Appointments   Date Time Provider Pricila Manuel   10/2/2020 12:00 PM Emma Hwang, PT [email or web address]   10/5/2020  3:45 PM Emma Hwang, PT [facility]   10/7/2020  9:45 AM Emma Voast, PT [facility]   10/9/2020 10:30 AM Emma Voast, PT [facility]

## 2020-10-05 ENCOUNTER — HOSPITAL ENCOUNTER (OUTPATIENT)
Dept: PHYSICAL THERAPY | Age: 70
Discharge: HOME | End: 2020-10-05
Payer: MEDICARE

## 2020-10-05 PROCEDURE — 97140 MANUAL THERAPY 1/> REGIONS: CPT

## 2020-10-05 PROCEDURE — 97110 THERAPEUTIC EXERCISES: CPT

## 2020-10-05 PROCEDURE — 97016 VASOPNEUMATIC DEVICE THERAPY: CPT

## 2020-10-05 NOTE — PROGRESS NOTES
PT DAILY TREATMENT NOTE 10-18    Patient Name: Miranda Altman  Date:10/5/2020  : 1950  [x]  Patient  Verified  Payor: Mj Diggs / Plan: [place] MEDICARE PART A & B / Product Type: Medicare /    In time: 3:46  Out time: 4:48  Total Treatment Time (min): 62  Visit #: 9 of 10    Medicare/BCBS Only   Total Timed Codes (min):  52 1:1 Treatment Time:  50       Treatment Area: Pain in right knee [M25.561]  Presence of right artificial knee joint [Z96.651]    SUBJECTIVE  Pain Level (0-10 scale):  7/10  Any medication changes, allergies to medications, adverse drug reactions, diagnosis change, or new procedure performed?: [x] No    [] Yes (see summary sheet for update)  Subjective functional status/changes:   [] No changes reported  Pt. Reports her knee is swollen today and she is still having pain.      OBJECTIVE    Modality rationale: decrease edema, decrease inflammation and decrease pain to improve the patients ability to increase ease of ambulation    Min Type Additional Details    [] Estim:  []Unatt       []IFC  []Premod                        []Other:  []w/ice   []w/heat  Position:  Location:    [] Estim: []Att    []TENS instruct  []NMES                    []Other:  []w/US   []w/ice   []w/heat  Position:  Location:    []  Traction: [] Cervical       []Lumbar                       [] Prone          []Supine                       []Intermittent   []Continuous Lbs:  [] before manual  [] after manual    []  Ultrasound: []Continuous   [] Pulsed                           []1MHz   []3MHz W/cm2:  Location:    []  Iontophoresis with dexamethasone         Location: [] Take home patch   [] In clinic    []  Ice     []  heat  []  Ice massage  []  Laser   []  Anodyne Position:  Location:    []  Laser with stim  []  Other:  Position:  Location:   10 [x]  Vasopneumatic Device Pressure:       [x] lo [] med [] hi   Temperature: [] lo [x] med [] hi   [x] Skin assessment post-treatment:  [x]intact []redness- no adverse reaction    []redness - adverse reaction:     32 min Therapeutic Exercise:  [x] See flow sheet :   Rationale: increase ROM and increase strength to improve the patients ability to increase ease of ambulation     10 min Manual Therapy:  tib distraction, patella mobs, trigger point release to distal quads   Rationale: decrease pain, increase ROM and increase tissue extensibility to increase ease of ambulation           With   [x] TE   [] TA   [] neuro   [] other: Patient Education: [x] Review HEP    [] Progressed/Changed HEP based on:   [] positioning   [] body mechanics   [] transfers   [] heat/ice application    [] other:      Other Objective/Functional Measures:   Pt. Continues to have decreased right knee mobility  She required cues to improve quad set correctly  Extension lag during SLR after 8 reps     Pain Level (0-10 scale) post treatment: 0/10    ASSESSMENT/Changes in Function:  Pt. Is progressing slowly towards goals. She continues to have partial compliance with her HEP. She continues to be limited by pain and right knee edema. Pt. Has decreased right knee mobility and decreased knee strength. Patient will continue to benefit from skilled PT services to modify and progress therapeutic interventions, address functional mobility deficits, address ROM deficits, address strength deficits, analyze and address soft tissue restrictions, analyze and cue movement patterns, analyze and modify body mechanics/ergonomics and assess and modify postural abnormalities to attain remaining goals. Progress towards goals / Updated goals:  Short Term Goals: To be accomplished in 1 weeks:  1. Pt will report compliance and independence to HEP to help the pt manage their pain and symptoms.             Eval: established   Current:  Pt reports partial HEP compliance 9/22/20      Long Term Goals: To be accomplished in 4 weeks:  1.  Pt will increase FOTO score to 59 points to improve ability to perform ADLs.  Eval: 33 points  2. Pt will report an improvement in at best pain to 0/10 to improve ease of bed transfers. Eval: 4/10 at best  Not met: 7/10 (10/5/20)  3. Pt will increase AROM right knee to 0-115 degs to improve pt's ability to negotiate stairs. Eval:  degs AROM  Current:  Progressing.  2-109 degs 9/28/20  4. Pt will be able to ambulate 100ft in the clinic with no AD, no LOB/assist, and mild to no gait deviations to improve safety and efficiency with gait.   Eval: Uses SPC for gait, slow gait speed, unable to fully EXT right knee in stance phase     PLAN  []  Upgrade activities as tolerated     [x]  Continue plan of care  []  Update interventions per flow sheet       []  Discharge due to:_  []  Other:_      Danie Ruiz PT 10/5/2020  8:11 AM    Future Appointments   Date Time Provider Pricila Manuel   10/5/2020  3:45 PM Luis Goldstein PT [id number]   10/7/2020  9:45 AM Luis Goldstein PT [id number]   10/9/2020 10:30 AM Luis Goldstein PT [facility]

## 2020-10-07 ENCOUNTER — HOSPITAL ENCOUNTER (OUTPATIENT)
Dept: PHYSICAL THERAPY | Age: 70
Discharge: HOME | End: 2020-10-07
Payer: MEDICARE

## 2020-10-07 ENCOUNTER — TRANSCRIBE ORDER (OUTPATIENT)
Dept: SCHEDULING | Age: 70
End: 2020-10-07

## 2020-10-07 DIAGNOSIS — Z12.31 VISIT FOR SCREENING MAMMOGRAM: Primary | ICD-10-CM

## 2020-10-07 PROCEDURE — 97110 THERAPEUTIC EXERCISES: CPT

## 2020-10-07 PROCEDURE — 97140 MANUAL THERAPY 1/> REGIONS: CPT

## 2020-10-07 NOTE — PROGRESS NOTES
PT DAILY TREATMENT NOTE 10-18    Patient Name: Florentin Ohms  Date:10/7/2020  : 1950  [x]  Patient  Verified  Payor: Silverio Cai / Plan: [place] MEDICARE PART A & B / Product Type: Medicare /    In time: 9:45  Out time: 10:28  Total Treatment Time (min): 43  Visit #: 8 of 10    Medicare/BCBS Only   Total Timed Codes (min):  43 1:1 Treatment Time:  43       Treatment Area: Pain in right knee [M25.561]  Presence of right artificial knee joint [Z96.651]    SUBJECTIVE  Pain Level (0-10 scale):  5/10  Any medication changes, allergies to medications, adverse drug reactions, diagnosis change, or new procedure performed?: [x] No    [] Yes (see summary sheet for update)  Subjective functional status/changes:   [] No changes reported  Pt. Reports she isn't getting as much sharp pain in her knee today. OBJECTIVE    35 min Therapeutic Exercise:  [x] See flow sheet :   Rationale: increase ROM and increase strength to improve the patients ability to increase ease of ambulation     8 min Manual Therapy:  Patella mobs, trigger point release to distal quads   Rationale: decrease pain, increase ROM and increase tissue extensibility to increase ease of ambulation           With   [x] TE   [] TA   [] neuro   [] other: Patient Education: [x] Review HEP    [] Progressed/Changed HEP based on:   [] positioning   [] body mechanics   [] transfers   [] heat/ice application    [] other:      Other Objective/Functional Measures:   Pt. Continues to have extension lag during SLR  Required cues to perform quad set correctly  She was fatigued after 4 minutes of walking       Pain Level (0-10 scale) post treatment:  5/10    ASSESSMENT/Changes in Function:  Pt. Is progressing slowly towards goals. She continues to have decreased right knee strength and decreased right knee mobility. Ambulation is improving but pt. Continues to require cane for community ambulation.      Patient will continue to benefit from skilled PT services to modify and progress therapeutic interventions, address functional mobility deficits, address ROM deficits, address strength deficits, analyze and address soft tissue restrictions, analyze and cue movement patterns and analyze and modify body mechanics/ergonomics to attain remaining goals. Progress towards goals / Updated goals:  Short Term Goals: To be accomplished in 1 weeks:  1. Pt will report compliance and independence to HEP to help the pt manage their pain and symptoms.             Eval: established   Current:  Pt reports partial HEP compliance 9/22/20      Long Term Goals: To be accomplished in 4 weeks:  1. Pt will increase FOTO score to 59 points to improve ability to perform ADLs. Eval: 33 points  2. Pt will report an improvement in at best pain to 0/10 to improve ease of bed transfers. Eval: 4/10 at best  Not met: 5/10 (10/7/20)  3. Pt will increase AROM right knee to 0-115 degs to improve pt's ability to negotiate stairs. Eval:  degs AROM  Current:  Progressing.  2-109 degs 9/28/20  4. Pt will be able to ambulate 100ft in the clinic with no AD, no LOB/assist, and mild to no gait deviations to improve safety and efficiency with gait.   Eval: Uses SPC for gait, slow gait speed, unable to fully EXT right knee in stance phase     PLAN  []  Upgrade activities as tolerated     [x]  Continue plan of care  []  Update interventions per flow sheet       []  Discharge due to:_  []  Other:_      Johnathon Young, PT 10/7/2020  7:08 AM    Future Appointments   Date Time Provider Pricila Manuel   10/7/2020  9:45 AM Cassie Abdi PT [facility]   10/9/2020 10:30 AM BONNIE Solis [facility]

## 2020-10-08 ENCOUNTER — TELEPHONE (OUTPATIENT)
Dept: ORTHOPEDIC SURGERY | Age: 70
End: 2020-10-08

## 2020-10-08 NOTE — TELEPHONE ENCOUNTER
Patient states she was placed on Colchicine and this caused extreme bowel issues, so her PCP discontinued this medication. Should she continue the iron pills?   Please advise [phone]

## 2020-10-09 ENCOUNTER — HOSPITAL ENCOUNTER (OUTPATIENT)
Dept: PHYSICAL THERAPY | Age: 70
Discharge: HOME | End: 2020-10-09
Payer: MEDICARE

## 2020-10-09 PROCEDURE — 97016 VASOPNEUMATIC DEVICE THERAPY: CPT

## 2020-10-09 PROCEDURE — 97140 MANUAL THERAPY 1/> REGIONS: CPT

## 2020-10-09 PROCEDURE — 97110 THERAPEUTIC EXERCISES: CPT

## 2020-10-09 NOTE — PROGRESS NOTES
PT DAILY TREATMENT NOTE 10-18    Patient Name: Isaac Lam  Date:10/9/2020  : 1950  [x]  Patient  Verified  Payor: [place] MEDICARE / Plan: [place] MEDICARE PART A & B / Product Type: Medicare /    In time: 10:31  Out time: 11:30  Total Treatment Time (min): 61  Visit #: 9 of 10    Medicare/BCBS Only   Total Timed Codes (min):  44 1:1 Treatment Time:  44       Treatment Area: Pain in right knee [M25.561]  Presence of right artificial knee joint [Z96.651]    SUBJECTIVE  Pain Level (0-10 scale): 5/10  Any medication changes, allergies to medications, adverse drug reactions, diagnosis change, or new procedure performed?: [x] No    [] Yes (see summary sheet for update)  Subjective functional status/changes:   [] No changes reported  Pt. Reports she is feeling pretty good today. She reports she doesn't walk with her cane at home.      OBJECTIVE    Modality rationale: decrease edema, decrease inflammation and decrease pain to improve the patients ability to increase ease of ambulatoin    Min Type Additional Details    [] Estim:  []Unatt       []IFC  []Premod                        []Other:  []w/ice   []w/heat  Position:  Location:    [] Estim: []Att    []TENS instruct  []NMES                    []Other:  []w/US   []w/ice   []w/heat  Position:  Location:    []  Traction: [] Cervical       []Lumbar                       [] Prone          []Supine                       []Intermittent   []Continuous Lbs:  [] before manual  [] after manual    []  Ultrasound: []Continuous   [] Pulsed                           []1MHz   []3MHz W/cm2:  Location:    []  Iontophoresis with dexamethasone         Location: [] Take home patch   [] In clinic    []  Ice     []  heat  []  Ice massage  []  Laser   []  Anodyne Position:  Location:    []  Laser with stim  []  Other:  Position:  Location:   15 [x]  Vasopneumatic Device Pressure:       [x] lo [] med [] hi   Temperature: [x] lo [] med [] hi   [x] Skin assessment post-treatment: [x]intact []redness- no adverse reaction    []redness  adverse reaction:     34 min Therapeutic Exercise:  [x] See flow sheet :   Rationale: increase ROM and increase strength to improve the patients ability to increase ease of ADLs    10 min Manual Therapy:  Patella mobs, trigger point release to distal quads   Rationale: decrease pain, increase ROM and increase tissue extensibility to increase ease of ambulation           With   [x] TE   [] TA   [] neuro   [] other: Patient Education: [x] Review HEP    [] Progressed/Changed HEP based on:   [] positioning   [] body mechanics   [] transfers   [] heat/ice application    [] other:      Other Objective/Functional Measures:   Right knee AROM: 5-105 degrees  Right knee MMT: ext: 4+/5 flex: 4+/5  FOTO:  47 points    Pain Level (0-10 scale) post treatment: 5/10    ASSESSMENT/Changes in Function:    []  See Plan of Care  [x]  See progress note/recertification  []  See Discharge Summary         Progress towards goals / Updated goals:  See progress note    PLAN  []  Upgrade activities as tolerated     [x]  Continue plan of care  []  Update interventions per flow sheet       []  Discharge due to:_  []  Other:_      Duane Clay PT 10/9/2020  7:49 AM    Future Appointments   Date Time Provider Pricila Manuel   10/9/2020 10:30 AM Ashish Kras, PT [facility]   10/14/2020  3:00 PM Ashish Kras, PT [facility]   10/16/2020 12:00 PM Ashish Kras, PT [facility]   10/16/2020  2:00 PM Lary Tolliver PA-C [facility]   10/19/2020  3:00 PM [facility]   10/21/2020 10:30 AM [facility]choa Hora [facility]   10/27/2020 11:15 AM Genevive Moody, PT [facility]   10/29/2020 10:30 AM Genecristian Moody, PT [facility]   11/4/2020 12:45 PM Ashish Kras, PT [facility]   11/6/2020 11:15 AM Ashish Kras, PT [facility]   11/9/2020 10:30 AM Ashish Kras, PT [facility]   11/12/2020 10:30 AM [facility]ish Kras, [facility]   11/16/2020 10:30 AM Ping Santos, PT [facility]   11/19/2020 10:30 AM Ping Santos, PT [facility]   11/23/2020 10:30 AM Ping Santos, PT [facility]   12/8/2020 11:30 AM HBV DONNIE RM 4 3D [facility]

## 2020-10-09 NOTE — PROGRESS NOTES
[facility]  [employer]  JAYME  [address] [place] [address]  [phone] fax    Continued Plan of Care/ Re-certification for Physical Therapy Services    Patient name: Asmita Pelaez Start of Care: 2020   Referral source: Reji Huynh : 1950               Medical Diagnosis: Pain in right knee [M25.561]  Presence of right artificial knee joint [Z96.651]  Payor: Eliud Walters / Plan: [place] MEDICARE PART A & B / Product Type: Medicare /  Onset Date: DOS 2020               Treatment Diagnosis: right knee pain   Prior Hospitalization: see medical history Provider#: [id number]   Medications: Verified on Patient summary List    Comorbidities: arthritis, HTN, reports having vertigo   Prior Level of Function: Independent with ADLs and functional tasks with pain in the knee before surgery. Visits from Start of Care: 9    Missed Visits: 0    The Plan of Care and following information is based on the patient's current status:  Goal:Pt will increase FOTO score to 59 points to improve ability to perform ADLs. Eval: 33 points  Current Status: not met    Goal: Pt will report an improvement in at best pain to 0/10 to improve ease of bed transfers. Eval: 4/10 at best  Current Status: not met    Goal: Pt will increase AROM right knee to 0-115 degs to improve pt's ability to negotiate stairs. Eval:  degs AROM  Current Status: not met    Goal: Pt will be able to ambulate 100ft in the clinic with no AD, no LOB/assist, and mild to no gait deviations to improve safety and efficiency with gait.   Eval: Uses SPC for gait, slow gait speed, unable to fully EXT right knee in stance phase   Current Status: met    Key functional changes: improving ambulation       Problems/ barriers to goal attainment: none     Problem List: pain affecting function, decrease ROM, decrease strength, edema affecting function, impaired gait/ balance, decrease ADL/ functional abilitiies, decrease activity tolerance, decrease flexibility/ joint mobility and decrease transfer abilities    Treatment Plan: Therapeutic exercise, Therapeutic activities, Neuromuscular re-education, Physical agent/modality, Gait/balance training, Manual therapy, Patient education, Self Care training and Functional mobility training     Patient Goal (s) has been updated and includes: to walk better     Goals for this certification period to be accomplished in 4 weeks:  1. Pt will increase FOTO score to 59 points to improve ability to perform ADLs. 2. Pt will report an improvement in at best pain to 0/10 to improve ease of bed transfers. 3. Pt will increase AROM right knee to 0-115 degs to improve pt's ability to negotiate stairs. 4. Pt. Will improve right knee MMT to 5/5 in order to increase ease of transfers at home. Frequency / Duration: Patient to be seen 2 times per week for 4 weeks:    Assessment / Recommendations: pt. Is progressing slowly with physical therapy. Right knee AROM is 5-105 degrees. Right knee MMT ext/flex was 4+/5. FOTO score improved to 47 points. She continues to have complaints of moderate pain at 5/10. She also continues to have right knee edema. Her ambulation is improving and no longer requires a cane for household ambulation, but continues to use cane for community ambulation. Skilled PT is medically necessary in order to continue to improve right knee mobility and strength for increased ease of ambulation and improved quality of life. Certification Period:  10/9/20-11/7/20    Unk Milks, PT 10/9/2020 11:00 AM    ________________________________________________________________________  I certify that the above Therapy Services are being furnished while the patient is under my care. I agree with the treatment plan and certify that this therapy is necessary. [] I have read the above and request that my patient continue as recommended.   [] I have read the above report and request that my patient continue therapy with the following changes/special instructions: _______________________________________  [] I have read the above report and request that my patient be discharged from therapy    Physician's Signature:____________Date:_________TIME:________    ** Signature, Date and Time must be completed for valid certification **    Please sign and return to [facility] [employer]  [address] [place] [address]  [phone] fax

## 2020-10-14 ENCOUNTER — HOSPITAL ENCOUNTER (OUTPATIENT)
Dept: PHYSICAL THERAPY | Age: 70
Discharge: HOME | End: 2020-10-14
Payer: MEDICARE

## 2020-10-14 PROCEDURE — 97140 MANUAL THERAPY 1/> REGIONS: CPT

## 2020-10-14 PROCEDURE — 97110 THERAPEUTIC EXERCISES: CPT

## 2020-10-14 NOTE — PROGRESS NOTES
PT DAILY TREATMENT NOTE 10-18    Patient Name: William Alonso  Date:10/14/2020  : 1950  [x]  Patient  Verified  Payor: Sarita Cardozo / Plan: [place] MEDICARE PART A & B / Product Type: Medicare /    In time: 3:05  Out time: 3:45  Total Treatment Time (min): 40  Visit #: 1 of 8    Medicare/BCBS Only   Total Timed Codes (min):  40 1:1 Treatment Time:  40       Treatment Area: Pain in right knee [M25.561]  Presence of right artificial knee joint [Z96.651]    SUBJECTIVE  Pain Level (0-10 scale):  6/10  Any medication changes, allergies to medications, adverse drug reactions, diagnosis change, or new procedure performed?: [x] No    [] Yes (see summary sheet for update)  Subjective functional status/changes:   [] No changes reported  Pt. Reports she did good over the weekend but has been having more pain since Monday    OBJECTIVE    30 min Therapeutic Exercise:  [x] See flow sheet :   Rationale: increase ROM and increase strength to improve the patients ability to increase ease of ambulation     10 min Manual Therapy:  Patella mobs, trigger point release and STM to distal quads   Rationale: decrease pain, increase ROM and increase tissue extensibility to increase ease of ADLs          With   [x] TE   [] TA   [] neuro   [] other: Patient Education: [x] Review HEP    [] Progressed/Changed HEP based on:   [] positioning   [] body mechanics   [] transfers   [] heat/ice application    [] other:      Other Objective/Functional Measures:   Pt. Continues to have multiple trigger point along distal quads  Pt. Continues to have difficulty with SLR  She was challenged with TRX squatting       Pain Level (0-10 scale) post treatment:  5/10    ASSESSMENT/Changes in Function:  Pt. Is making slow progress towards goals. She continues to have decreased right knee mobility and decreased quad strength. She continues to have moderate pain that is affecting her ambulation.      Patient will continue to benefit from skilled PT services to modify and progress therapeutic interventions, address functional mobility deficits, address ROM deficits, address strength deficits, analyze and address soft tissue restrictions, analyze and cue movement patterns and analyze and modify body mechanics/ergonomics to attain remaining goals. Progress towards goals / Updated goals:  1. Pt will increase FOTO score to 59 points to improve ability to perform ADLs. 2. Pt will report an improvement in at best pain to 0/10 to improve ease of bed transfers.   3. Pt will increase AROM right knee to 0-115 degs to improve pt's ability to negotiate     PLAN  []  Upgrade activities as tolerated     [x]  Continue plan of care  []  Update interventions per flow sheet       []  Discharge due to:_  []  Other:_      Kalie Vivas, PT 10/14/2020  8:02 AM    Future Appointments   Date Time Provider Pricila Manuel   10/14/2020  3:00 PM Coletta Kill, PT [facility]   10/16/2020 12:00 PM Coletta Kill, PT [facility]   10/16/2020  2:00 PM Yvonne Villalta PA-C [facility]   10/19/2020  3:00 PM Waldemar Freed [id number] [facility]   10/21/2020 10:30 AM Waldemar Freed [id number] [facility]   10/27/2020 11:15 AM Matheus Ortizr, PT [id number] [facility]   10/29/2020 10:30 AM Matheus Ortizr, PT [id number] [facility]   11/4/2020 12:45 PM Coletta Kill, PT [facility]   11/6/2020 11:15 AM Coletta Kill, PT [facility]   11/9/2020 10:30 AM Coletta Kill, PT [facility]   11/12/2020 10:30 AM Coletta Kill, PT [facility]   11/16/2020 10:30 AM Coletta Kill, PT [facility]   11/19/2020 10:30 AM Coletta Kill, PT [facility]   11/23/2020 10:30 AM Coletta Kill, PT [facility]   12/8/2020 11:30 AM HBV DONNIE RM 4 3D [facility]

## 2020-10-16 ENCOUNTER — OFFICE VISIT (OUTPATIENT)
Dept: ORTHOPEDIC SURGERY | Age: 70
End: 2020-10-16
Payer: MEDICARE

## 2020-10-16 ENCOUNTER — HOSPITAL ENCOUNTER (OUTPATIENT)
Dept: PHYSICAL THERAPY | Age: 70
Discharge: HOME | End: 2020-10-16
Payer: MEDICARE

## 2020-10-16 VITALS
BODY MASS INDEX: 35.28 KG/M2 | HEART RATE: 79 BPM | WEIGHT: 175 LBS | SYSTOLIC BLOOD PRESSURE: 147 MMHG | TEMPERATURE: 97.1 F | DIASTOLIC BLOOD PRESSURE: 86 MMHG | HEIGHT: 59 IN

## 2020-10-16 DIAGNOSIS — M17.12 ARTHRITIS OF LEFT KNEE: ICD-10-CM

## 2020-10-16 DIAGNOSIS — Z96.651 STATUS POST TOTAL RIGHT KNEE REPLACEMENT: Primary | ICD-10-CM

## 2020-10-16 DIAGNOSIS — M25.561 RIGHT KNEE PAIN, UNSPECIFIED CHRONICITY: ICD-10-CM

## 2020-10-16 PROCEDURE — 99024 POSTOP FOLLOW-UP VISIT: CPT | Performed by: PHYSICIAN ASSISTANT

## 2020-10-16 PROCEDURE — 97140 MANUAL THERAPY 1/> REGIONS: CPT

## 2020-10-16 PROCEDURE — 73562 X-RAY EXAM OF KNEE 3: CPT | Performed by: PHYSICIAN ASSISTANT

## 2020-10-16 PROCEDURE — 97110 THERAPEUTIC EXERCISES: CPT

## 2020-10-16 RX ORDER — ACETAMINOPHEN AND CODEINE PHOSPHATE 300; 30 MG/1; MG/1
1-2 TABLET ORAL EVERY 8 HOURS
Qty: 42 TAB | Refills: 0 | Status: SHIPPED | OUTPATIENT
Start: 2020-10-16 | End: 2020-10-23

## 2020-10-16 NOTE — PROGRESS NOTES
PT DAILY TREATMENT NOTE 10-18    Patient Name: Cliff Martin  Date:10/16/2020  : 1950  [x]  Patient  Verified  Payor: Caryn Weber / Plan: [place] MEDICARE PART A & B / Product Type: Medicare /    In time: 12:00  Out time: 12:45  Total Treatment Time (min): 45  Visit #: 2 of 8    Medicare/BCBS Only   Total Timed Codes (min):  45 1:1 Treatment Time:  45       Treatment Area: Pain in right knee [M25.561]  Presence of right artificial knee joint [Z96.651]    SUBJECTIVE  Pain Level (0-10 scale):  7/10  Any medication changes, allergies to medications, adverse drug reactions, diagnosis change, or new procedure performed?: [x] No    [] Yes (see summary sheet for update)  Subjective functional status/changes:   [] No changes reported  Pt. Reports she continues to have pain in her knee and stiffness. OBJECTIVE    37 min Therapeutic Exercise:  [x] See flow sheet :   Rationale: increase ROM and increase strength to improve the patients ability to increase ease of ADLs    8 min: manual: patella mobs, trigger point release and STM to right distal quads and adduction   Rationale: increase ROM for increased ease of ADLs          With   [x] TE   [] TA   [] neuro   [] other: Patient Education: [x] Review HEP    [] Progressed/Changed HEP based on:   [] positioning   [] body mechanics   [] transfers   [] heat/ice application    [] other:      Other Objective/Functional Measures:   Tenderness to palpation over pes anserine    She tolerated increased step height and resistance for LAQ today  She is ambulating short distances without AD but continues to have decreased weight shift to right side    Pain Level (0-10 scale) post treatment:  5/10     ASSESSMENT/Changes in Function:  Pt. Is progressing slowly towards goals. She continues to have some difficulty with ambulation without an AD secondary to decreased right knee strength and mobility.  She also continues to have moderate right knee pain that is affecting her daily activities. Patient will continue to benefit from skilled PT services to modify and progress therapeutic interventions, address functional mobility deficits, address ROM deficits, address strength deficits, analyze and address soft tissue restrictions, analyze and cue movement patterns, analyze and modify body mechanics/ergonomics and assess and modify postural abnormalities to attain remaining goals. Progress towards goals / Updated goals:  1. Pt will increase FOTO score to 59 points to improve ability to perform ADLs. 2. Pt will report an improvement in at best pain to 0/10 to improve ease of bed transfers.   3. Pt will increase AROM right knee to 0-115 degs to improve pt's ability to negotiate     PLAN  []  Upgrade activities as tolerated     [x]  Continue plan of care  []  Update interventions per flow sheet       []  Discharge due to:_  []  Other:_      Sharita Beaver, BONNIE 10/16/2020  7:58 AM    Future Appointments   Date Time Provider Pricila Manuel   10/16/2020 12:00 PM Jose Miguel Franco, PT [facility] [address]   10/16/2020  2:00 PM Hamilton De La Vega PA-C [facility]   10/19/2020  3:00 PM Mary Meneses GAULRJW [address]   10/21/2020 10:30 AM Mary Meneses RZLKYWM [address]   10/27/2020 11:15 AM Jose Alfredo Sheikh, PT AKABYTT [address]   10/29/2020 10:30 AM Jose Alfredo Sheikh, PT [id number] [address]   11/4/2020 12:45 PM Jose Miguel Franco, PT [facility] [address]   11/6/2020 11:15 AM Jose Miguel Franco, PT [facility] [address]   11/9/2020 10:30 AM Jose Miguel Franco, PT [facility] [address]   11/12/2020 10:30 AM Jose Miguel Franco, PT [facility] [address]   11/16/2020 10:30 AM Jose Miguel Franco, PT [facility] [address]   11/19/2020 10:30 AM Jose Miguel Franco, PT [facility] [address]   11/23/2020 10:30 AM Jose Miguel Franco, PT [facility] [address]   12/8/2020 11:30 AM HBV DONNIE RM 4 3D HBVRMAM [facility]

## 2020-10-16 NOTE — PROGRESS NOTES
[address] [place] [address]  [phone]           Patient: Wlilie Herrera                MRN: [id number]       SSN: [id number]  YOB: 1950        AGE: 65 y.o. SEX: female  Body mass index is 35.35 kg/m². PCP: Charlene Caldera DO  10/16/20      This office note has been dictated. REVIEW OF SYSTEMS:  Constitutional: Negative for fever, chills, weight loss and malaise/fatigue. HENT: Negative. Eyes: Negative. Respiratory: Negative. Cardiovascular: Negative. Gastrointestinal: No bowel incontinence or constipation. Genitourinary: No bladder incontinence or saddle anesthesia. Skin: Negative. Neurological: Negative. Endo/Heme/Allergies: Negative. Psychiatric/Behavioral: Negative. Musculoskeletal: As per HPI above. Past Medical History:   Diagnosis Date    Arthritis     Chronic kidney disease     GERD (gastroesophageal reflux disease)     Hypertension     Ill-defined condition     Positional Vertigo -  pt can not lie flat    Sleep apnea          Current Outpatient Medications:     ondansetron (ZOFRAN ODT) 4 mg disintegrating tablet, Take 1 Tab by mouth every six (6) hours. , Disp: 30 Tab, Rfl: 1    ferrous sulfate (IRON) 325 mg (65 mg iron) EC tablet, Take 1 Tab by mouth two (2) times daily (with meals). , Disp: 60 Tab, Rfl: 1    OTHER, Cell Power -8 gtts daily OTC, Disp: , Rfl:     OTHER, CBD oil as needed, Disp: , Rfl:     metoclopramide HCl (Reglan) 10 mg tablet, Take 10 mg by mouth Before breakfast, lunch, dinner and at bedtime. , Disp: , Rfl:     omeprazole (PRILOSEC) 40 mg capsule, Take 40 mg by mouth daily. , Disp: , Rfl:     losartan (COZAAR) 50 mg tablet, Take 50 mg by mouth daily. , Disp: , Rfl:     acetaminophen (TYLENOL) 325 mg tablet, Take 650 mg by mouth every four (4) hours as needed for Pain., Disp: , Rfl:     diazePAM (VALIUM) 5 mg tablet, Take 1 Tab by mouth every eight (8) hours as needed for Anxiety. Max Daily Amount: 15 mg., Disp: 20 Tab, Rfl: 0    metoprolol (LOPRESSOR) 50 mg tablet, Take 50 mg by mouth daily. , Disp: , Rfl:     folic acid (FOLVITE) 1 mg tablet, Take 1 mg by mouth daily. , Disp: , Rfl:     Cholecalciferol, Vitamin D3, (VITAMIN D3) 1,000 unit cap, Take 1,000 Units by mouth daily. , Disp: , Rfl:     colchicine 0.6 mg tablet, Take 1 Tab by mouth two (2) times a day., Disp: 60 Tab, Rfl: 1    docusate sodium (Colace) 100 mg capsule, Take 1 Cap by mouth two (2) times a day for 90 days. , Disp: 60 Cap, Rfl: 2    cephALEXin (Keflex) 500 mg capsule, Take 1 Cap by mouth four (4) times daily. , Disp: 12 Cap, Rfl: 0    cyanocobalamin (VITAMIN B-12) 1,000 mcg tablet, Take 1,000 mcg by mouth daily as needed. , Disp: , Rfl:     Allergies   Allergen Reactions    Cymbalta [Duloxetine] Anaphylaxis    Gabapentin Other (comments)     hallucinations    Nsaids (Non-Steroidal Anti-Inflammatory Drug) Other (comments)     History \"gastric issues\" and kidney issues per pt.     Silicone Unknown (comments)    Tramadol Other (comments)     hallucinations       Social History     Socioeconomic History    Marital status:      Spouse name: Not on file    Number of children: Not on file    Years of education: Not on file    Highest education level: Not on file   Occupational History    Not on file   Social Needs    Financial resource strain: Not on file    Food insecurity     Worry: Not on file     Inability: Not on file    Transportation needs     Medical: Not on file     Non-medical: Not on file   Tobacco Use    Smoking status: Never Smoker    Smokeless tobacco: Never Used   Substance and Sexual Activity    Alcohol use: Yes     Comment: occasionally    Drug use: Never    Sexual activity: Not on file   Lifestyle    Physical activity     Days per week: Not on file     Minutes per session: Not on file    Stress: Not on file   Relationships    Social connections     Talks on phone: Not on file     Gets together: Not on file     Attends Islam service: Not on file     Active member of club or organization: Not on file     Attends meetings of clubs or organizations: Not on file     Relationship status: Not on file    Intimate partner violence     Fear of current or ex partner: Not on file     Emotionally abused: Not on file     Physically abused: Not on file     Forced sexual activity: Not on file   Other Topics Concern    Not on file   Social History Narrative    Not on file       Past Surgical History:   Procedure Laterality Date    HX BREAST BIOPSY      6-8 yrs ago, scar marked    HX CARPAL TUNNEL RELEASE Right     mid to late [de-identified]    HX [address] Left     mid [address] HX CHOLECYSTECTOMY      HX GYN      Vaginal Hernia as a child     HX HERNIA REPAIR      age [de-identified]    HX HYSTERECTOMY      partial     NEUROLOGICAL PROCEDURE UNLISTED  2017    lower lumbar           Patient seen evaluated today for her right knee. She is now 8 weeks status post right total knee placement surgery. She is progressing well. She had no troubles the wound. Does have some stiffness in her knee at times. She does continue with physical therapy. She is been mostly compliant with working on range of motion activities on her own. Patient denies recent fevers, chills, chest pain, SOB, or injuries. No recent systemic changes noted. A 12-point review of systems is performed today. Pertinent positives are noted. All other systems reviewed and otherwise are negative. Physical exam: General: Alert and oriented x3, nad.  well-developed, well nourished. normal affect, AF. NC/AT, EOMI, neck supple, trachea midline, no JVD present. Breathing is non-labored. Examination of the right knee with skin intact. The surgical wound is healed nicely. There is no erythema, ecchymosis, warmth.   There are no signs for infection or cellulitis present. Range of motion is -3-118 degrees. Patella tracks nicely without rubs or crepitus noted. Stability is quite good. She has negative calf tenderness. Negative Homans. no signs for DVT present. Radiographs obtained in the office today include an AP, lateral, skyline of the right knee done 10/16/2020 at the [facility] location shows a total knee components to be well fixed without evidence of loosening or fracture noted. Assessment: Status post right knee replacement    Plan: At this point, she will continue with physical therapy. She will continue with home excise program.  She will continue to ice therapy. She is given a refill of her Tylenol 3 which will be sent to the pharmacy. We will see her back in 4 weeks time for evaluation. She does have known advanced arthritis of the left knee willing to try a series of viscosupplementation we will get these preapproved for her.                 JR Denis CARPENTER, TONY, ATC

## 2020-10-19 ENCOUNTER — HOSPITAL ENCOUNTER (OUTPATIENT)
Dept: PHYSICAL THERAPY | Age: 70
Discharge: HOME | End: 2020-10-19
Payer: MEDICARE

## 2020-10-19 PROCEDURE — 97110 THERAPEUTIC EXERCISES: CPT

## 2020-10-19 NOTE — PROGRESS NOTES
PT DAILY TREATMENT NOTE 10-18    Patient Name: Chanell Dhaliwal  Date:10/19/2020  : 1950  [x]  Patient  Verified  Payor: [place] MEDICARE / Plan: [place] MEDICARE PART A & B / Product Type: Medicare /    In time: 3:00  Out time:3:46  Total Treatment Time (min): 55  Visit #: 3 of 8    Medicare/BCBS Only   Total Timed Codes (min):  46 1:1 Treatment Time:  46       Treatment Area: Pain in right knee [M25.561]  Presence of right artificial knee joint [Z96.651]    SUBJECTIVE  Pain Level (0-10 scale): 5  Any medication changes, allergies to medications, adverse drug reactions, diagnosis change, or new procedure performed?: [x] No    [] Yes (see summary sheet for update)  Subjective functional status/changes:   [] No changes reported  \"I'm stiff, with a little ping every now and then. It's not too bad. \"    OBJECTIVE    46 min Therapeutic Exercise:  [x] See flow sheet :   Rationale: increase ROM and increase strength to improve the patients ability to perform ADLs with ease          With   [x] TE   [] TA   [] neuro   [] other: Patient Education: [x] Review HEP    [] Progressed/Changed HEP based on:   [x] positioning   [] body mechanics   [] transfers   [] heat/ice application    [] other:      Other Objective/Functional Measures:   Attempted step ups on 8\" and pt reported she was barely able to clear the step; reduced to 6\" step  Pt reported tightness in HS; added standing HS stretch       Pain Level (0-10 scale) post treatment: 5    ASSESSMENT/Changes in Function: Patient is slowly progressing towards goals, strength is improving and demonstrating an increase in ROM, however still presents with some restrictions d/t swelling in the right knee. Will continue to progress therex as tolerated.     Patient will continue to benefit from skilled PT services to modify and progress therapeutic interventions, address functional mobility deficits, address ROM deficits, address strength deficits, analyze and address soft tissue restrictions, analyze and cue movement patterns, analyze and modify body mechanics/ergonomics and assess and modify postural abnormalities to attain remaining goals. []  See Plan of Care  [x]  See progress note/recertification  []  See Discharge Summary         Progress towards goals / Updated goals:  1. Pt will increase FOTO score to 59 points to improve ability to perform ADLs. 2. Pt will report an improvement in at best pain to 0/10 to improve ease of bed transfers.   3. Pt will increase AROM right knee to 0-115 degs to improve pt's ability to negotiate        PLAN  [x]  Upgrade activities as tolerated     [x]  Continue plan of care  []  Update interventions per flow sheet       []  Discharge due to:_  []  Other:_      TACHO Cuevas 10/19/2020  3:08 PM    Future Appointments   Date Time Provider Pricila Manuel   10/21/2020 10:30 AM Sulma LOUISYIT [facility]   10/27/2020 11:15 AM Flores Florentino, PT [id number] [facility]   10/29/2020 10:30 AM Flores Florentino, PT SYBLSOJ [facility]   11/4/2020 12:45 PM Ifrah Genre, PT MMCPTPB [facility]   11/6/2020 11:15 AM Ifrah Genre, PT MMCPTPB [facility]   11/9/2020 10:30 AM Ifrah Genre, PT MMCPTPB [facility]   11/12/2020 10:30 AM Ifrah Genre, PT MMCPTPB [facility]   11/13/2020  1:40 PM Araseli Sánchez PA-C [facility] AMB   11/16/2020 10:30 AM Ifrah Genre, PT MMCPTPB [facility]   11/19/2020 10:30 AM Ifrah Genre, PT MMCPTPB [facility]   11/23/2020 10:30 AM Ifrah Genre, PT MMCPTPB [facility]   12/8/2020 11:30 AM [facility] DONNIE RM 4 3D [facility]

## 2020-10-21 ENCOUNTER — HOSPITAL ENCOUNTER (OUTPATIENT)
Dept: PHYSICAL THERAPY | Age: 70
Discharge: HOME | End: 2020-10-21
Payer: MEDICARE

## 2020-10-21 PROCEDURE — 97110 THERAPEUTIC EXERCISES: CPT

## 2020-10-21 PROCEDURE — 97016 VASOPNEUMATIC DEVICE THERAPY: CPT

## 2020-10-21 NOTE — PROGRESS NOTES
PT DAILY TREATMENT NOTE 10-18    Patient Name: Elizabeth Dukes  Date:10/21/2020  : 1950  [x]  Patient  Verified  Payor: [place] MEDICARE / Plan: [place] MEDICARE PART A & B / Product Type: Medicare /    In time: 10:25  Out time: 11:25  Total Treatment Time (min): 60   Visit #: 4 of 8    Medicare/BCBS Only   Total Timed Codes (min):  50 1:1 Treatment Time:  50       Treatment Area: Pain in right knee [M25.561]  Presence of right artificial knee joint [Z96.651]    SUBJECTIVE  Pain Level (0-10 scale): 5  Any medication changes, allergies to medications, adverse drug reactions, diagnosis change, or new procedure performed?: [x] No    [] Yes (see summary sheet for update)  Subjective functional status/changes:   [] No changes reported  \"My pain is low, it's more soreness today. \"     OBJECTIVE    Modality rationale: decrease inflammation and decrease pain to improve the patients ability to  tolerate exercises and return to daily activity with ease.    Min Type Additional Details    [] Estim:  []Unatt       []IFC  []Premod                        []Other:  []w/ice   []w/heat  Position:  Location:    [] Estim: []Att    []TENS instruct  []NMES                    []Other:  []w/US   []w/ice   []w/heat  Position:  Location:    []  Traction: [] Cervical       []Lumbar                       [] Prone          []Supine                       []Intermittent   []Continuous Lbs:  [] before manual  [] after manual    []  Ultrasound: []Continuous   [] Pulsed                           []1MHz   []3MHz Location:  W/cm2:    []  Iontophoresis with dexamethasone         Location: [] Take home patch   [] In clinic    []  Ice     []  heat  []  Ice massage  []  Laser   []  Anodyne Position:  Location:    []  Laser with stim  []  Other: Position:  Location:   10 [x]  Vasopneumatic Device Pressure:       [x] lo [] med [] hi   Temperature: [x] lo [] med [] hi   [] Skin assessment post-treatment:  []intact []redness- no adverse reaction []redness  adverse reaction:     50 min Therapeutic Exercise:  [x] See flow sheet :   Rationale: increase ROM and increase strength to improve the patients ability to perform ADLs with ease          With   [x] TE   [] TA   [] neuro   [] other: Patient Education: [x] Review HEP    [] Progressed/Changed HEP based on:   [] positioning   [] body mechanics   [] transfers   [] heat/ice application    [] other:      Other Objective/Functional Measures:   Right knee AAROM 0*-109*   High and low urdle step overs outside of parallel bars, needing therapist 1 HHA for support  Patient reported some increase in pain after vaso     Pain Level (0-10 scale) post treatment: 5 before ice; 6 post vaso treatment    ASSESSMENT/Changes in Function: Patient is progressing with reductions in pain intensity; defining as more soreness than pain. Her knee AROM during ambulation is increasing as demonstrated with high andi step overs. Increase of s/s post treatment possible due to application of cold therapy; will assess for swelling at next visit and if appropriate will present different modality if needed. Patient will continue to benefit from skilled PT services to modify and progress therapeutic interventions, address functional mobility deficits, address ROM deficits, address strength deficits, analyze and address soft tissue restrictions, analyze and cue movement patterns, analyze and modify body mechanics/ergonomics, assess and modify postural abnormalities and instruct in home and community integration to attain remaining goals. []  See Plan of Care  [x]  See progress note/recertification  []  See Discharge Summary         Progress towards goals / Updated goals:  1. Pt will increase FOTO score to 59 points to improve ability to perform ADLs. 2. Pt will report an improvement in at best pain to 0/10 to improve ease of bed transfers.        Current: Progressing; 3/10 that pt reports as soreness more than pain 10/21/2020  3. Pt will increase AROM right knee to 0-115 degs to improve pt's ability to negotiate        Current: Progressing 0-109* 10/21/2020    PLAN  [x]  Upgrade activities as tolerated     [x]  Continue plan of care  []  Update interventions per flow sheet       []  Discharge due to:_  []  Other:_      Prabha SantosTACHO 10/21/2020  10:30 AM    Future Appointments   Date Time Provider Pricila Manuel   10/27/2020 11:15 AM Jesi Yan, PT MMCPTPB [facility]   10/29/2020 10:30 AM Jesi Yan, PT [id number] [facility]   11/4/2020 12:45 PM Kathy Cameron, PT MMCPTPB [facility]   11/6/2020 11:15 AM Kathy Cameron, PT MMCPTPB [facility]   11/9/2020 10:30 AM Kathy Cameron, PT MMCPTPB [facility]   11/12/2020 10:30 AM Kathy Cameron, PT MMCPTPB [facility]   11/13/2020  1:40 PM Zoraida Greenberg PA-C [facility] AMB   11/16/2020 10:30 AM Kathy Cameron, PT MMCPTPB [facility]   11/19/2020 10:30 AM Kathy Cameron, PT MMCPTPB [facility]   11/23/2020 10:30 AM Kathy Cameron, PT MMCPTPB [facility]   12/8/2020 11:30 AM [facility] DONNIE RM 4 3D HBV[facility]

## 2020-10-27 ENCOUNTER — HOSPITAL ENCOUNTER (OUTPATIENT)
Dept: PHYSICAL THERAPY | Age: 70
Discharge: HOME | End: 2020-10-27
Payer: MEDICARE

## 2020-10-27 PROCEDURE — 97140 MANUAL THERAPY 1/> REGIONS: CPT

## 2020-10-27 PROCEDURE — 97110 THERAPEUTIC EXERCISES: CPT

## 2020-10-27 NOTE — PROGRESS NOTES
PT DAILY TREATMENT NOTE 10-18    Patient Name: Elena Arciniega  Date:10/27/2020  : 1950  [x]  Patient  Verified  Payor: Amber Espinoza / Plan: [place] MEDICARE PART A & B / Product Type: Medicare /    In time: 11:25   Out time: 12:01  Total Treatment Time (min): 36  Visit #: 5 of 8    Medicare/BCBS Only   Total Timed Codes (min): 36 1:1 Treatment Time: 31       Treatment Area: Pain in right knee [M25.561]  Presence of right artificial knee joint [Z96.651]    SUBJECTIVE  Pain Level (0-10 scale): 5  Any medication changes, allergies to medications, adverse drug reactions, diagnosis change, or new procedure performed?: [x] No    [] Yes (see summary sheet for update)  Subjective functional status/changes:   [] No changes reported  Pt reports having occasional pain in the right knee. OBJECTIVE    28 min Therapeutic Exercise:  [x] See flow sheet :   Rationale: increase ROM and increase strength to improve the patients ability to perform ADLs with ease    8 min Manual Therapy: in supine: right patellar superior grade 2-3 mobs at end range knee EXT, right tibial anterior grade 2-3 mobs with slight ER with posterior femur mobs at end range knee EXT, DTM right popliteus/distal quads, right patellar inferior grade 2-3 mobs at end range knee flex, right posterior tibial grade 2-3 mobs at end range knee flex, PROM right knee flex/EXT after performing above   Rationale: decrease pain, increase ROM, increase tissue extensibility and decrease trigger points to improve gait efficiency           With   [x] TE   [] TA   [] neuro   [] other: Patient Education: [x] Review HEP    [] Progressed/Changed HEP based on:   [] positioning   [] body mechanics   [] transfers   [] heat/ice application    [] other:      Other Objective/Functional Measures: Added/exercises per flow sheet to improve quad strength and ROM. Needed cues for proper form of eccentric step downs.      Pain Level (0-10 scale) post treatment: 6 discomfort    ASSESSMENT/Changes in Function: Reported mild increase in discomfort post session today. Pt continues to lack full right knee AROM as noted in supine. She also has mild/moderate inflammation and swelling around her right knee which may be contributing to her lack of ROM. Continue POC as tolerated to improve the pt's ROM and mobility. Patient will continue to benefit from skilled PT services to modify and progress therapeutic interventions, address functional mobility deficits, address ROM deficits, address strength deficits, analyze and address soft tissue restrictions, analyze and cue movement patterns, analyze and modify body mechanics/ergonomics, assess and modify postural abnormalities and instruct in home and community integration to attain remaining goals. []  See Plan of Care  []  See progress note/recertification  []  See Discharge Summary         Progress towards goals / Updated goals:  1. Pt will increase FOTO score to 59 points to improve ability to perform ADLs. 2. Pt will report an improvement in at best pain to 0/10 to improve ease of bed transfers.        Current: Progressing; 3/10 that pt reports as soreness more than pain 10/21/2020  3. Pt will increase AROM right knee to 0-115 degs to improve pt's ability to negotiate        Current: Progressing 0-109* 10/21/2020    PLAN  [x]  Upgrade activities as tolerated     [x]  Continue plan of care  [x]  Update interventions per flow sheet       []  Discharge due to:_  []  Other:_      Nuha Morejon, PT  10/27/2020  11:35 AM    Future Appointments   Date Time Provider Pricila Manuel   10/29/2020 10:30 AM Iraida Bettencourt, PT [id number] [facility]   11/4/2020 12:45 PM Domenica Smith, PT MMCPTPB [facility]   11/6/2020 11:15 AM Domenica Smith, PT MMCPTPB [facility]   11/9/2020 10:30 AM Domenica Smith, PT MMCPTPB [facility]   11/12/2020 10:30 AM Domenica Smith, PT [facility]   11/13/2020  1:40 PM Radha Gutierrez PA-C [facility] BS AMB   11/16/2020 10:30 AM Reggie Malia, PT COSSVHP [facility]   11/19/2020 10:30 AM Reggie Malia, PT MMCPTPB [facility]   11/23/2020 10:30 AM Reggie Malia, PT MMCPTPB [facility]   12/8/2020 11:30 AM [facility] DONNIE RM 4 3D HBV[facility]

## 2020-10-29 ENCOUNTER — HOSPITAL ENCOUNTER (OUTPATIENT)
Dept: PHYSICAL THERAPY | Age: 70
Discharge: HOME | End: 2020-10-29
Payer: MEDICARE

## 2020-10-29 ENCOUNTER — DOCUMENTATION ONLY (OUTPATIENT)
Dept: ORTHOPEDIC SURGERY | Age: 70
End: 2020-10-29

## 2020-10-29 PROCEDURE — 97110 THERAPEUTIC EXERCISES: CPT

## 2020-10-29 PROCEDURE — 97140 MANUAL THERAPY 1/> REGIONS: CPT

## 2020-10-29 NOTE — PROGRESS NOTES
PT DAILY TREATMENT NOTE 10-18    Patient Name: Chanell Dhaliwal  Date:10/29/2020  : 1950  [x]  Patient  Verified  Payor: [place] MEDICARE / Plan: [place] MEDICARE PART A & B / Product Type: Medicare /    In time: 10:30   Out time: 11:20  Total Treatment Time (min): 50  Visit #: 6 of 8    Medicare/BCBS Only   Total Timed Codes (min): 50 1:1 Treatment Time: 45       Treatment Area: Pain in right knee [M25.561]  Presence of right artificial knee joint [Z96.651]    SUBJECTIVE  Pain Level (0-10 scale): 4 \"sore\"  Any medication changes, allergies to medications, adverse drug reactions, diagnosis change, or new procedure performed?: [x] No    [] Yes (see summary sheet for update)  Subjective functional status/changes:   [] No changes reported  Reports having abdomin sorness from the last session. OBJECTIVE    33 min Therapeutic Exercise:  [x] See flow sheet :   Rationale: increase ROM and increase strength to improve the patients ability to perform ADLs with ease    17 min Manual Therapy: in supine: right patellar superior grade 2-3 mobs at end range knee EXT, right tibial anterior grade 2-3 mobs with slight ER with posterior femur mobs at end range knee EXT, DTM right popliteus/distal quads, STM around right knee for swelling, right patellar inferior grade 2-3 mobs at end range knee flex, right posterior tibial grade 2-3 mobs at end range knee flex, PROM right knee flex/EXT after performing above, pelvic alignment assessment   Rationale: decrease pain, increase ROM, increase tissue extensibility and decrease trigger points to improve gait efficiency           With   [x] TE   [] TA   [] neuro   [] other: Patient Education: [x] Review HEP    [] Progressed/Changed HEP based on:   [] positioning   [] body mechanics   [] transfers   [] heat/ice application    [] other:      Other Objective/Functional Measures: right knee flex AAROM 113 degs on last repetition of heel slides. Pelvic alignment WNL.  Needs cues for proper form of eccentric step downs. Pain Level (0-10 scale) post treatment: 4.5    ASSESSMENT/Changes in Function: Reported minimal increase in pain post session today. Pt continues to have mild-moderate swelling around the right knee and educated pt to continue icing her knee at home to improve this swelling. Pt demonstrated improved left knee flex AAROM today than last week. Continue POC as tolerated to further improve ROM and mobility. Patient will continue to benefit from skilled PT services to modify and progress therapeutic interventions, address functional mobility deficits, address ROM deficits, address strength deficits, analyze and address soft tissue restrictions, analyze and cue movement patterns, analyze and modify body mechanics/ergonomics, assess and modify postural abnormalities and instruct in home and community integration to attain remaining goals. []  See Plan of Care  []  See progress note/recertification  []  See Discharge Summary         Progress towards goals / Updated goals:  1. Pt will increase FOTO score to 59 points to improve ability to perform ADLs. 2. Pt will report an improvement in at best pain to 0/10 to improve ease of bed transfers.        Current: Progressing; 3/10 that pt reports as soreness more than pain 10/21/2020  3. Pt will increase AROM right knee to 0-115 degs to improve pt's ability to negotiate        Current: Progressing, 113 degs AAROM 10/29/2020    PLAN  [x]  Upgrade activities as tolerated     [x]  Continue plan of care  [x]  Update interventions per flow sheet       []  Discharge due to:_  []  Other:_      Anahy Bautista, PT  10/29/2020  10:56 AM    Future Appointments   Date Time Provider Pricila Manuel   11/4/2020 12:45 PM Guido Rodriguez PT [facility]   11/6/2020 11:15 AM Guido Rodriguez PT [facility]   11/9/2020 10:30 AM Guido Rodriguez PT [facility]   11/12/2020 10:30 AM Guido Rodriguez PT [facility]   11/13/2020 1:40 PM Leif Dates [facility]   11/16/2020 10:30 AM Olga Lidia Hernandez, PT DOYNJJT [facility]   11/19/2020 10:30 AM Olga Lidia Hernandez, PT ETGNUHS [facility]   11/23/2020 10:30 AM Olga Lidia Hernandez, PT [facility]   12/8/2020 11:30 AM [facility] RM 4 3D [facility]

## 2020-11-04 ENCOUNTER — HOSPITAL ENCOUNTER (OUTPATIENT)
Dept: PHYSICAL THERAPY | Age: 70
Discharge: HOME | End: 2020-11-04
Payer: MEDICARE

## 2020-11-04 PROCEDURE — 97140 MANUAL THERAPY 1/> REGIONS: CPT

## 2020-11-04 PROCEDURE — 97110 THERAPEUTIC EXERCISES: CPT

## 2020-11-04 NOTE — PROGRESS NOTES
PT DAILY TREATMENT NOTE     Patient Name: Rubina Caldwell  Date:2020  : 1950  [x]  Patient  Verified  Payor: [place] MEDICARE / Plan: [place] MEDICARE PART A & B / Product Type: Medicare /    In time:12:48  Out time: 1:30  Total Treatment Time (min): 42  Visit #: 7 of 8    Medicare/BCBS Only   Total Timed Codes (min):  42 1:1 Treatment Time:  42       Treatment Area: Pain in right knee [M25.561]  Presence of right artificial knee joint [Z96.651]    SUBJECTIVE  Pain Level (0-10 scale):  4/10  Any medication changes, allergies to medications, adverse drug reactions, diagnosis change, or new procedure performed?: [x] No    [] Yes (see summary sheet for update)  Subjective functional status/changes:   [] No changes reported  Pt. Reports she is still having swelling in her knee. She reports she continues to have difficulty stepping into the shower. OBJECTIVE    33 min Therapeutic Exercise:  [x] See flow sheet :   Rationale: increase ROM and increase strength to improve the patients ability to increase ease of ambulation     9 min Manual Therapy:  Patella mobs, STM and trigger point release to distal quads   The manual therapy interventions were performed at a separate and distinct time from the therapeutic activities interventions. Rationale: decrease pain, increase ROM and increase tissue extensibility to increase ease of ADLs          With   [x] TE   [] TA   [] neuro   [] other: Patient Education: [x] Review HEP    [] Progressed/Changed HEP based on:   [] positioning   [] body mechanics   [] transfers   [] heat/ice application    [] other:      Other Objective/Functional Measures:   Right knee AROM: 5-117 degrees  Pt. Was educated on performing prolonged knee extension stretching at home. Pt. Tolerated increased resistance during therex today    Pain Level (0-10 scale) post treatment: 4/10    ASSESSMENT/Changes in Function:  Pt. Is progressing slowly towards goals.  She continues to have decreased right knee extension AROM but flexion is improving. She also demonstrates improving strength during therex. She continues to have moderate pain that increases with prolonged ambulation. She also continues to have complaints of swelling in her knee. Patient will continue to benefit from skilled PT services to modify and progress therapeutic interventions, address functional mobility deficits, address ROM deficits, address strength deficits, analyze and address soft tissue restrictions, analyze and cue movement patterns, analyze and modify body mechanics/ergonomics and assess and modify postural abnormalities to attain remaining goals. Progress towards goals / Updated goals:  1. Pt will increase FOTO score to 59 points to improve ability to perform ADLs. 2. Pt will report an improvement in at best pain to 0/10 to improve ease of bed transfers.        Current: Progressing; 3/10 that pt reports as soreness more than pain 10/21/2020  3. Pt will increase AROM right knee to 0-115 degs to improve pt's ability to negotiate        progressin-117 degrees (20)    PLAN  []  Upgrade activities as tolerated     [x]  Continue plan of care  []  Update interventions per flow sheet       []  Discharge due to:_  []  Other:_      Bud Evans, PT 2020  7:25 AM    Future Appointments   Date Time Provider Pricila Manuel   2020 12:45 PM Luisa Obrien PT [facility]   2020 11:15 AM Luisa Obrien, PT [facility]   2020 10:30 AM Luisa Obrien, PT [facility]   2020 10:30 AM Luisa Obrien, PT [facility]   2020  1:40 PM Juana Matta PA-C [facility]   2020 10:30 AM Luisa Obrien, PT LKUOFLA [facility]   2020 10:30 AM Luisa Obrien, PT [facility]   2020 10:30 AM Luisa Obrien, PT [facility]   2020 11:30 AM [facility] RM 4 3D [facility]

## 2020-11-06 ENCOUNTER — HOSPITAL ENCOUNTER (OUTPATIENT)
Dept: PHYSICAL THERAPY | Age: 70
Discharge: HOME | End: 2020-11-06
Payer: MEDICARE

## 2020-11-06 PROCEDURE — 97110 THERAPEUTIC EXERCISES: CPT

## 2020-11-06 NOTE — PROGRESS NOTES
PT DAILY TREATMENT NOTE     Patient Name: Daily Ko  Date:2020  : 1950  [x]  Patient  Verified  Payor: Amna Gómez / Plan: [place] MEDICARE PART A & B / Product Type: Medicare /    In time: 11:16  Out time: 11:59  Total Treatment Time (min): 43  Visit #: 8 of 8    Medicare/BCBS Only   Total Timed Codes (min):  43 1:1 Treatment Time:  43       Treatment Area: Pain in right knee [M25.561]  Presence of right artificial knee joint [Z96.651]    SUBJECTIVE  Pain Level (0-10 scale): 3/10  Any medication changes, allergies to medications, adverse drug reactions, diagnosis change, or new procedure performed?: [x] No    [] Yes (see summary sheet for update)  Subjective functional status/changes:   [] No changes reported  Pt. Reports she is doing ok but the inside of her knee has been hurting. OBJECTIVE    38 min Therapeutic Exercise:  [x] See flow sheet :   Rationale: increase ROM and increase strength to improve the patients ability to increase ease of ambulation     5 min Manual Therapy:  KT for pes anserine   The manual therapy interventions were performed at a separate and distinct time from the therapeutic activities interventions. Rationale: decrease pain to increase ease of ambulation           With   [x] TE   [] TA   [] neuro   [] other: Patient Education: [x] Review HEP    [] Progressed/Changed HEP based on:   [] positioning   [] body mechanics   [] transfers   [] heat/ice application    [] other:      Other Objective/Functional Measures: FOTO: 48 points  Right knee MMT ext: 4+/5 flex: 4+/5  Pt.  Was educated on supine quad stretch for HEP     Pain Level (0-10 scale) post treatment:  3/10    ASSESSMENT/Changes in Function:      []  See Plan of Care  [x]  See progress note/recertification  []  See Discharge Summary         Progress towards goals / Updated goals:  See re-cert note    PLAN  []  Upgrade activities as tolerated     [x]  Continue plan of care  []  Update interventions per flow sheet       []  Discharge due to:_  []  Other:_      Jeniffer Ruiz, PT 11/6/2020  7:57 AM    Future Appointments   Date Time Provider Pricila Manuel   11/6/2020 11:15 AM Gia Amador, PT [facility]   11/9/2020 10:30 AM Gia Amador, PT [facility]   11/12/2020 10:30 AM Gia Amador, PT [facility]   11/13/2020  1:40 PM Shelly Arshad PA-C [facility]   11/16/2020 10:30 AM Gia Amador, PT [facility]   11/19/2020 10:30 AM Gia Amador, PT [facility]   11/23/2020 10:30 AM Gia Amador, PT [facility]   12/3/2020  8:40 AM hSelly Arshad PA-C [facility]   12/8/2020 11:30 AM [facility] RM 4 3D [facility]   12/10/2020 10:10 AM Shelly Arshad PA-C [facility]   12/17/2020  9:50 AM Shellyantoinette Arshad PA-C [facility]

## 2020-11-06 NOTE — PROGRESS NOTES
[facility] [place] [facility]  [address] [place] [address]  [phone] fax    Continued Plan of Care/ Re-certification for Physical Therapy Services    Patient name: Mildred Lobo Start of Care: 2020   Referral source: Marcia Barrios PA-C : 1950               Medical Diagnosis: Pain in right knee [M25.561]  Presence of right artificial knee joint [Z96.651]  Payor: [place] MEDICARE / Plan: VA MEDICARE PART A & B / Product Type: Medicare /  Onset Date: DOS 2020               Treatment Diagnosis: right knee pain   Prior Hospitalization: see medical history Provider#: [id number]   Medications: Verified on Patient summary List    Comorbidities: arthritis, HTN, reports having vertigo   Prior Level of Function: Independent with ADLs and functional tasks with pain in the knee before surgery.      Visits from Start of Care:  17    Missed Visits: 0    The Plan of Care and following information is based on the patient's current status:  Goal: Pt will increase FOTO score to 59 points to improve ability to perform ADLs. Status at last note/certification: 47 points  Current Status: not met    Goal: Pt will report an improvement in at best pain to 0/10 to improve ease of bed transfers. Status at last note/certification:   Current Status: not met    Goal:  Pt will increase AROM right knee to 0-115 degs to improve pt's ability to negotiate stairs. Status at last note/certification: 3-901 degrees  Current Status: not met    Goal: Pt. Will improve right knee MMT to 5/5 in order to increase ease of transfers at home.    Status at last note/certification: ext: 4+/5 flex: 4+/5  Current Status: not met    Key functional changes:  Improving right knee mobility and ambulation tolerance      Problems/ barriers to goal attainment: right knee pain     Problem List: pain affecting function, decrease ROM, decrease strength, edema affecting function, impaired gait/ balance, decrease ADL/ functional abilitiies, decrease activity tolerance, decrease flexibility/ joint mobility and decrease transfer abilities    Treatment Plan: Therapeutic exercise, Therapeutic activities, Neuromuscular re-education, Physical agent/modality, Gait/balance training, Manual therapy, Patient education, Self Care training and Functional mobility training     Patient Goal (s) has been updated and includes: to have less pain     Goals for this certification period to be accomplished in 4 weeks:  1. Pt will increase FOTO score to 59 points to improve ability to perform ADLs. 2. Pt will report pain at 2/10 or less during ADLs in order to improve quality of life. 3. Pt will increase AROM right knee to 0-120 degs to improve pt's ability to negotiate stairs. 4. Pt. Will improve right knee MMT to 5/5 in order to increase ease of transfers at home. Frequency / Duration: Patient to be seen 2 times per week for 4 weeks:    Assessment / Recommendations: pt. Is progressing with physical therapy. FOTO score had no significant change at 48 points. Right knee AROM improved 5-117 degrees. Right knee MMT ext: 4+/5 flex: 4+/5. She has improving tolerance to ambulation but continues to have increased pain with prolonged ambulation. She reports pain in medial knee and has tenderness to palpation over pes anserine. Skilled PT is medically necessary in order to continue to improve right knee mobility and strength for increased ease of ADLs and improved quality of life. Certification Period: 11/6/20-12/5/20    Deisi Murdock, PT 11/6/2020 2:12 PM    ________________________________________________________________________  I certify that the above Therapy Services are being furnished while the patient is under my care. I agree with the treatment plan and certify that this therapy is necessary. [] I have read the above and request that my patient continue as recommended.   [] I have read the above report and request that my patient continue therapy with the following changes/special instructions: _______________________________________  [] I have read the above report and request that my patient be discharged from therapy    Physician's Signature:____________Date:_________TIME:________    ** Signature, Date and Time must be completed for valid certification **    Please sign and return to [facility] [place] [facility]  [address] [place] [address]  [phone] fax

## 2020-11-09 ENCOUNTER — HOSPITAL ENCOUNTER (OUTPATIENT)
Dept: PHYSICAL THERAPY | Age: 70
Discharge: HOME | End: 2020-11-09
Payer: MEDICARE

## 2020-11-09 PROCEDURE — 97140 MANUAL THERAPY 1/> REGIONS: CPT

## 2020-11-09 PROCEDURE — 97110 THERAPEUTIC EXERCISES: CPT

## 2020-11-09 NOTE — PROGRESS NOTES
PT DAILY TREATMENT NOTE     Patient Name: Daily Ko  Date:2020  : 1950  [x]  Patient  Verified  Payor: VA MEDICARE / Plan: VA MEDICARE PART A & B / Product Type: Medicare /    In time: 10:31  Out time: 11:10  Total Treatment Time (min): 39  Visit #: 1 of 8    Medicare/BCBS Only   Total Timed Codes (min):  39 1:1 Treatment Time:  39       Treatment Area: Pain in right knee [M25.561]  Presence of right artificial knee joint [Z96.651]    SUBJECTIVE  Pain Level (0-10 scale):  3/10  Any medication changes, allergies to medications, adverse drug reactions, diagnosis change, or new procedure performed?: [x] No    [] Yes (see summary sheet for update)  Subjective functional status/changes:   [] No changes reported  Pt. Reports she doesn't feel too bad today. OBJECTIVE    29 min Therapeutic Exercise:  [x] See flow sheet :   Rationale: increase ROM and increase strength to improve the patients ability to increase ease of ambulation     10 min Manual Therapy:  Patella mobs, trigger point release and STM to distal quads   The manual therapy interventions were performed at a separate and distinct time from the therapeutic activities interventions. Rationale: decrease pain, increase ROM and increase tissue extensibility to increase ease of ambulation           With   [x] TE   [] TA   [] neuro   [] other: Patient Education: [x] Review HEP    [] Progressed/Changed HEP based on:   [] positioning   [] body mechanics   [] transfers   [] heat/ice application    [] other:      Other Objective/Functional Measures:   Pt. Tolerated PT well with no reports of increased pain  She continues to have decreased knee extension mobility during ambulation  She was educated on hamstring and quad stretching for HEP  Pt. Had no difficulty with 6 inch step ups today. Pain Level (0-10 scale) post treatment:  3/10    ASSESSMENT/Changes in Function:  Pt. Is progressing with physical therapy.  She continues to have decreased right knee strength and mobility which is affecting her ambulation. She has also been limited by left hip pain which has also been affecting her ambulation. Patient will continue to benefit from skilled PT services to modify and progress therapeutic interventions, address functional mobility deficits, address ROM deficits, address strength deficits, analyze and address soft tissue restrictions, analyze and cue movement patterns, analyze and modify body mechanics/ergonomics and assess and modify postural abnormalities to attain remaining goals. Progress towards goals / Updated goals:  1. Pt will increase FOTO score to 59 points to improve ability to perform ADLs. 2. Pt will report pain at 2/10 or less during ADLs in order to improve quality of life. Progressing: 3/10 (11/9/20)  3. Pt will increase AROM right knee to 0-120 degs to improve pt's ability to negotiate stairs. 4. Pt. Will improve right knee MMT to 5/5 in order to increase ease of transfers at home.     PLAN  []  Upgrade activities as tolerated     [x]  Continue plan of care  []  Update interventions per flow sheet       []  Discharge due to:_  []  Other:_      Brenda Gandhi, PT 11/9/2020  7:58 AM    Future Appointments   Date Time Provider Pricila Manuel   11/9/2020 10:30 AM Kimberly Lyman, PT [facility]   11/12/2020 10:30 AM Kimberly Lyman PT [facility]   11/13/2020  1:40 PM TONY Mcdonough BS AMB   11/16/2020 10:30 AM Kimberly Lyman PT [facility]NT[facility]   11/19/2020 10:30 AM Kimberly Lyman PT [facility]   11/23/2020 10:30 AM Kimberly Lyman PT [facility]   12/3/2020  8:40 AM TONY Mcdonough BS AMB   12/8/2020 11:30 AM [facility] DON[facility]IE RM 4 3D [facility]   12/10/2020 10:10 AM TONY Mcdonough BS AMB   12/17/2020  9:50 AM Francy Chua PA-C [facility] AMB

## 2020-11-12 ENCOUNTER — HOSPITAL ENCOUNTER (EMERGENCY)
Age: 70
Discharge: HOME | End: 2020-11-12
Attending: EMERGENCY MEDICINE
Payer: MEDICARE

## 2020-11-12 VITALS
DIASTOLIC BLOOD PRESSURE: 71 MMHG | OXYGEN SATURATION: 98 % | HEART RATE: 87 BPM | SYSTOLIC BLOOD PRESSURE: 157 MMHG | TEMPERATURE: 98.6 F | RESPIRATION RATE: 21 BRPM

## 2020-11-12 DIAGNOSIS — G89.29 CHRONIC NECK PAIN: Primary | ICD-10-CM

## 2020-11-12 DIAGNOSIS — M54.2 CHRONIC NECK PAIN: Primary | ICD-10-CM

## 2020-11-12 LAB
ANION GAP SERPL CALC-SCNC: 3 MMOL/L (ref 3–18)
BUN SERPL-MCNC: 16 MG/DL (ref 7–18)
BUN/CREAT SERPL: 12 (ref 12–20)
CALCIUM SERPL-MCNC: 9.3 MG/DL (ref 8.5–10.1)
CHLORIDE SERPL-SCNC: 108 MMOL/L (ref 100–111)
CO2 SERPL-SCNC: 29 MMOL/L (ref 21–32)
CREAT SERPL-MCNC: 1.29 MG/DL (ref 0.6–1.3)
GLUCOSE SERPL-MCNC: 104 MG/DL (ref 74–99)
POTASSIUM SERPL-SCNC: 4.1 MMOL/L (ref 3.5–5.5)
SODIUM SERPL-SCNC: 140 MMOL/L (ref 136–145)

## 2020-11-12 PROCEDURE — 74011250636 HC RX REV CODE- 250/636: Performed by: EMERGENCY MEDICINE

## 2020-11-12 PROCEDURE — 80048 BASIC METABOLIC PNL TOTAL CA: CPT

## 2020-11-12 PROCEDURE — 74011000250 HC RX REV CODE- 250: Performed by: EMERGENCY MEDICINE

## 2020-11-12 PROCEDURE — 99284 EMERGENCY DEPT VISIT MOD MDM: CPT

## 2020-11-12 PROCEDURE — 96375 TX/PRO/DX INJ NEW DRUG ADDON: CPT

## 2020-11-12 PROCEDURE — 74011250637 HC RX REV CODE- 250/637: Performed by: EMERGENCY MEDICINE

## 2020-11-12 PROCEDURE — 96374 THER/PROPH/DIAG INJ IV PUSH: CPT

## 2020-11-12 RX ORDER — HYDROMORPHONE HYDROCHLORIDE 1 MG/ML
1 INJECTION, SOLUTION INTRAMUSCULAR; INTRAVENOUS; SUBCUTANEOUS ONCE
Status: COMPLETED | OUTPATIENT
Start: 2020-11-12 | End: 2020-11-12

## 2020-11-12 RX ORDER — LIDOCAINE 4 G/100G
1 PATCH TOPICAL ONCE
Status: DISCONTINUED | OUTPATIENT
Start: 2020-11-12 | End: 2020-11-13 | Stop reason: HOSPADM

## 2020-11-12 RX ORDER — KETOROLAC TROMETHAMINE 15 MG/ML
15 INJECTION, SOLUTION INTRAMUSCULAR; INTRAVENOUS
Status: COMPLETED | OUTPATIENT
Start: 2020-11-12 | End: 2020-11-12

## 2020-11-12 RX ORDER — DIAZEPAM 5 MG/1
5 TABLET ORAL
Qty: 9 TAB | Refills: 0 | Status: SHIPPED | OUTPATIENT
Start: 2020-11-12 | End: 2020-11-15

## 2020-11-12 RX ORDER — DIAZEPAM 5 MG/1
10 TABLET ORAL
Status: COMPLETED | OUTPATIENT
Start: 2020-11-12 | End: 2020-11-12

## 2020-11-12 RX ADMIN — KETOROLAC TROMETHAMINE 15 MG: 15 INJECTION, SOLUTION INTRAMUSCULAR; INTRAVENOUS at 22:25

## 2020-11-12 RX ADMIN — HYDROMORPHONE HYDROCHLORIDE 1 MG: 1 INJECTION, SOLUTION INTRAMUSCULAR; INTRAVENOUS; SUBCUTANEOUS at 21:19

## 2020-11-12 RX ADMIN — DIAZEPAM 10 MG: 5 TABLET ORAL at 22:25

## 2020-11-13 NOTE — ED NOTES
I have reviewed discharge instructions with the patient. The patient verbalized understanding. Patient armband removed and given to patient to take home. Patient was informed of the privacy risks if armband lost or stolen. Patient alert and oriented x 4. No obvious signs of distress noted. Patient utilized a wheelchair to [employer].

## 2020-11-13 NOTE — ED NOTES
9:00 PM :Pt care assumed from Dr. Carmita Villalpando , ED provider. Pt complaint(s), current treatment plan, progression and available diagnostic results have been discussed thoroughly. Rounding occurred: yes  Intended Disposition: Home   Pending diagnostic reports and/or labs (please list): lab results    10:11 PM  I discussed results with patient. Labs unremarkable. She is still complaining of significant pain worse when she tries to move she has been lying in the bed still. Was already given Dilaudid. Will give dose of Valium here and short course of Toradol. Already had meds as prescribed by Dr. Carmita Villalpando. Stable for discharge home.

## 2020-11-13 NOTE — ED TRIAGE NOTES
Patient presents to the ED with complaints of neck and muscle spasms that started Saturday but have been getting continuously worse. Upon arrival patient alert and oriented x 4. Patient has a history of HTN. No obvious signs of distress noted.

## 2020-11-13 NOTE — DISCHARGE INSTRUCTIONS
SPECIFIC PATIENT INSTRUCTIONS FROM THE PHYSICIAN WHO TREATED YOU IN THE ER TODAY:  1. Valium as prescribed until finished. 2. Return if any concerns or worsening condition(s). 3. Follow up with:  Your primary doctor if still having symptoms after you finish the ibuprofen and flexeril. Patient Education        Back Pain: Care Instructions  Your Care Instructions     Back pain has many possible causes. It is often related to problems with muscles and ligaments of the back. It may also be related to problems with the nerves, discs, or bones of the back. Moving, lifting, standing, sitting, or sleeping in an awkward way can strain the back. Sometimes you don't notice the injury until later. Arthritis is another common cause of back pain. Although it may hurt a lot, back pain usually improves on its own within several weeks. Most people recover in 12 weeks or less. Using good home treatment and being careful not to stress your back can help you feel better sooner. Follow-up care is a key part of your treatment and safety. Be sure to make and go to all appointments, and call your doctor if you are having problems. It's also a good idea to know your test results and keep a list of the medicines you take. How can you care for yourself at home? · Sit or lie in positions that are most comfortable and reduce your pain. Try one of these positions when you lie down:  ? Lie on your back with your knees bent and supported by large pillows. ? Lie on the floor with your legs on the seat of a sofa or chair. ? Lie on your side with your knees and hips bent and a pillow between your legs. ? Lie on your stomach if it does not make pain worse. · Do not sit up in bed, and avoid soft couches and twisted positions. Bed rest can help relieve pain at first, but it delays healing. Avoid bed rest after the first day of back pain. · Change positions every 30 minutes. If you must sit for long periods of time, take breaks from sitting. Get up and walk around, or lie in a comfortable position. · Try using a heating pad on a low or medium setting for 15 to 20 minutes every 2 or 3 hours. Try a warm shower in place of one session with the heating pad. · You can also try an ice pack for 10 to 15 minutes every 2 to 3 hours. Put a thin cloth between the ice pack and your skin. · Take pain medicines exactly as directed. ? If the doctor gave you a prescription medicine for pain, take it as prescribed. ? If you are not taking a prescription pain medicine, ask your doctor if you can take an over-the-counter medicine. · Take short walks several times a day. You can start with 5 to 10 minutes, 3 or 4 times a day, and work up to longer walks. Walk on level surfaces and avoid hills and stairs until your back is better. · Return to work and other activities as soon as you can. Continued rest without activity is usually not good for your back. · To prevent future back pain, do exercises to stretch and strengthen your back and stomach. Learn how to use good posture, safe lifting techniques, and proper body mechanics. When should you call for help? Call your doctor now or seek immediate medical care if:    · You have new or worsening numbness in your legs.     · You have new or worsening weakness in your legs. (This could make it hard to stand up.)     · You lose control of your bladder or bowels. Watch closely for changes in your health, and be sure to contact your doctor if:    · You have a fever, lose weight, or don't feel well.     · You do not get better as expected. Where can you learn more? Go to http://www.Givkwik.com/  Enter I594 in the search box to learn more about \"Back Pain: Care Instructions. \"  Current as of: March 2, 2020               Content Version: 12.6  © 6526-4079 [employer].    Care instructions adapted under license by [employer] (which disclaims liability or warranty for this information). If you have questions about a medical condition or this instruction, always ask your healthcare professional. [address] any warranty or liability for your use of this information. Patient Education        Back Pain and Sex: Care Instructions  Overview     It's common to be afraid to have sex when you have back pain. But you can still have a satisfying sex life. Talk to your partner about which movements are comfortable for you and which aren't. If the thought of having pain during sex worries you, talk about that too. Follow-up care is a key part of your treatment and safety. Be sure to make and go to all appointments, and call your doctor if you are having problems. It's also a good idea to know your test results and keep a list of the medicines you take. How can you care for yourself at home? · Learn which sexual positions may be good or bad for your back. For example, some back problems cause pain when you bend forward. Others cause problems when you arch your back. · Try positions you've never considered before. You may need to use a firmer surface than your mattress, such as a soft rug on the floor or even a sturdy chair. Oral sex might be easier than intercourse. · Go slow. Sex is like exercise--warming up and stretching first are important. Many people use yoga to gently stretch their muscles. When you're ready to have sex, keep your movements slow and gentle. · Take a hot shower to help relax your muscles. Or have your partner give you a massage. · Increase the time you and your partner spend touching and caressing each other before sex (foreplay). · If it hurts, stop. That may seem obvious, but when things get passionate, it can be hard to stay in control. Try to keep it slow so that you can stop right away if your back starts to hurt. When should you call for help?   Watch closely for changes in your health, and be sure to contact your doctor if you have any problems. Where can you learn more? Go to http://www.gray.com/  Enter R861 in the search box to learn more about \"Back Pain and Sex: Care Instructions. \"  Current as of: March 2, 2020               Content Version: 12.6  © 7801-5065 [employer]. Care instructions adapted under license by [employer] (which disclaims liability or warranty for this information). If you have questions about a medical condition or this instruction, always ask your healthcare professional. [place] [address] any warranty or liability for your use of this information. Patient Education        Learning About How to Have a Healthy Back  What causes back pain? Back pain is often caused by overuse, strain, or injury. For example, people often hurt their backs playing sports or working in the yard, being jolted in a car accident, or lifting something too heavy. Aging plays a part too. Your bones and muscles tend to lose strength as you age, which makes injury more likely. The spongy discs between the bones of the spine (vertebrae) may suffer from wear and tear and no longer provide enough cushion between the bones. A disc that bulges or breaks open (herniated disc) can press on nerves, causing back pain. In some people, back pain is the result of arthritis, broken vertebrae caused by bone loss (osteoporosis), illness, or a spine problem. Although most people have back pain at one time or another, there are steps you can take to make it less likely. How can you have a healthy back? Reduce stress on your back through good posture   Slumping or slouching alone may not cause low back pain. But after the back has been strained or injured, bad posture can make pain worse. · Sleep in a position that maintains your back's normal curves and on a mattress that feels comfortable.  Sleep on your side with a pillow between your knees, or sleep on your back with a pillow under your knees. These positions can reduce strain on your back. · Stand and sit up straight. \"Good posture\" generally means your ears, shoulders, and hips are in a straight line. · If you must stand for a long time, put one foot on a stool, ledge, or box. Switch feet every now and then. · Sit in a chair that is low enough to let you place both feet flat on the floor with both knees nearly level with your hips. If your chair or desk is too high, use a footrest to raise your knees. Place a small pillow, a rolled-up towel, or a lumbar roll in the curve of your back if you need extra support. · Try a kneeling chair, which helps tilt your hips forward. This takes pressure off your lower back. · Try sitting on an exercise ball. It can rock from side to side, which helps keep your back loose. · When driving, keep your knees nearly level with your hips. Sit straight, and drive with both hands on the steering wheel. Your arms should be in a slightly bent position. Reduce stress on your back through careful lifting   · Squat down, bending at the hips and knees only. If you need to, put one knee to the floor and extend your other knee in front of you, bent at a right angle (half kneeling). · Press your chest straight forward. This helps keep your upper back straight while keeping a slight arch in your low back. · Hold the load as close to your body as possible, at the level of your belly button (navel). · Use your feet to change direction, taking small steps. · Lead with your hips as you change direction. Keep your shoulders in line with your hips as you move. · Set down your load carefully, squatting with your knees and hips only. Exercise and stretch your back   · Do some exercise on most days of the week, if your doctor says it is okay. You can walk, run, swim, or cycle. · Stretch your back muscles.  Here are a few exercises to try:  ? Lie on your back, and gently pull one bent knee to your chest. Put that foot back on the floor, and then pull the other knee to your chest.  ? Do pelvic tilts. Lie on your back with your knees bent. Tighten your stomach muscles. Pull your belly button (navel) in and up toward your ribs. You should feel like your back is pressing to the floor and your hips and pelvis are slightly lifting off the floor. Hold for 6 seconds while breathing smoothly. ? Sit with your back flat against a wall. · Keep your core muscles strong. The muscles of your back, belly (abdomen), and buttocks support your spine. ? Pull in your belly and imagine pulling your navel toward your spine. Hold this for 6 seconds, then relax. Remember to keep breathing normally as you tense your muscles. ? Do curl-ups. Always do them with your knees bent. Keep your low back on the floor, and curl your shoulders toward your knees using a smooth, slow motion. Keep your arms folded across your chest. If this bothers your neck, try putting your hands behind your neck (not your head), with your elbows spread apart. ? Lie on your back with your knees bent and your feet flat on the floor. Tighten your belly muscles, and then push with your feet and raise your buttocks up a few inches. Hold this position 6 seconds as you continue to breathe normally, then lower yourself slowly to the floor. Repeat 8 to 12 times. ? If you like group exercise, try Pilates or yoga. These classes have poses that strengthen the core muscles. Lead a healthy lifestyle   · Stay at a healthy weight to avoid strain on your back. · Do not smoke. Smoking increases the risk of osteoporosis, which weakens the spine. If you need help quitting, talk to your doctor about stop-smoking programs and medicines. These can increase your chances of quitting for good. Where can you learn more? Go to http://www.Kagera.com/  Enter L315 in the search box to learn more about \"Learning About How to Have a Healthy Back. \"  Current as of: March 2, 2020               Content Version: 12.6  © 7884-4161 [employer]. Care instructions adapted under license by [employer] (which disclaims liability or warranty for this information). If you have questions about a medical condition or this instruction, always ask your healthcare professional. [address] any warranty or liability for your use of this information. Patient Education        Neck Pain: Care Instructions  Your Care Instructions    You can have neck pain anywhere from the bottom of your head to the top of your shoulders. It can spread to the upper back or arms. Injuries, painting a ceiling, sleeping with your neck twisted, staying in one position for too long, and many other activities can cause neck pain. Most neck pain gets better with home care. Your doctor may recommend medicine to relieve pain or relax your muscles. He or she may suggest exercise and physical therapy to increase flexibility and relieve stress. You may need to wear a special (cervical) collar to support your neck for a day or two. Follow-up care is a key part of your treatment and safety. Be sure to make and go to all appointments, and call your doctor if you are having problems. It's also a good idea to know your test results and keep a list of the medicines you take. How can you care for yourself at home? · Try using a heating pad on a low or medium setting for 15 to 20 minutes every 2 or 3 hours. Try a warm shower in place of one session with the heating pad. · You can also try an ice pack for 10 to 15 minutes every 2 to 3 hours. Put a thin cloth between the ice and your skin. · Take pain medicines exactly as directed. ¨ If the doctor gave you a prescription medicine for pain, take it as prescribed. ¨ If you are not taking a prescription pain medicine, ask your doctor if you can take an over-the-counter medicine.   · If your doctor recommends a cervical collar, wear it exactly as directed. When should you call for help? Call your doctor now or seek immediate medical care if:  ? · You have new or worsening numbness in your arms, buttocks or legs. ? · You have new or worsening weakness in your arms or legs. (This could make it hard to stand up.)   ? · You lose control of your bladder or bowels. ? Watch closely for changes in your health, and be sure to contact your doctor if:  ? · Your neck pain is getting worse. ? · You are not getting better after 1 week. ? · You do not get better as expected. Where can you learn more? Go to http://www.gray.com/. Enter [id number] in the search box to learn more about \"Neck Pain: Care Instructions. \"  Current as of: March 21, 2017  Content Version: 11.5  © 8872-0930 [employer]. Care instructions adapted under license by [employer] (which disclaims liability or warranty for this information). If you have questions about a medical condition or this instruction, always ask your healthcare professional. [place] [address] any warranty or liability for your use of this information. Patient Education        Neck Pain: Care Instructions  Your Care Instructions     You can have neck pain anywhere from the bottom of your head to the top of your shoulders. It can spread to the upper back or arms. Injuries, painting a ceiling, sleeping with your neck twisted, staying in one position for too long, and many other activities can cause neck pain. Most neck pain gets better with home care. Your doctor may recommend medicine to relieve pain or relax your muscles. He or she may suggest exercise and physical therapy to increase flexibility and relieve stress. You may need to wear a special (cervical) collar to support your neck for a day or two. Follow-up care is a key part of your treatment and safety.  Be sure to make and go to all appointments, and call your doctor if you are having problems. It's also a good idea to know your test results and keep a list of the medicines you take. How can you care for yourself at home? · Try using a heating pad on a low or medium setting for 15 to 20 minutes every 2 or 3 hours. Try a warm shower in place of one session with the heating pad. · You can also try an ice pack for 10 to 15 minutes every 2 to 3 hours. Put a thin cloth between the ice and your skin. · Take pain medicines exactly as directed. ? If the doctor gave you a prescription medicine for pain, take it as prescribed. ? If you are not taking a prescription pain medicine, ask your doctor if you can take an over-the-counter medicine. · If your doctor recommends a cervical collar, wear it exactly as directed. When should you call for help? Call your doctor now or seek immediate medical care if:    · You have new or worsening numbness in your arms, buttocks or legs.     · You have new or worsening weakness in your arms or legs. (This could make it hard to stand up.)     · You lose control of your bladder or bowels. Watch closely for changes in your health, and be sure to contact your doctor if:    · Your neck pain is getting worse.     · You are not getting better after 1 week.     · You do not get better as expected. Where can you learn more? Go to http://www.harrison.com/  Enter V723 in the search box to learn more about \"Neck Pain: Care Instructions. \"  Current as of: March 2, 2020               Content Version: 12.6  © 4998-7784 [employer] Incorporated. Care instructions adapted under license by [employer] (which disclaims liability or warranty for this information). If you have questions about a medical condition or this instruction, always ask your healthcare professional. [address] any warranty or liability for your use of this information. [employer] Activation    Thank you for requesting access to [employer]  Please follow the instructions below to securely access and download your online medical record. [facility] allows you to send messages to your doctor, view your test results, renew your prescriptions, schedule appointments, and more. How Do I Sign Up? In your internet browser, go to [email or web address]. Click on the First Time User? Click Here link in the Sign In box. You will see the New Member Sign Up page. Enter your [facility] Access Code exactly as it appears below. You will not need to use this code after you´ve completed the sign-up process. If you do not sign up before the expiration date, you must request a new code. [facility] Access Code: [email or web address]  Expires: 3/28/2019  2:27 PM (This is the date your [facility] access code will )    Enter the last four digits of your Social Security Number (xxxx) and Date of Birth (mm/dd/yyyy) as indicated and click Submit. You will be taken to the next sign-up page. Create a [facility] ID. This will be your [facility] login ID and cannot be changed, so think of one that is secure and easy to remember. Create a [facility] password. You can change your password at any time. Enter your Password Reset Question and Answer. This can be used at a later time if you forget your password. Enter your e-mail address. You will receive e-mail notification when new information is available in [address] Click Sign Up. You can now view and download portions of your medical record. Click the [place] link to download a portable copy of your medical information. Additional Information    If you have questions, please visit the Frequently Asked Questions section of the [facility] website at [email or web address] Remember, [facility] is NOT to be used for urgent needs. For medical emergencies, dial 911.

## 2020-11-13 NOTE — ED PROVIDER NOTES
[address] [facility]  [facility] EMERGENCY DEPT          8:11 PM    Date: 11/12/2020  Patient Name: Jailene Gregory    History of Presenting Illness     Chief Complaint   Patient presents with    Spasms       65 y.o. female with noted past medical history who presents to the emergency department with PMH muscle spasms, HTN, and renal 'disease.'       Patient states that 6 days ago after having physical therapy she started to have some worsening of her chronic bilateral neck pain. The pain continued to worsen to the present. She is typically has a 1 out of 10 baseline chronic pain but it is now gone up to 9-10 out of 10. She states that the methocarbamol and Tylenol with codeine is not working. She does have Valium at home, a couple tablets, but has not taken that. She has no other associated symptoms    Patient has any trauma or overuse or new injury to the affected area of chronic pain. No other complaints. No recent fall nor history of overuse. No bowel or bladder incontinence. Patient denies any other associated signs or symptoms. Patient denies any other complaints. Nursing notes regarding the HPI and triage nursing notes were reviewed. Prior medical records were reviewed. Current Outpatient Medications   Medication Sig Dispense Refill    colchicine 0.6 mg tablet Take 1 Tab by mouth two (2) times a day. 60 Tab 1    ondansetron (ZOFRAN ODT) 4 mg disintegrating tablet Take 1 Tab by mouth every six (6) hours. 30 Tab 1    ferrous sulfate (IRON) 325 mg (65 mg iron) EC tablet Take 1 Tab by mouth two (2) times daily (with meals). 60 Tab 1    docusate sodium (Colace) 100 mg capsule Take 1 Cap by mouth two (2) times a day for 90 days. 60 Cap 2    cephALEXin (Keflex) 500 mg capsule Take 1 Cap by mouth four (4) times daily.  12 Cap 0    OTHER Cell Power -8 gtts daily OTC      OTHER CBD oil as needed      metoclopramide HCl (Reglan) 10 mg tablet Take 10 mg by mouth Before breakfast, lunch, dinner and at bedtime.  omeprazole (PRILOSEC) 40 mg capsule Take 40 mg by mouth daily.  losartan (COZAAR) 50 mg tablet Take 50 mg by mouth daily.  acetaminophen (TYLENOL) 325 mg tablet Take 650 mg by mouth every four (4) hours as needed for Pain.  diazePAM (VALIUM) 5 mg tablet Take 1 Tab by mouth every eight (8) hours as needed for Anxiety. Max Daily Amount: 15 mg. 20 Tab 0    metoprolol (LOPRESSOR) 50 mg tablet Take 50 mg by mouth daily.  folic acid (FOLVITE) 1 mg tablet Take 1 mg by mouth daily.  Cholecalciferol, Vitamin D3, (VITAMIN D3) 1,000 unit cap Take 1,000 Units by mouth daily.  cyanocobalamin (VITAMIN B-12) 1,000 mcg tablet Take 1,000 mcg by mouth daily as needed. Past History     Past Medical History:  Past Medical History:   Diagnosis Date    Arthritis     Chronic kidney disease     GERD (gastroesophageal reflux disease)     Hypertension     Ill-defined condition     Positional Vertigo -  pt can not lie flat    Sleep apnea        Past Surgical History:  Past Surgical History:   Procedure Laterality Date    HX BREAST BIOPSY      6-8 yrs ago, scar marked    HX CARPAL TUNNEL RELEASE Right     mid to late [de-identified]    HX CARPAL TUNNEL RELEASE Left     mid 1990s    HX  SECTION      HX CHOLECYSTECTOMY      HX GYN      Vaginal Hernia as a child     HX HERNIA REPAIR      age [de-identified]    HX HYSTERECTOMY      partial     NEUROLOGICAL PROCEDURE UNLISTED      lower lumbar       Family History:  No family history on file. Social History:  Social History     Tobacco Use    Smoking status: Never Smoker    Smokeless tobacco: Never Used   Substance Use Topics    Alcohol use: Yes     Comment: occasionally    Drug use: Never       Allergies:   Allergies   Allergen Reactions    Cymbalta [Duloxetine] Anaphylaxis    Gabapentin Other (comments)     hallucinations    Nsaids (Non-Steroidal Anti-Inflammatory Drug) Other (comments)     History \"gastric issues\" and kidney issues per pt.  Silicone Unknown (comments)    Tramadol Other (comments)     hallucinations       Patient's primary care provider (as noted in EPIC):  Pegge Pert, DO    Review of Systems   Constitutional: Negative for diaphoresis. HENT: Negative for congestion. Eyes: Negative for discharge. Respiratory: Negative for stridor. Cardiovascular: Negative for palpitations. Gastrointestinal: Negative for diarrhea. Genitourinary: Negative for flank pain. Musculoskeletal: Negative for joint swelling. Neurological: Negative for weakness. Psychiatric/Behavioral: Negative for hallucinations. All other systems reviewed and are negative. Visit Vitals  BP (!) 174/81   Pulse 87   Temp 98.6 °F (37 °C)   Resp 21   SpO2 98%       PHYSICAL EXAM:  CONSTITUTIONAL:  Alert, in no apparent distress;  well developed;  well nourished. HEAD:  Normocephalic, atraumatic. EYES:  EOMI. Non-icteric sclera. Normal conjunctiva. ENTM:  Nose:  no rhinorrhea. Throat:  no erythema or exudate, mucous membranes moist.  NECK:  No JVD. Supple  RESPIRATORY:  Chest clear, equal breath sounds, good air movement. CARDIOVASCULAR:  Regular rate and rhythm. No murmurs, rubs, or gallops. GI:  Normal bowel sounds, abdomen soft and non-tender. No rebound or guarding. BACK:  Bilateral diffuse paracervical focal reproducible tenderness to palpation. No midline vertebral bony point tenderness or step-off. UPPER EXT:  Normal inspection. LOWER EXT:  No edema, no calf tenderness. Distal pulses intact. NEURO:  Moves all four extremities, and grossly normal motor exam.  SKIN:  No rashes;  Normal for age. PSYCH:  Alert and normal affect.     DIFFERENTIAL DIAGNOSES/ MEDICAL DECISION MAKING:  Low back pain from myofascial strain/ sprain, muscle spasm, vertebral disc problem, neuropathy to include sciatica, abscess/infection, referred retroperitoneal pain from pyelonephritis or ureterolithiasis, other etiologies versus a combination of the above (example: myofascial strain/ sprain as well as muscle spasm). Diagnostic Study Results     Abnormal lab results from this emergency department encounter:  Labs Reviewed - No data to display    Lab values for this patient within approximately the last 12 hours:  No results found for this or any previous visit (from the past 12 hour(s)). Radiologist and cardiologist interpretations if available at time of this note:  No results found. Medication(s) ordered for patient during this emergency visit encounter:  Medications - No data to display    Medical Decision Making     I am the first provider for this patient. I reviewed the vital signs, available nursing notes, past medical history, past surgical history, family history and social history. Vital Signs:  Reviewed the patient's vital signs. IMPRESSION AND MEDICAL DECISION MAKING:  There is no signs of sciatica. No perianal decreased sensation nor bowel/bladder incontinence to suggest a neurological emergency. The patient does not have fever, no other signs of infection to suggest an epidural or other back abscess that would require emergent intervention. The patient denies being an IVDA. Signout Note      Pt care transferred to Dr. Maxwell Sinclair  ,ED provider. History of patient complaint(s), available diagnostic reports and current treatment plan has been discussed thoroughly. Bedside rounding on patient occured : no . Intended disposition of patient : Discharge  Pending diagnostics reports and/or labs (please list): Chem 7 to assess renal function and K+. SPECIFIC PATIENT INSTRUCTIONS FROM THE PHYSICIAN WHO TREATED YOU IN THE ER TODAY:  1. Valium as prescribed until finished. 2. Return if any concerns or worsening condition(s). 3. Follow up with:  Your primary doctor if still having symptoms after you finish the ibuprofen and flexeril.     Patient is improved, resting quietly and comfortably. The patient will be discharged home. The patient was reassured that these symptoms do not appear to represent a serious or life threatening condition at this time. Warning signs of worsening condition were discussed and understood by the patient. Based on patient's age, coexisting illness, exam, and the results of this ED evaluation, the decision to treat as an outpatient was made. Based on the information available at time of discharge, acute pathology requiring immediate intervention was deemed relative unlikely. While it is impossible to completely exclude the possibility of underlying serious disease or worsening of condition, I feel the relative likelihood is extremely low. I discussed this uncertainty with the patient, who understood ED evaluation and treatment and felt comfortable with the outpatient treatment plan. All questions regarding care, test results, and follow up were answered. The patient is stable and appropriate to discharge. They understand that they should return to the emergency department for any new or worsening symptoms. I stressed the importance of follow up for repeat assessment and possibly further evaluation/treatment. Dictation disclaimer:  Please note that this dictation was completed with [employer], the computer voice recognition software. Quite often unanticipated grammatical, syntax, homophones, and other interpretive errors are inadvertently transcribed by the computer software. Please disregard these errors. Please excuse any errors that have escaped final proofreading. Coding Diagnoses     Clinical Impression:   1. Chronic neck pain        Disposition     Disposition:  Discharge. Erik Agudelo Ma, M.D.   ZAYNAB Board Certified Emergency Physician    Provider Attestation:  If a scribe was utilized in generation of this patient record, I personally performed the services described in the documentation, reviewed the documentation, as recorded by the scribe in my presence, and it accurately records the patient's history of presenting illness, review of systems, patient physical examination, and procedures performed by me as the attending physician. Imer Spencer M.D.   ZAYNAB Board Certified Emergency Physician  11/12/2020.  8:11 PM

## 2020-11-16 ENCOUNTER — HOSPITAL ENCOUNTER (OUTPATIENT)
Dept: PHYSICAL THERAPY | Age: 70
Discharge: HOME | End: 2020-11-16
Payer: MEDICARE

## 2020-11-16 PROCEDURE — 97110 THERAPEUTIC EXERCISES: CPT

## 2020-11-16 PROCEDURE — 97140 MANUAL THERAPY 1/> REGIONS: CPT

## 2020-11-16 NOTE — PROGRESS NOTES
PT DAILY TREATMENT NOTE     Patient Name: Samantha Interiano  Date:2020  : 1950  [x]  Patient  Verified  Payor: Chey Arriaza / [place] / Product Type: Medicare /    In time: 10:31  Out time:  11:25  Total Treatment Time (min): 47  Visit #: 2 of 8    Medicare/BCBS Only   Total Timed Codes (min):  54 1:1 Treatment Time:  54       Treatment Area: Pain in right knee [M25.561]  Presence of right artificial knee joint [Z96.651]    SUBJECTIVE  Pain Level (0-10 scale):  4/10  Any medication changes, allergies to medications, adverse drug reactions, diagnosis change, or new procedure performed?: [x] No    [] Yes (see summary sheet for update)  Subjective functional status/changes:   [] No changes reported  Pt. Reports she cancelled last visit because of neck pain and it is doing better since starting a steroid dose pack. She also has been having a lot of hip pain. OBJECTIVE    44 min Therapeutic Exercise:  [x] See flow sheet :   Rationale: increase ROM and increase strength to improve the patients ability to increase ease of ADLs    10 min Manual Therapy:  Patella mobs, STM to distal quads, tib distraction    The manual therapy interventions were performed at a separate and distinct time from the therapeutic activities interventions. Rationale: decrease pain, increase ROM and increase tissue extensibility to increase ease of ADLs          With   [x] TE   [] TA   [] neuro   [] other: Patient Education: [x] Review HEP    [] Progressed/Changed HEP based on:   [] positioning   [] body mechanics   [] transfers   [] heat/ice application    [] other:      Other Objective/Functional Measures:   Pt.  Has c/o pain with most activities today  She was challenged with performing SLR without extension lag  She continues to be challenged with 5# resistance during LAQ  Held standing exercises secondary to increased pain today     Pain Level (0-10 scale) post treatment: 4/10    ASSESSMENT/Changes in Function:  Pt. Is making slow progress towards goals. She was limited by neck and hip pain today. She continues to demonstrate decreased right knee extension strength during exercises. She also continues to demonstrate decreased right terminal knee extension. Patient will continue to benefit from skilled PT services to modify and progress therapeutic interventions, address functional mobility deficits, address ROM deficits, address strength deficits, analyze and address soft tissue restrictions, analyze and cue movement patterns, analyze and modify body mechanics/ergonomics and assess and modify postural abnormalities to attain remaining goals. Progress towards goals / Updated goals:  1. Pt will increase FOTO score to 59 points to improve ability to perform ADLs. 2. Pt will report pain at 2/10 or less during ADLs in order to improve quality of life.   Progressing: 3/10 (11/9/20)  3. Pt will increase AROM right knee to 0-120 degs to improve pt's ability to negotiate stairs. Not met: pt. Continues to lack terminal knee extension (11/16/20)  4. Pt. Will improve right knee MMT to 5/5 in order to increase ease of transfers at home.     PLAN  []  Upgrade activities as tolerated     [x]  Continue plan of care  []  Update interventions per flow sheet       []  Discharge due to:_  []  Other:_      Adan Marie PT 11/16/2020  7:54 AM    Future Appointments   Date Time Provider Pricila Manuel   11/16/2020 10:30 AM Cammy Pickett PT [facility]   11/19/2020 10:30 AM Aly BERGERONVLRMICHAEL [facility]   11/23/2020 10:30 AM Cammy Pickett PT [facility]   12/3/2020  8:40 AM TONY Pozo BS AMB   12/8/2020 11:30 AM [facility] DONNIE RM 4 3D [facility]   12/10/2020 10:10 AM TONY Pozo AMB   12/17/2020  9:50 AM TONY Pozo BS AMB

## 2020-11-19 ENCOUNTER — HOSPITAL ENCOUNTER (OUTPATIENT)
Dept: PHYSICAL THERAPY | Age: 70
Discharge: HOME | End: 2020-11-19
Payer: MEDICARE

## 2020-11-19 PROCEDURE — 97110 THERAPEUTIC EXERCISES: CPT

## 2020-11-19 NOTE — PROGRESS NOTES
PT DAILY TREATMENT NOTE     Patient Name: Artemio Cunningham  Date:2020  : 1950  [x]  Patient  Verified  Payor: Flora Sumit / [place]: [place] / Product Type: Medicare /    In time: 10:30  Out time: 11:12  Total Treatment Time (min): 42  Visit #: 3 of 8    Medicare/BCBS Only   Total Timed Codes (min):  42 1:1 Treatment Time:  42       Treatment Area: Pain in right knee [M25.561]  Presence of right artificial knee joint [Z96.651]    SUBJECTIVE  Pain Level (0-10 scale): 3  Any medication changes, allergies to medications, adverse drug reactions, diagnosis change, or new procedure performed?: [x] No    [] Yes (see summary sheet for update)  Subjective functional status/changes:   [] No changes reported  \"I would like to work on squats today, I need to be able to sit down and get up a little better. \"    OBJECTIVE    42 min Therapeutic Exercise:  [x] See flow sheet :   Rationale: increase ROM and increase strength to improve the patients ability to perform ADLs with ease        With   [] TE   [] TA   [] neuro   [] other: Patient Education: [x] Review HEP    [] Progressed/Changed HEP based on:   [] positioning   [] body mechanics   [] transfers   [] heat/ice application    [] other:      Other Objective/Functional Measures:   Assessed sit to stand transfer and pt performed with ease; squat form      Pain Level (0-10 scale) post treatment: 0    ASSESSMENT/Changes in Function: Patient presents with decreased swelling of the right knee and is reporting decrease in pain levels. She has voiced a concern with STS transfers and requested exercises to assist with improving this skill; upon observing pt complete STS exercises, she demonstrates the ability to complete with no UE support from plinth; will progress to low chair to assess.      Patient will continue to benefit from skilled PT services to modify and progress therapeutic interventions, address functional mobility deficits, address ROM deficits, address strength deficits, analyze and address soft tissue restrictions, analyze and cue movement patterns, analyze and modify body mechanics/ergonomics and assess and modify postural abnormalities to attain remaining goals. []  See Plan of Care  [x]  See progress note/recertification  []  See Discharge Summary         Progress towards goals / Updated goals:  Progress towards goals / Updated goals:  1. Pt will increase FOTO score to 59 points to improve ability to perform ADLs. 2. Pt will report pain at 2/10 or less during ADLs in order to improve quality of life.   Progressing: 3/10 (11/9/20)  3. Pt will increase AROM right knee to 0-120 degs to improve pt's ability to negotiate stairs. Not met: pt. Continues to lack terminal knee extension (11/16/20)  4.  Pt. Will improve right knee MMT to 5/5 in order to increase ease of transfers at home.     PLAN  [x]  Upgrade activities as tolerated     [x]  Continue plan of care  []  Update interventions per flow sheet       []  Discharge due to:_  []  Other:_      TACHO Pedroza 11/19/2020  10:56 AM    Future Appointments   Date Time Provider Pricila Manuel   11/23/2020 10:30 AM Dinorah Garcia, PT [facility]   12/3/2020  8:40 AM Stuart Crespo PA-C [facility] BS AMB   12/8/2020 11:30 AM [facility] DON[facility]IE RM 4 3D [facility]   12/10/2020 10:10 AM TONY Posada BS AMB   12/17/2020  9:50 AM TONY Posada BS AMB

## 2020-11-23 ENCOUNTER — HOSPITAL ENCOUNTER (OUTPATIENT)
Dept: PHYSICAL THERAPY | Age: 70
Discharge: HOME | End: 2020-11-23
Payer: MEDICARE

## 2020-11-23 PROCEDURE — 97140 MANUAL THERAPY 1/> REGIONS: CPT

## 2020-11-23 PROCEDURE — 97110 THERAPEUTIC EXERCISES: CPT

## 2020-11-23 NOTE — PROGRESS NOTES
PT DAILY TREATMENT NOTE     Patient Name: Lucy Fair  Date:2020  : 1950  [x]  Patient  Verified  Payor: Clarisse Failing / Plan: [place] / Product Type: Medicare /    In time: 10:32  Out time: 11:13  Total Treatment Time (min): 41  Visit #: 4 of 8    Medicare/BCBS Only   Total Timed Codes (min):  41 1:1 Treatment Time:  41       Treatment Area: Pain in right knee [M25.561]  Presence of right artificial knee joint [Z96.651]    SUBJECTIVE  Pain Level (0-10 scale):  2-310  Any medication changes, allergies to medications, adverse drug reactions, diagnosis change, or new procedure performed?: [x] No    [] Yes (see summary sheet for update)  Subjective functional status/changes:   [] No changes reported  Pt. Reports she has some pinching in her knee but is doing ok overall. OBJECTIVE    33 min Therapeutic Exercise:  [x] See flow sheet :   Rationale: increase ROM, increase strength and improve coordination to improve the patients ability to increase ease of ADLs    8 min Manual Therapy:  Patella mobs, trigger point release to distal quads   The manual therapy interventions were performed at a separate and distinct time from the therapeutic activities interventions. Rationale: decrease pain, increase ROM and increase tissue extensibility to increase ease of ADLs          With   [x] TE   [] TA   [] neuro   [] other: Patient Education: [x] Review HEP    [] Progressed/Changed HEP based on:   [] positioning   [] body mechanics   [] transfers   [] heat/ice application    [] other:      Other Objective/Functional Measures:   Pt. Continues to report relief with patella mobs and was re-educated on performing for HEP. She was challenged with 4 inch step downs and lunges  She continues to be challenged with 5# resistance for LAQ    Pain Level (0-10 scale) post treatment:  2-3/10    ASSESSMENT/Changes in Function:  Pt. Is progressing slowly towards goals.  She continues to have pain in right knee and decreased terminal knee extension mobility. She also demonstrates decreased right knee strength. She has increased ease of step ups with 8 inch height. Patient will continue to benefit from skilled PT services to modify and progress therapeutic interventions, address functional mobility deficits, address ROM deficits, address strength deficits, analyze and address soft tissue restrictions, analyze and cue movement patterns, analyze and modify body mechanics/ergonomics and assess and modify postural abnormalities to attain remaining goals. Progress towards goals / Updated goals:  1. Pt will increase FOTO score to 59 points to improve ability to perform ADLs. 2. Pt will report pain at 2/10 or less during ADLs in order to improve quality of life.   Progressing: 3/10 (11/9/20)  3. Pt will increase AROM right knee to 0-120 degs to improve pt's ability to negotiate stairs. Not met: pt. Continues to lack terminal knee extension (11/16/20)  4. Pt. Will improve right knee MMT to 5/5 in order to increase ease of transfers at home.     PLAN  []  Upgrade activities as tolerated     [x]  Continue plan of care  []  Update interventions per flow sheet       []  Discharge due to:_  []  Other:_      Chucho Leiva, PT 11/23/2020  7:54 AM    Future Appointments   Date Time Provider Pricila Manuel   11/23/2020 10:30 AM Lucas Homans, PT [facility]   12/3/2020  8:40 AM Amos Pantoja PA-C [facility] BS AMB   12/4/2020 10:40 AM DO JADE Peraza BS AMB   12/8/2020 11:30 AM [facility] DONNIE RM 4 3D [facility]   12/10/2020 10:10 AM Amos Pantoja PA-C [facility] AMB   12/17/2020  9:50 AM Amos Pantoja PA-C [facility] BS AMB

## 2020-12-02 ENCOUNTER — HOSPITAL ENCOUNTER (OUTPATIENT)
Dept: PHYSICAL THERAPY | Age: 70
End: 2020-12-02
Payer: MEDICARE

## 2020-12-02 ENCOUNTER — TELEPHONE (OUTPATIENT)
Dept: ORTHOPEDIC SURGERY | Age: 70
End: 2020-12-02

## 2020-12-02 NOTE — TELEPHONE ENCOUNTER
Patient notified per KAITLIN ramirez to proceed with Euflexxa injections. Patient verbalized understanding.

## 2020-12-02 NOTE — TELEPHONE ENCOUNTER
Patient called stating she is supposed to be starting her euflexxa injections tomorrow but she wants to be sure she's still able to get them with the medications she's currently taking. She's taking the prescription Prednisone and she says she takes 3 tablets at a time, and she's also taking the prescription Methocarbomal 500 mg. Please advise patient back regarding this at [phone].

## 2020-12-04 ENCOUNTER — HOSPITAL ENCOUNTER (OUTPATIENT)
Dept: PHYSICAL THERAPY | Age: 70
Discharge: HOME | End: 2020-12-04
Payer: MEDICARE

## 2020-12-04 PROCEDURE — 97110 THERAPEUTIC EXERCISES: CPT

## 2020-12-04 PROCEDURE — 97140 MANUAL THERAPY 1/> REGIONS: CPT

## 2020-12-04 NOTE — PROGRESS NOTES
[facility]  [facility]  [address] [place] [address]  [phone] fax    Continued Plan of Care/ Re-certification for Physical Therapy Services    Patient name: Mildred Alex Start of Care: 2020   Referral source: Teresa Barrios PA-C : 1950               Medical Diagnosis: Pain in right knee [M25.561]  Presence of right artificial knee joint [Z96.651]  Payor: [place] MEDICARE / Plan: VA MEDICARE PART A & B / Product Type: Medicare /  Onset Date: DOS 2020               Treatment Diagnosis: right knee pain   Prior Hospitalization: see medical history Provider#: [id number]   Medications: Verified on Patient summary List    Comorbidities: arthritis, HTN, reports having vertigo   Prior Level of Function: Independent with ADLs and functional tasks with pain in the knee before surgery.      Visits from Start of Care: 22    Missed Visits: 1    The Plan of Care and following information is based on the patient's current status:  Goal: Pt will increase FOTO score to 59 points to improve ability to perform ADLs. Status at last note/certification: 48  Current Status: not met    Goal:  Pt will report pain at 2/10 or less during ADLs in order to improve quality of life. Status at last note/certification: 3/47  Current Status: not met    Goal: Pt will increase AROM right knee to 0-120 degs to improve pt's ability to negotiate stairs. Status at last note/certification: 0-884 degrees  Current Status: not met    Goal: Pt. Will improve right knee MMT to 5/5 in order to increase ease of transfers at home.   Status at last note/certification: 4+/5  Current Status: not met    Key functional changes: to have less pain and walk better      Problems/ barriers to goal attainment:  none     Problem List: pain affecting function, decrease ROM, decrease strength, impaired gait/ balance, decrease ADL/ functional abilitiies, decrease activity tolerance, decrease flexibility/ joint mobility and decrease transfer abilities    Treatment Plan: Therapeutic exercise, Therapeutic activities, Neuromuscular re-education, Physical agent/modality, Gait/balance training, Manual therapy, Patient education, Self Care training, Functional mobility training, Home safety training and Stair training     Patient Goal (s) has been updated and includes: to walk better     Goals for this certification period to be accomplished in 4 weeks:  1. Patient will improve FOTO score to 59 points in order to demonstrate a significant improvement in function. 2. Patient will demonstrate good understand of HEP in order to continue to improve following D/C. Frequency / Duration: Patient to be seen 1 times per week for 1 weeks:    Assessment / Recommendations: pt. Is progressing slowly towards goals. She continues to have right knee but has also been limited by left knee and back pain. Continues to report pain at 2-3/10. Right knee AROM is 1-112 degrees. Right knee MMT: ext: 4+/5 flex: 5/5. She continues to have some patella hypomobility and decreased quad flexibility. She is currently ambulating without an AD with mild decrease in weight shift to right side. Skilled PT is medically necessary in order to establish updated HEP in order to continue to progress following D/C. Certification Period: 12/4/20-1/3/21    Zach Dent PT 12/4/2020 9:43 AM    ________________________________________________________________________  I certify that the above Therapy Services are being furnished while the patient is under my care. I agree with the treatment plan and certify that this therapy is necessary. [] I have read the above and request that my patient continue as recommended.   [] I have read the above report and request that my patient continue therapy with the following changes/special instructions: _______________________________________  [] I have read the above report and request that my patient be discharged from therapy    Physician's Signature:____________Date:_________TIME:________    ** Signature, Date and Time must be completed for valid certification **    Please sign and return to [facility] [address]  [address] [facility]  [phone] fax

## 2020-12-04 NOTE — PROGRESS NOTES
PT DAILY TREATMENT NOTE     Patient Name: Tameka Brooks  Date:2020  : 1950  [x]  Patient  Verified  Payor: VA MEDICARE / Plan: VA MEDICARE PART A & B / Product Type: Medicare /    In time: 9:00  Out time: 9:40  Total Treatment Time (min): 40  Visit #: 5 of 8    Medicare/BCBS Only   Total Timed Codes (min):  40 1:1 Treatment Time:  40       Treatment Area: Pain in right knee [M25.561]  Presence of right artificial knee joint [Z96.651]    SUBJECTIVE  Pain Level (0-10 scale): 3/10  Any medication changes, allergies to medications, adverse drug reactions, diagnosis change, or new procedure performed?: [x] No    [] Yes (see summary sheet for update)  Subjective functional status/changes:   [] No changes reported  Pt. Reports she has been having a lot of back spasms lately. She reports continued pain in her knee. OBJECTIVE    32 min Therapeutic Exercise:  [x] See flow sheet :   Rationale: increase ROM and increase strength to improve the patients ability to increase ease of ADLs    8 min Manual Therapy:   Patella mobs, trigger point release to distal quads   The manual therapy interventions were performed at a separate and distinct time from the therapeutic activities interventions. Rationale: decrease pain and increase ROM to increase ease of ADLs          With   [x] TE   [] TA   [] neuro   [] other: Patient Education: [x] Review HEP    [] Progressed/Changed HEP based on:   [] positioning   [] body mechanics   [] transfers   [] heat/ice application    [] other:      Other Objective/Functional Measures:   Right knee AROM: 1-112 degrees  Right knee MMT: ext: 4+/5 flex: 5/5  Discussed D/C plans with pt. And she is agreeable to D/C next visit  Pt.  Continues to be challenged with lunges and 8 inch step ups  Right knee pain with most activities       Pain Level (0-10 scale) post treatment:  3/10    ASSESSMENT/Changes in Function:     See re-cert     Progress towards goals / Updated goals:    See re-cert    PLAN  []  Upgrade activities as tolerated     [x]  Continue plan of care  []  Update interventions per flow sheet       []  Discharge due to:_  []  Other:_      Unk Milks, PT 12/4/2020  7:49 AM    Future Appointments   Date Time Provider Pricila Manuel   12/4/2020  9:00 AM Ria Wayne, PT [facility]   12/4/2020  2:25 PM Juan Pablo Gilmore PA-C VS BS AMB   12/8/2020 11:30 AM HBV DONNIE RM 4 3D [facility]   12/8/2020  2:40 PM DO JADE Neves BS AMB   12/10/2020 10:10 AM TONY Doherty BS AMB   12/17/2020  9:50 AM TONY Doherty BS AMB

## 2020-12-08 ENCOUNTER — OFFICE VISIT (OUTPATIENT)
Dept: ORTHOPEDIC SURGERY | Age: 70
End: 2020-12-08
Payer: MEDICARE

## 2020-12-08 ENCOUNTER — HOSPITAL ENCOUNTER (OUTPATIENT)
Dept: MAMMOGRAPHY | Age: 70
Discharge: HOME | End: 2020-12-08
Attending: FAMILY MEDICINE
Payer: MEDICARE

## 2020-12-08 VITALS
HEIGHT: 59 IN | WEIGHT: 180 LBS | RESPIRATION RATE: 15 BRPM | BODY MASS INDEX: 36.29 KG/M2 | DIASTOLIC BLOOD PRESSURE: 75 MMHG | HEART RATE: 76 BPM | SYSTOLIC BLOOD PRESSURE: 150 MMHG | TEMPERATURE: 97 F

## 2020-12-08 DIAGNOSIS — M50.30 DEGENERATIVE DISC DISEASE, CERVICAL: Primary | ICD-10-CM

## 2020-12-08 DIAGNOSIS — M48.02 CERVICAL SPINAL STENOSIS: ICD-10-CM

## 2020-12-08 DIAGNOSIS — M99.01 CERVICAL SOMATIC DYSFUNCTION: ICD-10-CM

## 2020-12-08 DIAGNOSIS — Z12.31 VISIT FOR SCREENING MAMMOGRAM: ICD-10-CM

## 2020-12-08 PROCEDURE — G8427 DOCREV CUR MEDS BY ELIG CLIN: HCPCS | Performed by: FAMILY MEDICINE

## 2020-12-08 PROCEDURE — G8432 DEP SCR NOT DOC, RNG: HCPCS | Performed by: FAMILY MEDICINE

## 2020-12-08 PROCEDURE — G8399 PT W/DXA RESULTS DOCUMENT: HCPCS | Performed by: FAMILY MEDICINE

## 2020-12-08 PROCEDURE — G8417 CALC BMI ABV UP PARAM F/U: HCPCS | Performed by: FAMILY MEDICINE

## 2020-12-08 PROCEDURE — 1090F PRES/ABSN URINE INCON ASSESS: CPT | Performed by: FAMILY MEDICINE

## 2020-12-08 PROCEDURE — 3017F COLORECTAL CA SCREEN DOC REV: CPT | Performed by: FAMILY MEDICINE

## 2020-12-08 PROCEDURE — G8536 NO DOC ELDER MAL SCRN: HCPCS | Performed by: FAMILY MEDICINE

## 2020-12-08 PROCEDURE — 1101F PT FALLS ASSESS-DOCD LE1/YR: CPT | Performed by: FAMILY MEDICINE

## 2020-12-08 PROCEDURE — 99214 OFFICE O/P EST MOD 30 MIN: CPT | Performed by: FAMILY MEDICINE

## 2020-12-08 PROCEDURE — G9899 SCRN MAM PERF RSLTS DOC: HCPCS | Performed by: FAMILY MEDICINE

## 2020-12-08 PROCEDURE — 77063 BREAST TOMOSYNTHESIS BI: CPT

## 2020-12-08 PROCEDURE — 98925 OSTEOPATH MANJ 1-2 REGIONS: CPT | Performed by: FAMILY MEDICINE

## 2020-12-08 RX ORDER — DICLOFENAC SODIUM 10 MG/G
4 GEL TOPICAL
Qty: 300 G | Refills: 1 | Status: SHIPPED | OUTPATIENT
Start: 2020-12-08

## 2020-12-08 NOTE — PROGRESS NOTES
HISTORY OF PRESENT ILLNESS    Asmita Pelaez 1950 is a 65y.o. year old female comes in today as new patient for: neck pain    Patients symptoms have been present for years but bad since cold samy with fan last year at [facility] on top head. Pain level 10 - Worst pain ever/10 neck pain. It has worsened with certain motions. Patient has tried:  Pt First/ER visit ands Tx zanaflex and prednisone with benefit. It is described as pain in neck with spasms as well. Mostly on neck. Has had PT last year. Has done better with contour neck pillow the last 3 days. Big concern is grinding/cracking in neck. Issues with renal issues limiting NSAID and tylenol not much benefit aside from tylenol 3. Does have Hx lumbar surgery 2018. IMAGING: MRI cervical 2019  IMPRESSION:  Detail, particularly on axial plane imaging, is limited by motion artifact. Given this limitation there is no drastic change when compared to 2013. Multiple level mild spinal canal stenoses from C3-4 to C6-7.     Past Surgical History:   Procedure Laterality Date    HX BACK SURGERY      low back 2018    HX BREAST BIOPSY      6-8 yrs ago, scar marked    HX CARPAL TUNNEL RELEASE Right     mid to late [de-identified]    HX CARPAL TUNNEL RELEASE Left     mid     HX  SECTION      HX CHOLECYSTECTOMY      HX GYN      Vaginal Hernia as a child     HX HERNIA REPAIR      age [de-identified]    HX HYSTERECTOMY      partial    [place]      Dr Indio Ramirez in 2020    NEUROLOGICAL PROCEDURE UNLISTED  2017    lower lumbar     Social History     Socioeconomic History    Marital status:      Spouse name: Not on file    Number of children: Not on file    Years of education: Not on file    Highest education level: Not on file   Tobacco Use    Smoking status: Never Smoker    Smokeless tobacco: Never Used   Substance and Sexual Activity    Alcohol use: Yes     Comment: occasionally    Drug use: Never      Current Outpatient Medications   Medication Sig Dispense Refill    ondansetron (ZOFRAN ODT) 4 mg disintegrating tablet Take 1 Tab by mouth every six (6) hours. 30 Tab 1    OTHER CBD oil as needed      metoclopramide HCl (Reglan) 10 mg tablet Take 10 mg by mouth Before breakfast, lunch, dinner and at bedtime.  omeprazole (PRILOSEC) 40 mg capsule Take 40 mg by mouth daily.  losartan (COZAAR) 50 mg tablet Take 50 mg by mouth daily.  metoprolol (LOPRESSOR) 50 mg tablet Take 50 mg by mouth daily.  folic acid (FOLVITE) 1 mg tablet Take 1 mg by mouth daily.  Cholecalciferol, Vitamin D3, (VITAMIN D3) 1,000 unit cap Take 1,000 Units by mouth daily.  colchicine 0.6 mg tablet Take 1 Tab by mouth two (2) times a day. 60 Tab 1    ferrous sulfate (IRON) 325 mg (65 mg iron) EC tablet Take 1 Tab by mouth two (2) times daily (with meals). 60 Tab 1    OTHER Cell Power -8 gtts daily OTC      acetaminophen (TYLENOL) 325 mg tablet Take 650 mg by mouth every four (4) hours as needed for Pain.  cyanocobalamin (VITAMIN B-12) 1,000 mcg tablet Take 1,000 mcg by mouth daily as needed. Past Medical History:   Diagnosis Date    Arthritis     Chronic kidney disease     GERD (gastroesophageal reflux disease)     Hypertension     Ill-defined condition     Positional Vertigo -  pt can not lie flat    Sleep apnea      History reviewed. No pertinent family history. ROS:  No numb/tingle. All other systems reviewed and negative aside from that written in the HPI. Objective:  BP (!) 150/75   Pulse 76   Temp 97 °F (36.1 °C)   Resp 15   Ht 4' 10.5\" (1.486 m)   Wt 180 lb (81.6 kg)   BMI 36.98 kg/m²   GEN:  Appears stated age in NAD. HEAD:  Normocephalic, Atraumatic. NEURO:  Sensation intact light touch upper extremities. Biceps & Triceps reflexes +2/4 bilaterally. M/S:  Examined Seated and supine. Spurling negative. Cervical rotation Decreased left TTA at C3, 5 on left, worse with flexion.   Strength +5/5 bilateral  upper extremities. EXT: no clubbing/cyanosis. no edema. SKIN: Warm & dry w/o rash. HEENT: Conjunctiva/lids WNL. External canals/nares WNL. Tongue midline. PERRL, EOMI. Hearing intact. NECK: Trachea midline. Supple, Full ROM. No thyromegaly. CARDIAC: No edema. LUNGS: Normal effort. ABD: Soft, no masses. No HSM. PSYCH: A+O x3. Appropriate judgment and insight. Assessment/Plan:     ICD-10-CM ICD-9-CM    1. Degenerative disc disease, cervical  M50.30 722.4 diclofenac (VOLTAREN) 1 % gel   2. Cervical spinal stenosis  M48.02 723.0 diclofenac (VOLTAREN) 1 % gel   3. Cervical somatic dysfunction  M99.01 739.1 AL OSTEOPATHIC MANIP,1-2 BODY REGN       Patient (or guardian if minor) verbalizes understanding of evaluation and plan. Verbal consent obtained. Cervical SD treated with ME. Correction of previous malalignments verified after Tx. Pt tolerated well. Notes improvement of Sx and pain is now rated 1-2/10. Will return as needed and consider PT down the road as not much benefit prior. Voltaren gel as issues renal disease.

## 2020-12-08 NOTE — PROGRESS NOTES
Pt states that she has sharp pains when she has cold air hit her head. Pt states that she has a hard mattress and a contour neck pillow. Pt has had lower back surgery in the past by Dr Horace Cardoso. Finished 7 day course of prednisone last night.

## 2020-12-08 NOTE — LETTER
12/8/20 Patient: Cliff Martin YOB: 1950 Date of Visit: 12/8/2020 Sandi Dhillon DO 
[address] [id number] [address] [address] VIA Facsimile: [phone] Dear Sandi Dhillon DO, Thank you for referring Ms. Rosa Tsang to [place] [address]. for evaluation. My notes for this consultation are attached. If you have questions, please do not hesitate to call me. I look forward to following your patient along with you.  
 
 
Sincerely, 
 
Andrzej Blank DO

## 2020-12-08 NOTE — PATIENT INSTRUCTIONS
Search YouTube for my channel:    Dr. Trujillo Handler    Neck/Upper back      ATTENTION:  Effective 12/15/2020 Dr. Governor Stoll will no longer be at [facility] in [place] and will transition completely to his other office near [facility] at [address] [facility]. [address], [facility]. The office phone number is still [phone]. Please reach out via [facility] or telephone for any appointment requests or questions you may have. So sorry for the inconvenience, but we hope to be able to continue providing quality care to you despite the move.

## 2020-12-09 ENCOUNTER — HOSPITAL ENCOUNTER (OUTPATIENT)
Dept: PHYSICAL THERAPY | Age: 70
Discharge: HOME | End: 2020-12-09
Payer: MEDICARE

## 2020-12-09 PROCEDURE — 97110 THERAPEUTIC EXERCISES: CPT

## 2020-12-09 NOTE — PROGRESS NOTES
Physical Therapy Discharge Instructions      [facility] [address]  [address] [facility]  [phone] fax    Patient: Adela Benz  : 1950      Continue Home Exercise Program 1-2 times per day for 6 weeks, then decrease to 3 times per week      Continue with    [x] Ice  as needed      [] Heat         Follow up with MD:     [] Upon completion of therapy     [] As needed    Recommendations:     [x]   Return to activity with home program    []   Return to activity with the following modifications:       []Post Rehab Program    []Join Independent aquatic program     []Return to/join local gym    Additional Comments: Keep up with the exercises at home!     Zach Dent, PT 2020 2:12 PM

## 2020-12-09 NOTE — PROGRESS NOTES
[facility] [place]  [address] [facility]  [phone] fax    Physical Therapy Discharge Summary    Patient name: Mildred Ramsey Start of Care: 2020   Referral source: Wilrfido Barrios PA-C : 1950               Medical Diagnosis: Pain in right knee [M25.561]  Presence of right artificial knee joint [Z96.651]  Payor: VA MEDICARE / Plan: VA MEDICARE PART A & B / Product Type: Medicare /  Onset Date: DOS 2020               Treatment Diagnosis: right knee pain   Prior Hospitalization: see medical history Provider#: [id number]   Medications: Verified on Patient summary List    Comorbidities: arthritis, HTN, reports having vertigo   Prior Level of Function: Independent with ADLs and functional tasks with pain in the knee before surgery.      Visits from Start of Care: 23    Missed Visits: 1    Reporting Period : 20 to 20    Summary of Care:  Goal: Patient will improve FOTO score to 59 points in order to demonstrate a significant improvement in function. Status at last note/certification: 48  Status at discharge: not met    Goal: Patient will demonstrate good understand of HEP in order to continue to improve following D/C. Status at last note/certification: n/a  Status at discharge: met    . Is progressing slowly towards goals. She continues to have right knee but has also been limited by left knee and back pain. Continues to report pain at 2-3/10. Right knee AROM is 1-112 degrees. Right knee MMT: ext: 4+/5 flex: 5/5. She continues to have some patella hypomobility and decreased quad flexibility. FOTO score had no significant change at 46 points. She demonstrates good understanding of her HEP and was educated on continuing following D/C.      ASSESSMENT/RECOMMENDATIONS:  [x]Discontinue therapy: [x]Patient has reached or is progressing toward set goals      []Patient is non-compliant or has abdicated      []Due to lack of appreciable progress towards set goals    Chely Tripp, PT 12/9/2020 7:19 AM

## 2020-12-09 NOTE — PROGRESS NOTES
PT DAILY TREATMENT NOTE     Patient Name: Tanesha Rogel  Date:2020  : 1950  [x]  Patient  Verified  Payor: VA MEDICARE / Plan: VA MEDICARE PART A & B / Product Type: Medicare /    In time: 2:15  Out time: 2:55  Total Treatment Time (min): 40  Visit #: 1 of 1    Medicare/BCBS Only   Total Timed Codes (min):  40 1:1 Treatment Time:  40       Treatment Area: Pain in right knee [M25.561]  Presence of right artificial knee joint [Z96.651]    SUBJECTIVE  Pain Level (0-10 scale): 2/10  Any medication changes, allergies to medications, adverse drug reactions, diagnosis change, or new procedure performed?: [x] No    [] Yes (see summary sheet for update)  Subjective functional status/changes:   [] No changes reported  Pt. Reports she is feeling pretty stiff today. She reports she is ready for D/C today and knows what to do for her HEP. OBJECTIVE    40 min Therapeutic Exercise:  [x] See flow sheet :   Rationale: increase ROM and increase strength to improve the patients ability to increase ease of ADLs          With   [x] TE   [] TA   [] neuro   [] other: Patient Education: [x] Review HEP    [] Progressed/Changed HEP based on:   [] positioning   [] body mechanics   [] transfers   [] heat/ice application    [] other:      Other Objective/Functional Measures: FOTO: 46 points  Pt. Tolerated PT well with no reports of increased pain  She continues to be challenged with lunges     Pain Level (0-10 scale) post treatment: 2/10    ASSESSMENT/Changes in Function:      []  See Plan of Care  []  See progress note/recertification  [x]  See Discharge Summary         Progress towards goals / Updated goals:  See D/C note    PLAN  []  Upgrade activities as tolerated     []  Continue plan of care  []  Update interventions per flow sheet       [x]  Discharge due to:_ progress towards goals.    []  Other:_      Dae Dhaliwal, PT 2020  7:18 AM    Future Appointments   Date Time Provider Department Center   12/9/2020  2:15 PM Josh Frias, PT [facility]   12/10/2020 10:10 AM Sejal Gray PA-C [facility] BS AMB   12/17/2020  9:50 AM TONY Calix BS AMB   12/28/2020  1:45 PM KAITLIN [facility] BS AMB

## 2020-12-10 ENCOUNTER — OFFICE VISIT (OUTPATIENT)
Dept: ORTHOPEDIC SURGERY | Age: 70
End: 2020-12-10
Payer: MEDICARE

## 2020-12-10 VITALS
BODY MASS INDEX: 35.96 KG/M2 | HEART RATE: 79 BPM | HEIGHT: 59 IN | OXYGEN SATURATION: 95 % | DIASTOLIC BLOOD PRESSURE: 68 MMHG | SYSTOLIC BLOOD PRESSURE: 125 MMHG | WEIGHT: 178.4 LBS | TEMPERATURE: 95.9 F

## 2020-12-10 DIAGNOSIS — M17.12 ARTHRITIS OF LEFT KNEE: Primary | ICD-10-CM

## 2020-12-10 PROCEDURE — 99024 POSTOP FOLLOW-UP VISIT: CPT | Performed by: PHYSICIAN ASSISTANT

## 2020-12-10 PROCEDURE — 20611 DRAIN/INJ JOINT/BURSA W/US: CPT | Performed by: PHYSICIAN ASSISTANT

## 2020-12-10 NOTE — PROGRESS NOTES
Patient: Yasmeen Robertson                MRN: [id number]       SSN: [id number]  YOB: 1950        AGE: 65 y.o. SEX: female  Body mass index is 36.65 kg/m². PCP: Jake Fernández DO  12/10/20        Pt presents today for their 1st euflexxa  injection for Left knee . There has been no recent fevers/chills, systemic changes, or injuries to report. PE:  General A&Ox3, NAD  Exam of the knees reveals skin intact, no erythema, no ecchymosis, no signs for infection. No cyanosis, clubbing, or edema present distally. Neg calf tenderness, neg homans, no signs for DVT present. A: Left knee OA    P: The Left knee was injected 2ml euflexxa with US guided assistance without complications. Patient was instructed on post injection care. Chart reviewed for the following:  Karely LUTZ PA-C, have reviewed the History, Physical and updated the Allergic reactions for Yasmeen Robertson?     TIME OUT performed immediately prior to start of procedure:  Karely LUTZ PA-C, have performed the following reviews on Yasmeen Robertson prior to the start of the procedure:  ????????  * Patient was identified by name and date of birth   * Agreement on procedure being performed was verified  * Risks and Benefits explained to the patient  * Procedure site verified and marked as necessary  * Patient was positioned for comfort  * Consent was signed and verified    Time: 10:35 AM    Body part: Left knee    Medication & Dose: 2ml euflexxa    Date of procedure: 12/10/2020    Procedure performed by: Carey Cabrera PA-C    Provider assisted by: none    Patient assisted by: self    How tolerated by patient: tolerated the procedure well with no complications    Post Procedural Pain Scale: 5    Comments: none    Karely Yang PA-C

## 2020-12-11 DIAGNOSIS — M25.561 RIGHT KNEE PAIN, UNSPECIFIED CHRONICITY: Primary | ICD-10-CM

## 2020-12-11 RX ORDER — ACETAMINOPHEN AND CODEINE PHOSPHATE 300; 30 MG/1; MG/1
1-2 TABLET ORAL EVERY 8 HOURS
Qty: 42 TAB | Refills: 0 | OUTPATIENT
Start: 2020-12-11 | End: 2020-12-18

## 2020-12-11 NOTE — TELEPHONE ENCOUNTER
Last Visit: 12/10/20 with KAITLIN Knapp  Next Appointment: 12/17/20 with KAITLIN Knapp  Previous Refill Encounter(s): 10/16/20 #42    Requested Prescriptions     Pending Prescriptions Disp Refills    acetaminophen-codeine (Tylenol-Codeine #3) 300-30 mg per tablet 42 Tab 0     Sig: Take 1-2 Tabs by mouth every eight (8) hours for 7 days. Max Daily Amount: 6 Tabs.  Indications: pain

## 2020-12-17 ENCOUNTER — OFFICE VISIT (OUTPATIENT)
Dept: ORTHOPEDIC SURGERY | Age: 70
End: 2020-12-17
Payer: MEDICARE

## 2020-12-17 VITALS
TEMPERATURE: 97.1 F | SYSTOLIC BLOOD PRESSURE: 135 MMHG | WEIGHT: 178.4 LBS | OXYGEN SATURATION: 94 % | HEART RATE: 88 BPM | DIASTOLIC BLOOD PRESSURE: 76 MMHG | BODY MASS INDEX: 35.96 KG/M2 | HEIGHT: 59 IN

## 2020-12-17 DIAGNOSIS — M17.12 ARTHRITIS OF LEFT KNEE: Primary | ICD-10-CM

## 2020-12-17 PROCEDURE — 20611 DRAIN/INJ JOINT/BURSA W/US: CPT | Performed by: PHYSICIAN ASSISTANT

## 2020-12-17 NOTE — PROGRESS NOTES
Patient: Cyrus Solo                MRN: [id number]       SSN: [id number]  YOB: 1950        AGE: 65 y.o. SEX: female  Body mass index is 36.65 kg/m². PCP: Erich Rg DO  12/17/20        Pt presents today for their 2nd euflexxa  injection for Left knee . There has been no recent fevers/chills, systemic changes, or injuries to report. PE:  General A&Ox3, NAD  Exam of the knees reveals skin intact, no erythema, no ecchymosis, no signs for infection. No cyanosis, clubbing, or edema present distally. Neg calf tenderness, neg homans, no signs for DVT present. A: Left knee OA    P: The Left knee was injected 2ml euflexxa with US guided assistance without complications. Patient was instructed on post injection care. Chart reviewed for the following:  Jeffry LUTZ PA-C, have reviewed the History, Physical and updated the Allergic reactions for Cyrus Solo?     TIME OUT performed immediately prior to start of procedure:  Jeffry LUTZ PA-C, have performed the following reviews on Cyrus Solo prior to the start of the procedure:  ????????  * Patient was identified by name and date of birth   * Agreement on procedure being performed was verified  * Risks and Benefits explained to the patient  * Procedure site verified and marked as necessary  * Patient was positioned for comfort  * Consent was signed and verified    Time: 10:10 AM    Body part: Left knee    Medication & Dose: 2ml euflexxa    Date of procedure: 12/17/2020    Procedure performed by: Trish Alvarado PA-C    Provider assisted by: none    Patient assisted by: self    How tolerated by patient: tolerated the procedure well with no complications    Post Procedural Pain Scale: 2    Comments: none    Jeffry Stanley PA-C

## 2020-12-21 ENCOUNTER — TELEPHONE (OUTPATIENT)
Dept: ORTHOPEDIC SURGERY | Age: 70
End: 2020-12-21

## 2020-12-21 DIAGNOSIS — M79.89 PAIN AND SWELLING OF LEFT LOWER LEG: Primary | ICD-10-CM

## 2020-12-21 DIAGNOSIS — M79.662 PAIN AND SWELLING OF LEFT LOWER LEG: Primary | ICD-10-CM

## 2020-12-21 NOTE — TELEPHONE ENCOUNTER
Patient has an upcoming appt for 12/28 for her third Euflexxa injection to the left knee. She reports she has had a feeling in the left lower extremity of discomfort radiating up her leg into her thigh and buttock after lying down as well as two \"sore spots\", one on the outside area of her leg and one on the thigh. Around these areas she reports a feeling of \"something bubbling under the skin\". She is concerned about DVT. Is this something she can wait to discuss at the scheduled visit or should she be seen at urgent care or ED?   Please advise at [phone]

## 2020-12-22 ENCOUNTER — HOSPITAL ENCOUNTER (OUTPATIENT)
Dept: VASCULAR SURGERY | Age: 70
Discharge: HOME | End: 2020-12-22
Attending: PHYSICIAN ASSISTANT
Payer: MEDICARE

## 2020-12-22 DIAGNOSIS — M79.662 PAIN AND SWELLING OF LEFT LOWER LEG: ICD-10-CM

## 2020-12-22 DIAGNOSIS — M79.89 PAIN AND SWELLING OF LEFT LOWER LEG: ICD-10-CM

## 2020-12-22 PROCEDURE — 93971 EXTREMITY STUDY: CPT

## 2020-12-22 NOTE — TELEPHONE ENCOUNTER
I called [facility] they only had 11:00am appt open on 12/22/20. I called patient at 10:18am advised her I needed her to check in at 10:30am she verbally stated that she would try. She stated she would call me back if they did not take her b/c she was a few mins late.

## 2020-12-29 ENCOUNTER — OFFICE VISIT (OUTPATIENT)
Dept: ORTHOPEDIC SURGERY | Age: 70
End: 2020-12-29
Payer: MEDICARE

## 2020-12-29 VITALS
HEIGHT: 59 IN | WEIGHT: 181 LBS | TEMPERATURE: 98.2 F | DIASTOLIC BLOOD PRESSURE: 88 MMHG | BODY MASS INDEX: 36.49 KG/M2 | SYSTOLIC BLOOD PRESSURE: 150 MMHG | RESPIRATION RATE: 16 BRPM

## 2020-12-29 DIAGNOSIS — M17.12 PRIMARY OSTEOARTHRITIS OF LEFT KNEE: Primary | ICD-10-CM

## 2020-12-29 PROCEDURE — 20610 DRAIN/INJ JOINT/BURSA W/O US: CPT | Performed by: PHYSICIAN ASSISTANT

## 2020-12-29 NOTE — PROGRESS NOTES
Patient: Chanell Dhaliwal                MRN: [id number]       SSN: [id number]  YOB: 1950        AGE: 65 y.o. SEX: female  Body mass index is 37.19 kg/m². PCP: Maribel Dinh DO  12/29/20    Chief Complaint   Patient presents with    Knee Pain     left knee euflexxa        HISTORY:  Chanell Dhaliwal is a 65 y.o. female who is seen for reevaluation of Left knee here for 3rd and final injection of euflexxa. PROCEDURE:  Patient's Left knee, after timeout under sterile conditions, was injected with 2 cc of Euflexxa. [facility]  OFFICE PROCEDURE PROGRESS NOTE        Chart reviewed for the following:   Gerri LUTZ PA-C, have reviewed the History, Physical and updated the Allergic reactions for [place] performed immediately prior to start of procedure:   IGerri PA-C, have performed the following reviews on Chanell Dhaliwal prior to the start of the procedure:            * Patient was identified by name and date of birth   * Agreement on procedure being performed was verified  * Risks and Benefits explained to the patient  * Procedure site verified and marked as necessary  * Patient was positioned for comfort  * Consent was signed and verified     Time: 2:26 PM       Date of procedure: 12/29/2020    Procedure performed by:  KAITLIN Mckeon    Provider assisted by: None     How tolerated by patient: tolerated the procedure well with no complications    Comments: none    IMPRESSION:     ICD-10-CM ICD-9-CM    1. Primary osteoarthritis of left knee  M17.12 715.16 sodium hyaluronate (SUPARTZ FX/EUFLEXXA/HYALGAN) 10 mg/mL injection syrg 20 mg      DRAIN/INJECT LARGE JOINT/BURSA        PLAN: Ms. Dereck Cruz has completed her Euflexxa injection series. she will return as needed.       [facility]

## 2021-01-25 ENCOUNTER — TELEPHONE (OUTPATIENT)
Dept: ORTHOPEDIC SURGERY | Age: 71
End: 2021-01-25

## 2021-01-25 NOTE — TELEPHONE ENCOUNTER
Spoke to [facility], notified them patient does require pre-med. They asked for protocol to be faxed to [phone]. Letter written and faxed to the provided number, confirmation received.

## 2021-01-25 NOTE — TELEPHONE ENCOUNTER
Ni from [facility] called requesting a callback. The patient is in office for a routine cleaning and the patient is requesting to be premedicated. Lee Gamboa would like to confirm this with provider and is requesting to know what medication to give the patient . Please Alejandro Flannery at [phone].

## 2021-01-25 NOTE — LETTER
ANTIBIOTIC DENTAL PROTOCOL 
 
1/25/2021 1:04 PM 
 
Ms. Valerie Lynn [address] [facility] To Whom It May Concern: 
 
Valerie Lynn is currently under the care of [address] [place], [place] [address]raheem Mathur. Due to this patient having a history of a total joint replacement, it is recommended they take an antibiotic for life prior to any dental cleaning/procedure. Our protocol is to have the patient take Amoxil 500mg, 4 capsules by mouth 1 hour prior to appointment/procedure. If there are questions or concerns please have the patient contact our office. Sincerely, Louis Vance MD

## 2021-04-21 ENCOUNTER — TELEPHONE (OUTPATIENT)
Dept: ORTHOPEDIC SURGERY | Age: 71
End: 2021-04-21

## 2021-04-21 NOTE — TELEPHONE ENCOUNTER
Patient is requesting a letter stating she has joint replacement hardware to present when going through metal detectors. She is requesting this be mailed to her residence.     Patient [phone]    [address] [place] [address]

## 2021-04-21 NOTE — TELEPHONE ENCOUNTER
Joint replacement card filled out and given to [facility] at [address] [facility] for mailing.

## 2021-05-07 ENCOUNTER — TELEPHONE (OUTPATIENT)
Dept: ORTHOPEDIC SURGERY | Age: 71
End: 2021-05-07

## 2021-05-07 DIAGNOSIS — M17.12 PRIMARY OSTEOARTHRITIS OF LEFT KNEE: Primary | ICD-10-CM

## 2021-05-07 NOTE — TELEPHONE ENCOUNTER
Patient called asking if we can start a new authorization for her to get euflexxa injections again. Please advise patient back regarding this at [phone].

## 2021-05-14 ENCOUNTER — HOSPITAL ENCOUNTER (OUTPATIENT)
Dept: LAB | Age: 71
Discharge: HOME | End: 2021-05-14
Payer: MEDICARE

## 2021-05-14 ENCOUNTER — TRANSCRIBE ORDER (OUTPATIENT)
Dept: REGISTRATION | Age: 71
End: 2021-05-14

## 2021-05-14 DIAGNOSIS — I12.9 MALIGNANT HYPERTENSIVE KIDNEY DISEASE WITH CHRONIC KIDNEY DISEASE STAGE I THROUGH STAGE IV, OR UNSPECIFIED(403.00): ICD-10-CM

## 2021-05-14 DIAGNOSIS — N18.9 CHRONIC KIDNEY DISEASE, UNSPECIFIED CKD STAGE: ICD-10-CM

## 2021-05-14 DIAGNOSIS — I10 ESSENTIAL HYPERTENSION, MALIGNANT: ICD-10-CM

## 2021-05-14 DIAGNOSIS — I12.9 MALIGNANT HYPERTENSIVE KIDNEY DISEASE WITH CHRONIC KIDNEY DISEASE STAGE I THROUGH STAGE IV, OR UNSPECIFIED(403.00): Primary | ICD-10-CM

## 2021-05-14 DIAGNOSIS — N18.31 CHRONIC KIDNEY DISEASE (CKD) STAGE G3A/A1, MODERATELY DECREASED GLOMERULAR FILTRATION RATE (GFR) BETWEEN 45-59 ML/MIN/1.73 SQUARE METER AND ALBUMINURIA CREATININE RATIO LESS THAN 30 MG/G (HCC): ICD-10-CM

## 2021-05-14 LAB
ALBUMIN SERPL-MCNC: 3.6 G/DL (ref 3.4–5)
ANION GAP SERPL CALC-SCNC: 6 MMOL/L (ref 3–18)
APPEARANCE UR: CLEAR
BILIRUB UR QL: NEGATIVE
BUN SERPL-MCNC: 17 MG/DL (ref 7–18)
BUN/CREAT SERPL: 14 (ref 12–20)
CALCIUM SERPL-MCNC: 8.8 MG/DL (ref 8.5–10.1)
CALCIUM SERPL-MCNC: 8.9 MG/DL (ref 8.5–10.1)
CHLORIDE SERPL-SCNC: 107 MMOL/L (ref 100–111)
CO2 SERPL-SCNC: 30 MMOL/L (ref 21–32)
COLOR UR: YELLOW
CREAT SERPL-MCNC: 1.21 MG/DL (ref 0.6–1.3)
CREAT UR-MCNC: 147 MG/DL (ref 30–125)
ERYTHROCYTE [DISTWIDTH] IN BLOOD BY AUTOMATED COUNT: 14.6 % (ref 11.6–14.5)
GLUCOSE SERPL-MCNC: 105 MG/DL (ref 74–99)
GLUCOSE UR STRIP.AUTO-MCNC: NEGATIVE MG/DL
HCT VFR BLD AUTO: 42.7 % (ref 35–45)
HGB BLD-MCNC: 13 G/DL (ref 12–16)
HGB UR QL STRIP: NEGATIVE
KETONES UR QL STRIP.AUTO: NEGATIVE MG/DL
LEUKOCYTE ESTERASE UR QL STRIP.AUTO: NEGATIVE
MCH RBC QN AUTO: 24.4 PG (ref 24–34)
MCHC RBC AUTO-ENTMCNC: 30.4 G/DL (ref 31–37)
MCV RBC AUTO: 80.3 FL (ref 74–97)
NITRITE UR QL STRIP.AUTO: NEGATIVE
PH UR STRIP: 5.5 [PH] (ref 5–8)
PHOSPHATE SERPL-MCNC: 3 MG/DL (ref 2.5–4.9)
PLATELET # BLD AUTO: 272 K/UL (ref 135–420)
PMV BLD AUTO: 11.3 FL (ref 9.2–11.8)
POTASSIUM SERPL-SCNC: 4.2 MMOL/L (ref 3.5–5.5)
PROT UR STRIP-MCNC: NEGATIVE MG/DL
PROT UR-MCNC: 7 MG/DL
PTH-INTACT SERPL-MCNC: 77.2 PG/ML (ref 18.4–88)
RBC # BLD AUTO: 5.32 M/UL (ref 4.2–5.3)
SODIUM SERPL-SCNC: 143 MMOL/L (ref 136–145)
SP GR UR REFRACTOMETRY: 1.02 (ref 1–1.03)
UROBILINOGEN UR QL STRIP.AUTO: 0.2 EU/DL (ref 0.2–1)
WBC # BLD AUTO: 6.2 K/UL (ref 4.6–13.2)

## 2021-05-14 PROCEDURE — 82570 ASSAY OF URINE CREATININE: CPT

## 2021-05-14 PROCEDURE — 83970 ASSAY OF PARATHORMONE: CPT

## 2021-05-14 PROCEDURE — 84156 ASSAY OF PROTEIN URINE: CPT

## 2021-05-14 PROCEDURE — 36415 COLL VENOUS BLD VENIPUNCTURE: CPT

## 2021-05-14 PROCEDURE — 85027 COMPLETE CBC AUTOMATED: CPT

## 2021-05-14 PROCEDURE — 80069 RENAL FUNCTION PANEL: CPT

## 2021-05-14 PROCEDURE — 81003 URINALYSIS AUTO W/O SCOPE: CPT

## 2021-06-04 ENCOUNTER — DOCUMENTATION ONLY (OUTPATIENT)
Dept: ORTHOPEDIC SURGERY | Age: 71
End: 2021-06-04

## 2021-06-28 DIAGNOSIS — M17.12 PRIMARY OSTEOARTHRITIS OF LEFT KNEE: ICD-10-CM

## 2021-07-01 ENCOUNTER — OFFICE VISIT (OUTPATIENT)
Dept: ORTHOPEDIC SURGERY | Age: 71
End: 2021-07-01
Payer: MEDICARE

## 2021-07-01 VITALS
WEIGHT: 183 LBS | BODY MASS INDEX: 36.89 KG/M2 | OXYGEN SATURATION: 100 % | TEMPERATURE: 97.1 F | HEIGHT: 59 IN | HEART RATE: 80 BPM

## 2021-07-01 DIAGNOSIS — M17.12 PRIMARY OSTEOARTHRITIS OF LEFT KNEE: ICD-10-CM

## 2021-07-01 DIAGNOSIS — Z96.651 STATUS POST TOTAL RIGHT KNEE REPLACEMENT: Primary | ICD-10-CM

## 2021-07-01 PROCEDURE — 20610 DRAIN/INJ JOINT/BURSA W/O US: CPT | Performed by: PHYSICIAN ASSISTANT

## 2021-07-01 NOTE — PROGRESS NOTES
Patient: Roger Duff                MRN: [id number]       SSN: [id number]  YOB: 1950        AGE: 66 y.o. SEX: female  Body mass index is 36.96 kg/m². PCP: Marlene Moctezuma DO  07/01/21        Pt presents today for their 1st euflexxa  injection for Left knee . There has been no recent fevers/chills, systemic changes, or injuries to report. PE:  General A&Ox3, NAD  Exam of the knees reveals skin intact, no erythema, no ecchymosis, no signs for infection. No cyanosis, clubbing, or edema present distally. Neg calf tenderness, neg homans, no signs for DVT present. A: Left knee OA    P: The Left knee was injected 2ml euflexxa without complications. Patient was instructed on post injection care. Chart reviewed for the following:  Catalina LUTZ PA-C, have reviewed the History, Physical and updated the Allergic reactions for Roger Duff?     TIME OUT performed immediately prior to start of procedure:  Catalina LUTZ PA-C, have performed the following reviews on Roger Duff prior to the start of the procedure:  ????????  * Patient was identified by name and date of birth   * Agreement on procedure being performed was verified  * Risks and Benefits explained to the patient  * Procedure site verified and marked as necessary  * Patient was positioned for comfort  * Consent was signed and verified    Time: 10:19 AM    Body part: Left knee, intra-articular    Medication & Dose: 2ml Euflexxa    Date of procedure: 7/1/21    Procedure performed by: Ariella Liriano PA-C    Provider assisted by: none    Patient assisted by: self    How tolerated by patient: tolerated the procedure well with no complications    Post Procedural Pain Scale: 3    Comments:  none      Catalina Atkinson PA-C

## 2021-07-08 ENCOUNTER — OFFICE VISIT (OUTPATIENT)
Dept: ORTHOPEDIC SURGERY | Age: 71
End: 2021-07-08
Payer: MEDICARE

## 2021-07-08 VITALS
BODY MASS INDEX: 36.89 KG/M2 | OXYGEN SATURATION: 100 % | HEIGHT: 59 IN | RESPIRATION RATE: 15 BRPM | WEIGHT: 183 LBS | TEMPERATURE: 97.5 F | HEART RATE: 80 BPM

## 2021-07-08 DIAGNOSIS — M17.12 PRIMARY OSTEOARTHRITIS OF LEFT KNEE: Primary | ICD-10-CM

## 2021-07-08 DIAGNOSIS — M25.562 LEFT KNEE PAIN, UNSPECIFIED CHRONICITY: ICD-10-CM

## 2021-07-08 PROCEDURE — 1090F PRES/ABSN URINE INCON ASSESS: CPT | Performed by: ORTHOPAEDIC SURGERY

## 2021-07-08 PROCEDURE — G8417 CALC BMI ABV UP PARAM F/U: HCPCS | Performed by: ORTHOPAEDIC SURGERY

## 2021-07-08 PROCEDURE — G9899 SCRN MAM PERF RSLTS DOC: HCPCS | Performed by: ORTHOPAEDIC SURGERY

## 2021-07-08 PROCEDURE — G8432 DEP SCR NOT DOC, RNG: HCPCS | Performed by: ORTHOPAEDIC SURGERY

## 2021-07-08 PROCEDURE — G8427 DOCREV CUR MEDS BY ELIG CLIN: HCPCS | Performed by: ORTHOPAEDIC SURGERY

## 2021-07-08 PROCEDURE — G8399 PT W/DXA RESULTS DOCUMENT: HCPCS | Performed by: ORTHOPAEDIC SURGERY

## 2021-07-08 PROCEDURE — 1101F PT FALLS ASSESS-DOCD LE1/YR: CPT | Performed by: ORTHOPAEDIC SURGERY

## 2021-07-08 PROCEDURE — 99212 OFFICE O/P EST SF 10 MIN: CPT | Performed by: ORTHOPAEDIC SURGERY

## 2021-07-08 PROCEDURE — 3017F COLORECTAL CA SCREEN DOC REV: CPT | Performed by: ORTHOPAEDIC SURGERY

## 2021-07-08 PROCEDURE — G8756 NO BP MEASURE DOC: HCPCS | Performed by: ORTHOPAEDIC SURGERY

## 2021-07-08 PROCEDURE — 20610 DRAIN/INJ JOINT/BURSA W/O US: CPT | Performed by: ORTHOPAEDIC SURGERY

## 2021-07-08 PROCEDURE — G8536 NO DOC ELDER MAL SCRN: HCPCS | Performed by: ORTHOPAEDIC SURGERY

## 2021-07-08 NOTE — PROGRESS NOTES
Patient: Raghavendra Salmon                MRN: [id number]       SSN: [id number]  YOB: 1950        AGE: 66 y.o. SEX: female  Body mass index is 36.96 kg/m². PCP: Stefano Walters DO  07/08/21    Diamante Narayan presents today for follow-up assessment for the left knee she is doing Euflexxa series no problems after the first injection and I had a look at her right knee replacement as well she has some chronic pain associated with it and she describes tightness in the right knee otherwise been feeling well and denies fevers or chills denies instability denies start up pain in the examination on today the right knee x-ray looks quite benign she is missing about 3 degrees extension bends at about 125 degrees the knee itself is not hot not red not particularly swollen and she is got some mild joint line tenderness and some tenderness over the pes anserinus as well on the right side left knee is in varus degree or 2 fixed flexion deformity as well    Previous portal eventually clean and dry in the left knee with timeout left knee injected with Euflexxa preparation well-tolerated we will keep an eye on the right knee as well we will see her back next week we could consider also pes injection for the right knee    REVIEW OF SYSTEMS:      CON: negative  EYE: negative   ENT: negative  RESP: negative  GI:    negative   :  negative  MSK: Positive  A twelve point review of systems was completed, positives noted and all other systems were reviewed and are negative          Past Medical History:   Diagnosis Date    Arthritis     Chronic kidney disease     GERD (gastroesophageal reflux disease)     Hypertension     Ill-defined condition     Positional Vertigo -  pt can not lie flat    Sleep apnea        History reviewed. No pertinent family history.     Current Outpatient Medications   Medication Sig Dispense Refill    diclofenac (VOLTAREN) 1 % gel Apply 4 g to affected area every six (6) hours as needed for Pain (neck). 300 g 1    OTHER Cell Power -8 gtts daily OTC      OTHER CBD oil as needed      metoclopramide HCl (Reglan) 10 mg tablet Take 10 mg by mouth Before breakfast, lunch, dinner and at bedtime.  omeprazole (PRILOSEC) 40 mg capsule Take 40 mg by mouth daily.  losartan (COZAAR) 50 mg tablet Take 50 mg by mouth daily.  acetaminophen (TYLENOL) 325 mg tablet Take 650 mg by mouth every four (4) hours as needed for Pain.  metoprolol (LOPRESSOR) 50 mg tablet Take 50 mg by mouth daily.  folic acid (FOLVITE) 1 mg tablet Take 1 mg by mouth daily.  Cholecalciferol, Vitamin D3, (VITAMIN D3) 1,000 unit cap Take 1,000 Units by mouth daily.  colchicine 0.6 mg tablet Take 1 Tab by mouth two (2) times a day. (Patient not taking: Reported on 2021) 60 Tab 1    ondansetron (ZOFRAN ODT) 4 mg disintegrating tablet Take 1 Tab by mouth every six (6) hours. (Patient not taking: Reported on 2021) 30 Tab 1    ferrous sulfate (IRON) 325 mg (65 mg iron) EC tablet Take 1 Tab by mouth two (2) times daily (with meals). (Patient not taking: Reported on 2021) 60 Tab 1    cyanocobalamin (VITAMIN B-12) 1,000 mcg tablet Take 1,000 mcg by mouth daily as needed. (Patient not taking: Reported on 2021)         Allergies   Allergen Reactions    Cymbalta [Duloxetine] Anaphylaxis    Gabapentin Other (comments)     hallucinations    Nsaids (Non-Steroidal Anti-Inflammatory Drug) Other (comments)     History \"gastric issues\" and kidney issues per pt.     Silicone Unknown (comments)    Tramadol Other (comments)     hallucinations       Past Surgical History:   Procedure Laterality Date    HX BACK SURGERY      low back 2018    HX BREAST BIOPSY      6-8 yrs ago, scar marked    HX CARPAL TUNNEL RELEASE Right     mid to late [de-identified]    HX CARPAL TUNNEL RELEASE Left     mid     HX  SECTION      HX CHOLECYSTECTOMY      HX GYN      Vaginal Hernia as a child     HX HERNIA REPAIR      age [de-identified]    HX HYSTERECTOMY      partial     Robert Daily Sample in 08/2020    NEUROLOGICAL PROCEDURE UNLISTED  2017    lower lumbar       Social History     Socioeconomic History    Marital status:      Spouse name: Not on file    Number of children: Not on file    Years of education: Not on file    Highest education level: Not on file   Occupational History    Not on file   Tobacco Use    Smoking status: Never Smoker    Smokeless tobacco: Never Used   Substance and Sexual Activity    Alcohol use: Yes     Comment: occasionally    Drug use: Never    Sexual activity: Not on file   Other Topics Concern    Not on file   Social History Narrative    Not on file     Social Determinants of Health     Financial Resource Strain:     Difficulty of Paying Living Expenses:    Food Insecurity:     Worried About Running Out of Food in the Last Year:     [address] in the Last Year:    Transportation Needs:     Lack of Transportation (Medical):  Lack of Transportation (Non-Medical):    Physical Activity:     Days of Exercise per Week:     Minutes of Exercise per Session:    Stress:     Feeling of Stress :    Social Connections:     Frequency of Communication with Friends and Family:     Frequency of Social Gatherings with Friends and Family:     Attends [employer] Services:     Active Member of Clubs or Organizations:     Attends Club or Organization Meetings:     Marital Status:    Intimate Partner Violence:     Fear of Current or Ex-Partner:     Emotionally Abused:     Physically Abused:     Sexually Abused:        Visit Vitals  Pulse 80   Temp 97.5 °F (36.4 °C)   Resp 15   Ht 4' 11\" (1.499 m)   Wt 183 lb (83 kg)   SpO2 100%   BMI 36.96 kg/m²         PHYSICAL EXAMINATION:  GENERAL: Alert and oriented x3, in no acute distress, well-developed, well-nourished, afebrile. HEART: No JVD.   EYES: No scleral icterus   NECK: No significant lymphadenopathy   LUNGS: No respiratory compromise or indrawing  ABDOMEN: Soft, non-tender, non-distended. Electronically signed by: MD BOLIVAR Solares, Oliva Urias M.D., have reviewed the history, physical, and have updated the allergic reactions for Aundrea Kirk. TIME OUT performed immediately prior to the start of procedure:  Ian Seay M.D., have performed the following reviews on Aundrea Kirk prior to the start of the procedure:    - Patient was identified by name and date of birth  - Agreement on procedure being performed was verified  - Risks and benefits explained to the patient  -Patient was positioned for comfort  - Consent was signed and verified  - Patient was advised regarding risks of bruising, bleeding, infection and pain    Time: 10:46 AM    Body Part: intra- articular injection of left knee. Medication and Dose: 2 mL standard Euflexxa preparation, i.e. 20 mg, and 3 mL 1% lidocaine    Date of Procedure: 07/08/21    PROCEDURE PERFORMED BY : Alisson Urias M.D., [facility])    Provider Assisted by: Dedra Anderson    Patient assisted by: self    Patient tolerated procedure well with no complications

## 2021-07-15 ENCOUNTER — OFFICE VISIT (OUTPATIENT)
Dept: ORTHOPEDIC SURGERY | Age: 71
End: 2021-07-15
Payer: MEDICARE

## 2021-07-15 VITALS
WEIGHT: 181 LBS | RESPIRATION RATE: 16 BRPM | HEIGHT: 59 IN | BODY MASS INDEX: 36.49 KG/M2 | TEMPERATURE: 97.7 F | OXYGEN SATURATION: 99 % | HEART RATE: 78 BPM

## 2021-07-15 DIAGNOSIS — M17.12 PRIMARY OSTEOARTHRITIS OF LEFT KNEE: Primary | ICD-10-CM

## 2021-07-15 DIAGNOSIS — L91.0 KELOID SCAR OF SKIN: ICD-10-CM

## 2021-07-15 DIAGNOSIS — M25.562 LEFT KNEE PAIN, UNSPECIFIED CHRONICITY: ICD-10-CM

## 2021-07-15 PROCEDURE — 20610 DRAIN/INJ JOINT/BURSA W/O US: CPT | Performed by: ORTHOPAEDIC SURGERY

## 2021-07-15 PROCEDURE — 99211 OFF/OP EST MAY X REQ PHY/QHP: CPT | Performed by: ORTHOPAEDIC SURGERY

## 2021-07-15 NOTE — PROGRESS NOTES
Patient: Zaid Lang                MRN: [id number]       SSN: [id number]  YOB: 1950        AGE: 66 y.o. SEX: female  Body mass index is 37.19 kg/m². PCP: Megan Lara DO  07/15/21    Hiram Padilla is seen in follow-up with regards to both knees for Euflexxa No. 3 and also to reevaluate her right knee which she is ended up with about a 3 cm keloid scar at the proximal wound no problems with infection and she is had a touch of pes anserinus bursitis    I think it be reasonable to have plastics inject her keloid if they feel that is appropriate otherwise we could excise the keloid scar with a chance of recurrent I will send her to Dr. Nuria Hinds for an opinion and see if he is willing to inject the keloid for her and I did do the Euflexxa No. 3 today which is very well-tolerated we will see her back in the not-too-distant future for follow-up assessment thank you    REVIEW OF SYSTEMS:      CON: negative  EYE: negative   ENT: negative  RESP: negative  GI:    negative   :  negative  MSK: Positive  A twelve point review of systems was completed, positives noted and all other systems were reviewed and are negative          Past Medical History:   Diagnosis Date    Arthritis     Chronic kidney disease     GERD (gastroesophageal reflux disease)     Hypertension     Ill-defined condition     Positional Vertigo -  pt can not lie flat    Sleep apnea        No family history on file. Current Outpatient Medications   Medication Sig Dispense Refill    diclofenac (VOLTAREN) 1 % gel Apply 4 g to affected area every six (6) hours as needed for Pain (neck). 300 g 1    OTHER Cell Power -8 gtts daily OTC      OTHER CBD oil as needed      metoclopramide HCl (Reglan) 10 mg tablet Take 10 mg by mouth Before breakfast, lunch, dinner and at bedtime.  omeprazole (PRILOSEC) 40 mg capsule Take 40 mg by mouth daily.  losartan (COZAAR) 50 mg tablet Take 50 mg by mouth daily.       acetaminophen (TYLENOL) 325 mg tablet Take 650 mg by mouth every four (4) hours as needed for Pain.  metoprolol (LOPRESSOR) 50 mg tablet Take 50 mg by mouth daily.  folic acid (FOLVITE) 1 mg tablet Take 1 mg by mouth daily.  Cholecalciferol, Vitamin D3, (VITAMIN D3) 1,000 unit cap Take 1,000 Units by mouth daily.  colchicine 0.6 mg tablet Take 1 Tab by mouth two (2) times a day. (Patient not taking: Reported on 2021) 60 Tab 1    ondansetron (ZOFRAN ODT) 4 mg disintegrating tablet Take 1 Tab by mouth every six (6) hours. (Patient not taking: Reported on 2021) 30 Tab 1    ferrous sulfate (IRON) 325 mg (65 mg iron) EC tablet Take 1 Tab by mouth two (2) times daily (with meals). (Patient not taking: Reported on 2021) 60 Tab 1    cyanocobalamin (VITAMIN B-12) 1,000 mcg tablet Take 1,000 mcg by mouth daily as needed. (Patient not taking: Reported on 2021)         Allergies   Allergen Reactions    Cymbalta [Duloxetine] Anaphylaxis    Gabapentin Other (comments)     hallucinations    Nsaids (Non-Steroidal Anti-Inflammatory Drug) Other (comments)     History \"gastric issues\" and kidney issues per pt.     Silicone Unknown (comments)    Tramadol Other (comments)     hallucinations       Past Surgical History:   Procedure Laterality Date    HX BACK SURGERY      low back 2018    HX BREAST BIOPSY      6-8 yrs ago, scar marked    HX CARPAL TUNNEL RELEASE Right     mid to late [de-identified]    HX CARPAL TUNNEL RELEASE Left     mid     HX  SECTION      HX CHOLECYSTECTOMY      HX GYN      Vaginal Hernia as a child     HX HERNIA REPAIR      age [de-identified]    HX HYSTERECTOMY      partial     Boo Segura in 2020    NEUROLOGICAL PROCEDURE UNLISTED  2017    lower lumbar       Social History     Socioeconomic History    Marital status:      Spouse name: Not on file    Number of children: Not on file    Years of education: Not on file    Highest education level: Not on file   Occupational History    Not on file   Tobacco Use    Smoking status: Never Smoker    Smokeless tobacco: Never Used   Substance and Sexual Activity    Alcohol use: Yes     Comment: occasionally    Drug use: Never    Sexual activity: Not on file   Other Topics Concern    Not on file   Social History Narrative    Not on file     Social Determinants of Health     Financial Resource Strain:     Difficulty of Paying Living Expenses:    Food Insecurity:     Worried About Running Out of Food in the Last Year:     [address] in the Last Year:    Transportation Needs:     Lack of Transportation (Medical):  Lack of Transportation (Non-Medical):    Physical Activity:     Days of Exercise per Week:     Minutes of Exercise per Session:    Stress:     Feeling of Stress :    Social Connections:     Frequency of Communication with Friends and Family:     Frequency of Social Gatherings with Friends and Family:     Attends [employer] Services:     Active Member of Clubs or Organizations:     Attends Club or Organization Meetings:     Marital Status:    Intimate Partner Violence:     Fear of Current or Ex-Partner:     Emotionally Abused:     Physically Abused:     Sexually Abused:        Visit Vitals  Pulse 78   Temp 97.7 °F (36.5 °C) (Temporal)   Resp 16   Ht 4' 10.5\" (1.486 m)   Wt 82.1 kg (181 lb)   SpO2 99%   BMI 37.19 kg/m²         PHYSICAL EXAMINATION:  GENERAL: Alert and oriented x3, in no acute distress, well-developed, well-nourished, afebrile. HEART: No JVD. EYES: No scleral icterus   NECK: No significant lymphadenopathy   LUNGS: No respiratory compromise or indrawing  ABDOMEN: Soft, non-tender, non-distended. Electronically signed by: MD BOLIVAR Meredith, Poly Mckeon M.D., have reviewed the history, physical, and have updated the allergic reactions for Susan Comes.     TIME OUT performed immediately prior to the start of procedure:  Luis Angel Kim M.D., have performed the following reviews on Kishan Tang prior to the start of the procedure:    - Patient was identified by name and date of birth  - Agreement on procedure being performed was verified  - Risks and benefits explained to the patient  -Patient was positioned for comfort  - Consent was signed and verified  - Patient was advised regarding risks of bruising, bleeding, infection and pain    Time: 10:42 AM    Body Part: intra- articular injection of left knee. Medication and Dose: 2 mL standard Euflexxa preparation, i.e. 20 mg, and 3 mL 1% lidocaine    Date of Procedure: 07/15/21    PROCEDURE PERFORMED BY : Jayden Quinteros M.D., [facility])    Provider Assisted by: Patel Mckee    Patient assisted by: self    Patient tolerated procedure well with no complications

## 2021-07-19 ENCOUNTER — HOSPITAL ENCOUNTER (OUTPATIENT)
Dept: PHYSICAL THERAPY | Age: 71
Discharge: HOME | End: 2021-07-19
Attending: PHYSICIAN ASSISTANT
Payer: MEDICARE

## 2021-07-19 PROCEDURE — 97161 PT EVAL LOW COMPLEX 20 MIN: CPT

## 2021-07-19 PROCEDURE — 97140 MANUAL THERAPY 1/> REGIONS: CPT

## 2021-07-19 NOTE — PROGRESS NOTES
PT DAILY TREATMENT NOTE/KNEE EVAL     Patient Name: Franky Sanchez  Date:2021  : 1950  [x]  Patient  Verified  Payor: Omkar Mina / Plan: [place] MEDICARE PART A & B / Product Type: Medicare /    In time: 3:00  Out time: 3:42  Total Treatment Time (min): 42  Visit #: 1 of 8    Medicare/BCBS Only   Total Timed Codes (min):  18 1:1 Treatment Time:  42     Treatment Area: Knee pain, right [M25.561]  Presence of right artificial knee joint [Z96.651]    SUBJECTIVE  Pain Level (0-10 scale):  5/10  []constant []intermittent []improving []worsening []no change since onset    Any medication changes, allergies to medications, adverse drug reactions, diagnosis change, or new procedure performed?: [x] No    [] Yes (see summary sheet for update)  Subjective functional status/changes:     Mechanism of Injury:  2020 right TKA. Had PT but reports still having problems. Reports walking has most difficulty. Has a lot of swelling and tightness after prolonged walking. Reports she has a lot of scar tissue. Also has left knee pain and is getting injections with it. Uses cane occasionally for community ambulation. Pain in front of knee. Also has difficulty with stooping and afraid to get on ground. Still complains of stiffness. Living Situation: lives alone, driving, Ind with ADLs  Pt Goals: to be able to walk without a limp    OBJECTIVE/EXAMINATION    8 min Therapeutic Exercise:  [] See flow sheet : HEP   Rationale: increase ROM, increase strength and improve coordination to improve the patients ability to increase ease of ADLs    10 min Manual Therapy:  Trigger point release and STM to distal quads   The manual therapy interventions were performed at a separate and distinct time from the therapeutic activities interventions.   Rationale: decrease pain and increase ROM to increase ease of ambulation           With   [x] TE   [] TA   [] neuro   [] other: Patient Education: [x] Review HEP    [] Progressed/Changed HEP based on:   [] positioning   [] body mechanics   [] transfers   [] heat/ice application    [] other:      Physical Therapy Evaluation - Knee    Gait:  [x] Normal    [] Abnormal    [] Antalgic    [] NWB    Device:    Describe:    ROM / Strength  [] Unable to assess                  AROM                      PROM                   Strength (1-5)    Left Right Left Right Left Right   Hip Flexion     4+ 4    Extension     4- 4-    Abduction     4- 4-    Adduction         Knee Flexion 127 110   5 5    Extension 0 2   4+ 4+   Ankle Plantarflexion     4+ 4+    Dorsiflexion     4+ 4+       Flexibility: [] Unable to assess at this time  Hamstrings:    (L) Tightness= [] WNL   [x] Min   [] Mod   [] Severe    (R) Tightness= [] WNL   [x] Min   [] Mod   [] Severe  Quadriceps:    (L) Tightness= [] WNL   [] Min   [x] Mod   [] Severe    (R) Tightness= [] WNL   [] Min   [x] Mod   [] Severe  Gastroc:      (L) Tightness= [] WNL   [] Min   [] Mod   [] Severe    (R) Tightness= [] WNL   [] Min   [] Mod   [] Severe  Other:    Palpation: tenderness to palpation over distal quads    Optional Tests:  Good patella mobility bilaterally        Other tests/comments:  30 second sit to stand test: 9x  SLS: 5 seconds bilaterally  Laxity noted with anterior drawer test     Pain Level (0-10 scale) post treatment:  5/10    ASSESSMENT/Changes in Function:      [x]  See Plan of Care  []  See progress note/recertification  []  See Discharge Summary         Progress towards goals / Updated goals:  See POC    PLAN  []  Upgrade activities as tolerated     [x]  Continue plan of care  []  Update interventions per flow sheet       []  Discharge due to:_  []  Other:_      Sal Millard, PT 7/19/2021  3:04 PM

## 2021-07-19 NOTE — PROGRESS NOTES
[facility]  [facility] [employer]  JAYME  [address] [place] [address]  [phone] fax    Plan of Care/ Statement of Necessity for Physical Therapy Services    Patient name: Miranda Patel Start of Care: 2021   Referral source: Roger Fragoso : 1950    Medical Diagnosis: Knee pain, right [M25.561]  Presence of right artificial knee joint [Z96.651]  Payor: [place] MEDICARE / Plan: [place] MEDICARE PART A & B / Product Type: Medicare /  Onset Date: 2020    Treatment Diagnosis:  Right knee pain   Prior Hospitalization: see medical history Provider#: [id number]   Medications: Verified on Patient summary List    Comorbidities: HTN, arthritis, osteoporosis   Prior Level of Function: Independent with ambulation at home and cane for community ambulation, Ind with ADLs      The Plan of Care and following information is based on the information from the initial evaluation. Assessment/ key information:  Pt. Is a 66year old female c/o right knee pain and stiffness following TKA on 20. She had PT initially after her surgery but returned today secondary to continued stiffness and difficulty with walking. She reports walking tolerance is a few minutes before increased pain. She also has difficulty with stooping and is afraid to get on the ground because she is unable to get back up. She presents with mild decrease in right knee mobility 2-110 degrees. She has decreased B hip strength and mild decrease in B knee extension strength. Decreased quad flexibility on right and she has multiple trigger points in distal quads. 30 second sit to stand test was 9x. Single leg balance is 5 seconds bilaterally. Skilled PT is medically necessary in order to improve LE strength and flexibility for increased ease of ambulation and improved quality of life.      Evaluation Complexity History MEDIUM  Complexity : 1-2 comorbidities / personal factors will impact the outcome/ POC ; Examination MEDIUM Complexity : 3 Standardized tests and measures addressing body structure, function, activity limitation and / or participation in recreation  ;Presentation LOW Complexity : Stable, uncomplicated  ;Clinical Decision Making MEDIUM Complexity : FOTO score of 26-74  Overall Complexity Rating: LOW   Problem List: pain affecting function, decrease ROM, decrease strength, impaired gait/ balance, decrease ADL/ functional abilitiies, decrease activity tolerance, decrease flexibility/ joint mobility and decrease transfer abilities   Treatment Plan may include any combination of the following: Therapeutic exercise, Therapeutic activities, Neuromuscular re-education, Physical agent/modality, Gait/balance training, Manual therapy, Patient education, Self Care training, Functional mobility training, Home safety training and Stair training  Patient / Family readiness to learn indicated by: asking questions  Persons(s) to be included in education: patient (P)  Barriers to Learning/Limitations: None  Patient Goal (s): to have less pain  Patient Self Reported Health Status: good  Rehabilitation Potential: good    Short Term Goals: To be accomplished in 1 weeks:  1. Patient will demonstrate compliance with HEP in order to improve flexibility for increased ease of ambulation. Long Term Goals: To be accomplished in 4 weeks:  1. Patient will improve FOTO score by 17 points in order to demonstrate a significant improvement in function. 2. Patient will improve right knee AROM 0-120 degrees in order to increase ease of ambulation. 3. Patient will improve B knee extension MMT to 5/5 in order to increase ease of stair negotiation. 4. Patient will improve B SL balance to 10 seconds in order to improve stability with ambulation. Frequency / Duration: Patient to be seen 2 times per week for 4 weeks.     Patient/ Caregiver education and instruction: Diagnosis, prognosis, exercises   [x]  Plan of care has been reviewed with PTA    Certification Period:  7/19/21-8/18/21    Zhen Shaheen, PT 7/19/2021 4:39 PM    ________________________________________________________________________    I certify that the above Therapy Services are being furnished while the patient is under my care. I agree with the treatment plan and certify that this therapy is necessary.     [de-identified] Signature:____________Date:_________TIME:________     Teresa Haque PA-C  ** Signature, Date and Time must be completed for valid certification **    Please sign and return to [facility] Ruben Bo  [address] [facility]  [phone] fax

## 2021-08-04 ENCOUNTER — HOSPITAL ENCOUNTER (OUTPATIENT)
Dept: PHYSICAL THERAPY | Age: 71
Discharge: HOME | End: 2021-08-04
Attending: PHYSICIAN ASSISTANT
Payer: MEDICARE

## 2021-08-04 PROCEDURE — 97112 NEUROMUSCULAR REEDUCATION: CPT

## 2021-08-04 PROCEDURE — 97140 MANUAL THERAPY 1/> REGIONS: CPT

## 2021-08-04 PROCEDURE — 97110 THERAPEUTIC EXERCISES: CPT

## 2021-08-04 PROCEDURE — 97530 THERAPEUTIC ACTIVITIES: CPT

## 2021-08-04 NOTE — PROGRESS NOTES
PT DAILY TREATMENT NOTE     Patient Name: Raghavendra Salmon  Date:2021  : 1950  [x]  Patient  Verified  Payor: VA MEDICARE / Plan: VA MEDICARE PART A & B / Product Type: Medicare /    In time:8:56 AM  Out time:9:42 AM  Total Treatment Time (min): 46  Visit #: 2 of 8    Medicare/BCBS Only   Total Timed Codes (min):  46 1:1 Treatment Time:  46       Treatment Area: Knee pain, right [M25.561]  Presence of right artificial knee joint [Z96.651]    SUBJECTIVE  Pain Level (0-10 scale): 1  Any medication changes, allergies to medications, adverse drug reactions, diagnosis change, or new procedure performed?: [x] No    [] Yes (see summary sheet for update)  Subjective functional status/changes:   [] No changes reported  \"The right knee is feeling pretty good today, just some discomfort. \"    OBJECTIVE        27 min Therapeutic Exercise:  [x] See flow sheet :   Rationale: increase ROM, increase strength and improve coordination to improve the patients ability to increase ease with ADLs    9 min Therapeutic Activity:  [x]  See flow sheet : step ups, mini squats, sit<>stands with left foot slightly in front   Rationale: increase strength and improve coordination  to improve the patients ability to ease with ADLs       10 min Manual Therapy: in supine: PROM to right knee, scar cross friction massage. The manual therapy interventions were performed at a separate and distinct time from the therapeutic activities interventions.   Rationale: decrease pain, increase ROM and increase tissue extensibility to ease ADL tolerance      With   [] TE   [] TA   [] neuro   [] other: Patient Education: [x] Review HEP    [] Progressed/Changed HEP based on:   [] positioning   [] body mechanics   [] transfers   [] heat/ice application    [] other:      Other Objective/Functional Measures:   First follow up session     Pain Level (0-10 scale) post treatment: 1    ASSESSMENT/Changes in Function:   Initiated POC as per flowsheet. Patient puts forth good effort with session, fully participates, and reports HEP semi-compliance. She reports that she has only been able to do the HEP a couple of times secondary to having dental surgery and then having some complications from the dental surgery though she reports she is recovering and feeling better after this setback. I encouraged her to more consistent with HEP as able. Patient does require occasional standing rest breaks. Patient will continue to benefit from skilled PT services to modify and progress therapeutic interventions, address functional mobility deficits, address ROM deficits, address strength deficits, analyze and address soft tissue restrictions, analyze and cue movement patterns, analyze and modify body mechanics/ergonomics and assess and modify postural abnormalities to attain remaining goals. []  See Plan of Care  []  See progress note/recertification  []  See Discharge Summary         Progress towards goals / Updated goals:  Short Term Goals: To be accomplished in 1 weeks:  1. Patient will demonstrate compliance with HEP in order to improve flexibility for increased ease of ambulation. -slow progression, patient reports only performing a couple times (8/4/21)     Long Term Goals: To be accomplished in 4 weeks:  1. Patient will improve FOTO score by 17 points in order to demonstrate a significant improvement in function. 2. Patient will improve right knee AROM 0-120 degrees in order to increase ease of ambulation. 3. Patient will improve B knee extension MMT to 5/5 in order to increase ease of stair negotiation. 4. Patient will improve B SL balance to 10 seconds in order to improve stability with ambulation.      PLAN  []  Upgrade activities as tolerated     [x]  Continue plan of care  []  Update interventions per flow sheet       []  Discharge due to:_  []  Other:_      Nora Lao 8/4/2021  8:52 AM    Future Appointments   Date Time Provider Department [place]   8/4/2021  9:00 AM Willa Olszewski [facility]   8/9/2021  9:45 AM Malachi Patrick, PT [facility]   8/13/2021  9:45 AM Malachi Patrick, PT [id number]   8/16/2021  9:45 AM Verona Ayala, PTA [facility]   8/19/2021 10:30 AM Pretty Santos, PT [facility]   8/23/2021 10:30 AM Pretty Santos, PT [facility]   8/26/2021 10:30 AM Dangelo Velez, PTA [facility]   8/30/2021 10:30 AM Malachi Patrick, PT [facility]   9/2/2021 10:30 AM Pretty Santos, PT [facility]

## 2021-08-06 ENCOUNTER — APPOINTMENT (OUTPATIENT)
Dept: PHYSICAL THERAPY | Age: 71
End: 2021-08-06
Attending: PHYSICIAN ASSISTANT
Payer: MEDICARE

## 2021-08-09 ENCOUNTER — HOSPITAL ENCOUNTER (OUTPATIENT)
Dept: PHYSICAL THERAPY | Age: 71
Discharge: HOME | End: 2021-08-09
Attending: PHYSICIAN ASSISTANT
Payer: MEDICARE

## 2021-08-09 PROCEDURE — 97530 THERAPEUTIC ACTIVITIES: CPT

## 2021-08-09 PROCEDURE — 97110 THERAPEUTIC EXERCISES: CPT

## 2021-08-09 PROCEDURE — 97140 MANUAL THERAPY 1/> REGIONS: CPT

## 2021-08-09 NOTE — PROGRESS NOTES
PT DAILY TREATMENT NOTE     Patient Name: Russell Guzmán  Date:2021  : 1950  [x]  Patient  Verified  Payor: VA MEDICARE / Plan: VA MEDICARE PART A & B / Product Type: Medicare /    In time:9:47A  Out time:10:29A  Total Treatment Time (min): 42  Visit #: 3 of 8    Medicare/BCBS Only   Total Timed Codes (min):  42 1:1 Treatment Time:  41       Treatment Area: Knee pain, right [M25.561]  Presence of right artificial knee joint [Z96.651]    SUBJECTIVE  Pain Level (0-10 scale): 1/10  Any medication changes, allergies to medications, adverse drug reactions, diagnosis change, or new procedure performed?: [x] No    [] Yes (see summary sheet for update)  Subjective functional status/changes:   [] No changes reported    Patient reports her knee just feels \"uncomfortable\". She always has some discomfort. She felt fine after last visit; she jair not report significant soreness. She feels the outside of her knee will still be painful. When she lies with her leg out straight she feels her \"kneecap will get out of place\". OBJECTIVE      21 min Therapeutic Exercise:  [x] See flow sheet :   Rationale: increase ROM and increase strength to improve the patients ability to perform ADLs with increased ease    13 min Therapeutic Activity:  [x]  See flow sheet : patient education, sit to stands, goal assessment   Rationale: increase ROM, increase strength and improve balance  to improve the patients ability to perform ADLs with increased ease      8 min Manual Therapy:  Ball massage to intermediate and lateral quad, scar massage, patellar mobs all directions   The manual therapy interventions were performed at a separate and distinct time from the therapeutic activities interventions.   Rationale: decrease pain, increase ROM and increase tissue extensibility to perform ADLs with increased ease              With   [] TE   [x] TA   [] neuro   [] other: Patient Education: [x] Review HEP    [] Progressed/Changed HEP based on:   [] positioning   [] body mechanics   [] transfers   [] heat/ice application    [x] other: importance of compliance with HEP; sitting/lying with towel roll under heel with leg extended to tolerance to increase extension and to stand/move leg once it becomes stiff/uncomfortable with activity; full knee ext necessary for normal gait; scar massage     Other Objective/Functional Measures:     Hypertonicity to right intermediate and lateral quad  Decreased hypomobility to most superior part of scar  Patellar mobility WNL  No extensor lag with SLR  Increased anterior hip symptoms reported with sidelying abd with reduction with performing activity with more hip ext   Increased DF with standing with right foot posterior  Knee ext MMT: right 4/5 left 4+/5  SLS: right 5 sec left 16 sec  Increased lateral trunk lean to ipsilateral stance side        Pain Level (0-10 scale) post treatment: 1/10    ASSESSMENT/Changes in Function: Patient demonstrates increased hypertonicity to right quads as possible contributing factor to continued symptoms. Bilateral knee extension strength remains impaired as does SLS on the right LE; left SLS has improved from initial evaluation. She is able to perform SLR without extensor lag indicating some improvement in quad/hip flexor strength. Patient will continue to benefit from skilled PT services to modify and progress therapeutic interventions, address functional mobility deficits, address ROM deficits, address strength deficits, analyze and address soft tissue restrictions, analyze and cue movement patterns, analyze and modify body mechanics/ergonomics, assess and modify postural abnormalities, address imbalance/dizziness and instruct in home and community integration to attain remaining goals.             Progress towards goals / Updated goals:  Short Term Goals: To be accomplished in 1 weeks:  1. Patient will demonstrate compliance with HEP in order to improve flexibility for increased ease of ambulation. -slow progression, patient reports only performing a couple times (8/4/21)      Long Term Goals: To be accomplished in 4 weeks:  1. Patient will improve FOTO score by 17 points in order to demonstrate a significant improvement in function. 2. Patient will improve right knee AROM 0-120 degrees in order to increase ease of ambulation. 3. Patient will improve B knee extension MMT to 5/5 in order to increase ease of stair negotiation. right 4/5 left 4+/5 (8/9/21)  4. Patient will improve B SL balance to 10 seconds in order to improve stability with ambulation. right 5 sec left 16 sec (8/9/21)  PLAN  [x]  Upgrade activities as tolerated     [x]  Continue plan of care  []  Update interventions per flow sheet       []  Discharge due to:_  []  Other:_      Jess Gardiner, BONNIE 8/9/2021  9:16 AM    Future Appointments   Date Time Provider [place]   8/9/2021  9:45 AM Eden Buenrostro, PT [facility]   8/13/2021  9:45 AM Citlaly Gonsalez, PT [email or web address]   8/16/2021  9:45 AM Ximena Alvarez, PTA [facility]   8/19/2021 10:30 AM Lawrence Winkler, PT [facility]   8/23/2021 10:30 AM Lawrence Winkler, PT [facility]   8/26/2021 10:30 AM Monica Callahan, PTA [facility]   8/30/2021 10:30 AM Citlaly Gonsalez, PT [facility]   9/2/2021 10:30 AM Lawrence Winkler, PT [facility]

## 2021-08-13 ENCOUNTER — HOSPITAL ENCOUNTER (OUTPATIENT)
Dept: PHYSICAL THERAPY | Age: 71
Discharge: HOME | End: 2021-08-13
Attending: PHYSICIAN ASSISTANT
Payer: MEDICARE

## 2021-08-13 PROCEDURE — 97140 MANUAL THERAPY 1/> REGIONS: CPT

## 2021-08-13 PROCEDURE — 97110 THERAPEUTIC EXERCISES: CPT

## 2021-08-13 NOTE — PROGRESS NOTES
PT DAILY TREATMENT NOTE     Patient Name: Myriam Lin  Date:2021  : 1950  [x]  Patient  Verified  Payor: VA MEDICARE / Plan: VA MEDICARE PART A & B / Product Type: Medicare /    In time: 9:45A   Out time:10:31A  Total Treatment Time (min): 46  Visit #: 4 of 8    Medicare/BCBS Only   Total Timed Codes (min): 46 1:1 Treatment Time: 42     Treatment Area: Knee pain, right [M25.561]  Presence of right artificial knee joint [Z96.651]    SUBJECTIVE  Pain Level (0-10 scale): 1.5/10  Any medication changes, allergies to medications, adverse drug reactions, diagnosis change, or new procedure performed?: [x] No    [] Yes (see summary sheet for update)  Subjective functional status/changes:   [] No changes reported    Patient reports she felt fine after last visit. Her knee is a little sore today. Her feet will feel cold when she is lying down. OBJECTIVE      34 min Therapeutic Exercise:  [] See flow sheet :   Rationale: increase ROM and increase strength to improve the patients ability to perform functional tasks with increased ease      8 min Manual Therapy:  Stick roll to right intermediate and lateral quad   The manual therapy interventions were performed at a separate and distinct time from the therapeutic activities interventions.   Rationale: decrease pain, increase ROM, increase tissue extensibility and decrease trigger points to perform functional tasks with increased ease              With   [x] TE   [] TA   [] neuro   [] other: Patient Education: [x] Review HEP    [] Progressed/Changed HEP based on:   [] positioning   [] body mechanics   [] transfers   [] heat/ice application    [x] other: use of rolling pin or ball for self massage at home; speaking with PCP about continued cold feeling in feet when lying down for possible referral to specialist; ability to perform activities bilaterally as long as symptoms in left knee do not worsen; use of belt/rope/dog leash for prone quad stretch at home; performing supine hip flexor stretch as alternative     Other Objective/Functional Measures:     Difficulty and mild pain reported with bike-difficulty with backwards revolutions  TP and hypertonicity to right quads; hypertonicity to right gastroc  Mild quad lag with SLR with increased repetitions. Poor form with sidelying hip abd-increased left hip symptoms with activity   Improved ability to keep feet in contact with floor to perform sit to stand transfers with cues to scoot close to edge of seat and perform forward trunk lean/anterior weight shift prior to standing    Pain Level (0-10 scale) post treatment: 1.5/10     ASSESSMENT/Changes in Function: Patient continues to lack full left knee ext AROM as well as hypertonicity to quadriceps muscles. She presents with mild extensor lag with SLR this visit with increased fatigue following bike as possible contributing factor. She demonstrates improving functional mobility as evidenced by decreased difficulty and improved form with sit to stand transfers with initial verbal cues for setup. Overall hip strength remains impaired. Patient educated on use of rolling pin or ball for self massage to LEs at home. Patient will continue to benefit from skilled PT services to modify and progress therapeutic interventions, address functional mobility deficits, address ROM deficits, address strength deficits, analyze and address soft tissue restrictions, analyze and cue movement patterns, analyze and modify body mechanics/ergonomics, assess and modify postural abnormalities, address imbalance/dizziness and instruct in home and community integration to attain remaining goals.             Progress towards goals / Updated goals:  Short Term Goals: To be accomplished in 1 weeks:  1. Patient will demonstrate compliance with HEP in order to improve flexibility for increased ease of ambulation. -slow progression, patient reports only performing a couple times (8/4/21)    Long Term Goals: To be accomplished in 4 weeks:  1. Patient will improve FOTO score by 17 points in order to demonstrate a significant improvement in function. 2. Patient will improve right knee AROM 0-120 degrees in order to increase ease of ambulation. 3. Patient will improve B knee extension MMT to 5/5 in order to increase ease of stair negotiation. right 4/5 left 4+/5 (8/9/21)  4. Patient will improve B SL balance to 10 seconds in order to improve stability with ambulation. right 5 sec left 16 sec (8/9/21)    PLAN  []  Upgrade activities as tolerated     []  Continue plan of care  []  Update interventions per flow sheet       []  Discharge due to:_  []  Other:_      Corazon Coker, PT 8/13/2021  9:10 AM    Future Appointments   Date Time Provider [place]   8/13/2021  9:45 AM Eden Viveros, PT [facility]   8/16/2021  9:45 AM Verona Ayala, PTA [facility]   8/19/2021 10:30 AM Pretty Santos, PT [facility]   8/23/2021 10:30 AM Pretty Santos, PT [facility]   8/26/2021 10:30 AM Dangelo Velez, PTA [facility]   8/30/2021 10:30 AM Malachi Patrick, PT [facility]   9/2/2021 10:30 AM Pretty Santos, PT [facility]

## 2021-08-16 ENCOUNTER — HOSPITAL ENCOUNTER (OUTPATIENT)
Dept: PHYSICAL THERAPY | Age: 71
Discharge: HOME | End: 2021-08-16
Attending: PHYSICIAN ASSISTANT
Payer: MEDICARE

## 2021-08-16 PROCEDURE — 97110 THERAPEUTIC EXERCISES: CPT

## 2021-08-16 PROCEDURE — 97530 THERAPEUTIC ACTIVITIES: CPT

## 2021-08-16 NOTE — PROGRESS NOTES
[facility]  [facility]  [address] [facility]  [phone] fax    Continued Plan of Care/ Re-certification for Physical Therapy Services    Patient name: Norberto Amaya Start of Care: 2021   Referral source: Jennie Flowers : 1950   Medical/Treatment Diagnosis: Knee pain, right [M25.561]  Presence of right artificial knee joint [Z96.651]  Payor: [place] MEDICARE / Plan: VA MEDICARE PART A & B / Product Type: Medicare /  Onset Date: 2020     Prior Hospitalization: see medical history Provider#: [id number]   Medications: Verified on Patient Summary List     Comorbidities: HTN, arthritis, osteoporosis   Prior Level of Function: Independent with ambulation at home and cane for community ambulation, Ind with ADLs                            Visits from Start of Care: 5    Missed Visits: 0    The Plan of Care and following information is based on the patient's current status:  Goal: Patient will demonstrate compliance with HEP in order to improve flexibility for increased ease of ambulation.   Status at last note/certification: Initiated  Current Status: met    Goal: Patient will improve FOTO score by 17 points in order to demonstrate a significant improvement in function. Status at last note/certification: 31  Current Status: not met    Goal: Patient will improve right knee AROM 0-120 degrees in order to increase ease of ambulation. Status at last note/certification: 1-863 degrees  Current Status: not met    Goal: Patient will improve B knee extension MMT to 5/5 in order to increase ease of stair negotiation. Status at last note/certification: 4+/5  Current Status: met    Goal: Patient will improve B SL balance to 10 seconds in order to improve stability with ambulation.    Status at last note/certification: 5 seconds bilaterally  Current Status: not met    Key functional changes: Increased strength, improved FOTO score, increased right knee AROM, decreased pain     Problems/ barriers to goal attainment: B feet coldness/altered sensation at night; sciatic pain left>right    Problem List: pain affecting function, decrease ROM, decrease strength, edema affecting function, impaired gait/ balance, decrease ADL/ functional abilitiies, decrease activity tolerance, decrease flexibility/ joint mobility and decrease transfer abilities    Treatment Plan: Therapeutic exercise, Therapeutic activities, Neuromuscular re-education, Physical agent/modality, Gait/balance training, Manual therapy, Patient education, Self Care training, Functional mobility training, Home safety training and Stair training     Patient Goal (s) has been updated and includes: \"To be able to walk longer and get up from the floor\"    Goals for this certification period to be accomplished in 4 weeks:  1. Patient will improve FOTO score by 17 points in order to demonstrate a significant improvement in function. 2. Patient will improve right knee AROM 0-120 degrees in order to increase ease of ambulation. 3. Patient will report \"little to no difficulty\" when asked on the FOTO questionnaire, \"How much difficulty would you have performing your usual work, housework, or school activities ,\" to demonstrate improved functional mobility. 4. Patient will improve B SL balance to 10 seconds in order to improve stability with ambulation.    5. Patient will be able to perform floor<>stand transfer modified independent to improve confidence within the home for living alone. Frequency / Duration: Patient to be seen 2 times per week for 4 weeks:    Assessment / Recommendations: Ms. David Gallardo is making slow, steady progress in just 5 visits of skilled PT meeting 1/1 STGs and 1/4 LTGs. Her FOTO improved to 46/100 indicating improved functional mobility. Her right knee AROM improved to 2-115 degrees and her knee extension strength is 5/5.  She has improved SLS to 5 seconds on the right and 16 seconds on the left. She continues to have difficulty with standing and walking prolonged periods due to pain. She needs to be able to perform floor to stand transfers for household chores and safety in case of falls due to living independently. Skilled PT remains medically necessary to further improve strength and mobility of the right knee with decreased pain for ease of ADLs, household chores, ambulation, stairs and transfers. Certification Period:  8/16/2021 to 9/14/2021    Haydee Monson, PTA 8/16/2021 7:49 AM    ________________________________________________________________________  I certify that the above Therapy Services are being furnished while the patient is under my care. I agree with the treatment plan and certify that this therapy is necessary. [] I have read the above and request that my patient continue as recommended.   [] I have read the above report and request that my patient continue therapy with the following changes/special instructions: _______________________________________  [] I have read the above report and request that my patient be discharged from therapy    Physician's Signature:____________Date:_________TIME:________     Mika House PA-C  ** Signature, Date and Time must be completed for valid certification **    Please sign and return to [facility] [place]  [address] [facility]  [phone] fax

## 2021-08-16 NOTE — PROGRESS NOTES
PT DAILY TREATMENT NOTE     Patient Name: Kishan Tang  Date:2021  : 1950  [x]  Patient  Verified  Payor: VA MEDICARE / Plan: VA MEDICARE PART A & B / Product Type: Medicare /    In time: 9:45   Out time: 10:33  Total Treatment Time (min): 48  Visit #: 5 of 8    Medicare/BCBS Only   Total Timed Codes (min): 48 1:1 Treatment Time: 48     Treatment Area: Knee pain, right [M25.561]  Presence of right artificial knee joint [Z96.651]    SUBJECTIVE  Pain Level (0-10 scale): 1/10  Any medication changes, allergies to medications, adverse drug reactions, diagnosis change, or new procedure performed?: [x] No    [] Yes (see summary sheet for update)  Subjective functional status/changes:   [] No changes reported  Pt reports still concerned about cold feet at the end of the day but is going to discuss this with her MD. She reports difficulty walking prolonged periods because of knee pain and stiffness. She has not been able to try getting up off the floor but this concerns her since she lives alone.     OBJECTIVE      28 min Therapeutic Exercise:  [x] See flow sheet :   Rationale: increase ROM and increase strength to improve the patients ability to perform functional tasks with increased ease    20 min Therapeutic Activity:  [x] See flow sheet : goal reassessment; FOTO   Rationale: increase ROM and increase strength to improve the patients ability to perform functional tasks with increased ease            With   [x] TE   [] TA   [] neuro   [] other: Patient Education: [x] Review HEP    [] Progressed/Changed HEP based on:   [] positioning   [] body mechanics   [] transfers   [] heat/ice application    [] other:     Other Objective/Functional Measures:   Right knee AROM: 2-115 degrees  Knee extension MMT: left 5/5 right 5/5  FOTO: 46  Decreased heel strike on the right during ambulation  Educated on heel prop and TKE at wall to work on full extension AROM    Pain Level (0-10 scale) post treatment: 0/10    ASSESSMENT/Changes in Function: See recertification. Patient will continue to benefit from skilled PT services to modify and progress therapeutic interventions, address functional mobility deficits, address ROM deficits, address strength deficits, analyze and address soft tissue restrictions, analyze and cue movement patterns, analyze and modify body mechanics/ergonomics, assess and modify postural abnormalities, address imbalance/dizziness and instruct in home and community integration to attain remaining goals. Progress towards goals / Updated goals:  Short Term Goals: To be accomplished in 1 weeks:  1. Patient will demonstrate compliance with HEP in order to improve flexibility for increased ease of ambulation. -slow progression, patient reports only performing a couple times (8/4/21)    Long Term Goals: To be accomplished in 4 weeks:  1. Patient will improve FOTO score by 17 points in order to demonstrate a significant improvement in function. 46/100  2. Patient will improve right knee AROM 0-120 degrees in order to increase ease of ambulation. 2-115 degrees  3. Patient will improve B knee extension MMT to 5/5 in order to increase ease of stair negotiation. right 4/5 left 4+/5 (8/9/21)  4. Patient will improve B SL balance to 10 seconds in order to improve stability with ambulation. right 5 sec left 16 sec (8/9/21)    PLAN  [x]  Upgrade activities as tolerated     [x]  Continue plan of care  []  Update interventions per flow sheet       []  Discharge due to:_  []  Other:_      Becky Stewart PTA 8/16/2021  9:10 AM    Future Appointments   Date Time Provider Pricila Manuel   8/16/2021  9:45 AM Wilfredo Wisdom PTA [facility]   8/19/2021 10:30 AM Wilhelmena Bloch, PT [facility]   8/23/2021 10:30 AM Wilhelmena Bloch, PT [facility]   8/26/2021 10:30 AM Susana Senior PTA [facility]   8/30/2021 10:30 AM Dilcia Galvan, PT [facility]   9/2/2021 10:30 AM Wilhelmena Bloch, PT [facility]

## 2021-08-19 ENCOUNTER — HOSPITAL ENCOUNTER (OUTPATIENT)
Dept: PHYSICAL THERAPY | Age: 71
Discharge: HOME | End: 2021-08-19
Attending: PHYSICIAN ASSISTANT
Payer: MEDICARE

## 2021-08-19 PROCEDURE — 97110 THERAPEUTIC EXERCISES: CPT

## 2021-08-19 PROCEDURE — 97140 MANUAL THERAPY 1/> REGIONS: CPT

## 2021-08-23 ENCOUNTER — HOSPITAL ENCOUNTER (OUTPATIENT)
Dept: PHYSICAL THERAPY | Age: 71
Discharge: HOME | End: 2021-08-23
Attending: PHYSICIAN ASSISTANT
Payer: MEDICARE

## 2021-08-23 PROCEDURE — 97110 THERAPEUTIC EXERCISES: CPT

## 2021-08-23 PROCEDURE — 97140 MANUAL THERAPY 1/> REGIONS: CPT

## 2021-08-23 NOTE — PROGRESS NOTES
PT DAILY TREATMENT NOTE     Patient Name: Sergio Garcia  Date:2021  : 1950  [x]  Patient  Verified  Payor: VA MEDICARE / Plan: VA MEDICARE PART A & B / Product Type: Medicare /    In time: 10:30  Out time: 11:10  Total Treatment Time (min): 40  Visit #: 2 of 8    Medicare/BCBS Only   Total Timed Codes (min):  40 1:1 Treatment Time:  40       Treatment Area: Knee pain, right [M25.561]  Presence of right artificial knee joint [Z96.651]    SUBJECTIVE  Pain Level (0-10 scale):  4/10  Any medication changes, allergies to medications, adverse drug reactions, diagnosis change, or new procedure performed?: [x] No    [] Yes (see summary sheet for update)  Subjective functional status/changes:   [] No changes reported  Pt. Reports her back is hurting a lot today. OBJECTIVE    30 min Therapeutic Exercise:  [x] See flow sheet :   Rationale: increase ROM and increase strength to improve the patients ability to increase ease of ADLs    10 min: manual: trigger point release to distal quads  Rationale: increase ease of ambulation           With   [x] TE   [] TA   [] neuro   [] other: Patient Education: [x] Review HEP    [] Progressed/Changed HEP based on:   [] positioning   [] body mechanics   [] transfers   [] heat/ice application    [] other:      Other Objective/Functional Measures:   Right knee AROM flexion: 115 degrees  Pt. C/o pulling in her back during mini lunges  She tolerated increased resistance during LAQ     Pain Level (0-10 scale) post treatment:  4/10    ASSESSMENT/Changes in Function:  Pt. Is making limited progress towards goals. She continues to demonstrate mild decrease in LE strength and mobility. She continues to be limited by her back pain and left knee pain. Pt. Also continues to demonstrate decreased single leg stability on right.      Patient will continue to benefit from skilled PT services to modify and progress therapeutic interventions, address functional mobility deficits, address ROM deficits, address strength deficits, analyze and address soft tissue restrictions, analyze and cue movement patterns and analyze and modify body mechanics/ergonomics to attain remaining goals. Progress towards goals / Updated goals:  1. Patient will improve FOTO score by 17 points in order to demonstrate a significant improvement in function. 2. Patient will improve right knee AROM 0-120 degrees in order to increase ease of ambulation. Not met: flex: 115 degrees (8/23/21)  3. Patient will report \"little to no difficulty\" when asked on the FOTO questionnaire, \"How much difficulty would you have performing your usual work, housework, or school activities ,\" to demonstrate improved functional mobility.    4. Patient will improve B SL balance to 10 seconds in order to improve stability with ambulation.    5.  Patient will be able to perform floor<>stand transfer modified independent to improve confidence within the home for living alone.     PLAN  []  Upgrade activities as tolerated     [x]  Continue plan of care  []  Update interventions per flow sheet       []  Discharge due to:_  []  Other:_      Roque Hardy PT 8/23/2021  7:48 AM    Future Appointments   Date Time Provider Pricila Manuel   8/23/2021 10:30 AM Leti Martínez, PT [facility]   8/26/2021 10:30 AM Samantha Mast, PTA [facility]   8/30/2021 10:30 AM Sony Summers, PT [facility]   9/2/2021 10:30 AM Leti Martínez, PT [facility]

## 2021-08-26 ENCOUNTER — HOSPITAL ENCOUNTER (OUTPATIENT)
Dept: PHYSICAL THERAPY | Age: 71
Discharge: HOME | End: 2021-08-26
Attending: PHYSICIAN ASSISTANT
Payer: MEDICARE

## 2021-08-26 PROCEDURE — 97112 NEUROMUSCULAR REEDUCATION: CPT

## 2021-08-26 PROCEDURE — 97110 THERAPEUTIC EXERCISES: CPT

## 2021-08-26 NOTE — PROGRESS NOTES
PT DAILY TREATMENT NOTE     Patient Name: Mario Browning  Date:2021  : 1950  [x]  Patient  Verified  Payor: VA MEDICARE / Plan: VA MEDICARE PART A & B / Product Type: Medicare /    In time:10:43  Out time:11:26  Total Treatment Time (min): 43  Visit #: 3 of 8    Medicare/BCBS Only   Total Timed Codes (min):  33 1:1 Treatment Time:  33       Treatment Area: Knee pain, right [M25.561]  Presence of right artificial knee joint [Z96.651]    SUBJECTIVE  Pain Level (0-10 scale): 2 knee; 6 l/s  Any medication changes, allergies to medications, adverse drug reactions, diagnosis change, or new procedure performed?: [x] No    [] Yes (see summary sheet for update)  Subjective functional status/changes:   [] No changes reported  Patient reports she isn't having as much knee pain, but her back is bothering her a lot. This morning, both of her knees were aching, but the back pain has taken over. OBJECTIVE    Modality rationale: decrease inflammation, decrease pain and increase tissue extensibility to improve the patients ability to  tolerate exercises and return to daily activity with ease.    Min Type Additional Details    [] Estim:  []Unatt       []IFC  []Premod                        []Other:  []w/ice   []w/heat  Position:  Location:    [] Estim: []Att    []TENS instruct  []NMES                    []Other:  []w/US   []w/ice   []w/heat  Position:  Location:    []  Traction: [] Cervical       []Lumbar                       [] Prone          []Supine                       []Intermittent   []Continuous Lbs:  [] before manual  [] after manual    []  Ultrasound: []Continuous   [] Pulsed                           []1MHz   []3MHz W/cm2:  Location:    []  Iontophoresis with dexamethasone         Location: [] Take home patch   [] In clinic   10 []  Ice     [x]  heat  []  Ice massage  []  Laser   []  Anodyne Position: seated  Location: low back    []  Laser with stim  []  Other:  Position:  Location:    [] Vasopneumatic Device    []  Right     []  Left  Pre-treatment girth:  Post-treatment girth:  Measured at (location):  Pressure:       [] lo [] med [] hi   Temperature: [] lo [] med [] hi   [] Skin assessment post-treatment:  []intact []redness- no adverse reaction    []redness - adverse reaction:     23 min Therapeutic Exercise:  [x] See flow sheet :   Rationale: increase ROM and increase strength to improve the patients ability to perform ADLs with ease    10 min Neuromuscular Re-education:  []  See flow sheet :   Rationale: improve coordination, improve balance and increase proprioception  to improve the patients ability to stabilize, use proper body mechanics, and promote proper postural awareness        With   [x] TE   [x] TA   [] neuro   [] other: Patient Education: [x] Review HEP    [] Progressed/Changed HEP based on:   [] positioning   [] body mechanics   [] transfers   [] heat/ice application    [] other:      Other Objective/Functional Measures:   SLR in supine challenging; sidelying increased knee pain, completed with knee flexed (clam #3  Checked alignment and left AI rotation, corrected with shotgun and MET    SLS right only - slight instability    Pain Level (0-10 scale) post treatment: 3    ASSESSMENT/Changes in Function: Patient arrived with minimal knee pain, however experiencing increased low back pain from a previous back surgery. She responded well to manual interventions, with some improvement in gait and pain symptoms. Quad weakness noted with supine hip extension with some extensor lag. Patient will continue to benefit from skilled PT services to modify and progress therapeutic interventions, address functional mobility deficits, address ROM deficits, address strength deficits, analyze and address soft tissue restrictions, analyze and cue movement patterns, analyze and modify body mechanics/ergonomics and assess and modify postural abnormalities to attain remaining goals.      []  See Plan of Care  [x]  See progress note/recertification  []  See Discharge Summary         Progress towards goals / Updated goals:  1. Patient will improve FOTO score by 17 points in order to demonstrate a significant improvement in function. 2. Patient will improve right knee AROM 0-120 degrees in order to increase ease of ambulation. Not met: flex: 115 degrees (8/23/21)  3. Patient will report \"little to no difficulty\" when asked on the FOTO questionnaire, \"How much difficulty would you have performing your usual work, housework, or school activities ,\" to demonstrate improved functional mobility.    4. Patient will improve B SL balance to 10 seconds in order to improve stability with ambulation.    5.  Patient will be able to perform floor<>stand transfer modified independent to improve confidence within the home for living alone.     PLAN  [x]  Upgrade activities as tolerated     [x]  Continue plan of care  []  Update interventions per flow sheet       []  Discharge due to:_  []  Other:_      Tanmay Lam, SEAN 8/26/2021  10:46 AM    Future Appointments   Date Time Provider Pricila Manuel   8/30/2021 10:30 AM Dilcia Galvan, PT [facility]   9/2/2021 10:30 AM Wilhelmena Bloch, PT [facility]

## 2021-08-30 ENCOUNTER — HOSPITAL ENCOUNTER (OUTPATIENT)
Dept: PHYSICAL THERAPY | Age: 71
Discharge: HOME | End: 2021-08-30
Attending: PHYSICIAN ASSISTANT
Payer: MEDICARE

## 2021-08-30 PROCEDURE — 97530 THERAPEUTIC ACTIVITIES: CPT

## 2021-08-30 PROCEDURE — 97110 THERAPEUTIC EXERCISES: CPT

## 2021-08-30 NOTE — PROGRESS NOTES
PT DAILY TREATMENT NOTE     Patient Name: Kyle Govea  Date:2021  : 1950  [x]  Patient  Verified  Payor: VA MEDICARE / Plan: VA MEDICARE PART A & B / Product Type: Medicare /    In time:1030  Out time:1124  Total Treatment Time (min): 45  Visit #: 4 of 8    Medicare/BCBS Only   Total Timed Codes (min):  45 1:1 Treatment Time:  38       Treatment Area: Knee pain, right [M25.561]  Presence of right artificial knee joint [Z96.651]    SUBJECTIVE  Pain Level (0-10 scale): 2/10 knee, 4-5/10 LS  Any medication changes, allergies to medications, adverse drug reactions, diagnosis change, or new procedure performed?: [x] No    [] Yes (see summary sheet for update)  Subjective functional status/changes:   [] No changes reported  Pt reports her back is bothering her mor than her knees. OBJECTIVE      23 min Therapeutic Exercise:  [] See flow sheet :   Rationale: increase ROM, increase strength, improve coordination and improve balance to improve the patients ability to ease with adl's    22 min Therapeutic Activity:  []  See flow sheet :   Rationale: increase ROM and increase strength  to improve the patients ability to ease with adl's           With   [] TE   [] TA   [] neuro   [] other: Patient Education: [x] Review HEP    [] Progressed/Changed HEP based on:   [] positioning   [] body mechanics   [] transfers   [] heat/ice application    [] other:      Other Objective/Functional Measures: Pt limited in there ex fr her knee dure to LS pain, mostly at right SI. She had some difficulty with tolerating  standing ex's in parallel bar due to back pain that was decreased with MET correction. (right upslip, Right AI, Right/right sacral rotation, L3-L5 rotated left)     Pain Level (0-10 scale) post treatment: 3/10    ASSESSMENT/Changes in Function: Back pain throughout session in parallel bars limited pt's progress today. Ended with MET correction by ATC.  (NC) Pt reported relief after MET correction. Patient will continue to benefit from skilled PT services to modify and progress therapeutic interventions, address functional mobility deficits, address ROM deficits, address strength deficits, analyze and address soft tissue restrictions, analyze and cue movement patterns, analyze and modify body mechanics/ergonomics, assess and modify postural abnormalities and address imbalance/dizziness to attain remaining goals. [x]  See Plan of Care  []  See progress note/recertification  []  See Discharge Summary         Progress towards goals / Updated goals:  1. Patient will improve FOTO score by 17 points in order to demonstrate a significant improvement in function. 2. Patient will improve right knee AROM 0-120 degrees in order to increase ease of ambulation.   Not met: flex: 115 degrees (8/23/21)  3. Patient will report \"little to no difficulty\" when asked on the FOTO questionnaire, \"How much difficulty would you have performing your usual work, housework, or school activities ,\" to demonstrate improved functional mobility.    4. Patient will improve B SL balance to 10 seconds in order to improve stability with ambulation.    5.  Patient will be able to perform floor<>stand transfer modified independent to improve confidence within the home for living alone.        PLAN  [x]  Upgrade activities as tolerated     [x]  Continue plan of care  []  Update interventions per flow sheet       []  Discharge due to:_  []  Other:_      Dorene Antony PT 8/30/2021  10:43 AM    Future Appointments   Date Time Provider Pricila Manuel   9/2/2021 10:30 AM Tiffani Lai, PT [facility]

## 2021-09-02 ENCOUNTER — HOSPITAL ENCOUNTER (OUTPATIENT)
Dept: PHYSICAL THERAPY | Age: 71
Discharge: HOME | End: 2021-09-02
Attending: PHYSICIAN ASSISTANT
Payer: MEDICARE

## 2021-09-02 PROCEDURE — 97110 THERAPEUTIC EXERCISES: CPT

## 2021-09-02 NOTE — PROGRESS NOTES
PT DAILY TREATMENT NOTE     Patient Name: Josue Bryan  Date:2021  : 1950  [x]  Patient  Verified  Payor: [place] MEDICARE / Plan: [place] MEDICARE PART A & B / Product Type: Medicare /    In time: 10:30  Out time: 11:10  Total Treatment Time (min): 40  Visit #: 5 of 8    Medicare/BCBS Only   Total Timed Codes (min):  40 1:1 Treatment Time:  40       Treatment Area: Knee pain, right [M25.561]  Presence of right artificial knee joint [Z96.651]    SUBJECTIVE  Pain Level (0-10 scale): 1/10 knee 6/10 back  Any medication changes, allergies to medications, adverse drug reactions, diagnosis change, or new procedure performed?: [x] No    [] Yes (see summary sheet for update)  Subjective functional status/changes:   [] No changes reported  Pt. Reports she is doing pretty good today. She reports her back and left knee bother her more than her right knee. OBJECTIVE    40 min Therapeutic Exercise:  [x] See flow sheet :   Rationale: increase ROM and increase strength to improve the patients ability to increase ease of ADLs          With   [x] TE   [] TA   [] neuro   [] other: Patient Education: [x] Review HEP    [] Progressed/Changed HEP based on:   [] positioning   [] body mechanics   [] transfers   [] heat/ice application    [] other:      Other Objective/Functional Measures:   SLS right: 6 seconds left: 12 seconds   Right knee AROM: 0-121 degrees  Pt. Was challenged with mini lunges bilaterally     Pain Level (0-10 scale) post treatment:  1/10 right knee. Back pain 7/10    ASSESSMENT/Changes in Function:  Pt. Is progressing slowly towards goals. She demonstrates improving right knee AROM and improving single leg stability. She demonstrates some improvement in quad strength with increased resistance during exercises. She continues to be challenged with lunges. Currently most limitations are with LBP and left knee pain.      Patient will continue to benefit from skilled PT services to modify and progress therapeutic interventions, address functional mobility deficits, address ROM deficits, address strength deficits, analyze and address soft tissue restrictions, analyze and cue movement patterns, analyze and modify body mechanics/ergonomics and assess and modify postural abnormalities to attain remaining goals. Progress towards goals / Updated goals:  1. Patient will improve FOTO score by 17 points in order to demonstrate a significant improvement in function. 2. Patient will improve right knee AROM 0-120 degrees in order to increase ease of ambulation.   Met: 0-121 degrees (9/2/21)  3. Patient will report \"little to no difficulty\" when asked on the FOTO questionnaire, \"How much difficulty would you have performing your usual work, housework, or school activities ,\" to demonstrate improved functional mobility.    4. Patient will improve B SL balance to 10 seconds in order to improve stability with ambulation.    Progressing: right: 6 seconds left: 12 seconds (9/2/21)  5.  Patient will be able to perform floor<>stand transfer modified independent to improve confidence within the home for living alone.     PLAN  []  Upgrade activities as tolerated     [x]  Continue plan of care  []  Update interventions per flow sheet       []  Discharge due to:_  []  Other:_      Issa Adhikari, PT 9/2/2021  8:34 AM    Future Appointments   Date Time Provider Pricila Manuel   9/2/2021 10:30 AM Ruddy Ross, PT [facility]   9/7/2021 10:30 AM Ruddy Ross, PT [facility]   9/9/2021 10:30 AM Nirmala Pro, PTA [facility]   9/14/2021 11:15 AM [facility], PT [facility]   9/16/2021 10:30 AM Chilo Key, PT [facility]   9/21/2021 12:00 PM Selina Eilisbet, PT [facility]   9/23/2021 10:30 AM Ruddy Ross, PT [facility]   9/28/2021 10:30 AM Selina Hanson, PT [facility]   9/30/2021 10:30 AM Ruddy Ross, PT [facility]

## 2021-09-07 ENCOUNTER — HOSPITAL ENCOUNTER (OUTPATIENT)
Dept: PHYSICAL THERAPY | Age: 71
Discharge: HOME | End: 2021-09-07
Attending: PHYSICIAN ASSISTANT
Payer: MEDICARE

## 2021-09-07 PROCEDURE — 97110 THERAPEUTIC EXERCISES: CPT

## 2021-09-07 NOTE — PROGRESS NOTES
PT DAILY TREATMENT NOTE     Patient Name: Aubree Kate  Date:2021  : 1950  [x]  Patient  Verified  Payor: VA MEDICARE / Plan: VA MEDICARE PART A & B / Product Type: Medicare /    In time: 10:32  Out time: 11:12  Total Treatment Time (min): 40  Visit #: 6 of 8    Medicare/BCBS Only   Total Timed Codes (min):  40 1:1 Treatment Time:  40       Treatment Area: Knee pain, right [M25.561]  Presence of right artificial knee joint [Z96.651]    SUBJECTIVE  Pain Level (0-10 scale):  0/10  Any medication changes, allergies to medications, adverse drug reactions, diagnosis change, or new procedure performed?: [x] No    [] Yes (see summary sheet for update)  Subjective functional status/changes:   [] No changes reported  Pt. Reports her knee has been doing pretty good. She reports she may D/C soon in order to have her back treated. OBJECTIVE    40 min Therapeutic Exercise:  [x] See flow sheet :   Rationale: increase ROM and increase strength to improve the patients ability to increase ease of ADLs    With   [x] TE   [] TA   [] neuro   [] other: Patient Education: [x] Review HEP    [] Progressed/Changed HEP based on:   [] positioning   [] body mechanics   [] transfers   [] heat/ice application    [] other:      Other Objective/Functional Measures:   Pt. Has mild increase in pain with SLR flexion on right. Pt. Continues to be challenged with mini lunges  Tolerated 6# resistance during LAQ well     Pain Level (0-10 scale) post treatment:  0/10    ASSESSMENT/Changes in Function:  Pt. Is progressing with physical therapy. She is having less pain overall and demonstrates improving right knee mobility. She also demonstrates improving right knee strength with increased resistance during exercises. She is most limited by back pain and left knee pain at this time and she was educated on following back up with her doctor about this.      Patient will continue to benefit from skilled PT services to modify and progress therapeutic interventions, address functional mobility deficits, address ROM deficits, address strength deficits, analyze and address soft tissue restrictions, analyze and cue movement patterns, analyze and modify body mechanics/ergonomics and assess and modify postural abnormalities to attain remaining goals. Progress towards goals / Updated goals:  1. Patient will improve FOTO score by 17 points in order to demonstrate a significant improvement in function. 2. Patient will improve right knee AROM 0-120 degrees in order to increase ease of ambulation.   Met: 0-121 degrees (9/2/21)  3. Patient will report \"little to no difficulty\" when asked on the FOTO questionnaire, \"How much difficulty would you have performing your usual work, housework, or school activities ,\" to demonstrate improved functional mobility.    4. Patient will improve B SL balance to 10 seconds in order to improve stability with ambulation.    Progressing: right: 6 seconds left: 12 seconds (9/2/21)  5.  Patient will be able to perform floor<>stand transfer modified independent to improve confidence within the home for living alone.     PLAN  []  Upgrade activities as tolerated     [x]  Continue plan of care  []  Update interventions per flow sheet       []  Discharge due to:_  []  Other:_      Beny Roberts, PT 9/7/2021  8:32 AM    Future Appointments   Date Time Provider Pricila Manuel   9/7/2021 10:30 AM Elio Chance PT [facility]   9/9/2021 10:30 AM Forrest Woo [facility]   9/14/2021 11:15 AM Kalyn Joseph PT [facility]   9/16/2021 10:30 AM Shelly Aviles PT [facility]   9/21/2021 12:00 PM Kalyn Joseph PT [facility]   9/23/2021 10:30 AM Elio Chance PT [facility]   9/28/2021 10:30 AM Kalyn Joseph PT [facility]   9/30/2021 10:30 AM Elio Chance PT [facility]

## 2021-09-09 ENCOUNTER — APPOINTMENT (OUTPATIENT)
Dept: PHYSICAL THERAPY | Age: 71
End: 2021-09-09
Attending: PHYSICIAN ASSISTANT
Payer: MEDICARE

## 2021-09-14 ENCOUNTER — HOSPITAL ENCOUNTER (OUTPATIENT)
Dept: PHYSICAL THERAPY | Age: 71
Discharge: HOME | End: 2021-09-14
Attending: PHYSICIAN ASSISTANT
Payer: MEDICARE

## 2021-09-14 PROCEDURE — 97530 THERAPEUTIC ACTIVITIES: CPT

## 2021-09-14 PROCEDURE — 97110 THERAPEUTIC EXERCISES: CPT

## 2021-09-14 NOTE — PROGRESS NOTES
PT DAILY TREATMENT NOTE     Patient Name: Gino Lucila  Date:2021  : 1950  [x]  Patient  Verified  Payor: VA MEDICARE / Plan: VA MEDICARE PART A & B / Product Type: Medicare /    In time:1120  Out time:1202  Total Treatment Time (min): 42  Visit #: 7 of 8    Medicare/BCBS Only   Total Timed Codes (min):  42 1:1 Treatment Time:  42       Treatment Area: Knee pain, right [M25.561]  Presence of right artificial knee joint [Z96.651]    SUBJECTIVE  Pain Level (0-10 scale): 0-1/10  Any medication changes, allergies to medications, adverse drug reactions, diagnosis change, or new procedure performed?: [x] No    [] Yes (see summary sheet for update)  Subjective functional status/changes:   [] No changes reported  Pt reports today is a good day and the pain fluctuates between back and knee. OBJECTIVE        34 min Therapeutic Exercise:  [] See flow sheet :   Rationale: increase ROM, increase strength, improve coordination and improve balance to improve the patients ability to ease with adl's     8 min Therapeutic Activity:  []  See flow sheet :KT/FOTO   Rationale: increase strength and increase proprioception  to improve the patients ability to eae with safe ambulation           With   [] TE   [] TA   [] neuro   [] other: Patient Education: [x] Review HEP    [] Progressed/Changed HEP based on:   [] positioning   [] body mechanics   [] transfers   [] heat/ice application    [] other:      Other Objective/Functional Measures: Pt reported left knee instability during step lunge and step up/down. KT /webbing to decrease pain and improve stability   FOTO= 50  Pain Level (0-10 scale) post treatment: 1/10    ASSESSMENT/Changes in Function: Pt agreed to DC today. She was educated on KT to help support knee. She was advise to get a brace for continued knee support and to continue with HEP.      Patient will continue to benefit from skilled PT services to instruct in home and community integration to attain remaining goals. []  See Plan of Care  []  See progress note/recertification  [x]  See Discharge Summary         Progress towards goals / Updated goals:  1. Patient will improve FOTO score by 17 points in order to demonstrate a significant improvement in function. 2. Patient will improve right knee AROM 0-120 degrees in order to increase ease of ambulation.   Met: 0-121 degrees (9/2/21)  3. Patient will report \"little to no difficulty\" when asked on the FOTO questionnaire, \"How much difficulty would you have performing your usual work, housework, or school activities ,\" to demonstrate improved functional mobility.    4. Patient will improve B SL balance to 10 seconds in order to improve stability with ambulation.    Progressing: right: 6 seconds left: 12 seconds (9/2/21)  5. Patient will be able to perform floor<>stand transfer modified independent to improve confidence within the home for living alone.    NA. Pt declined.    PLAN  []  Upgrade activities as tolerated     []  Continue plan of care  []  Update interventions per flow sheet       []  Discharge due to:_  []  Other:_      Neville Crow, PT 9/14/2021  10:29 AM    Future Appointments   Date Time Provider Pricila Manuel   9/14/2021 11:15 AM Shawn Fountain, PT [facility]   9/16/2021 10:30 AM Pepe Campos, PT [facility]   9/21/2021 12:00 PM Shawn Fountain, PT [id number]   9/23/2021 10:30 AM Franco Amezcua, PT [facility]   9/28/2021 10:30 AM Shawn Fountain, PT [facility]   9/30/2021 10:30 AM Franco Amezcua, PT [facility]

## 2021-09-16 ENCOUNTER — APPOINTMENT (OUTPATIENT)
Dept: PHYSICAL THERAPY | Age: 71
End: 2021-09-16
Attending: PHYSICIAN ASSISTANT
Payer: MEDICARE

## 2021-09-21 ENCOUNTER — APPOINTMENT (OUTPATIENT)
Dept: PHYSICAL THERAPY | Age: 71
End: 2021-09-21
Attending: PHYSICIAN ASSISTANT
Payer: MEDICARE

## 2021-09-22 NOTE — PROGRESS NOTES
[facility] - [place] [facility]  [address] [facility]  [phone] fax    Physical Therapy Discharge Summary    Patient name: Norberto Amaya Start of Care: 2021   Referral source: Jennie Flowers : 1950   Medical/Treatment Diagnosis: Knee pain, right [M25.561]  Presence of right artificial knee joint [Z96.651]  Payor: VA MEDICARE / Plan: VA MEDICARE PART A & B / Product Type: Medicare /  Onset Date: 2020      Prior Hospitalization: see medical history Provider#: [id number]   Medications: Verified on Patient Summary List      Comorbidities: HTN, arthritis, osteoporosis   Prior Level of Function: Independent with ambulation at home and cane for community ambulation, Ind with ADLs    Visits from Start of Care: 12    Missed Visits: 0    Reporting Period : 21 to 21    Summary of Care:  Goal: Patient will improve FOTO score by 17 points in order to demonstrate a significant improvement in function. Status at last note/certification: 55  Status at discharge: met    Goal: Patient will improve right knee AROM 0-120 degrees in order to increase ease of ambulation. Status at last note/certification: 3-038 degrees  Status at discharge: met    Goal: Patient will report \"little to no difficulty\" when asked on the FOTO questionnaire, \"How much difficulty would you have performing your usual work, housework, or school activities ,\" to demonstrate improved functional mobility. Status at last note/certification:  Increased difficulty   Status at discharge: not met    Goal: Patient will improve B SL balance to 10 seconds in order to improve stability with ambulation.    Status at last note/certification: 5 seconds  Status at discharge: met    Goal: Patient will be able to perform floor<>stand transfer modified independent to improve confidence within the home for living alone.    Status at last note/certification: unable   Status at discharge: not met    Pt. Has progressed well with physical therapy. FOTO score has improved to 50 points indicating a significant improvement in function. Right knee AROM also improved to 0-121 degrees. She continues to have decreased right single leg balance at 6 seconds. She does continue to demonstrate some anterior laxity in right knee. She was educated on continuing with her HEP following D/C.      ASSESSMENT/RECOMMENDATIONS:  [x]Discontinue therapy: [x]Patient has reached or is progressing toward set goals      []Patient is non-compliant or has abdicated      []Due to lack of appreciable progress towards set goals    Kevyn Tobias, PT 9/22/2021 9:35 AM

## 2021-09-23 ENCOUNTER — APPOINTMENT (OUTPATIENT)
Dept: PHYSICAL THERAPY | Age: 71
End: 2021-09-23
Attending: PHYSICIAN ASSISTANT
Payer: MEDICARE

## 2021-09-28 ENCOUNTER — APPOINTMENT (OUTPATIENT)
Dept: PHYSICAL THERAPY | Age: 71
End: 2021-09-28
Attending: PHYSICIAN ASSISTANT
Payer: MEDICARE

## 2021-09-30 ENCOUNTER — APPOINTMENT (OUTPATIENT)
Dept: PHYSICAL THERAPY | Age: 71
End: 2021-09-30
Attending: PHYSICIAN ASSISTANT
Payer: MEDICARE

## 2021-11-03 ENCOUNTER — TRANSCRIBE ORDER (OUTPATIENT)
Dept: REGISTRATION | Age: 71
End: 2021-11-03

## 2021-11-03 ENCOUNTER — HOSPITAL ENCOUNTER (OUTPATIENT)
Dept: GENERAL RADIOLOGY | Age: 71
Discharge: HOME | End: 2021-11-03
Payer: MEDICARE

## 2021-11-03 DIAGNOSIS — M54.50 LUMBAGO: ICD-10-CM

## 2021-11-03 DIAGNOSIS — M54.50 LUMBAGO: Primary | ICD-10-CM

## 2021-11-03 PROCEDURE — 72100 X-RAY EXAM L-S SPINE 2/3 VWS: CPT

## 2021-12-01 ENCOUNTER — TRANSCRIBE ORDER (OUTPATIENT)
Dept: SCHEDULING | Age: 71
End: 2021-12-01

## 2021-12-01 DIAGNOSIS — Z12.31 OTHER SCREENING MAMMOGRAM: Primary | ICD-10-CM

## 2021-12-10 ENCOUNTER — HOSPITAL ENCOUNTER (OUTPATIENT)
Dept: MAMMOGRAPHY | Age: 71
Discharge: HOME | End: 2021-12-10
Attending: FAMILY MEDICINE
Payer: MEDICARE

## 2021-12-10 DIAGNOSIS — Z12.31 OTHER SCREENING MAMMOGRAM: ICD-10-CM

## 2021-12-10 PROCEDURE — 77063 BREAST TOMOSYNTHESIS BI: CPT

## 2022-01-18 ENCOUNTER — TRANSCRIBE ORDER (OUTPATIENT)
Dept: SCHEDULING | Age: 72
End: 2022-01-18

## 2022-01-18 DIAGNOSIS — R09.89 DIMINISHED PULSES IN LOWER EXTREMITY: Primary | ICD-10-CM

## 2022-01-18 DIAGNOSIS — R20.9 COLD FEET: ICD-10-CM

## 2022-01-21 ENCOUNTER — DOCUMENTATION ONLY (OUTPATIENT)
Dept: ORTHOPEDIC SURGERY | Age: 72
End: 2022-01-21

## 2022-01-31 ENCOUNTER — HOSPITAL ENCOUNTER (OUTPATIENT)
Dept: VASCULAR SURGERY | Age: 72
Discharge: HOME | End: 2022-01-31
Attending: FAMILY MEDICINE
Payer: MEDICARE

## 2022-01-31 DIAGNOSIS — R20.9 COLD FEET: ICD-10-CM

## 2022-01-31 DIAGNOSIS — R09.89 DIMINISHED PULSES IN LOWER EXTREMITY: ICD-10-CM

## 2022-01-31 PROCEDURE — 93922 UPR/L XTREMITY ART 2 LEVELS: CPT

## 2022-02-01 LAB
LEFT ABI: 1.22
LEFT ARM BP: 116 MMHG
LEFT POSTERIOR TIBIAL: 150 MMHG
LEFT TBI: 0.88
LEFT TOE PRESSURE: 113 MMHG
RIGHT ABI: 1.21
RIGHT ARM BP: 129 MMHG
RIGHT POSTERIOR TIBIAL: 138 MMHG
RIGHT TBI: 1.12
RIGHT TOE PRESSURE: 144 MMHG
VAS LEFT DORSALIS PEDIS BP: 158 MMHG
VAS RIGHT DORSALIS PEDIS BP: 156 MMHG

## 2022-02-10 ENCOUNTER — OFFICE VISIT (OUTPATIENT)
Dept: ORTHOPEDIC SURGERY | Age: 72
End: 2022-02-10
Payer: MEDICARE

## 2022-02-10 VITALS
OXYGEN SATURATION: 97 % | HEIGHT: 59 IN | WEIGHT: 184.6 LBS | TEMPERATURE: 96.8 F | HEART RATE: 89 BPM | BODY MASS INDEX: 37.21 KG/M2

## 2022-02-10 DIAGNOSIS — M17.12 PRIMARY OSTEOARTHRITIS OF LEFT KNEE: Primary | ICD-10-CM

## 2022-02-10 PROCEDURE — 20611 DRAIN/INJ JOINT/BURSA W/US: CPT | Performed by: PHYSICIAN ASSISTANT

## 2022-02-10 RX ORDER — METOPROLOL SUCCINATE 100 MG/1
100 TABLET, EXTENDED RELEASE ORAL DAILY
COMMUNITY

## 2022-02-10 RX ORDER — FLUTICASONE PROPIONATE 50 MCG
SPRAY, SUSPENSION (ML) NASAL
COMMUNITY
Start: 2022-01-28

## 2022-02-10 RX ORDER — BIOTIN 1 MG
1000 TABLET ORAL DAILY
COMMUNITY

## 2022-02-10 RX ORDER — LOSARTAN POTASSIUM 100 MG/1
100 TABLET ORAL DAILY
COMMUNITY
Start: 2021-12-27

## 2022-02-10 NOTE — PROGRESS NOTES
Patient: Good Holder                MRN: [id number]       SSN: [id number]  YOB: 1950        AGE: 66 y.o. SEX: female  Body mass index is 37.28 kg/m². PCP: Darlyn Fajardo DO  02/10/22        Pt presents today for their 1st euflexxa  injection for Left knee . There has been no recent fevers/chills, systemic changes, or injuries to report. PE:  General A&Ox3, NAD  Exam of the knees reveals skin intact, no erythema, no ecchymosis, no signs for infection. No cyanosis, clubbing, or edema present distally. Neg calf tenderness, neg homans, no signs for DVT present. A: Left knee OA    P: The Left knee was injected 2ml euflexxa with US guided assistance without complications. Patient was instructed on post injection care. Chart reviewed for the following:  Natalie LUTZ PA-C, have reviewed the History, Physical and updated the Allergic reactions for Good Holder? TIME OUT performed immediately prior to start of procedure:  Natalie LUTZ PA-C, have performed the following reviews on Good Holder prior to the start of the procedure:  ????????  * Patient was identified by name and date of birth   * Agreement on procedure being performed was verified  * Risks and Benefits explained to the patient  * Procedure site verified and marked as necessary  * Patient was positioned for comfort  * Consent was signed and verified    Time: 10:20 AM    Body part: Left knee, intra-articular    Medication & Dose: 2ml euflexxa    Date of procedure: 2/10/22    Procedure performed by: Octavio Panda PA-C    Provider assisted by: none    Patient assisted by: self    How tolerated by patient: tolerated the procedure well with no complications    Post Procedural Pain Scale: 4    Comments:  [employer] using a frequency of 10MHz with a 12L-RS transducer head was used to confirm needle placement.   Ultrasound images captured using [address] [employer] Ultrasound machine and scanned into patient's chart      Cammy Castellon PA-C

## 2022-02-17 ENCOUNTER — OFFICE VISIT (OUTPATIENT)
Dept: ORTHOPEDIC SURGERY | Age: 72
End: 2022-02-17
Payer: MEDICARE

## 2022-02-17 VITALS
TEMPERATURE: 97.8 F | HEART RATE: 94 BPM | HEIGHT: 59 IN | OXYGEN SATURATION: 99 % | WEIGHT: 184.6 LBS | BODY MASS INDEX: 37.21 KG/M2

## 2022-02-17 DIAGNOSIS — M17.12 PRIMARY OSTEOARTHRITIS OF LEFT KNEE: Primary | ICD-10-CM

## 2022-02-17 PROCEDURE — 20611 DRAIN/INJ JOINT/BURSA W/US: CPT | Performed by: PHYSICIAN ASSISTANT

## 2022-02-17 NOTE — PROGRESS NOTES
Patient: Shubham Shanks                MRN: [id number]       SSN: [id number]  YOB: 1950        AGE: 66 y.o. SEX: female  Body mass index is 37.28 kg/m². PCP: Tin Lowery DO  02/17/22        Pt presents today for their 2nd euflexxa  injection for Left knee . There has been no recent fevers/chills, systemic changes, or injuries to report. PE:  General A&Ox3, NAD  Exam of the knees reveals skin intact, no erythema, no ecchymosis, no signs for infection. No cyanosis, clubbing, or edema present distally. Neg calf tenderness, neg homans, no signs for DVT present. A: Left knee OA    P: The Left knee was injected 2ml euflexxa with US guided assistance without complications. Patient was instructed on post injection care. Chart reviewed for the following:  Sal LUTZ PA-C, have reviewed the History, Physical and updated the Allergic reactions for Shubham Shanks? TIME OUT performed immediately prior to start of procedure:  Sal LUTZ PA-C, have performed the following reviews on Shubham Shanks prior to the start of the procedure:  ????????  * Patient was identified by name and date of birth   * Agreement on procedure being performed was verified  * Risks and Benefits explained to the patient  * Procedure site verified and marked as necessary  * Patient was positioned for comfort  * Consent was signed and verified    Time: 9:02 AM    Body part: Left knee, intra-articular    Medication & Dose: 2ml euflexxa    Date of procedure: 2/17/22    Procedure performed by: Dickie Canavan, PA-C    Provider assisted by: none    Patient assisted by: self    How tolerated by patient: tolerated the procedure well with no complications    Post Procedural Pain Scale: 3    Comments:  [employer] using a frequency of 10MHz with a 12L-RS transducer head was used to confirm needle placement.   Ultrasound images captured using [employer] Ultrasound machine and scanned into patient's chart      Km Barron PA-C

## 2022-02-24 ENCOUNTER — OFFICE VISIT (OUTPATIENT)
Dept: ORTHOPEDIC SURGERY | Age: 72
End: 2022-02-24
Payer: MEDICARE

## 2022-02-24 VITALS
HEIGHT: 59 IN | HEART RATE: 84 BPM | BODY MASS INDEX: 37.38 KG/M2 | TEMPERATURE: 97.5 F | OXYGEN SATURATION: 97 % | WEIGHT: 185.4 LBS | RESPIRATION RATE: 16 BRPM

## 2022-02-24 DIAGNOSIS — Z96.651 STATUS POST TOTAL RIGHT KNEE REPLACEMENT: ICD-10-CM

## 2022-02-24 DIAGNOSIS — M17.12 PRIMARY OSTEOARTHRITIS OF LEFT KNEE: Primary | ICD-10-CM

## 2022-02-24 PROCEDURE — 20611 DRAIN/INJ JOINT/BURSA W/US: CPT | Performed by: PHYSICIAN ASSISTANT

## 2022-02-24 NOTE — PROGRESS NOTES
Patient: Linda Jackson                MRN: [id number]       SSN: [id number]  YOB: 1950        AGE: 66 y.o. SEX: female  Body mass index is 37.45 kg/m². PCP: Beka Andrews DO  02/24/22        Pt presents today for their 3rd euflexxa  injection for Left knee . There has been no recent fevers/chills, systemic changes, or injuries to report. PE:  General A&Ox3, NAD  Exam of the knees reveals skin intact, no erythema, no ecchymosis, no signs for infection. No cyanosis, clubbing, or edema present distally. Neg calf tenderness, neg homans, no signs for DVT present. A: Left knee OA    P: The Left knee was injected 2ml euflexxa with US guided assistance without complications. Patient was instructed on post injection care. Chart reviewed for the following:  Darrian LUTZ PA-C, have reviewed the History, Physical and updated the Allergic reactions for Linda Jackson? TIME OUT performed immediately prior to start of procedure:  Darrian LUTZ PA-C, have performed the following reviews on Linda Jackson prior to the start of the procedure:  ????????  * Patient was identified by name and date of birth   * Agreement on procedure being performed was verified  * Risks and Benefits explained to the patient  * Procedure site verified and marked as necessary  * Patient was positioned for comfort  * Consent was signed and verified    Time: 10:34 AM    Body part: Left knee, intra-articular    Medication & Dose: 2ml euflexxa    Date of procedure: 2/24/22    Procedure performed by: Dipak Steve PA-C    Provider assisted by: none    Patient assisted by: self    How tolerated by patient: tolerated the procedure well with no complications    Post Procedural Pain Scale: 3    Comments:  [employer] using a frequency of 10MHz with a 12L-RS transducer head was used to confirm needle placement.   Ultrasound images captured using [employer] Ultrasound machine and scanned into patient's chart      Keyon Rodriguez PA-C

## 2022-03-18 PROBLEM — M17.10 ARTHRITIS OF KNEE: Status: ACTIVE | Noted: 2020-08-17

## 2022-03-20 PROBLEM — E66.01 SEVERE OBESITY (HCC): Status: ACTIVE | Noted: 2019-04-25

## 2022-04-26 NOTE — PROGRESS NOTES
[facility]  [address], [address]  Phone: [phone]  Fax: [phone]        INITIAL CONSULTATION      HISTORY OF PRESENT ILLNESS:  Rachele Gordon is a 66 y.o. female whom is referred from Gabriela Robles DO secondary to low back pain radiating into the LLE in aL5 distribution to the ankle. She rates her pain 3-8/10. Her pain is not exacerbated positionally. Pt notes her pain is holding steady. States her pain is similar to the pain she had prior to spinal surgery done through Dr. Kate Hankins in 2018. Pt notes 1.5 years relief with spinal surgery. She denies injury. Pt admits to occasionally treating with Tylenol with minimal relief. Patient denies recent spinal injections or physical therapy/chiropractic care. Pt denies h/o stomach ulcers or bleeding disorders. Pt denies change in bowel or bladder habits. Pt is a nonsmoker. Records indicated the pt is allergic to Cymbalta, Tramadol and Flexeril. Denies DM. PmHx of HTN, chronic renal disease, Lumbar laminectomy and disctectomy of L5-S1 (Dr. Kate Hankins, 1/12/2018), Kyphoplasty (8/17/2020). Lumbar spine XR dated 11/3/2021 films independently reviewed. Per report, mild levocurvature, apex L2. Vertebral body heights preserved. Disc space loss and endplate spurring most notable at L1-L2 and L2-L3, most pronounced along concavity of curvature. No significant AP listhesis. Lumbar lordosis maintained. Multilevel mild to moderate facet arthropathy. No acute fracture identified. Right upper quadrant surgical clips. Mild bilateral SI joint degenerative changes. Per my review, there was no clear evidence of kyphoplasty.     A the end of the visit, the pt reported a c/o severe cerebral, neck and left shoulder spasms. The patient is RHD.  reviewed. Body mass index is 37.28 kg/m².     PCP: Gabriela Robles DO    Past Medical History:   Diagnosis Date    Arthritis     Chronic kidney disease     GERD (gastroesophageal reflux disease)     Hypertension     Ill-defined condition     Positional Vertigo -  pt can not lie flat    Sleep apnea           Past Surgical History:   Procedure Laterality Date    HX BACK SURGERY      low back 2018    HX BREAST BIOPSY      6-8 yrs ago, scar marked    HX CARPAL TUNNEL RELEASE Right     mid to late [de-identified]    HX CARPAL TUNNEL RELEASE Left     mid 1990s    HX  SECTION      HX CHOLECYSTECTOMY      HX GYN      Vaginal Hernia as a child     HX HERNIA REPAIR      age [de-identified]    HX HYSTERECTOMY      partial     Ronne Ghazi Dr Vilma Krabbe in 2020    NEUROLOGICAL PROCEDURE UNLISTED  2017    lower lumbar         Social History     Tobacco Use    Smoking status: Never Smoker    Smokeless tobacco: Never Used   Substance Use Topics    Alcohol use: Yes     Comment: occasionally       Work status: The patient is retired. Marital status: N/A . Current Outpatient Medications   Medication Sig Dispense Refill    biotin 1 mg tab Take 1,000 mcg by mouth daily.  fluticasone propionate (FLONASE) 50 mcg/actuation nasal spray instill 2 sprays into each nostril once daily if needed for congestion      metoprolol succinate (TOPROL-XL) 100 mg tablet Take 100 mg by mouth daily.  losartan (COZAAR) 100 mg tablet Take 100 mg by mouth daily.  OTHER Cell Power -8 gtts daily OTC      metoclopramide HCl (Reglan) 10 mg tablet Take 10 mg by mouth Before breakfast, lunch, dinner and at bedtime.  omeprazole (PRILOSEC) 40 mg capsule Take 40 mg by mouth daily.  acetaminophen (TYLENOL) 325 mg tablet Take 650 mg by mouth every four (4) hours as needed for Pain.  folic acid (FOLVITE) 1 mg tablet Take 1 mg by mouth daily.  Cholecalciferol, Vitamin D3, (VITAMIN D3) 1,000 unit cap Take 1,000 Units by mouth daily.  cyanocobalamin (VITAMIN B-12) 1,000 mcg tablet Take 1,000 mcg by mouth daily as needed.       diclofenac (VOLTAREN) 1 % gel Apply 4 g to affected area every six (6) hours as needed for Pain (neck). 300 g 1    colchicine 0.6 mg tablet Take 1 Tab by mouth two (2) times a day. (Patient not taking: Reported on 4/28/2022) 60 Tab 1    ondansetron (ZOFRAN ODT) 4 mg disintegrating tablet Take 1 Tab by mouth every six (6) hours. (Patient not taking: Reported on 4/28/2022) 30 Tab 1    ferrous sulfate (IRON) 325 mg (65 mg iron) EC tablet Take 1 Tab by mouth two (2) times daily (with meals). (Patient not taking: Reported on 2/17/2022) 60 Tab 1    OTHER CBD oil as needed (Patient not taking: Reported on 4/28/2022)      metoprolol (LOPRESSOR) 50 mg tablet Take 50 mg by mouth daily. (Patient not taking: Reported on 4/28/2022)         Allergies   Allergen Reactions    Cymbalta [Duloxetine] Anaphylaxis    Curcumin Itching    Gabapentin Other (comments)     hallucinations    Nsaids (Non-Steroidal Anti-Inflammatory Drug) Other (comments)     History \"gastric issues\" and kidney issues per pt.  Silicone Unknown (comments)    Tramadol Other (comments)     hallucinations            Family History   Problem Relation Age of Onset    Cancer Father          REVIEW OF SYSTEMS  Constitutional symptoms: Negative  Eyes: Negative  Ears, Nose, Throat, and Mouth: Negative  Cardiovascular: Negative  Respiratory: Negative  Genitourinary: Negative  Integumentary (Skin and/or breast): Negative  Musculoskeletal: Positive for low back and LLE pain  Extremities: Negative for edema. Endocrine/Rheumatologic: Negative  Hematologic/Lymphatic: Negative  Allergic/Immunologic: Negative  Psychiatric: Negative       PHYSICAL EXAMINATION  Visit Vitals  Pulse 83   Temp 97.8 °F (36.6 °C) (Temporal)   Ht 4' 11\" (1.499 m)   Wt 184 lb 9.6 oz (83.7 kg)   SpO2 98%   BMI 37.28 kg/m²       CONSTITUTIONAL: NAD, A&O x 3  HEART: Regular rate and rhythm  GASTROINTESTINAL: Positive bowel sounds, soft, nontender, and nondistended  LUNGS: Clear to auscultation bilaterally.   SKIN: Negative for rash.  RANGE OF MOTION: The patient has full passive range of motion in all four extremities. SENSATION: sensation is intact to light touch throughout. MOTOR:   Straight Leg Raise: Negative, bilateral  Leonard: Negative, bilateral  Tandem Gait: Neg. Deep tendon reflexes are 0 at the biceps, 0 at the brachioradialis and 0 on the right and 2 on the left at the triceps. Deep tendon reflexes are trace at the knees and 0 at the ankles bilaterally. Shoulder AB/Flex Elbow Flex Wrist Ext Elbow Ext Wrist Flex Hand Intrin Tone   Right +4/5 +4/5 +4/5 +4/5 +4/5 +4/5 +4/5   Left +4/5 +4/5 +4/5 +4/5 +4/5 +4/5 +4/5              Hip Flex Knee Ext Knee Flex Ankle DF GTE Ankle PF Tone   Right +4/5 +4/5 +4/5 +4/5 +4/5 +4/5 +4/5   Left +4/5 +4/5 +4/5 +4/5 +4/5 +4/5 +4/5     ASSESSMENT   Diagnoses and all orders for this visit:    1. Lumbar pain    2. Lumbar neuritis    3. DDD (degenerative disc disease), lumbar    4. Lumbar post-laminectomy syndrome      IMPRESSIONS/RECOMMENDATIONS:  Patient presents today with c/o low back pain radiating into the LLE in aL5 distribution to the ankle. Multiple treatment options were discussed. I offered MDP, pt declined. I will order a L spine MRI w/o contrast secondary to chronic renal disease. I advised patient to bring copies of films to next visit. Patient is neurologically intact. I will see the patient back in 1 month's time or earlier if needed. Written by Rolly Grady, as dictated by Yoly Hamilton MD  I examined the patient, reviewed and agree with the note.

## 2022-04-28 ENCOUNTER — OFFICE VISIT (OUTPATIENT)
Dept: ORTHOPEDIC SURGERY | Age: 72
End: 2022-04-28
Payer: MEDICARE

## 2022-04-28 VITALS
BODY MASS INDEX: 37.21 KG/M2 | OXYGEN SATURATION: 98 % | HEIGHT: 59 IN | HEART RATE: 83 BPM | TEMPERATURE: 97.8 F | WEIGHT: 184.6 LBS

## 2022-04-28 DIAGNOSIS — M54.50 LUMBAR PAIN: Primary | ICD-10-CM

## 2022-04-28 DIAGNOSIS — M54.16 LUMBAR NEURITIS: ICD-10-CM

## 2022-04-28 DIAGNOSIS — M96.1 LUMBAR POST-LAMINECTOMY SYNDROME: ICD-10-CM

## 2022-04-28 DIAGNOSIS — M51.36 DDD (DEGENERATIVE DISC DISEASE), LUMBAR: ICD-10-CM

## 2022-04-28 PROCEDURE — G8427 DOCREV CUR MEDS BY ELIG CLIN: HCPCS | Performed by: PHYSICAL MEDICINE & REHABILITATION

## 2022-04-28 PROCEDURE — 3017F COLORECTAL CA SCREEN DOC REV: CPT | Performed by: PHYSICAL MEDICINE & REHABILITATION

## 2022-04-28 PROCEDURE — G8536 NO DOC ELDER MAL SCRN: HCPCS | Performed by: PHYSICAL MEDICINE & REHABILITATION

## 2022-04-28 PROCEDURE — 1101F PT FALLS ASSESS-DOCD LE1/YR: CPT | Performed by: PHYSICAL MEDICINE & REHABILITATION

## 2022-04-28 PROCEDURE — G8399 PT W/DXA RESULTS DOCUMENT: HCPCS | Performed by: PHYSICAL MEDICINE & REHABILITATION

## 2022-04-28 PROCEDURE — G8432 DEP SCR NOT DOC, RNG: HCPCS | Performed by: PHYSICAL MEDICINE & REHABILITATION

## 2022-04-28 PROCEDURE — G9899 SCRN MAM PERF RSLTS DOC: HCPCS | Performed by: PHYSICAL MEDICINE & REHABILITATION

## 2022-04-28 PROCEDURE — 99204 OFFICE O/P NEW MOD 45 MIN: CPT | Performed by: PHYSICAL MEDICINE & REHABILITATION

## 2022-04-28 PROCEDURE — G8417 CALC BMI ABV UP PARAM F/U: HCPCS | Performed by: PHYSICAL MEDICINE & REHABILITATION

## 2022-04-28 PROCEDURE — 1090F PRES/ABSN URINE INCON ASSESS: CPT | Performed by: PHYSICAL MEDICINE & REHABILITATION

## 2022-04-28 RX ORDER — METHYLPREDNISOLONE 4 MG/1
TABLET ORAL
Qty: 1 DOSE PACK | Refills: 0 | Status: CANCELLED | OUTPATIENT
Start: 2022-04-28

## 2022-04-28 NOTE — LETTER
4/28/2022    Patient: Beulah Jimenes   YOB: 1950   Date of Visit: 4/28/2022     Padmini Oconnell, [address] [place] [id number]  Via Fax: [phone]    Dear Padmini Oconnell DO,      Thank you for referring Ms. Gely Sanchez to [facility] for evaluation. My notes for this consultation are attached. If you have questions, please do not hesitate to call me. I look forward to following your patient along with you.       Sincerely,    Tana Lawson MD

## 2022-05-05 ENCOUNTER — OFFICE VISIT (OUTPATIENT)
Dept: ORTHOPEDIC SURGERY | Age: 72
End: 2022-05-05
Payer: MEDICARE

## 2022-05-05 VITALS — TEMPERATURE: 97.5 F | HEART RATE: 103 BPM | OXYGEN SATURATION: 99 %

## 2022-05-05 DIAGNOSIS — M54.16 LUMBAR NEURITIS: ICD-10-CM

## 2022-05-05 DIAGNOSIS — M54.50 LUMBAR PAIN: ICD-10-CM

## 2022-05-05 DIAGNOSIS — M96.1 LUMBAR POST-LAMINECTOMY SYNDROME: ICD-10-CM

## 2022-05-05 DIAGNOSIS — M51.36 DDD (DEGENERATIVE DISC DISEASE), LUMBAR: ICD-10-CM

## 2022-05-05 DIAGNOSIS — M17.12 PRIMARY OSTEOARTHRITIS OF LEFT KNEE: Primary | ICD-10-CM

## 2022-05-05 PROCEDURE — G8432 DEP SCR NOT DOC, RNG: HCPCS | Performed by: PHYSICIAN ASSISTANT

## 2022-05-05 PROCEDURE — 1090F PRES/ABSN URINE INCON ASSESS: CPT | Performed by: PHYSICIAN ASSISTANT

## 2022-05-05 PROCEDURE — G8417 CALC BMI ABV UP PARAM F/U: HCPCS | Performed by: PHYSICIAN ASSISTANT

## 2022-05-05 PROCEDURE — G9899 SCRN MAM PERF RSLTS DOC: HCPCS | Performed by: PHYSICIAN ASSISTANT

## 2022-05-05 PROCEDURE — G8427 DOCREV CUR MEDS BY ELIG CLIN: HCPCS | Performed by: PHYSICIAN ASSISTANT

## 2022-05-05 PROCEDURE — 1101F PT FALLS ASSESS-DOCD LE1/YR: CPT | Performed by: PHYSICIAN ASSISTANT

## 2022-05-05 PROCEDURE — 20611 DRAIN/INJ JOINT/BURSA W/US: CPT | Performed by: PHYSICIAN ASSISTANT

## 2022-05-05 PROCEDURE — G8536 NO DOC ELDER MAL SCRN: HCPCS | Performed by: PHYSICIAN ASSISTANT

## 2022-05-05 PROCEDURE — G8399 PT W/DXA RESULTS DOCUMENT: HCPCS | Performed by: PHYSICIAN ASSISTANT

## 2022-05-05 PROCEDURE — 3017F COLORECTAL CA SCREEN DOC REV: CPT | Performed by: PHYSICIAN ASSISTANT

## 2022-05-05 RX ORDER — BETAMETHASONE SODIUM PHOSPHATE AND BETAMETHASONE ACETATE 3; 3 MG/ML; MG/ML
6 INJECTION, SUSPENSION INTRA-ARTICULAR; INTRALESIONAL; INTRAMUSCULAR; SOFT TISSUE ONCE
Status: COMPLETED | OUTPATIENT
Start: 2022-05-05 | End: 2022-05-05

## 2022-05-05 RX ADMIN — BETAMETHASONE SODIUM PHOSPHATE AND BETAMETHASONE ACETATE 6 MG: 3; 3 INJECTION, SUSPENSION INTRA-ARTICULAR; INTRALESIONAL; INTRAMUSCULAR; SOFT TISSUE at 11:29

## 2022-05-05 NOTE — PROGRESS NOTES
[address] [place] [address]  [phone]           Patient: Shea Matthews                MRN: [id number]       SSN: [id number]  YOB: 1950        AGE: 74 y.o. SEX: female  There is no height or weight on file to calculate BMI. PCP: Nabil Cyr DO  05/05/22            REVIEW OF SYSTEMS:  Constitutional: Negative for fever, chills, weight loss and malaise/fatigue. HENT: Negative. Eyes: Negative. Respiratory: Negative. Cardiovascular: Negative. Gastrointestinal: No bowel incontinence or constipation. Genitourinary: No bladder incontinence or saddle anesthesia. Skin: Negative. Neurological: Negative. Endo/Heme/Allergies: Negative. Psychiatric/Behavioral: Negative. Musculoskeletal: As per HPI above. Past Medical History:   Diagnosis Date    Arthritis     Chronic kidney disease     GERD (gastroesophageal reflux disease)     Hypertension     Ill-defined condition     Positional Vertigo -  pt can not lie flat    Sleep apnea          Current Outpatient Medications:     biotin 1 mg tab, Take 1,000 mcg by mouth daily. , Disp: , Rfl:     fluticasone propionate (FLONASE) 50 mcg/actuation nasal spray, instill 2 sprays into each nostril once daily if needed for congestion, Disp: , Rfl:     metoprolol succinate (TOPROL-XL) 100 mg tablet, Take 100 mg by mouth daily. , Disp: , Rfl:     losartan (COZAAR) 100 mg tablet, Take 100 mg by mouth daily. , Disp: , Rfl:     diclofenac (VOLTAREN) 1 % gel, Apply 4 g to affected area every six (6) hours as needed for Pain (neck). , Disp: 300 g, Rfl: 1    colchicine 0.6 mg tablet, Take 1 Tab by mouth two (2) times a day. (Patient not taking: Reported on 4/28/2022), Disp: 60 Tab, Rfl: 1    ondansetron (ZOFRAN ODT) 4 mg disintegrating tablet, Take 1 Tab by mouth every six (6) hours.  (Patient not taking: Reported on 4/28/2022), Disp: 30 Tab, Rfl: 1   ferrous sulfate (IRON) 325 mg (65 mg iron) EC tablet, Take 1 Tab by mouth two (2) times daily (with meals). (Patient not taking: Reported on 2/17/2022), Disp: 60 Tab, Rfl: 1    OTHER, Cell Power -8 gtts daily OTC, Disp: , Rfl:     OTHER, CBD oil as needed (Patient not taking: Reported on 4/28/2022), Disp: , Rfl:     metoclopramide HCl (Reglan) 10 mg tablet, Take 10 mg by mouth Before breakfast, lunch, dinner and at bedtime. , Disp: , Rfl:     omeprazole (PRILOSEC) 40 mg capsule, Take 40 mg by mouth daily. , Disp: , Rfl:     acetaminophen (TYLENOL) 325 mg tablet, Take 650 mg by mouth every four (4) hours as needed for Pain., Disp: , Rfl:     metoprolol (LOPRESSOR) 50 mg tablet, Take 50 mg by mouth daily. (Patient not taking: Reported on 4/28/2022), Disp: , Rfl:     folic acid (FOLVITE) 1 mg tablet, Take 1 mg by mouth daily. , Disp: , Rfl:     Cholecalciferol, Vitamin D3, (VITAMIN D3) 1,000 unit cap, Take 1,000 Units by mouth daily. , Disp: , Rfl:     cyanocobalamin (VITAMIN B-12) 1,000 mcg tablet, Take 1,000 mcg by mouth daily as needed. , Disp: , Rfl:     Current Facility-Administered Medications:     betamethasone (CELESTONE) injection 6 mg, 6 mg, Intra artICUlar, ONCE, Saul Barrios, PAAlma DeliaC    Allergies   Allergen Reactions    Cymbalta [Duloxetine] Anaphylaxis    Curcumin Itching    Gabapentin Other (comments)     hallucinations    Nsaids (Non-Steroidal Anti-Inflammatory Drug) Other (comments)     History \"gastric issues\" and kidney issues per pt.     Silicone Unknown (comments)    Tramadol Other (comments)     hallucinations       Social History     Socioeconomic History    Marital status:      Spouse name: Not on file    Number of children: Not on file    Years of education: Not on file    Highest education level: Not on file   Occupational History    Not on file   Tobacco Use    Smoking status: Never Smoker    Smokeless tobacco: Never Used   Vaping Use    Vaping Use: Never used Substance and Sexual Activity    Alcohol use: Yes     Comment: occasionally    Drug use: Never    Sexual activity: Not on file   Other Topics Concern    Not on file   Social History Narrative    Not on file     Social Determinants of Health     Financial Resource Strain:     Difficulty of Paying Living Expenses: Not on file   Food Insecurity:     Worried About Running Out of Food in the Last Year: Not on file    Deon of Food in the Last Year: Not on file   Transportation Needs:     Lack of Transportation (Medical): Not on file    Lack of Transportation (Non-Medical):  Not on file   Physical Activity:     Days of Exercise per Week: Not on file    Minutes of Exercise per Session: Not on file   Stress:     Feeling of Stress : Not on file   Social Connections:     Frequency of Communication with Friends and Family: Not on file    Frequency of Social Gatherings with Friends and Family: Not on file    Attends Mandaeism Services: Not on file    Active Member of [address] [place] [address] or Organizations: Not on file    Attends Club or Organization Meetings: Not on file    Marital Status: Not on file   Intimate Partner Violence:     Fear of Current or Ex-Partner: Not on file    Emotionally Abused: Not on file    Physically Abused: Not on file    Sexually Abused: Not on file   Housing Stability:     Unable to Pay for Housing in the Last Year: Not on file    Number of [place] in the Last Year: Not on file    Unstable Housing in the Last Year: Not on file       Past Surgical History:   Procedure Laterality Date    HX BACK SURGERY      low back 2018    HX BREAST BIOPSY      6-8 yrs ago, scar marked    HX CARPAL TUNNEL RELEASE Right     mid to late [de-identified]    HX [address] Left     mid [address] HX CHOLECYSTECTOMY      HX GYN      Vaginal Hernia as a child     HX HERNIA REPAIR      age [de-identified]    HX HYSTERECTOMY      partial     Sidra Barthel Dr Eduardo Mower in 08/2020  NEUROLOGICAL PROCEDURE UNLISTED  2017    lower lumbar       Patient seen evaluated today for her left knee. Does have known advanced arthritis of the left knee. She had a series of viscosupplementation which did not help her much this past time. He has previously. She does have decreased walking tolerance. She has trouble stairs. She has pain getting from a chair. She has occasional night pain. Patient denies recent fevers, chills, chest pain, SOB, or injuries. No recent systemic changes noted. A 12-point review of systems is performed today. Pertinent positives are noted. All other systems reviewed and otherwise are negative. Physical exam: General: Alert and oriented x3, nad.  well-developed, well nourished. normal affect, AF. NC/AT, EOMI, neck supple, trachea midline, no JVD present. Breathing is non-labored. Examination of lower extremities reveals pain-free range of motion the hips. There is no pain to palpation the trochanter bursa. Negative straight leg raise. Negative calf tenderness. Negative Homans. No signs of DVT present. The left knee reveals skin intact. There is no erythema, ecchymosis or warmth. There are no signs of infection or cellulitis present. Findings are consistent with advanced arthritis the left knee. Assessment: Left knee advanced arthritis    Plan: At this point, we discussed treatment options. Total knee replacements have been recommended however the patient is not interested would like to continue with conservative treatment. Today in the office we will move for the cortisone injection for the left knee. After informed consent, under aseptic conditions, with US guided assitance, the left knee was prepped with betadine and a mxiture of 3ml 1% lidocaine and 6mg of celestone was injected without complications. The patient tolerated the injection well. The patient is instructed on post-injection care.   We will see her back in the office in 3 months time for evaluation. Chart reviewed for the following:  Greg LUTZ PA-C, have reviewed the History, Physical and updated the Allergic reactions for [address]? TIME OUT performed immediately prior to start of procedure:  Greg LUTZ PA-C, have performed the following reviews on [address] prior to the start of the procedure:  ????????  * Patient was identified by name and date of birth   * Agreement on procedure being performed was verified  * Risks and Benefits explained to the patient  * Procedure site verified and marked as necessary  * Patient was positioned for comfort  * Consent was signed and verified    Time:11:21 AM    Body part: left knee, intra-articular    Medication & Dose: 3ml 1% lidocaine and 6mg celestone    Date of procedure: 05/05/22    Procedure performed by: Greg Miller PA-C    Provider assisted by: none    Patient assisted by: self    How tolerated by patient: tolerated the procedure well with no complications    Post Procedural Pain Scale: 4    Comments:   [address] using a frequency of 10MHz with a 12L-RS transducer head was used to confirm needle placement.   Ultrasound images captured using [address] Ultrasound machine and scanned into patient's chart       Ernestina Heath PA-C, ATC

## 2022-05-12 NOTE — TELEPHONE ENCOUNTER
Patient notified she could not have the Euflexxa injections again until the end of June as these can only be done every 6 months. Patient verbalized understanding and requests auth be submitted for the end of June. Auth order entered. no

## 2022-05-25 ENCOUNTER — HOSPITAL ENCOUNTER (OUTPATIENT)
Dept: MRI IMAGING | Age: 72
Discharge: HOME | End: 2022-05-25
Attending: PHYSICAL MEDICINE & REHABILITATION
Payer: MEDICARE

## 2022-05-25 PROCEDURE — 72148 MRI LUMBAR SPINE W/O DYE: CPT

## 2022-06-08 NOTE — PROGRESS NOTES
[facility]  [address], [place]   Phone: [phone]  Fax: [phone]        PROGRESS NOTE      HISTORY OF PRESENT ILLNESS:  The patient is a 74 y.o. female and was seen today for follow up of low back pain radiating into the LLE in a L5 distribution to the ankle. She additionally c/o severe cerebral, neck and left shoulder spasms. Her pain is not exacerbated positionally. Pt notes her pain is holding steady. States her pain is similar to the pain she had prior to spinal surgery done by Dr. Dionna Esqueda in 2018. Pt notes 1.5 years relief with spinal surgery. She denies injury. Pt admits to occasionally treating with Tylenol with minimal relief. Patient denies recent spinal injections or physical therapy/chiropractic care. Pt denies h/o stomach ulcers or bleeding disorders. Pt denies change in bowel or bladder habits. Pt is a nonsmoker. Records indicated the pt is allergic to Cymbalta, Tramadol and Flexeril. Denies DM. The patient is RHD. PmHx of HTN, Chronic renal disease, Lumbar laminectomy and disctectomy of L5-S1 (Dr. Dionna Esqueda, 1/12/2018), Kyphoplasty (8/17/2020). She admits to be followed by Dr. Pretty Landon, nephrology [phone]. Lumbar spine XR dated 11/3/2021 films independently reviewed. Per report, mild levocurvature, apex L2. Vertebral body heights preserved. Disc space loss and endplate spurring most notable at L1-L2 and L2-L3, most pronounced along concavity of curvature. No significant AP listhesis. Lumbar lordosis maintained. Multilevel mild to moderate facet arthropathy. No acute fracture identified. Right upper quadrant surgical clips. Mild bilateral SI joint degenerative changes. Per my review, there was no clear evidence of kyphoplasty. At her last clinical appointment, I offered MDP, pt declined. I ordered a L spine MRI w/o contrast secondary to chronic renal disease. I advised patient to bring copies of films to next visit.        The patient returns today and reports pain location and distribution remains unchanged. She rates her pain 0-7/10, previously 3-8/10. Pt notes her pain is intermittent in nature and dependent on amount of activity. She reports her pain is slightly better. Pt notes chronic bladder urgency. Denies bowel changes. Lumbar spine MRI w/o contrast dated 5/25/2022 films independently reviewed. Per report, Intradiscal degenerative changes most severe at L1-L2. Facet arthropathy L4-L5 L5-S1. Rotoscoliosis. Truncal obesity       reviewed. Body mass index is 37.16 kg/m². PCP: Gabriela Rodas DO      Past Medical History:   Diagnosis Date    Arthritis     Chronic kidney disease     GERD (gastroesophageal reflux disease)     Hypertension     Ill-defined condition     Positional Vertigo -  pt can not lie flat    Sleep apnea         Social History     Socioeconomic History    Marital status:      Spouse name: Not on file    Number of children: Not on file    Years of education: Not on file    Highest education level: Not on file   Occupational History    Not on file   Tobacco Use    Smoking status: Never Smoker    Smokeless tobacco: Never Used   Vaping Use    Vaping Use: Never used   Substance and Sexual Activity    Alcohol use: Yes     Comment: occasionally    Drug use: Never    Sexual activity: Not on file   Other Topics Concern    Not on file   Social History Narrative    Not on file     Social Determinants of Health     Financial Resource Strain:     Difficulty of Paying Living Expenses: Not on file   Food Insecurity:     Worried About Running Out of Food in the Last Year: Not on file    Deon of Food in the Last Year: Not on file   Transportation Needs:     Lack of Transportation (Medical): Not on file    Lack of Transportation (Non-Medical):  Not on file   Physical Activity:     Days of Exercise per Week: Not on file    Minutes of Exercise per Session: Not on file   Stress:     Feeling of Stress : Not on file   Social Connections:     Frequency of Communication with Friends and Family: Not on file    Frequency of Social Gatherings with Friends and Family: Not on file    Attends Temple Services: Not on file    Active Member of Clubs or Organizations: Not on file    Attends Club or Organization Meetings: Not on file    Marital Status: Not on file   Intimate Partner Violence:     Fear of Current or Ex-Partner: Not on file    Emotionally Abused: Not on file    Physically Abused: Not on file    Sexually Abused: Not on file   Housing Stability:     Unable to Pay for Housing in the Last Year: Not on file    Number of [place] in the Last Year: Not on file    Unstable Housing in the Last Year: Not on file       Current Outpatient Medications   Medication Sig Dispense Refill    diazePAM (VALIUM) 5 mg tablet 1/2-1 tablet as needed      estradioL (ESTRACE) 0.01 % (0.1 mg/gram) vaginal cream apply 1/4 applicatorful pv 2 - 3  x weekly at hs      biotin 1 mg tab Take 1,000 mcg by mouth daily.  fluticasone propionate (FLONASE) 50 mcg/actuation nasal spray instill 2 sprays into each nostril once daily if needed for congestion      metoprolol succinate (TOPROL-XL) 100 mg tablet Take 100 mg by mouth daily.  losartan (COZAAR) 100 mg tablet Take 100 mg by mouth daily.  diclofenac (VOLTAREN) 1 % gel Apply 4 g to affected area every six (6) hours as needed for Pain (neck). 300 g 1    OTHER Cell Power -8 gtts daily OTC      metoclopramide HCl (Reglan) 10 mg tablet Take 10 mg by mouth Before breakfast, lunch, dinner and at bedtime.  omeprazole (PRILOSEC) 40 mg capsule Take 40 mg by mouth daily.  acetaminophen (TYLENOL) 325 mg tablet Take 650 mg by mouth every four (4) hours as needed for Pain.  metoprolol (LOPRESSOR) 50 mg tablet Take 50 mg by mouth daily.  folic acid (FOLVITE) 1 mg tablet Take 1 mg by mouth daily.       Cholecalciferol, Vitamin D3, (VITAMIN D3) 1,000 unit cap Take 1,000 Units by mouth daily.  cyanocobalamin (VITAMIN B-12) 1,000 mcg tablet Take 1,000 mcg by mouth daily as needed.  ipratropium (ATROVENT) 21 mcg (0.03 %) nasal spray 2 sprays in each nostril prn allergy sxs (Patient not taking: Reported on 6/9/2022)      tiZANidine (Zanaflex) 4 mg tablet 1 tablet as needed      colchicine 0.6 mg tablet Take 1 Tab by mouth two (2) times a day. (Patient not taking: Reported on 4/28/2022) 60 Tab 1    ondansetron (ZOFRAN ODT) 4 mg disintegrating tablet Take 1 Tab by mouth every six (6) hours. (Patient not taking: Reported on 4/28/2022) 30 Tab 1    ferrous sulfate (IRON) 325 mg (65 mg iron) EC tablet Take 1 Tab by mouth two (2) times daily (with meals). (Patient not taking: Reported on 2/17/2022) 60 Tab 1    OTHER CBD oil as needed (Patient not taking: Reported on 4/28/2022)         Allergies   Allergen Reactions    Cymbalta [Duloxetine] Anaphylaxis    Curcumin Itching    Cyclobenzaprine Unknown (comments)    Gabapentin Other (comments)     hallucinations    Nsaids (Non-Steroidal Anti-Inflammatory Drug) Other (comments)     History \"gastric issues\" and kidney issues per pt.  Pravastatin Sodium Unknown (comments)    Silicone Unknown (comments)    Tramadol Other (comments)     hallucinations          PHYSICAL EXAMINATION    Visit Vitals  Pulse 82   Temp 97.5 °F (36.4 °C) (Temporal)   Ht 4' 11\" (1.499 m)   Wt 184 lb (83.5 kg)   SpO2 96%   BMI 37.16 kg/m²       CONSTITUTIONAL: NAD, A&O x 3  SENSATION: Intact to light touch throughout  RANGE OF MOTION: The patient has full passive range of motion in all four extremities. MOTOR:  Straight Leg Raise: Negative, bilateral               Hip Flex Knee Ext Knee Flex Ankle DF GTE Ankle PF Tone   Right +4/5 +4/5 +4/5 +4/5 +4/5 +4/5 +4/5   Left +4/5 +4/5 +4/5 +4/5 +4/5 +4/5 +4/5       ASSESSMENT   Diagnoses and all orders for this visit:    1. Lumbar pain    2. Lumbar neuritis    3.  DDD (degenerative disc disease), lumbar    4. Lumbar post-laminectomy syndrome      IMPRESSION AND PLAN:  Patient returns to the office today with c/o low back pain radiating into the LLE in a L5 distribution to the ankle. Multiple treatment options were discussed. I will order a LLE EMG to further evaluate her sxs. Declined MDP adn neuropathic medications. Patient is neurologically intact. I will see the patient back in 1 month's time or earlier if needed. Written by Miguel Trotter, as dictated by Tunde Fulton MD  I examined the patient, reviewed and agree with the note.

## 2022-06-09 ENCOUNTER — OFFICE VISIT (OUTPATIENT)
Dept: ORTHOPEDIC SURGERY | Age: 72
End: 2022-06-09
Payer: MEDICARE

## 2022-06-09 VITALS
HEART RATE: 82 BPM | WEIGHT: 184 LBS | OXYGEN SATURATION: 96 % | BODY MASS INDEX: 37.09 KG/M2 | TEMPERATURE: 97.5 F | HEIGHT: 59 IN

## 2022-06-09 DIAGNOSIS — M54.50 LUMBAR PAIN: Primary | ICD-10-CM

## 2022-06-09 DIAGNOSIS — M96.1 LUMBAR POST-LAMINECTOMY SYNDROME: ICD-10-CM

## 2022-06-09 DIAGNOSIS — M47.819 FACET ARTHROPATHY: ICD-10-CM

## 2022-06-09 DIAGNOSIS — M54.16 LUMBAR NEURITIS: ICD-10-CM

## 2022-06-09 DIAGNOSIS — M51.36 DDD (DEGENERATIVE DISC DISEASE), LUMBAR: ICD-10-CM

## 2022-06-09 PROCEDURE — 1090F PRES/ABSN URINE INCON ASSESS: CPT | Performed by: PHYSICAL MEDICINE & REHABILITATION

## 2022-06-09 PROCEDURE — 99214 OFFICE O/P EST MOD 30 MIN: CPT | Performed by: PHYSICAL MEDICINE & REHABILITATION

## 2022-06-09 PROCEDURE — 1123F ACP DISCUSS/DSCN MKR DOCD: CPT | Performed by: PHYSICAL MEDICINE & REHABILITATION

## 2022-06-09 PROCEDURE — G8417 CALC BMI ABV UP PARAM F/U: HCPCS | Performed by: PHYSICAL MEDICINE & REHABILITATION

## 2022-06-09 PROCEDURE — G8427 DOCREV CUR MEDS BY ELIG CLIN: HCPCS | Performed by: PHYSICAL MEDICINE & REHABILITATION

## 2022-06-09 PROCEDURE — G8536 NO DOC ELDER MAL SCRN: HCPCS | Performed by: PHYSICAL MEDICINE & REHABILITATION

## 2022-06-09 PROCEDURE — 1101F PT FALLS ASSESS-DOCD LE1/YR: CPT | Performed by: PHYSICAL MEDICINE & REHABILITATION

## 2022-06-09 PROCEDURE — G9899 SCRN MAM PERF RSLTS DOC: HCPCS | Performed by: PHYSICAL MEDICINE & REHABILITATION

## 2022-06-09 PROCEDURE — G8432 DEP SCR NOT DOC, RNG: HCPCS | Performed by: PHYSICAL MEDICINE & REHABILITATION

## 2022-06-09 PROCEDURE — 3017F COLORECTAL CA SCREEN DOC REV: CPT | Performed by: PHYSICAL MEDICINE & REHABILITATION

## 2022-06-09 PROCEDURE — G8399 PT W/DXA RESULTS DOCUMENT: HCPCS | Performed by: PHYSICAL MEDICINE & REHABILITATION

## 2022-06-09 RX ORDER — DIAZEPAM 5 MG/1
TABLET ORAL
COMMUNITY
Start: 2021-06-18

## 2022-06-09 RX ORDER — IPRATROPIUM BROMIDE 21 UG/1
SPRAY, METERED NASAL
COMMUNITY

## 2022-06-09 RX ORDER — TIZANIDINE 4 MG/1
TABLET ORAL
COMMUNITY

## 2022-06-09 RX ORDER — ESTRADIOL 0.1 MG/G
CREAM VAGINAL
COMMUNITY

## 2022-06-09 NOTE — LETTER
6/9/2022    Patient: Marco Precise   YOB: 1950   Date of Visit: 6/9/2022     Melissa Greco, [address] [place] [id number]  Via Fax: [phone]    Dear Melissa Greco DO,      Thank you for referring Ms. Maya Oshea to [facility] for evaluation. My notes for this consultation are attached. If you have questions, please do not hesitate to call me. I look forward to following your patient along with you.       Sincerely,    Mariya Watts MD

## 2022-06-15 ENCOUNTER — HOSPITAL ENCOUNTER (OUTPATIENT)
Dept: LAB | Age: 72
Discharge: HOME | End: 2022-06-15
Payer: MEDICARE

## 2022-06-15 ENCOUNTER — TRANSCRIBE ORDER (OUTPATIENT)
Dept: REGISTRATION | Age: 72
End: 2022-06-15

## 2022-06-15 DIAGNOSIS — I10 HYPERTENSION: ICD-10-CM

## 2022-06-15 DIAGNOSIS — I12.9 CHRONIC KIDNEY DISEASE DUE TO BENIGN HYPERTENSION: Primary | ICD-10-CM

## 2022-06-15 DIAGNOSIS — N18.31 STAGE 3A CHRONIC KIDNEY DISEASE (HCC): ICD-10-CM

## 2022-06-15 DIAGNOSIS — I12.9 CHRONIC KIDNEY DISEASE DUE TO BENIGN HYPERTENSION: ICD-10-CM

## 2022-06-15 LAB
ALBUMIN SERPL-MCNC: 3.5 G/DL (ref 3.4–5)
ANION GAP SERPL CALC-SCNC: 6 MMOL/L (ref 3–18)
BASOPHILS # BLD: 0.1 K/UL (ref 0–0.1)
BASOPHILS NFR BLD: 1 % (ref 0–2)
BUN SERPL-MCNC: 17 MG/DL (ref 7–18)
BUN/CREAT SERPL: 13 (ref 12–20)
CALCIUM SERPL-MCNC: 8.8 MG/DL (ref 8.5–10.1)
CALCIUM SERPL-MCNC: 9 MG/DL (ref 8.5–10.1)
CHLORIDE SERPL-SCNC: 108 MMOL/L (ref 100–111)
CO2 SERPL-SCNC: 27 MMOL/L (ref 21–32)
CREAT SERPL-MCNC: 1.26 MG/DL (ref 0.6–1.3)
CREAT UR-MCNC: 144 MG/DL (ref 30–125)
DIFFERENTIAL METHOD BLD: ABNORMAL
EOSINOPHIL # BLD: 0.1 K/UL (ref 0–0.4)
EOSINOPHIL NFR BLD: 1 % (ref 0–5)
ERYTHROCYTE [DISTWIDTH] IN BLOOD BY AUTOMATED COUNT: 15 % (ref 11.6–14.5)
GLUCOSE SERPL-MCNC: 115 MG/DL (ref 74–99)
HCT VFR BLD AUTO: 41.5 % (ref 35–45)
HGB BLD-MCNC: 12.9 G/DL (ref 12–16)
IMM GRANULOCYTES # BLD AUTO: 0 K/UL (ref 0–0.04)
IMM GRANULOCYTES NFR BLD AUTO: 0 % (ref 0–0.5)
LYMPHOCYTES # BLD: 2.2 K/UL (ref 0.9–3.6)
LYMPHOCYTES NFR BLD: 26 % (ref 21–52)
MCH RBC QN AUTO: 24.5 PG (ref 24–34)
MCHC RBC AUTO-ENTMCNC: 31.1 G/DL (ref 31–37)
MCV RBC AUTO: 78.7 FL (ref 78–100)
MICROALBUMIN UR-MCNC: 1.03 MG/DL (ref 0–3)
MICROALBUMIN/CREAT UR-RTO: 7 MG/G (ref 0–30)
MONOCYTES # BLD: 0.9 K/UL (ref 0.05–1.2)
MONOCYTES NFR BLD: 11 % (ref 3–10)
NEUTS SEG # BLD: 5 K/UL (ref 1.8–8)
NEUTS SEG NFR BLD: 61 % (ref 40–73)
NRBC # BLD: 0 K/UL (ref 0–0.01)
NRBC BLD-RTO: 0 PER 100 WBC
PHOSPHATE SERPL-MCNC: 3.4 MG/DL (ref 2.5–4.9)
PLATELET # BLD AUTO: 261 K/UL (ref 135–420)
PMV BLD AUTO: 10.5 FL (ref 9.2–11.8)
POTASSIUM SERPL-SCNC: 3.6 MMOL/L (ref 3.5–5.5)
PTH-INTACT SERPL-MCNC: 75.7 PG/ML (ref 18.4–88)
RBC # BLD AUTO: 5.27 M/UL (ref 4.2–5.3)
SODIUM SERPL-SCNC: 141 MMOL/L (ref 136–145)
WBC # BLD AUTO: 8.3 K/UL (ref 4.6–13.2)

## 2022-06-15 PROCEDURE — 83970 ASSAY OF PARATHORMONE: CPT

## 2022-06-15 PROCEDURE — 80069 RENAL FUNCTION PANEL: CPT

## 2022-06-15 PROCEDURE — 82043 UR ALBUMIN QUANTITATIVE: CPT

## 2022-06-15 PROCEDURE — 85025 COMPLETE CBC W/AUTO DIFF WBC: CPT

## 2022-06-15 PROCEDURE — 36415 COLL VENOUS BLD VENIPUNCTURE: CPT

## 2022-08-04 ENCOUNTER — OFFICE VISIT (OUTPATIENT)
Dept: ORTHOPEDIC SURGERY | Age: 72
End: 2022-08-04
Payer: MEDICARE

## 2022-08-04 VITALS
HEART RATE: 91 BPM | WEIGHT: 183.2 LBS | OXYGEN SATURATION: 97 % | BODY MASS INDEX: 35.97 KG/M2 | HEIGHT: 60 IN | TEMPERATURE: 97.1 F

## 2022-08-04 DIAGNOSIS — M17.12 PRIMARY OSTEOARTHRITIS OF LEFT KNEE: Primary | ICD-10-CM

## 2022-08-04 PROCEDURE — 20611 DRAIN/INJ JOINT/BURSA W/US: CPT | Performed by: PHYSICIAN ASSISTANT

## 2022-08-04 RX ORDER — BETAMETHASONE SODIUM PHOSPHATE AND BETAMETHASONE ACETATE 3; 3 MG/ML; MG/ML
6 INJECTION, SUSPENSION INTRA-ARTICULAR; INTRALESIONAL; INTRAMUSCULAR; SOFT TISSUE ONCE
Status: COMPLETED | OUTPATIENT
Start: 2022-08-04 | End: 2022-08-04

## 2022-08-04 RX ADMIN — BETAMETHASONE SODIUM PHOSPHATE AND BETAMETHASONE ACETATE 6 MG: 3; 3 INJECTION, SUSPENSION INTRA-ARTICULAR; INTRALESIONAL; INTRAMUSCULAR; SOFT TISSUE at 10:15

## 2022-08-04 NOTE — PROGRESS NOTES
[address] [place] [address]  [phone]           Patient: Micheline Mata                MRN: [id number]       SSN: [id number]  YOB: 1950        AGE: 74 y.o. SEX: female  Body mass index is 36.38 kg/m². PCP: Giovana April, DO 08/04/22            REVIEW OF SYSTEMS:  Constitutional: Negative for fever, chills, weight loss and malaise/fatigue. HENT: Negative. Eyes: Negative. Respiratory: Negative. Cardiovascular: Negative. Gastrointestinal: No bowel incontinence or constipation. Genitourinary: No bladder incontinence or saddle anesthesia. Skin: Negative. Neurological: Negative. Endo/Heme/Allergies: Negative. Psychiatric/Behavioral: Negative. Musculoskeletal: As per HPI above. Past Medical History:   Diagnosis Date    Arthritis     Chronic kidney disease     GERD (gastroesophageal reflux disease)     Hypertension     Ill-defined condition     Positional Vertigo -  pt can not lie flat    Sleep apnea          Current Outpatient Medications:     diazePAM (VALIUM) 5 mg tablet, 1/2-1 tablet as needed, Disp: , Rfl:     estradioL (ESTRACE) 0.01 % (0.1 mg/gram) vaginal cream, apply 1/4 applicatorful pv 2 - 3  x weekly at hs, Disp: , Rfl:     ipratropium (ATROVENT) 21 mcg (0.03 %) nasal spray, 2 sprays in each nostril prn allergy sxs (Patient not taking: Reported on 6/9/2022), Disp: , Rfl:     tiZANidine (Zanaflex) 4 mg tablet, 1 tablet as needed, Disp: , Rfl:     biotin 1 mg tab, Take 1,000 mcg by mouth daily. , Disp: , Rfl:     fluticasone propionate (FLONASE) 50 mcg/actuation nasal spray, instill 2 sprays into each nostril once daily if needed for congestion, Disp: , Rfl:     metoprolol succinate (TOPROL-XL) 100 mg tablet, Take 100 mg by mouth daily. , Disp: , Rfl:     losartan (COZAAR) 100 mg tablet, Take 100 mg by mouth daily. , Disp: , Rfl:     diclofenac (VOLTAREN) 1 % gel, Apply 4 g to affected area every six (6) hours as needed for Pain (neck). , Disp: 300 g, Rfl: 1    colchicine 0.6 mg tablet, Take 1 Tab by mouth two (2) times a day. (Patient not taking: Reported on 4/28/2022), Disp: 60 Tab, Rfl: 1    ondansetron (ZOFRAN ODT) 4 mg disintegrating tablet, Take 1 Tab by mouth every six (6) hours. (Patient not taking: Reported on 4/28/2022), Disp: 30 Tab, Rfl: 1    ferrous sulfate (IRON) 325 mg (65 mg iron) EC tablet, Take 1 Tab by mouth two (2) times daily (with meals). (Patient not taking: Reported on 2/17/2022), Disp: 60 Tab, Rfl: 1    OTHER, Cell Power -8 gtts daily OTC, Disp: , Rfl:     OTHER, CBD oil as needed (Patient not taking: Reported on 4/28/2022), Disp: , Rfl:     metoclopramide HCl (Reglan) 10 mg tablet, Take 10 mg by mouth Before breakfast, lunch, dinner and at bedtime. , Disp: , Rfl:     omeprazole (PRILOSEC) 40 mg capsule, Take 40 mg by mouth daily. , Disp: , Rfl:     acetaminophen (TYLENOL) 325 mg tablet, Take 650 mg by mouth every four (4) hours as needed for Pain., Disp: , Rfl:     metoprolol (LOPRESSOR) 50 mg tablet, Take 50 mg by mouth daily. , Disp: , Rfl:     folic acid (FOLVITE) 1 mg tablet, Take 1 mg by mouth daily. , Disp: , Rfl:     Cholecalciferol, Vitamin D3, (VITAMIN D3) 1,000 unit cap, Take 1,000 Units by mouth daily. , Disp: , Rfl:     cyanocobalamin (VITAMIN B-12) 1,000 mcg tablet, Take 1,000 mcg by mouth daily as needed. , Disp: , Rfl:     Allergies   Allergen Reactions    Cymbalta [Duloxetine] Anaphylaxis    Curcumin Itching    Cyclobenzaprine Unknown (comments)    Gabapentin Other (comments)     hallucinations    Nsaids (Non-Steroidal Anti-Inflammatory Drug) Other (comments)     History \"gastric issues\" and kidney issues per pt.     Pravastatin Sodium Unknown (comments)    Silicone Unknown (comments)    Tramadol Other (comments)     hallucinations       Social History     Socioeconomic History    Marital status:      Spouse name: Not on file    Number of children: Not on file    Years of education: Not on file    Highest education level: Not on file   Occupational History    Not on file   Tobacco Use    Smoking status: Never    Smokeless tobacco: Never   Vaping Use    Vaping Use: Never used   Substance and Sexual Activity    Alcohol use: Yes     Comment: occasionally    Drug use: Never    Sexual activity: Not on file   Other Topics Concern    Not on file   Social History Narrative    Not on file     Social Determinants of Health     Financial Resource Strain: Not on file   Food Insecurity: Not on file   Transportation Needs: Not on file   Physical Activity: Not on file   Stress: Not on file   Social Connections: Not on file   Intimate Partner Violence: Not on file   Housing Stability: Not on file       Past Surgical History:   Procedure Laterality Date    HX BACK SURGERY      low back 2018    HX BREAST BIOPSY      6-8 yrs ago, scar marked    HX CARPAL TUNNEL RELEASE Right     mid to late [de-identified]    HX CARPAL TUNNEL RELEASE Left     mid     HX  SECTION      HX CHOLECYSTECTOMY      HX GYN      Vaginal Hernia as a child     HX HERNIA REPAIR      age [de-identified]    HX HYSTERECTOMY      [facility]      Dr Alison Blank in 2020    [address]  2017    lower lumbar       Patient seen evaluated today for her left knee. She does have known advanced arthritis of the left knee. Injections are still efficacious however not as much as they once were. She does have decreased walking tolerance. Has trouble stairs. She has occasional pain at night. Patient denies recent fevers, chills, chest pain, SOB, or injuries. No recent systemic changes noted. A 12-point review of systems is performed today. Pertinent positives are noted. All other systems reviewed and otherwise are negative. Physical exam: General: Alert and oriented x3, nad.  well-developed, well nourished. normal affect, AF.   NC/AT, EOMI, neck supple, trachea midline, no JVD present. Breathing is non-labored. Examination of lower extremities reveals pain-free range of motion the hips. There is no pain to palpation the trochanter bursa. Neck straight leg raise. Negative calf tenderness. Negative Homans. No signs of DVT present. The left knee reveals skin intact. There is no erythema, ecchymosis or warmth. There are no signs for infection or status present. She does have discomfort to palpation tricompartmentally about the knee, worse to the medial compartment and some mild crepitus anteriorly with range of motion activities noted. Assessment: Left knee advanced osteoarthritis    Plan: At this point, we discussed treatments. The replacements were discussed. The patient is not interested in knee replacements at this time. She would like to continue with conservative treatment. Today in the office we will move forward with a cortisone injection for the left knee. After informed consent, under aseptic conditions, with US guided assitance, the left knee was prepped with betadine and a mxiture of 3ml 1% lidocaine and 6mg of celestone was injected without complications. The patient tolerated the injection well. The patient is instructed on post-injection care. We will see her back in 3 months time for evaluation and plan on repeat x-ray and injection at that time. Chart reviewed for the following:  Herb LUTZ PA-C, have reviewed the History, Physical and updated the Allergic reactions for Robina Delarosa?     TIME OUT performed immediately prior to start of procedure:  Herb LUTZ PA-C, have performed the following reviews on Robina Delarosa prior to the start of the procedure:  ????????  * Patient was identified by name and date of birth   * Agreement on procedure being performed was verified  * Risks and Benefits explained to the patient  * Procedure site verified and marked as necessary  * Patient was positioned for comfort  * Consent was signed and verified    Time:10:08 AM    Body part: left knee, intra-articular    Medication & Dose: 3ml 1% lidocaine and 6mg celestone    Date of procedure: 08/04/22    Procedure performed by: Tom Cain PA-C    Provider assisted by: none    Patient assisted by: self    How tolerated by patient: tolerated the procedure well with no complications    Post Procedural Pain Scale: 5    Comments:   [address] using a frequency of 10MHz with a 12L-RS transducer head was used to confirm needle placement.   Ultrasound images captured using [address] Ultrasound machine and scanned into patient's chart       Danielle Soriano PA-C, ATC

## 2022-08-12 ENCOUNTER — ANESTHESIA EVENT (OUTPATIENT)
Dept: ENDOSCOPY | Age: 72
End: 2022-08-12
Payer: MEDICARE

## 2022-08-15 ENCOUNTER — ANESTHESIA (OUTPATIENT)
Dept: ENDOSCOPY | Age: 72
End: 2022-08-15
Payer: MEDICARE

## 2022-08-15 ENCOUNTER — HOSPITAL ENCOUNTER (OUTPATIENT)
Age: 72
Setting detail: OUTPATIENT SURGERY
Discharge: HOME | End: 2022-08-15
Attending: INTERNAL MEDICINE | Admitting: INTERNAL MEDICINE
Payer: MEDICARE

## 2022-08-15 VITALS
DIASTOLIC BLOOD PRESSURE: 85 MMHG | BODY MASS INDEX: 35.88 KG/M2 | OXYGEN SATURATION: 97 % | HEART RATE: 70 BPM | TEMPERATURE: 98.4 F | RESPIRATION RATE: 20 BRPM | WEIGHT: 178 LBS | SYSTOLIC BLOOD PRESSURE: 149 MMHG | HEIGHT: 59 IN

## 2022-08-15 PROCEDURE — 2709999900 HC NON-CHARGEABLE SUPPLY: Performed by: INTERNAL MEDICINE

## 2022-08-15 PROCEDURE — 88305 TISSUE EXAM BY PATHOLOGIST: CPT

## 2022-08-15 PROCEDURE — 99100 ANES PT EXTEME AGE<1 YR&>70: CPT | Performed by: NURSE ANESTHETIST, CERTIFIED REGISTERED

## 2022-08-15 PROCEDURE — 00812 ANES LWR INTST SCR COLSC: CPT | Performed by: NURSE ANESTHETIST, CERTIFIED REGISTERED

## 2022-08-15 PROCEDURE — 74011000250 HC RX REV CODE- 250: Performed by: NURSE ANESTHETIST, CERTIFIED REGISTERED

## 2022-08-15 PROCEDURE — 99100 ANES PT EXTEME AGE<1 YR&>70: CPT | Performed by: ANESTHESIOLOGY

## 2022-08-15 PROCEDURE — 77030021593 HC FCPS BIOP ENDOSC BSC -A: Performed by: INTERNAL MEDICINE

## 2022-08-15 PROCEDURE — 77030013992 HC SNR POLYP ENDOSC BSC -B: Performed by: INTERNAL MEDICINE

## 2022-08-15 PROCEDURE — 77030008565 HC TBNG SUC IRR ERBE -B: Performed by: INTERNAL MEDICINE

## 2022-08-15 PROCEDURE — 76060000031 HC ANESTHESIA FIRST 0.5 HR: Performed by: INTERNAL MEDICINE

## 2022-08-15 PROCEDURE — 00812 ANES LWR INTST SCR COLSC: CPT | Performed by: ANESTHESIOLOGY

## 2022-08-15 PROCEDURE — 76040000019: Performed by: INTERNAL MEDICINE

## 2022-08-15 PROCEDURE — 74011250636 HC RX REV CODE- 250/636: Performed by: NURSE ANESTHETIST, CERTIFIED REGISTERED

## 2022-08-15 RX ORDER — LIDOCAINE HYDROCHLORIDE 10 MG/ML
0.1 INJECTION, SOLUTION EPIDURAL; INFILTRATION; INTRACAUDAL; PERINEURAL AS NEEDED
Status: DISCONTINUED | OUTPATIENT
Start: 2022-08-15 | End: 2022-08-15 | Stop reason: HOSPADM

## 2022-08-15 RX ORDER — PROPOFOL 10 MG/ML
INJECTION, EMULSION INTRAVENOUS AS NEEDED
Status: DISCONTINUED | OUTPATIENT
Start: 2022-08-15 | End: 2022-08-15 | Stop reason: HOSPADM

## 2022-08-15 RX ORDER — LIDOCAINE HYDROCHLORIDE 20 MG/ML
INJECTION, SOLUTION EPIDURAL; INFILTRATION; INTRACAUDAL; PERINEURAL AS NEEDED
Status: DISCONTINUED | OUTPATIENT
Start: 2022-08-15 | End: 2022-08-15 | Stop reason: HOSPADM

## 2022-08-15 RX ORDER — SODIUM CHLORIDE, SODIUM LACTATE, POTASSIUM CHLORIDE, CALCIUM CHLORIDE 600; 310; 30; 20 MG/100ML; MG/100ML; MG/100ML; MG/100ML
50 INJECTION, SOLUTION INTRAVENOUS CONTINUOUS
Status: DISCONTINUED | OUTPATIENT
Start: 2022-08-15 | End: 2022-08-15 | Stop reason: HOSPADM

## 2022-08-15 RX ORDER — ONDANSETRON 2 MG/ML
4 INJECTION INTRAMUSCULAR; INTRAVENOUS ONCE
Status: DISCONTINUED | OUTPATIENT
Start: 2022-08-15 | End: 2022-08-15 | Stop reason: HOSPADM

## 2022-08-15 RX ADMIN — PROPOFOL 20 MG: 10 INJECTION, EMULSION INTRAVENOUS at 07:50

## 2022-08-15 RX ADMIN — PROPOFOL 20 MG: 10 INJECTION, EMULSION INTRAVENOUS at 07:52

## 2022-08-15 RX ADMIN — PROPOFOL 20 MG: 10 INJECTION, EMULSION INTRAVENOUS at 07:47

## 2022-08-15 RX ADMIN — PROPOFOL 50 MG: 10 INJECTION, EMULSION INTRAVENOUS at 07:44

## 2022-08-15 RX ADMIN — PROPOFOL 20 MG: 10 INJECTION, EMULSION INTRAVENOUS at 07:49

## 2022-08-15 RX ADMIN — LIDOCAINE HYDROCHLORIDE 60 MG: 20 INJECTION, SOLUTION EPIDURAL; INFILTRATION; INTRACAUDAL; PERINEURAL at 07:42

## 2022-08-15 RX ADMIN — PROPOFOL 20 MG: 10 INJECTION, EMULSION INTRAVENOUS at 07:54

## 2022-08-15 RX ADMIN — SODIUM CHLORIDE, SODIUM LACTATE, POTASSIUM CHLORIDE, AND CALCIUM CHLORIDE: 600; 310; 30; 20 INJECTION, SOLUTION INTRAVENOUS at 07:36

## 2022-08-15 RX ADMIN — PROPOFOL 20 MG: 10 INJECTION, EMULSION INTRAVENOUS at 07:48

## 2022-08-15 RX ADMIN — PROPOFOL 40 MG: 10 INJECTION, EMULSION INTRAVENOUS at 07:45

## 2022-08-15 NOTE — H&P
[email or web address]  [phone]      Impression:   1. Average risk colon cancer screening exam      Plan:     1.  Colonoscopy      Addendum: All lab tests orders pertaining to the procedure have been ordered by the anesthesia personnel and results will be addressed by the anesthesia team    Chief Complaint: Average risk colon cancer screening exam.      HPI:  Robina Delarosa is a 74 y.o. female who is being seen on consult for average risk colon cancer screening with colonoscopy    PMH:   Past Medical History:   Diagnosis Date    Arthritis     Chronic kidney disease     GERD (gastroesophageal reflux disease)     Hypertension     Ill-defined condition     Positional Vertigo -  pt can not lie flat    Sleep apnea        PSH:   Past Surgical History:   Procedure Laterality Date    HX BACK SURGERY      low back 2018    HX BREAST BIOPSY      6-8 yrs ago, scar marked    HX CARPAL TUNNEL RELEASE Right     mid to late [de-identified]    HX CARPAL TUNNEL RELEASE Left     mid 1990s    HX  SECTION      HX CHOLECYSTECTOMY      HX GYN      Vaginal Hernia as a child     HX HERNIA REPAIR      age [de-identified]    HX HYSTERECTOMY      partial     Belynda Smoker      Dr Shayy Reed in 2020    NEUROLOGICAL PROCEDURE UNLISTED  2017    lower lumbar       Social HX:   Social History     Socioeconomic History    Marital status:      Spouse name: Not on file    Number of children: Not on file    Years of education: Not on file    Highest education level: Not on file   Occupational History    Not on file   Tobacco Use    Smoking status: Never    Smokeless tobacco: Never   Vaping Use    Vaping Use: Never used   Substance and Sexual Activity    Alcohol use: Yes     Comment: occasionally    Drug use: Never    Sexual activity: Not on file   Other Topics Concern    Not on file   Social History Narrative    Not on file     Social Determinants of Health     Financial Resource Strain: Not on file   Food Insecurity: Not on file   Transportation Needs: Not on file   Physical Activity: Not on file   Stress: Not on file   Social Connections: Not on file   Intimate Partner Violence: Not on file   Housing Stability: Not on file       FHX:   Family History   Problem Relation Age of Onset    Cancer Father        Allergy:   Allergies   Allergen Reactions    Cymbalta [Duloxetine] Anaphylaxis    Curcumin Itching    Cyclobenzaprine Unknown (comments)    Gabapentin Other (comments)     hallucinations    Nsaids (Non-Steroidal Anti-Inflammatory Drug) Other (comments)     History \"gastric issues\" and kidney issues per pt. Pravastatin Sodium Unknown (comments)    Silicone Unknown (comments)    Tramadol Other (comments)     hallucinations       Home Medications:     Medications Prior to Admission   Medication Sig    diazePAM (VALIUM) 5 mg tablet 1/2-1 tablet as needed    estradioL (ESTRACE) 0.01 % (0.1 mg/gram) vaginal cream apply 1/4 applicatorful pv 2 - 3  x weekly at hs    ipratropium (ATROVENT) 21 mcg (0.03 %) nasal spray 2 sprays in each nostril prn allergy sxs (Patient not taking: Reported on 6/9/2022)    tiZANidine (Zanaflex) 4 mg tablet 1 tablet as needed    biotin 1 mg tab Take 1,000 mcg by mouth daily. fluticasone propionate (FLONASE) 50 mcg/actuation nasal spray instill 2 sprays into each nostril once daily if needed for congestion    metoprolol succinate (TOPROL-XL) 100 mg tablet Take 100 mg by mouth daily. losartan (COZAAR) 100 mg tablet Take 100 mg by mouth daily. diclofenac (VOLTAREN) 1 % gel Apply 4 g to affected area every six (6) hours as needed for Pain (neck). colchicine 0.6 mg tablet Take 1 Tab by mouth two (2) times a day. (Patient not taking: Reported on 4/28/2022)    ondansetron (ZOFRAN ODT) 4 mg disintegrating tablet Take 1 Tab by mouth every six (6) hours. (Patient not taking: Reported on 4/28/2022)    ferrous sulfate (IRON) 325 mg (65 mg iron) EC tablet Take 1 Tab by mouth two (2) times daily (with meals).  (Patient not taking: Reported on 2/17/2022)    OTHER Cell [employer] -8 gtts daily OTC    OTHER CBD oil as needed (Patient not taking: Reported on 4/28/2022)    metoclopramide HCl (Reglan) 10 mg tablet Take 10 mg by mouth Before breakfast, lunch, dinner and at bedtime. omeprazole (PRILOSEC) 40 mg capsule Take 40 mg by mouth daily. acetaminophen (TYLENOL) 325 mg tablet Take 650 mg by mouth every four (4) hours as needed for Pain.    metoprolol (LOPRESSOR) 50 mg tablet Take 50 mg by mouth daily. folic acid (FOLVITE) 1 mg tablet Take 1 mg by mouth daily. Cholecalciferol, Vitamin D3, (VITAMIN D3) 1,000 unit cap Take 1,000 Units by mouth daily. cyanocobalamin (VITAMIN B-12) 1,000 mcg tablet Take 1,000 mcg by mouth daily as needed. Review of Systems:     Constitutional: No fevers, chills, weight loss, fatigue. Skin: No rashes, pruritis, jaundice, ulcerations, erythema. HENT: No headaches, nosebleeds, sinus pressure, rhinorrhea, sore throat. Eyes: No visual changes, blurred vision, eye pain, photophobia, jaundice. Cardiovascular: No chest pain, heart palpitations. Respiratory: No cough, SOB, wheezing, chest discomfort, orthopnea. Gastrointestinal: Neg unless noted otherwise in H&P   Genitourinary: No dysuria, bleeding, discharge, pyuria. Musculoskeletal: No weakness, arthralgias, wasting. Endo: No sweats. Heme: No bruising, easy bleeding. Allergies: As noted. Neurological: Cranial nerves intact. Alert and oriented. Gait not assessed. Psychiatric:  No anxiety, depression, hallucinations. Visit Vitals   4' 11\" (1.499 m)   Wt 82.1 kg (181 lb)   BMI 36.56 kg/m²       Physical Assessment:     constitutional: appearance: well developed, well nourished, normal habitus, no deformities, in no acute distress. skin: inspection: no rashes, ulcers, icterus or other lesions; no clubbing or telangiectasias. palpation: no induration or subcutaneos nodules.    eyes: inspection: normal conjunctivae and lids; no jaundice pupils: symmetrical, normoreactive to light, normal accommodation and size. ENMT: mouth: normal oral mucosa,lips and gums; good dentition. oropharynx: normal tongue, hard and soft palate; posterior pharynx without erithema, exudate or lesions. neck: thyroid: normal size, consistency and position; no masses or tenderness. respiratory: effort: normal chest excursion; no intercostal retraction or accessory muscle use. cardiovascular: abdominal aorta: normal size and position; no bruits. palpation: PMI of normal size and position; normal rhythm; no thrill or murmurs. abdominal: abdomen: normal consistency; no tenderness or masses. hernias: no hernias appreciated. liver: normal size and consistency. spleen: not palpable. rectal: hemoccult/guaiac: not performed. musculoskeletal: digits and nails: no clubbing, cyanosis, petechiae or other inflammatory conditions. gait: normal gait and station head and neck: normal range of motion; no pain, crepitation or contracture. spine/ribs/pelvis: normal range of motion; no pain, deformity or contracture. lymphatic: axilae: not palpable. groin: not palpable. neck: within normal limits. other: not palpable. neurologic: cranial nerves: II-XII normal.   psychiatric: judgement/insight: within normal limits. memory: within normal limits for recent and remote events. mood and affect: no evidence of depression, anxiety or agitation. orientation: oriented to time, space and person. Basic Metabolic Profile   No results for input(s): NA, K, CL, CO2, BUN, GLU, CA, MG, PHOS in the last 72 hours. No lab exists for component: CREAT      CBC w/Diff    No results for input(s): WBC, RBC, HGB, HCT, MCV, MCH, MCHC, RDW, PLT, HGBEXT, HCTEXT, PLTEXT in the last 72 hours. No lab exists for component: MPV No results for input(s): GRANS, LYMPH, EOS, PRO, MYELO, METAS, BLAST in the last 72 hours.     No lab exists for component: MONO, BASO     Hepatic Function No results for input(s): ALB, TP, TBILI, AP, AML, LPSE in the last 72 hours. No lab exists for component: DBAUDREY, GPT, SGOT       Zohaib Fraga MD, M.D. [facility], [address] [place] [address]  [email or web address]

## 2022-08-15 NOTE — PROGRESS NOTES
conducted a pre-surgery visit with Florina Jacobs, who is a 74 y.o.,female. The  provided the following Interventions:  Initiated a relationship of care and support. Offered prayer and assurance of continued prayers on patient's behalf. There is no advance directive completed. Plan:  Chaplains will continue to follow and will provide pastoral care on an as needed/requested basis.  recommends bedside caregivers page  on duty if patient shows signs of acute spiritual or emotional distress.    Reiseñor 3   Board Certified [address] [place] [address]   [phone]

## 2022-08-15 NOTE — ANESTHESIA PREPROCEDURE EVALUATION
Relevant Problems   ENDOCRINE   (+) Arthritis of knee   (+) Severe obesity (HCC)       Anesthetic History   No history of anesthetic complications            Review of Systems / Medical History  Patient summary reviewed, nursing notes reviewed and pertinent labs reviewed    Pulmonary        Sleep apnea           Neuro/Psych   Within defined limits           Cardiovascular    Hypertension              Exercise tolerance: >4 METS     GI/Hepatic/Renal     GERD    Renal disease: CRI       Endo/Other        Morbid obesity and arthritis     Other Findings              Physical Exam    Airway  Mallampati: II  TM Distance: 4 - 6 cm  Neck ROM: normal range of motion   Mouth opening: Normal     Cardiovascular  Regular rate and rhythm,  S1 and S2 normal,  no murmur, click, rub, or gallop  Rhythm: regular  Rate: normal         Dental  No notable dental hx       Pulmonary  Breath sounds clear to auscultation               Abdominal  GI exam deferred       Other Findings            Anesthetic Plan    ASA: 3  Anesthesia type: general          Induction: Intravenous  Anesthetic plan and risks discussed with: Patient

## 2022-08-15 NOTE — DISCHARGE INSTRUCTIONS
Colonoscopy: What to Expect at [address] [place] [address]  After a colonoscopy, you'll stay at the clinic until you wake up. Then you can go home. But you'll need to arrange for a ride. Your doctor will tell you when you can eat and do your other usual activities. Your doctor will talk to you about when you'll need your next colonoscopy. Your doctor can help you decide how often you need to be checked. This will depend on the results of your test and your risk for colorectal cancer. After the test, you may be bloated or have gas pains. You may need to pass gas. If a biopsy was done or a polyp was removed, you may have streaks of blood in your stool (feces) for a few days. Problems such as heavy rectal bleeding may not occur until several weeks after the test. This isn't common. But it can happen after polyps are removed. This care sheet gives you a general idea about how long it will take for you to recover. But each person recovers at a different pace. Follow the steps below to get better as quickly as possible. How can you care for yourself at home? Activity    Rest when you feel tired. You can do your normal activities when it feels okay to do so. Diet    Follow your doctor's directions for eating. Unless your doctor has told you not to, drink plenty of fluids. This helps to replace the fluids that were lost during the colon prep. Do not drink alcohol. Medicines    Your doctor will tell you if and when you can restart your medicines. You will also be given instructions about taking any new medicines. If you take aspirin or some other blood thinner, ask your doctor if and when to start taking it again. Make sure that you understand exactly what your doctor wants you to do. If polyps were removed or a biopsy was done during the test, your doctor may tell you not to take aspirin or other anti-inflammatory medicines for a few days. These include ibuprofen (Advil, Motrin) and naproxen (Aleve). Other instructions    For your safety, do not drive or operate machinery until the medicine wears off and you can think clearly. Your doctor may tell you not to drive or operate machinery until the day after your test.     Do not sign legal documents or make major decisions until the medicine wears off and you can think clearly. The anesthesia can make it hard for you to fully understand what you are agreeing to. Follow-up care is a key part of your treatment and safety. Be sure to make and go to all appointments, and call your doctor if you are having problems. It's also a good idea to know your test results and keep a list of the medicines you take. When should you call for help? Call 911 anytime you think you may need emergency care. For example, call if:    You passed out (lost consciousness). You pass maroon or bloody stools. You have trouble breathing. Call your doctor now or seek immediate medical care if:    You have pain that does not get better after you take pain medicine. You are sick to your stomach or cannot drink fluids. You have new or worse belly pain. You have blood in your stools. You have a fever. You cannot pass stools or gas. Watch closely for changes in your health, and be sure to contact your doctor if you have any problems. Where can you learn more? Go to http://www.gray.com/  Enter E264 in the search box to learn more about \"Colonoscopy: What to Expect at Home. \"  Current as of: September 8, 2021               Content Version: 13.2  © 2006-2022 Healthwise, Incorporated. Care instructions adapted under license by "[employer]" (which disclaims liability or warranty for this information). If you have questions about a medical condition or this instruction, always ask your healthcare professional. [place] [address] any warranty or liability for your use of this information.     DISCHARGE SUMMARY from Nurse     POST-PROCEDURE INSTRUCTIONS:    Call your Physician if you:  Observe any excess bleeding. Develop a temperature over 100.5o F. Experience abdominal, shoulder or chest pain. Notice any signs of decreased circulation or nerve impairment to an extremity such as a change in color, persistent numbness, tingling, coldness or increase in pain. Vomit blood or you have nausea and vomiting lasting longer than 4 hours. Are unable to take medications. Are unable to urinate within 8 hours after discharge following general anesthesia or intravenous sedation. For the next 24 hours after receiving general anesthesia or intravenous sedation, or while taking prescription Narcotics, limit your activities:  Do NOT drive a motor vehicle, operate hazard machinery or power tools, or perform tasks that require coordination. The medication you received during your procedure may have some effect on your mental awareness. Do NOT make important personal or business decisions. The medication you received during your procedure may have some effect on your mental awareness. Do NOT drink alcoholic beverages. These drinks do not mix well with the medications that have been given to you. Upon discharge from the hospital, you must be accompanied by a responsible adult. Resume your diet as directed by your physician. Resume medications as your physician has prescribed. Please give a list of your current medications to your Primary Care Provider. Please update this list whenever your medications are discontinued, doses are changed, or new medications (including over-the-counter products) are added. Please carry medication information at all times in case of emergency situations. These are general instructions for a healthy lifestyle:    No smoking/ No tobacco products/ Avoid exposure to second hand smoke.   Surgeon General's Warning:  Quitting smoking now greatly reduces serious risk to your health. Obesity, smoking, and a sedentary lifestyle greatly increase your risk for illness. A healthy diet, regular physical exercise & weight monitoring are important for maintaining a healthy lifestyle  You may be retaining fluid if you have a history of heart failure or if you experience any of the following symptoms:  Weight gain of 3 pounds or more overnight or 5 pounds in a week, increased swelling in our hands or feet or shortness of breath while lying flat in bed. Please call your doctor as soon as you notice any of these symptoms; do not wait until your next office visit. Recognize signs and symptoms of STROKE:  F  -  Face looks uneven  A  -  Arms unable to move or move unevenly  S  -  Speech slurred or non-existent  T  -  Time to call 911 - as soon as signs and symptoms begin - DO NOT go back to bed or wait to see If you get better - TIME IS BRAIN. Colorectal Screening  Colorectal cancer almost always develops from precancerous polyps (abnormal growths) in the colon or rectum. Screening tests can find precancerous polyps, so that they can be removed before they turn into cancer. Screening tests can also find colorectal cancer early, when treatment works best.  Speak with your physician about when you should begin screening and how often you should be tested. Colon Polyps: Care Instructions  Your Care Instructions     Colon polyps are growths in the colon or the rectum. The cause of most colon polyps is not known, and most people who get them do not have any problems. But a certain kind can turn into cancer. For this reason, regular testing for colon polyps is important for people as they get older. It is also important for anyone who has an increased risk for colon cancer. Polyps are usually found through routine colon cancer screening tests. Although most colon polyps are not cancerous, they are usually removed and then tested for cancer.  Screening for colon cancer saves lives because the cancer can usually be cured if it is caught early. If you have a polyp that is the type that can turn into cancer, you may need more tests to examine your entire colon. The doctor will remove any other polyps that he or she finds, and you will be tested more often. Follow-up care is a key part of your treatment and safety. Be sure to make and go to all appointments, and call your doctor if you are having problems. It's also a good idea to know your test results and keep a list of the medicines you take. How can you care for yourself at home? Regular exams to look for colon polyps are the best way to prevent polyps from turning into colon cancer. These can include stool tests, sigmoidoscopy, colonoscopy, and CT colonography. Talk with your doctor about a testing schedule that is right for you. To prevent polyps  There is no home treatment that can prevent colon polyps. But these steps may help lower your risk for cancer. Stay active. Being active can help you get to and stay at a healthy weight. Try to exercise on most days of the week. Walking is a good choice. Eat well. Choose a variety of vegetables, fruits, legumes (such as peas and beans), fish, poultry, and whole grains. Do not smoke. If you need help quitting, talk to your doctor about stop-smoking programs and medicines. These can increase your chances of quitting for good. If you drink alcohol, limit how much you drink. Limit alcohol to 2 drinks a day for men and 1 drink a day for women. When should you call for help? Call your doctor now or seek immediate medical care if:    You have severe belly pain. Your stools are maroon or very bloody. Watch closely for changes in your health, and be sure to contact your doctor if:    You have a fever. You have nausea or vomiting. You have a change in bowel habits (new constipation or diarrhea). Your symptoms get worse or are not improving as expected. Where can you learn more?   Go to http://www.gray.com/  Enter 95 [id number] in the search box to learn more about \"Colon Polyps: Care Instructions. \"  Current as of: 2021               Content Version: 13.2  © 4823-1616 [employer]. Care instructions adapted under license by [employer] (which disclaims liability or warranty for this information). If you have questions about a medical condition or this instruction, always ask your healthcare professional. [address] any warranty or liability for your use of this information. [employer] Activation    Thank you for requesting access to Palm. Please follow the instructions below to securely access and download your online medical record. Palm allows you to send messages to your doctor, view your test results, renew your prescriptions, schedule appointments, and more. How Do I Sign Up? In your internet browser, go to [email or web address]. Click on the First Time User? Click Here link in the Sign In box. You will see the New Member Sign Up page. Enter your Palm Access Code exactly as it appears below. You will not need to use this code after youve completed the sign-up process. If you do not sign up before the expiration date, you must request a new code. Palm Access Code: Activation code not generated  Current Palm Status: Active (This is the date your Palm access code will )    Enter the last four digits of your Social Security Number (xxxx) and Date of Birth (mm/dd/yyyy) as indicated and click Submit. You will be taken to the next sign-up page. Create a Palm ID. This will be your Palm login ID and cannot be changed, so think of one that is secure and easy to remember. Create a Palm password. You can change your password at any time. Enter your Password Reset Question and Answer. This can be used at a later time if you forget your password. Enter your e-mail address. You will receive e-mail notification when new information is available in [address] Click Sign Up. You can now view and download portions of your medical record. Click the [employer] link to download a portable copy of your medical information. Additional Information    If you have questions, please call [phone]. Remember, [employer] is NOT to be used for urgent needs. For medical emergencies, dial 911. Educational references and/or instructions provided during this visit included:    See Attached      APPOINTMENTS:    Per MD Instruction    Discharge information has been reviewed with the patient. The patient verbalized understanding.

## 2022-08-15 NOTE — ANESTHESIA POSTPROCEDURE EVALUATION
Procedure(s):  COLONOSCOPY/polypectomy. general    Anesthesia Post Evaluation      Multimodal analgesia: multimodal analgesia used between 6 hours prior to anesthesia start to PACU discharge  Patient location during evaluation: bedside  Patient participation: complete - patient participated  Level of consciousness: awake  Pain management: adequate  Airway patency: patent  Anesthetic complications: no  Cardiovascular status: stable  Respiratory status: acceptable  Hydration status: acceptable  Post anesthesia nausea and vomiting:  controlled  Final Post Anesthesia Temperature Assessment:  Normothermia (36.0-37.5 degrees C)      INITIAL Post-op Vital signs:   Vitals Value Taken Time   /80 08/15/22 0825   Temp 36.5 °C (97.7 °F) 08/15/22 0824   Pulse 103 08/15/22 0827   Resp 18 08/15/22 0827   SpO2 97 % 08/15/22 0827   Vitals shown include unvalidated device data.

## 2022-09-13 ENCOUNTER — HOSPITAL ENCOUNTER (OUTPATIENT)
Dept: LAB | Age: 72
Discharge: HOME | End: 2022-09-13
Payer: MEDICARE

## 2022-09-13 ENCOUNTER — TRANSCRIBE ORDER (OUTPATIENT)
Dept: REGISTRATION | Age: 72
End: 2022-09-13

## 2022-09-13 DIAGNOSIS — E53.8 B12 DEFICIENCY: ICD-10-CM

## 2022-09-13 DIAGNOSIS — E53.8 FOLATE DEFICIENCY: ICD-10-CM

## 2022-09-13 DIAGNOSIS — E83.10 IRON DISORDER: Primary | ICD-10-CM

## 2022-09-13 DIAGNOSIS — E83.10 IRON DISORDER: ICD-10-CM

## 2022-09-13 LAB
FERRITIN SERPL-MCNC: 250 NG/ML (ref 8–388)
FOLATE SERPL-MCNC: >20 NG/ML (ref 3.1–17.5)
IRON SATN MFR SERPL: 23 % (ref 20–50)
IRON SERPL-MCNC: 61 UG/DL (ref 50–175)
TIBC SERPL-MCNC: 269 UG/DL (ref 250–450)
VIT B12 SERPL-MCNC: 1035 PG/ML (ref 211–911)

## 2022-09-13 PROCEDURE — 84466 ASSAY OF TRANSFERRIN: CPT

## 2022-09-13 PROCEDURE — 82607 VITAMIN B-12: CPT

## 2022-09-13 PROCEDURE — 36415 COLL VENOUS BLD VENIPUNCTURE: CPT

## 2022-09-13 PROCEDURE — 82728 ASSAY OF FERRITIN: CPT

## 2022-09-13 PROCEDURE — 83540 ASSAY OF IRON: CPT

## 2022-09-13 PROCEDURE — 82746 ASSAY OF FOLIC ACID SERUM: CPT

## 2022-09-13 NOTE — PROGRESS NOTES
PT DAILY TREATMENT NOTE - [facility] 3-    Patient Name: Zunilda Harrison  Date:3/26/2018  : 1950  [x]  Patient  Verified  [place] / Plan: [place] MED[place]ARE PART A & B / Product Type: Medicare /    In time:10:32  Out time:11:26  Total Treatment Time (min): 54  Total Timed Codes (min): 44  1:1 Treatment Time ( only): 38   Visit #: 5 of 8    Treatment Area: Low back pain [M54.5]    SUBJECTIVE  Pain Level (0-10 scale): 4  Any medication changes, allergies to medications, adverse drug reactions, diagnosis change, or new procedure performed?: [x] No    [] Yes (see summary sheet for update)  Subjective functional status/changes:   [] No changes reported  Patient reports that she is going to return to the [facility] to take aquatic classes. OBJECTIVE  34 min Therapeutic Exercise:  [x] See flow sheet :   Rationale: increase ROM, increase strength and improve coordination to improve the patients ability to increase ease with ADLs    10 min Neuromuscular Re-education:  [x]  See flow sheet :   Rationale: increase strength, improve coordination and increase proprioception  to improve the patients ability to improve core activation and postural awareness             With   [] TE   [] TA   [] neuro   [] other: Patient Education: [x] Review HEP    [] Progressed/Changed HEP based on:   [] positioning   [] body mechanics   [] transfers   [] heat/ice application    [] other:      Other Objective/Functional Measures: Added 1/2 prone hip ext and donkey kick     Pain Level (0-10 scale) post treatment: 3    ASSESSMENT/Changes in Function:   Patient report that she would like to return to [facility] aquatic classes next week. She presents with slightly improved endurance today and able to add exercises per flowsheet. Will D/C next visit with updated HEP.      Patient will continue to benefit from skilled PT services to modify and progress therapeutic interventions, address functional mobility deficits, address ROM deficits, address strength deficits, analyze and address soft tissue restrictions, analyze and cue movement patterns, analyze and modify body mechanics/ergonomics and assess and modify postural abnormalities to attain remaining goals. []  See Plan of Care  []  See progress note/recertification  []  See Discharge Summary         Progress towards goals / Updated goals:  Updated Goals to be accomplished in 3-4 weeks:  1. Pt will improve trunk SB AROM to 75% of WNL for improved performance of housework.-met, 75% (3/21/2018)  2. Pt will report no difficulty performing her normal housework for improved functional independence.   3. Pt will demonstrate 4/5 hip EXT MMT for improved standing activity tolerance. -  PLAN  []  Upgrade activities as tolerated     [x]  Continue plan of care  []  Update interventions per flow sheet       []  Discharge due to:_  []  Other:_      Monica Nieves 3/26/2018  10:38 AM    Future Appointments  Date Time Provider Pricila Manuel   3/28/2018 10:30 AM Suraj Talbot PTA [id number] DISPLAY PLAN FREE TEXT

## 2022-09-14 LAB — TRANSFERRIN SERPL-MCNC: 225 MG/DL (ref 192–364)

## 2022-11-29 ENCOUNTER — TRANSCRIBE ORDER (OUTPATIENT)
Dept: SCHEDULING | Age: 72
End: 2022-11-29

## 2022-11-29 DIAGNOSIS — Z12.31 VISIT FOR SCREENING MAMMOGRAM: Primary | ICD-10-CM

## 2022-12-23 ENCOUNTER — HOSPITAL ENCOUNTER (OUTPATIENT)
Dept: MAMMOGRAPHY | Age: 72
Discharge: HOME | End: 2022-12-23
Attending: FAMILY MEDICINE
Payer: MEDICARE

## 2022-12-23 DIAGNOSIS — Z12.31 VISIT FOR SCREENING MAMMOGRAM: ICD-10-CM

## 2022-12-23 PROCEDURE — 77063 BREAST TOMOSYNTHESIS BI: CPT

## 2023-04-05 ENCOUNTER — HOSPITAL ENCOUNTER (OUTPATIENT)
Facility: HOSPITAL | Age: 73
Discharge: HOME | End: 2023-04-08
Payer: MEDICARE

## 2023-04-05 DIAGNOSIS — R14.0 ABDOMINAL BLOATING: ICD-10-CM

## 2023-04-05 DIAGNOSIS — K21.9 GASTROESOPHAGEAL REFLUX DISEASE WITHOUT ESOPHAGITIS: ICD-10-CM

## 2023-04-05 PROCEDURE — 36415 COLL VENOUS BLD VENIPUNCTURE: CPT

## 2023-04-05 PROCEDURE — 82784 ASSAY IGA/IGD/IGG/IGM EACH: CPT

## 2023-04-07 LAB
ENDOMYSIUM IGA SER QL: NEGATIVE
IGA SERPL-MCNC: 232 MG/DL (ref 64–422)
TTG IGA SER-ACNC: <2 U/ML (ref 0–3)

## 2023-07-20 ENCOUNTER — HOSPITAL ENCOUNTER (OUTPATIENT)
Facility: HOSPITAL | Age: 73
Discharge: HOME | End: 2023-07-20
Payer: MEDICARE

## 2023-07-20 DIAGNOSIS — I12.9: ICD-10-CM

## 2023-07-20 DIAGNOSIS — N18.31 STAGE 3A CHRONIC KIDNEY DISEASE (HCC): ICD-10-CM

## 2023-07-20 DIAGNOSIS — I12.9 CHRONIC KIDNEY DISEASE DUE TO BENIGN HYPERTENSION: ICD-10-CM

## 2023-07-20 LAB
ALBUMIN SERPL-MCNC: 3.6 G/DL (ref 3.4–5)
ANION GAP SERPL CALC-SCNC: 3 MMOL/L (ref 3–18)
BUN SERPL-MCNC: 18 MG/DL (ref 7–18)
BUN/CREAT SERPL: 13 (ref 12–20)
CALCIUM SERPL-MCNC: 9.4 MG/DL (ref 8.5–10.1)
CALCIUM SERPL-MCNC: 9.5 MG/DL (ref 8.5–10.1)
CHLORIDE SERPL-SCNC: 108 MMOL/L (ref 100–111)
CO2 SERPL-SCNC: 28 MMOL/L (ref 21–32)
CREAT SERPL-MCNC: 1.43 MG/DL (ref 0.6–1.3)
CREAT UR-MCNC: 162 MG/DL (ref 30–125)
ERYTHROCYTE [DISTWIDTH] IN BLOOD BY AUTOMATED COUNT: 15.2 % (ref 11.6–14.5)
GLUCOSE SERPL-MCNC: 83 MG/DL (ref 74–99)
HCT VFR BLD AUTO: 41 % (ref 35–45)
HGB BLD-MCNC: 12.6 G/DL (ref 12–16)
MCH RBC QN AUTO: 25 PG (ref 24–34)
MCHC RBC AUTO-ENTMCNC: 30.7 G/DL (ref 31–37)
MCV RBC AUTO: 81.2 FL (ref 78–100)
MICROALBUMIN UR-MCNC: <0.5 MG/DL (ref 0–3)
MICROALBUMIN/CREAT UR-RTO: ABNORMAL MG/G (ref 0–30)
NRBC # BLD: 0 K/UL (ref 0–0.01)
NRBC BLD-RTO: 0 PER 100 WBC
PHOSPHATE SERPL-MCNC: 3.4 MG/DL (ref 2.5–4.9)
PLATELET # BLD AUTO: 273 K/UL (ref 135–420)
PMV BLD AUTO: 11.2 FL (ref 9.2–11.8)
POTASSIUM SERPL-SCNC: 4.8 MMOL/L (ref 3.5–5.5)
PTH-INTACT SERPL-MCNC: 77.1 PG/ML (ref 18.4–88)
RBC # BLD AUTO: 5.05 M/UL (ref 4.2–5.3)
SODIUM SERPL-SCNC: 139 MMOL/L (ref 136–145)
WBC # BLD AUTO: 7.6 K/UL (ref 4.6–13.2)

## 2023-07-20 PROCEDURE — 82570 ASSAY OF URINE CREATININE: CPT

## 2023-07-20 PROCEDURE — 36415 COLL VENOUS BLD VENIPUNCTURE: CPT

## 2023-07-20 PROCEDURE — 85027 COMPLETE CBC AUTOMATED: CPT

## 2023-07-20 PROCEDURE — 80069 RENAL FUNCTION PANEL: CPT

## 2023-07-20 PROCEDURE — 82043 UR ALBUMIN QUANTITATIVE: CPT

## 2023-07-20 PROCEDURE — 83970 ASSAY OF PARATHORMONE: CPT

## 2023-10-19 ENCOUNTER — OFFICE VISIT (OUTPATIENT)
Age: 73
End: 2023-10-19

## 2023-10-19 ENCOUNTER — HOSPITAL ENCOUNTER (OUTPATIENT)
Facility: HOSPITAL | Age: 73
Discharge: HOME | End: 2023-10-19
Payer: MEDICARE

## 2023-10-19 VITALS — BODY MASS INDEX: 37.57 KG/M2 | WEIGHT: 186 LBS

## 2023-10-19 DIAGNOSIS — M25.561 CHRONIC PAIN OF RIGHT KNEE: ICD-10-CM

## 2023-10-19 DIAGNOSIS — M47.819 SPONDYLOSIS WITHOUT MYELOPATHY OR RADICULOPATHY, SITE UNSPECIFIED: ICD-10-CM

## 2023-10-19 DIAGNOSIS — G89.29 CHRONIC PAIN OF RIGHT KNEE: ICD-10-CM

## 2023-10-19 DIAGNOSIS — Z96.651 PRESENCE OF RIGHT ARTIFICIAL KNEE JOINT: ICD-10-CM

## 2023-10-19 DIAGNOSIS — M54.16 RADICULOPATHY, LUMBAR REGION: ICD-10-CM

## 2023-10-19 DIAGNOSIS — M17.12 UNILATERAL PRIMARY OSTEOARTHRITIS, LEFT KNEE: Primary | ICD-10-CM

## 2023-10-19 LAB
CRP SERPL-MCNC: 2.7 MG/DL (ref 0–0.3)
ERYTHROCYTE [DISTWIDTH] IN BLOOD BY AUTOMATED COUNT: 14.8 % (ref 11.6–14.5)
ERYTHROCYTE [SEDIMENTATION RATE] IN BLOOD: 32 MM/HR (ref 0–30)
HCT VFR BLD AUTO: 40.2 % (ref 35–45)
HGB BLD-MCNC: 12.2 G/DL (ref 12–16)
MCH RBC QN AUTO: 24.6 PG (ref 24–34)
MCHC RBC AUTO-ENTMCNC: 30.3 G/DL (ref 31–37)
MCV RBC AUTO: 81.2 FL (ref 78–100)
NRBC # BLD: 0 K/UL (ref 0–0.01)
NRBC BLD-RTO: 0 PER 100 WBC
PLATELET # BLD AUTO: 236 K/UL (ref 135–420)
PMV BLD AUTO: 12.3 FL (ref 9.2–11.8)
RBC # BLD AUTO: 4.95 M/UL (ref 4.2–5.3)
URATE SERPL-MCNC: 4.9 MG/DL (ref 2.6–7.2)
WBC # BLD AUTO: 7.1 K/UL (ref 4.6–13.2)

## 2023-10-19 PROCEDURE — 84550 ASSAY OF BLOOD/URIC ACID: CPT

## 2023-10-19 PROCEDURE — 36415 COLL VENOUS BLD VENIPUNCTURE: CPT

## 2023-10-19 PROCEDURE — 85652 RBC SED RATE AUTOMATED: CPT

## 2023-10-19 PROCEDURE — 85027 COMPLETE CBC AUTOMATED: CPT

## 2023-10-19 PROCEDURE — 86140 C-REACTIVE PROTEIN: CPT

## 2023-10-19 PROCEDURE — 83529 ASAY OF INTERLEUKIN-6 (IL-6): CPT

## 2023-10-19 RX ORDER — DICLOFENAC EPOLAMINE 0.01 G/1
1 SYSTEM TOPICAL 2 TIMES DAILY
Qty: 60 PATCH | Refills: 2 | Status: SHIPPED | OUTPATIENT
Start: 2023-10-19

## 2023-10-24 LAB — IL6 SERPL-MCNC: 10.1 PG/ML (ref 0–13)

## 2023-10-25 ENCOUNTER — TELEPHONE (OUTPATIENT)
Age: 73
End: 2023-10-25

## 2023-10-30 ENCOUNTER — TELEPHONE (OUTPATIENT)
Age: 73
End: 2023-10-30

## 2023-10-30 DIAGNOSIS — Z96.651 PRESENCE OF RIGHT ARTIFICIAL KNEE JOINT: Primary | ICD-10-CM

## 2023-10-30 NOTE — TELEPHONE ENCOUNTER
Patient received a call from [address] [place] [address] regarding her results but when she called back the lines were down. Patient can be reached at [phone].

## 2023-11-06 ENCOUNTER — TELEPHONE (OUTPATIENT)
Age: 73
End: 2023-11-06

## 2023-11-06 NOTE — TELEPHONE ENCOUNTER
PATIENT IS WAITING TO HEAR ABOUT THE SCHEDULING FOR HER BONE SCAN. PLEASE PLACE ORDER ASAP SO IT CAN BE SCHEDULED AS BERONICA RUIZ ORDERED ON 11/1/23.

## 2023-11-28 ENCOUNTER — HOSPITAL ENCOUNTER (OUTPATIENT)
Facility: HOSPITAL | Age: 73
Discharge: HOME | End: 2023-12-01
Payer: MEDICARE

## 2023-11-28 DIAGNOSIS — Z96.651 PRESENCE OF RIGHT ARTIFICIAL KNEE JOINT: ICD-10-CM

## 2023-11-28 PROCEDURE — 3430000000 HC RX DIAGNOSTIC RADIOPHARMACEUTICAL: Performed by: PHYSICIAN ASSISTANT

## 2023-11-28 PROCEDURE — A9547 IN111 OXYQUINOLINE: HCPCS | Performed by: PHYSICIAN ASSISTANT

## 2023-11-28 PROCEDURE — 78800 RP LOCLZJ TUM 1 AREA 1 D IMG: CPT

## 2023-11-28 PROCEDURE — A9503 TC99M MEDRONATE: HCPCS | Performed by: PHYSICIAN ASSISTANT

## 2023-11-28 RX ORDER — TC 99M MEDRONATE 20 MG/10ML
27.5 INJECTION, POWDER, LYOPHILIZED, FOR SOLUTION INTRAVENOUS
Status: COMPLETED | OUTPATIENT
Start: 2023-11-28 | End: 2023-11-28

## 2023-11-28 RX ORDER — INDIUM IN-111 OXYQUINOLINE 1 UG/ML
540 SOLUTION INTRAVENOUS
Status: COMPLETED | OUTPATIENT
Start: 2023-11-28 | End: 2023-11-28

## 2023-11-28 RX ADMIN — INDIUM IN-111 OXYQUINOLINE 0.54 MILLICURIE: 1 SOLUTION INTRAVENOUS at 12:25

## 2023-11-28 RX ADMIN — TC 99M MEDRONATE 27.5 MILLICURIE: 20 INJECTION, POWDER, LYOPHILIZED, FOR SOLUTION INTRAVENOUS at 09:20

## 2023-11-29 ENCOUNTER — HOSPITAL ENCOUNTER (OUTPATIENT)
Facility: HOSPITAL | Age: 73
Discharge: HOME | End: 2023-12-02
Payer: MEDICARE

## 2023-12-01 ENCOUNTER — HOSPITAL ENCOUNTER (OUTPATIENT)
Facility: HOSPITAL | Age: 73
End: 2023-12-01
Payer: MEDICARE

## 2023-12-01 DIAGNOSIS — Z96.651 PRESENCE OF RIGHT ARTIFICIAL KNEE JOINT: ICD-10-CM

## 2023-12-01 PROCEDURE — 3430000000 HC RX DIAGNOSTIC RADIOPHARMACEUTICAL: Performed by: PHYSICIAN ASSISTANT

## 2023-12-01 PROCEDURE — A9541 TC99M SULFUR COLLOID: HCPCS | Performed by: PHYSICIAN ASSISTANT

## 2023-12-01 RX ORDER — TECHNETIUM TC 99M SULFUR COLLOID 2 MG
10.5 KIT MISCELLANEOUS
Status: COMPLETED | OUTPATIENT
Start: 2023-12-01 | End: 2023-12-01

## 2023-12-01 RX ADMIN — Medication 10.5 MILLICURIE: at 09:45

## 2024-01-06 ENCOUNTER — HOSPITAL ENCOUNTER (OUTPATIENT)
Facility: HOSPITAL | Age: 74
End: 2024-01-06
Payer: MEDICARE

## 2024-01-06 VITALS — HEIGHT: 59 IN | BODY MASS INDEX: 36.69 KG/M2 | WEIGHT: 182 LBS

## 2024-01-06 DIAGNOSIS — Z12.31 VISIT FOR SCREENING MAMMOGRAM: ICD-10-CM

## 2024-01-06 PROCEDURE — 77063 BREAST TOMOSYNTHESIS BI: CPT

## 2024-02-06 ENCOUNTER — TELEPHONE (OUTPATIENT)
Age: 74
End: 2024-02-06

## 2024-02-06 NOTE — TELEPHONE ENCOUNTER
Patient is asking if she could get a prescription for diclofenac sodium (Voltaren) 1% gel sent to her pharmacy

## 2024-03-21 ENCOUNTER — OFFICE VISIT (OUTPATIENT)
Age: 74
End: 2024-03-21
Payer: MEDICARE

## 2024-03-21 DIAGNOSIS — Z96.651 PRESENCE OF RIGHT ARTIFICIAL KNEE JOINT: ICD-10-CM

## 2024-03-21 DIAGNOSIS — M17.12 UNILATERAL PRIMARY OSTEOARTHRITIS, LEFT KNEE: ICD-10-CM

## 2024-03-21 PROCEDURE — 1090F PRES/ABSN URINE INCON ASSESS: CPT | Performed by: PHYSICIAN ASSISTANT

## 2024-03-21 PROCEDURE — 1036F TOBACCO NON-USER: CPT | Performed by: PHYSICIAN ASSISTANT

## 2024-03-21 PROCEDURE — G8417 CALC BMI ABV UP PARAM F/U: HCPCS | Performed by: PHYSICIAN ASSISTANT

## 2024-03-21 PROCEDURE — 3017F COLORECTAL CA SCREEN DOC REV: CPT | Performed by: PHYSICIAN ASSISTANT

## 2024-03-21 PROCEDURE — G8484 FLU IMMUNIZE NO ADMIN: HCPCS | Performed by: PHYSICIAN ASSISTANT

## 2024-03-21 PROCEDURE — 1123F ACP DISCUSS/DSCN MKR DOCD: CPT | Performed by: PHYSICIAN ASSISTANT

## 2024-03-21 PROCEDURE — 99213 OFFICE O/P EST LOW 20 MIN: CPT | Performed by: PHYSICIAN ASSISTANT

## 2024-03-21 PROCEDURE — 73562 X-RAY EXAM OF KNEE 3: CPT | Performed by: PHYSICIAN ASSISTANT

## 2024-03-21 PROCEDURE — G8427 DOCREV CUR MEDS BY ELIG CLIN: HCPCS | Performed by: PHYSICIAN ASSISTANT

## 2024-03-21 PROCEDURE — G8399 PT W/DXA RESULTS DOCUMENT: HCPCS | Performed by: PHYSICIAN ASSISTANT

## 2024-03-21 NOTE — PROGRESS NOTES
Smokeless tobacco: Never   Substance and Sexual Activity    Alcohol use: Yes    Drug use: Never    Sexual activity: Not on file   Other Topics Concern    Not on file   Social History Narrative    Not on file     Social Determinants of Health     Financial Resource Strain: Not on file   Food Insecurity: Not on file   Transportation Needs: Not on file   Physical Activity: Not on file   Stress: Not on file   Social Connections: Not on file   Intimate Partner Violence: Not on file   Housing Stability: Not on file       Past Surgical History:   Procedure Laterality Date    BACK SURGERY      low back 2018    BREAST BIOPSY      6-8 yrs ago, scar marked    CARPAL TUNNEL RELEASE Right     mid to late s    CARPAL TUNNEL RELEASE Left     mid      SECTION      CHOLECYSTECTOMY      COLONOSCOPY N/A 8/15/2022    COLONOSCOPY/polypectomy performed by Ghanshyam Orozco MD at [facility] ENDOSCOPY    GYN      Vaginal Hernia as a child     HERNIA REPAIR      age [age]    HYSTERECTOMY (CERVIX STATUS UNKNOWN)      partial     NEUROLOGICAL SURGERY  2017    lower lumbar    TOTAL KNEE ARTHROPLASTY      Dr Sutton in 2020         Patient seen evaluated today for her right knee.  She is now approximate 3 and half years status post right total replacement surgery.  She overall is doing very well.  She is pleased with the results.  Does report little stiffness after being seated in a limited achiness with weather changes.  She denies any start up pain.  No feelings of instability.  No troubles the wound.  No fevers or chills.    Patient denies recent fevers, chills, chest pain, SOB, or injuries.   No recent systemic changes noted.  A 12-point review of systems is performed today.  Pertinent positives are noted.  All other systems reviewed and otherwise are negative.    Physical exam: General: Alert and oriented x3, nad.  well-developed, well nourished.  normal affect, AF.  NC/AT, EOMI, neck supple, trachea midline, no JVD present.

## 2024-04-15 ENCOUNTER — HOSPITAL ENCOUNTER (EMERGENCY)
Facility: HOSPITAL | Age: 74
Discharge: HOME | End: 2024-04-15
Payer: MEDICARE

## 2024-04-15 ENCOUNTER — APPOINTMENT (OUTPATIENT)
Facility: HOSPITAL | Age: 74
End: 2024-04-15
Payer: MEDICARE

## 2024-04-15 VITALS
OXYGEN SATURATION: 99 % | DIASTOLIC BLOOD PRESSURE: 80 MMHG | WEIGHT: 180 LBS | HEIGHT: 59 IN | TEMPERATURE: 98.6 F | HEART RATE: 98 BPM | RESPIRATION RATE: 18 BRPM | BODY MASS INDEX: 36.29 KG/M2 | SYSTOLIC BLOOD PRESSURE: 140 MMHG

## 2024-04-15 DIAGNOSIS — S93.402A SPRAIN OF LEFT ANKLE, UNSPECIFIED LIGAMENT, INITIAL ENCOUNTER: Primary | ICD-10-CM

## 2024-04-15 PROCEDURE — 73610 X-RAY EXAM OF ANKLE: CPT

## 2024-04-15 PROCEDURE — 99283 EMERGENCY DEPT VISIT LOW MDM: CPT

## 2024-04-15 PROCEDURE — 6370000000 HC RX 637 (ALT 250 FOR IP)

## 2024-04-15 RX ORDER — ACETAMINOPHEN 500 MG
500 TABLET ORAL EVERY 6 HOURS PRN
Qty: 30 TABLET | Refills: 0 | Status: SHIPPED | OUTPATIENT
Start: 2024-04-15

## 2024-04-15 RX ORDER — ACETAMINOPHEN 325 MG/1
650 TABLET ORAL
Status: COMPLETED | OUTPATIENT
Start: 2024-04-15 | End: 2024-04-15

## 2024-04-15 RX ADMIN — ACETAMINOPHEN 650 MG: 325 TABLET ORAL at 15:44

## 2024-04-15 ASSESSMENT — PAIN SCALES - GENERAL: PAINLEVEL_OUTOF10: 3

## 2024-04-15 ASSESSMENT — PAIN DESCRIPTION - LOCATION: LOCATION: ANKLE

## 2024-04-15 ASSESSMENT — PAIN DESCRIPTION - ORIENTATION: ORIENTATION: LEFT

## 2024-04-15 NOTE — ED NOTES
PT provided with aircast stirrup for left ankle. Discharge instructions reviewed and understood. PT discharged via wheelchair with all belongings. PT contacted own transportation.

## 2024-04-15 NOTE — ED PROVIDER NOTES
EMERGENCY DEPARTMENT HISTORY AND PHYSICAL EXAM      Patient Name: Leslye Hancock  MRN: [id number]  YOB: 1950  Provider: Ping Ortiz PA-C  PCP: Noris Bowling DO   Time/Date of evaluation: 3:31 PM EDT on 4/15/24    History of Presenting Illness     Chief Complaint   Patient presents with    Ankle Pain     Left ankle       History Provided By: Patient     History (Narative):   Leslye Hancock is a 73 y.o. female with a PMHX of of arthritis, chronic kidney disease, GERD, hypertension  who presents to the emergency department  by POV C/O of left ankle pain.  Patient states that she was out shopping, when she was walking down a step, patient states that she heard a pop, and had difficulty bearing weight.  Patient denies any prior surgeries or injuries to this ankle, any other trauma or injury  Past History     Past Medical History:  Past Medical History:   Diagnosis Date    Arthritis     Chronic kidney disease     GERD (gastroesophageal reflux disease)     Hypertension     Ill-defined condition     Positional Vertigo -  pt can not lie flat    Sleep apnea        Past Surgical History:  Past Surgical History:   Procedure Laterality Date    BACK SURGERY      low back 2018    BREAST BIOPSY      6-8 yrs ago, scar marked    CARPAL TUNNEL RELEASE Right     mid to late     CARPAL TUNNEL RELEASE Left     mid      SECTION      CHOLECYSTECTOMY      COLONOSCOPY N/A 8/15/2022    COLONOSCOPY/polypectomy performed by Ghanshyam Orozco MD at [facility] ENDOSCOPY    GYN      Vaginal Hernia as a child     HERNIA REPAIR      age [age]    HYSTERECTOMY (CERVIX STATUS UNKNOWN)      partial     NEUROLOGICAL SURGERY  2017    lower lumbar    TOTAL KNEE ARTHROPLASTY      Dr Sutton in 2020       Family History:  Family History   Problem Relation Age of Onset    Cancer Father        Social History:  Social History     Tobacco Use    Smoking status: Never    Smokeless tobacco: Never   Substance Use

## 2024-04-18 ENCOUNTER — OFFICE VISIT (OUTPATIENT)
Age: 74
End: 2024-04-18
Payer: MEDICARE

## 2024-04-18 VITALS — BODY MASS INDEX: 36.36 KG/M2 | HEIGHT: 59 IN

## 2024-04-18 DIAGNOSIS — M89.8X9 BONE PAIN: ICD-10-CM

## 2024-04-18 DIAGNOSIS — M25.572 ACUTE LEFT ANKLE PAIN: ICD-10-CM

## 2024-04-18 DIAGNOSIS — S93.492A SPRAIN OF ANTERIOR TALOFIBULAR LIGAMENT OF LEFT ANKLE, INITIAL ENCOUNTER: Primary | ICD-10-CM

## 2024-04-18 PROCEDURE — 1123F ACP DISCUSS/DSCN MKR DOCD: CPT | Performed by: ORTHOPAEDIC SURGERY

## 2024-04-18 PROCEDURE — G8417 CALC BMI ABV UP PARAM F/U: HCPCS | Performed by: ORTHOPAEDIC SURGERY

## 2024-04-18 PROCEDURE — 99214 OFFICE O/P EST MOD 30 MIN: CPT | Performed by: ORTHOPAEDIC SURGERY

## 2024-04-18 PROCEDURE — 1090F PRES/ABSN URINE INCON ASSESS: CPT | Performed by: ORTHOPAEDIC SURGERY

## 2024-04-18 PROCEDURE — G8399 PT W/DXA RESULTS DOCUMENT: HCPCS | Performed by: ORTHOPAEDIC SURGERY

## 2024-04-18 PROCEDURE — 1036F TOBACCO NON-USER: CPT | Performed by: ORTHOPAEDIC SURGERY

## 2024-04-18 PROCEDURE — G8427 DOCREV CUR MEDS BY ELIG CLIN: HCPCS | Performed by: ORTHOPAEDIC SURGERY

## 2024-04-18 PROCEDURE — 3017F COLORECTAL CA SCREEN DOC REV: CPT | Performed by: ORTHOPAEDIC SURGERY

## 2024-04-18 RX ORDER — METAXALONE 800 MG/1
800 TABLET ORAL 2 TIMES DAILY PRN
Qty: 14 TABLET | Refills: 0 | Status: SHIPPED | OUTPATIENT
Start: 2024-04-18 | End: 2024-04-25

## 2024-04-18 NOTE — PROGRESS NOTES
Final     Comment:     (NOTE)  HbA1C Interpretive Ranges  <5.7              Normal  5.7 - 6.4         Consider Prediabetes  >6.5              Consider Diabetes     ,  Lab Results   Component Value Date     07/20/2023    K 4.8 07/20/2023     07/20/2023    CO2 28 07/20/2023    BUN 18 07/20/2023    CREATININE 1.43 (H) 07/20/2023    GLUCOSE 83 07/20/2023    CALCIUM 9.5 07/20/2023    CALCIUM 9.4 07/20/2023    LABGLOM 38 (L) 07/20/2023    GFRAA 50 (L) 06/15/2022     No results found for: \"VITD25\" ,  Lab Results   Component Value Date    INR 1.7 (A) 08/31/2020    INR 2.1 (A) 08/27/2020    INR 1.9 (A) 08/24/2020    PROTIME 13.8 08/18/2020    PROTIME 13.2 08/03/2020   ,   Lab Results   Component Value Date    APTT 32.4 08/03/2020          REVIEW OF SYSTEMS : 4/24/2024  ALL BELOW ARE Negative except : SEE HPI     All other systems reviewed and are negative.     14 point review of systems otherwise negative unless noted in HPI.          I have spent  30 min total time minutes reviewing the previous notes, independently interpreting results reviewing diagnostic studies [Advanced  Imaging, Diagnostic test results (x-rays)] and had a direct face to face with the patient discussing the diagnosis and importance of compliance with the treatment and plan.  The treatment plan is listed in the above plan section of this Office encounter. I answered all of her questions, as well as documenting patient care coordination for this individual on the day of the visit.      Disclaimer:     Sections of this note are dictated using utilizing voice recognition software, which may have resulted in some phonetic based errors in grammar and contents. Even though attempts were made to correct all the mistakes, some may have been missed, and remained in the body of the document. If questions arise, please contact our department.     An electronic signature was used to authenticate this note.    Leslye Hancock may have a reminder

## 2024-04-18 NOTE — PATIENT INSTRUCTIONS
If we order a Diagnostic test (such as MRI or CT) during your office visit please see below:     Coordination of Care will be calling you to schedule your diagnostic test. If you have not heard from Coordination of Care within 2 business days, please call [phone].     Once you have a date scheduled for your diagnostic test, you will need to contact our office to schedule a follow up appointment about 4 days following the exam, as this is when the physician will review your diagnostic test results with you. You can contact our office to schedule appointment by phone at [phone], or you can send a message via [employer] to request an appointment.

## 2024-05-01 ENCOUNTER — HOSPITAL ENCOUNTER (OUTPATIENT)
Facility: HOSPITAL | Age: 74
Discharge: HOME | End: 2024-05-04
Attending: ORTHOPAEDIC SURGERY
Payer: MEDICARE

## 2024-05-01 DIAGNOSIS — M25.572 ACUTE LEFT ANKLE PAIN: ICD-10-CM

## 2024-05-01 PROCEDURE — 73721 MRI JNT OF LWR EXTRE W/O DYE: CPT

## 2024-05-15 ENCOUNTER — OFFICE VISIT (OUTPATIENT)
Age: 74
End: 2024-05-15
Payer: MEDICARE

## 2024-05-15 VITALS — HEIGHT: 59 IN | WEIGHT: 180 LBS | BODY MASS INDEX: 36.29 KG/M2

## 2024-05-15 DIAGNOSIS — M76.71 TENDINITIS OF RIGHT PERONEUS BREVIS TENDON: ICD-10-CM

## 2024-05-15 DIAGNOSIS — S93.332A TRAUMATIC SUBLUXATION OF PERONEAL TENDON OF LEFT FOOT: Primary | ICD-10-CM

## 2024-05-15 DIAGNOSIS — M79.671 RIGHT FOOT PAIN: ICD-10-CM

## 2024-05-15 DIAGNOSIS — M25.571 RIGHT ANKLE PAIN, UNSPECIFIED CHRONICITY: ICD-10-CM

## 2024-05-15 PROCEDURE — G8417 CALC BMI ABV UP PARAM F/U: HCPCS | Performed by: ORTHOPAEDIC SURGERY

## 2024-05-15 PROCEDURE — 1036F TOBACCO NON-USER: CPT | Performed by: ORTHOPAEDIC SURGERY

## 2024-05-15 PROCEDURE — 1090F PRES/ABSN URINE INCON ASSESS: CPT | Performed by: ORTHOPAEDIC SURGERY

## 2024-05-15 PROCEDURE — G8399 PT W/DXA RESULTS DOCUMENT: HCPCS | Performed by: ORTHOPAEDIC SURGERY

## 2024-05-15 PROCEDURE — 1123F ACP DISCUSS/DSCN MKR DOCD: CPT | Performed by: ORTHOPAEDIC SURGERY

## 2024-05-15 PROCEDURE — 73630 X-RAY EXAM OF FOOT: CPT | Performed by: ORTHOPAEDIC SURGERY

## 2024-05-15 PROCEDURE — 99214 OFFICE O/P EST MOD 30 MIN: CPT | Performed by: ORTHOPAEDIC SURGERY

## 2024-05-15 PROCEDURE — 73600 X-RAY EXAM OF ANKLE: CPT | Performed by: ORTHOPAEDIC SURGERY

## 2024-05-15 PROCEDURE — G8427 DOCREV CUR MEDS BY ELIG CLIN: HCPCS | Performed by: ORTHOPAEDIC SURGERY

## 2024-05-15 NOTE — PROGRESS NOTES
AMBULATORY PROGRESS NOTE      Patient: Leslye Hancock             MRN: [id number]     SSN: [id number] Body mass index is 36.36 kg/m².  YOB: 1950     AGE: 74 y.o.       EX: female    PCP: Noris Bowling DO       IMPRESSION //  DIAGNOSIS AND TREATMENT PLAN      Leslye Hancock has a diagnosis of:      Leslye Hancock is a exquisite point tenderness to the posterior lateral portion of her left ankle.  I was concerned about a stress fracture.  She had MRI, of the left ankle, on May 1, 2024, I reviewed the MRI and saw the images.  It does show a dislocated peroneal tendon and rupture of the superior peroneal retinaculum with dislocation the peroneal longus laterally and anteriorly.  There is some interstitial tendinosis, but do not see any tear.  Peroneal brevis, still significant ice, to the to the posterior lateral border of the posterior border the fibula.  Next recommendation, deepening groove left  fibular osteotomy, to assess the peroneal tendons additionally.    I am unsure, as to why she is having pain discomfort, to the plantar portion of heels, right heel left renal artery.    X-rays reviewed, right foot, AP and oblique, show normal alignment, the right foot.    DIAGNOSES    1. Traumatic subluxation of peroneal tendon of left foot    2. Tendinitis of right peroneus brevis tendon    3. Right foot pain    4. Right ankle pain, unspecified chronicity          PLAN:    1. Wrapped the left ankle with ACE bandage today  2. Discussed surgical procedure for left peroneal longus tendon dislocation and recovery time  3. Order pre-op lab work     RTO after consult with surgery scheduler    Orders Placed This Encounter    SCHEDULE SURGERY     Scheduling Instructions:      PATIENT NAME: Leslye Hancock       PROCEDURE LOCATION: [facility]      TIME LINE FOR PROCEDURE :Elective      LENGTH OF PROCEDURE: 1.5 HOURS      PROCEDURE DATE: TBD      ADMIT:

## 2024-05-16 ENCOUNTER — TELEPHONE (OUTPATIENT)
Age: 74
End: 2024-05-16

## 2024-05-16 NOTE — TELEPHONE ENCOUNTER
Patient is requesting a copy of the paper images of xrays and MRI she said were reviewed at yesterday's office visit.  Not the office note, she says we had images on paper.  Please advise patient when these will be ready for  at  office, [phone].

## 2024-05-16 NOTE — TELEPHONE ENCOUNTER
MRI report printed along with x-ray images from 5-15-24. Tried to call patient, no answer no machine.  If patient calls back please let patient know she may  at HV location.

## 2024-05-21 ENCOUNTER — HOSPITAL ENCOUNTER (OUTPATIENT)
Facility: HOSPITAL | Age: 74
Discharge: HOME | End: 2024-05-24
Payer: MEDICARE

## 2024-05-21 DIAGNOSIS — M76.71 TENDINITIS OF RIGHT PERONEUS BREVIS TENDON: ICD-10-CM

## 2024-05-21 DIAGNOSIS — M25.562 CHRONIC PAIN OF LEFT KNEE: ICD-10-CM

## 2024-05-21 DIAGNOSIS — S93.332A TRAUMATIC SUBLUXATION OF PERONEAL TENDON OF LEFT FOOT: ICD-10-CM

## 2024-05-21 DIAGNOSIS — M17.12 UNILATERAL PRIMARY OSTEOARTHRITIS, LEFT KNEE: ICD-10-CM

## 2024-05-21 DIAGNOSIS — G89.29 CHRONIC PAIN OF LEFT KNEE: ICD-10-CM

## 2024-05-21 LAB
25(OH)D3 SERPL-MCNC: 54.8 NG/ML (ref 30–100)
ALBUMIN SERPL-MCNC: 3.6 G/DL (ref 3.4–5)
ALBUMIN/GLOB SERPL: 1 (ref 0.8–1.7)
ALP SERPL-CCNC: 84 U/L (ref 45–117)
ALT SERPL-CCNC: 18 U/L (ref 13–56)
ANION GAP SERPL CALC-SCNC: 1 MMOL/L (ref 3–18)
AST SERPL-CCNC: 17 U/L (ref 10–38)
BASOPHILS # BLD: 0.1 K/UL (ref 0–0.1)
BASOPHILS NFR BLD: 1 % (ref 0–2)
BILIRUB SERPL-MCNC: 0.4 MG/DL (ref 0.2–1)
BUN SERPL-MCNC: 21 MG/DL (ref 7–18)
BUN/CREAT SERPL: 16 (ref 12–20)
CALCIUM SERPL-MCNC: 9.4 MG/DL (ref 8.5–10.1)
CHLORIDE SERPL-SCNC: 108 MMOL/L (ref 100–111)
CO2 SERPL-SCNC: 29 MMOL/L (ref 21–32)
CREAT SERPL-MCNC: 1.29 MG/DL (ref 0.6–1.3)
DIFFERENTIAL METHOD BLD: ABNORMAL
EOSINOPHIL # BLD: 0.1 K/UL (ref 0–0.4)
EOSINOPHIL NFR BLD: 1 % (ref 0–5)
ERYTHROCYTE [DISTWIDTH] IN BLOOD BY AUTOMATED COUNT: 15.1 % (ref 11.6–14.5)
EST. AVERAGE GLUCOSE BLD GHB EST-MCNC: 111 MG/DL
GLOBULIN SER CALC-MCNC: 3.5 G/DL (ref 2–4)
GLUCOSE SERPL-MCNC: 85 MG/DL (ref 74–99)
HBA1C MFR BLD: 5.5 % (ref 4.2–5.6)
HCT VFR BLD AUTO: 40.6 % (ref 35–45)
HGB BLD-MCNC: 12.4 G/DL (ref 12–16)
IMM GRANULOCYTES # BLD AUTO: 0 K/UL (ref 0–0.04)
IMM GRANULOCYTES NFR BLD AUTO: 0 % (ref 0–0.5)
INR PPP: 1 (ref 0.9–1.1)
LYMPHOCYTES # BLD: 2.7 K/UL (ref 0.9–3.6)
LYMPHOCYTES NFR BLD: 35 % (ref 21–52)
MCH RBC QN AUTO: 24.5 PG (ref 24–34)
MCHC RBC AUTO-ENTMCNC: 30.5 G/DL (ref 31–37)
MCV RBC AUTO: 80.1 FL (ref 78–100)
MONOCYTES # BLD: 0.8 K/UL (ref 0.05–1.2)
MONOCYTES NFR BLD: 10 % (ref 3–10)
NEUTS SEG # BLD: 4.1 K/UL (ref 1.8–8)
NEUTS SEG NFR BLD: 52 % (ref 40–73)
NRBC # BLD: 0 K/UL (ref 0–0.01)
NRBC BLD-RTO: 0 PER 100 WBC
PLATELET # BLD AUTO: 246 K/UL (ref 135–420)
PMV BLD AUTO: 11 FL (ref 9.2–11.8)
POTASSIUM SERPL-SCNC: 4.3 MMOL/L (ref 3.5–5.5)
PROT SERPL-MCNC: 7.1 G/DL (ref 6.4–8.2)
PROTHROMBIN TIME: 13 SEC (ref 11.9–14.7)
RBC # BLD AUTO: 5.07 M/UL (ref 4.2–5.3)
SODIUM SERPL-SCNC: 138 MMOL/L (ref 136–145)
WBC # BLD AUTO: 7.8 K/UL (ref 4.6–13.2)

## 2024-05-21 PROCEDURE — 80053 COMPREHEN METABOLIC PANEL: CPT

## 2024-05-21 PROCEDURE — 82306 VITAMIN D 25 HYDROXY: CPT

## 2024-05-21 PROCEDURE — 85025 COMPLETE CBC W/AUTO DIFF WBC: CPT

## 2024-05-21 PROCEDURE — 83036 HEMOGLOBIN GLYCOSYLATED A1C: CPT

## 2024-05-21 PROCEDURE — 36415 COLL VENOUS BLD VENIPUNCTURE: CPT

## 2024-05-21 PROCEDURE — 71046 X-RAY EXAM CHEST 2 VIEWS: CPT

## 2024-05-21 PROCEDURE — 85610 PROTHROMBIN TIME: CPT

## 2024-05-21 RX ORDER — LIDOCAINE 50 MG/G
PATCH TOPICAL
Qty: 30 PATCH | Refills: 0 | Status: SHIPPED | OUTPATIENT
Start: 2024-05-21

## 2024-05-22 LAB
EKG ATRIAL RATE: 74 BPM
EKG DIAGNOSIS: NORMAL
EKG P AXIS: 23 DEGREES
EKG P-R INTERVAL: 128 MS
EKG Q-T INTERVAL: 380 MS
EKG QRS DURATION: 70 MS
EKG QTC CALCULATION (BAZETT): 421 MS
EKG R AXIS: 15 DEGREES
EKG T AXIS: 49 DEGREES
EKG VENTRICULAR RATE: 74 BPM

## 2024-05-22 RX ORDER — FEBUXOSTAT 40 MG/1
40 TABLET, FILM COATED ORAL DAILY
COMMUNITY

## 2024-05-22 RX ORDER — METAXALONE 800 MG/1
800 TABLET ORAL 3 TIMES DAILY
COMMUNITY

## 2024-05-22 NOTE — PERIOP NOTE
Instructions for your surgery at [facility]      Today's Date:  5/22/2024      Patient's Name:  Leslye Hancock           Surgery Date:  5/31              Please enter the main entrance of the hospital and check-in at the  located in the lobby. Once checked in at the , you will take the elevators to the second floor, and report to the waiting room on the left. The room will say Procedure Registration.    Do NOT eat or drink anything, including candy, gum, or ice chips after midnight prior to your surgery, unless you have specific instructions from your surgeon or anesthesia provider to do so.  Brush your teeth before coming to the hospital. You may swish with water, but do not swallow.  No smoking/Vaping/E-Cigarettes 24 hours prior to the day of surgery.  No alcohol 24 hours prior to the day of surgery.  No recreational drugs for one week prior to the day of surgery.  Bring Photo ID, Insurance information, and Co-pay if required on day of surgery.  Bring in pertinent legal documents, such as, Medical Power of , DNR, Advance Directive, etc.  Leave all valuables, including money/purse, at home.  Remove all jewelry, including ALL body piercings, nail polish, acrylic nails, and makeup (including mascara); no lotions, powders, deodorant, or perfume/cologne/after shave on the skin.  Follow instruction for Hibiclens washes and CHG wipes from surgeon's office.   Glasses and dentures may be worn to the hospital. They must be removed prior to surgery. Please bring case/container for glasses or dentures.   Contact lenses should not be worn on day of surgery.   Call your doctor's office if symptoms of a cold or illness develop within 24-48 hours prior to your surgery.  Call your doctor's office if you have any questions concerning insurance or co-pays.  15. AN ADULT (relative or friend 18 years or older) MUST DRIVE YOU HOME AFTER YOUR SURGERY.  16. Please make

## 2024-05-28 ENCOUNTER — TELEPHONE (OUTPATIENT)
Age: 74
End: 2024-05-28

## 2024-05-28 NOTE — TELEPHONE ENCOUNTER
Patient called and is requesting a call back states that the pharmacy does not have her prescription in stock that she has to have before her surgery on 05/31/24      Please call and advise patient at   [phone]  Or  [phone]

## 2024-05-28 NOTE — TELEPHONE ENCOUNTER
Tried to call patient.  If patient calls back please find out what the name of the medication is and an alternate pharmacy.

## 2024-05-29 ENCOUNTER — TELEPHONE (OUTPATIENT)
Age: 74
End: 2024-05-29

## 2024-05-29 RX ORDER — ONDANSETRON 4 MG/1
4 TABLET, FILM COATED ORAL EVERY 8 HOURS PRN
Qty: 10 TABLET | Refills: 0 | Status: SHIPPED | OUTPATIENT
Start: 2024-05-29

## 2024-05-29 RX ORDER — CEPHALEXIN 500 MG/1
500 CAPSULE ORAL 3 TIMES DAILY
Qty: 20 CAPSULE | Refills: 0 | Status: SHIPPED | OUTPATIENT
Start: 2024-05-29

## 2024-05-29 RX ORDER — OXYCODONE HYDROCHLORIDE 5 MG/1
5 TABLET ORAL EVERY 4 HOURS PRN
Qty: 28 TABLET | Refills: 0 | Status: SHIPPED | OUTPATIENT
Start: 2024-05-29 | End: 2024-06-05

## 2024-05-29 NOTE — TELEPHONE ENCOUNTER
Patient called again regarding request for medication for upcoming surgery.  She's anxious to have this called in and is requesting we let her know once done by calling her at [phone].  Due to her condition she says she may take a minute to reach the phone so please leave a message if she doesn't answer.

## 2024-05-29 NOTE — DISCHARGE INSTRUCTIONS
provided.  ___________________________________________________________________________________________________________________________________

## 2024-05-29 NOTE — H&P (VIEW-ONLY)
mouth daily as needed    Automatic Reconciliation, Ar   diazePAM (VALIUM) 5 MG tablet 1/2-1 tablet as needed 6/18/21   Automatic Reconciliation, Ar   diclofenac sodium (VOLTAREN) 1 % GEL Apply 4 g topically every 6 hours as needed 12/8/20   Automatic Reconciliation, Ar   estradiol (ESTRACE) 0.1 MG/GM vaginal cream apply 1/4 applicatorful pv 2 - 3  x weekly at hs    Automatic Reconciliation, Ar   fluticasone (FLONASE) 50 MCG/ACT nasal spray instill 2 sprays into each nostril once daily if needed for congestion 1/28/22   Automatic Reconciliation, Ar   folic acid (FOLVITE) 1 MG tablet Take 1 tablet by mouth daily    Automatic Reconciliation, Ar   ipratropium (ATROVENT) 0.03 % nasal spray 2 sprays in each nostril prn allergy sxs    Automatic Reconciliation, Ar   losartan (COZAAR) 100 MG tablet Take 1 tablet by mouth daily 12/27/21   Automatic Reconciliation, Ar   metoclopramide (REGLAN) 10 MG tablet Take 1 tablet by mouth 4 times daily (before meals and nightly)    Automatic Reconciliation, Ar   metoprolol succinate (TOPROL XL) 100 MG extended release tablet Take 1 tablet by mouth daily    Automatic Reconciliation, Ar   metoprolol tartrate (LOPRESSOR) 50 MG tablet Take 1 tablet by mouth daily    Automatic Reconciliation, Ar   omeprazole (PRILOSEC) 40 MG delayed release capsule Take 1 capsule by mouth daily    Automatic Reconciliation, Ar       Current Outpatient Medications   Medication Sig    ondansetron (ZOFRAN) 4 MG tablet Take 1 tablet by mouth every 8 hours as needed for Nausea or Vomiting    cephALEXin (KEFLEX) 500 MG capsule Take 1 capsule by mouth 3 times daily    oxyCODONE (ROXICODONE) 5 MG immediate release tablet Take 1 tablet by mouth every 4 hours as needed for Pain for up to 7 days. Max Daily Amount: 30 mg    apixaban (ELIQUIS) 2.5 MG TABS tablet Take 1 tablet by mouth 2 times daily    metaxalone (SKELAXIN) 800 MG tablet Take 1 tablet by mouth 3 times daily    febuxostat (ULORIC) 40 MG TABS tablet Take 1

## 2024-05-29 NOTE — PROGRESS NOTES
FOOT AND ANKLE HISTORY AND PHYSICAL      Patient: Leslye Hancock                   MRN: [id number]         SSN: [id number]  YOB: 1950            AGE: 74 y.o.          SEX: female  Date of examination: 5/30/2024     Patient scheduled for: Excisional debridement of the left peroneal brevis tendon, tenosynovectomy of the peroneal Evess tendon, deepening fibular groove osteotomy left fibula, repair of the peroneal brevis tendon tear, possible allograft tendon augmentation  Date of surgery: 5/31/2024   Surgical Time: 90 MIN  Consults: Noris Bowling DO   Special Equipment: Arthrex system, Synthes drills   Location of Surgery:   [facility]  Surgeon: Jose Alberts MD  ANESTHESIA TYPE:  General,   Regional block        PRESCRIPTIONS AND/OR ORDERS PROVIDED DURING H&P:    Orders Placed This Encounter    ondansetron (ZOFRAN) 4 MG tablet     Sig: Take 1 tablet by mouth every 8 hours as needed for Nausea or Vomiting     Dispense:  10 tablet     Refill:  0    cephALEXin (KEFLEX) 500 MG capsule     Sig: Take 1 capsule by mouth 3 times daily     Dispense:  20 capsule     Refill:  0    oxyCODONE (ROXICODONE) 5 MG immediate release tablet     Sig: Take 1 tablet by mouth every 4 hours as needed for Pain for up to 7 days. Max Daily Amount: 30 mg     Dispense:  28 tablet     Refill:  0     Reduce doses taken as pain becomes manageable    apixaban (ELIQUIS) 2.5 MG TABS tablet     Sig: Take 1 tablet by mouth 2 times daily     Dispense:  60 tablet     Refill:  0        There are no Patient Instructions on file for this visit.    Rx sent to :       [facility]/pharmacy #[id number] - [place], [place] - [address] - P [phone] - F [phone]              HISTORY AND INFORMED CONSENT      The patient was seen in the office today for a preoperative history and physical for an upcoming above listed surgery.  The patient is a pleasant 74 y.o. female who has a history of

## 2024-05-29 NOTE — H&P
FOOT AND ANKLE HISTORY AND PHYSICAL      Patient: Leslye Hancock                   MRN: [id number]         SSN: [id number]  YOB: 1950            AGE: 74 y.o.          SEX: female  Date of examination: 5/29/2024     Patient scheduled for: Excisional debridement of the left peroneal brevis tendon, tenosynovectomy of the peroneal Evess tendon, deepening fibular groove osteotomy left fibula, repair of the peroneal brevis tendon tear, possible allograft tendon augmentation  Date of surgery: 5/31/2024   Surgical Time: 90 MIN  Consults: Noris Bowling DO   Special Equipment: Arthrex system, Synthes drills   Location of Surgery:   [facility]  Surgeon: Jose Alberts MD  ANESTHESIA TYPE:  General,   Regional block        PRESCRIPTIONS AND/OR ORDERS PROVIDED DURING H&P:    Orders Placed This Encounter    ondansetron (ZOFRAN) 4 MG tablet     Sig: Take 1 tablet by mouth every 8 hours as needed for Nausea or Vomiting     Dispense:  10 tablet     Refill:  0    cephALEXin (KEFLEX) 500 MG capsule     Sig: Take 1 capsule by mouth 3 times daily     Dispense:  20 capsule     Refill:  0    oxyCODONE (ROXICODONE) 5 MG immediate release tablet     Sig: Take 1 tablet by mouth every 4 hours as needed for Pain for up to 7 days. Max Daily Amount: 30 mg     Dispense:  28 tablet     Refill:  0     Reduce doses taken as pain becomes manageable    apixaban (ELIQUIS) 2.5 MG TABS tablet     Sig: Take 1 tablet by mouth 2 times daily     Dispense:  60 tablet     Refill:  0        There are no Patient Instructions on file for this visit.    Rx sent to :       [facility]/pharmacy #[id number] - [place], [place] - [address] - P [phone] - F [phone]              HISTORY AND INFORMED CONSENT      The patient was seen in the office today for a preoperative history and physical for an upcoming above listed surgery.  The patient is a pleasant 74 y.o. female who has a history of

## 2024-05-29 NOTE — TELEPHONE ENCOUNTER
Patient advised her medication that she is to have for after surgery on Friday 5/31, hasn't been sent to her pharmacy.

## 2024-05-30 ENCOUNTER — ANESTHESIA EVENT (OUTPATIENT)
Facility: HOSPITAL | Age: 74
End: 2024-05-30
Payer: MEDICARE

## 2024-05-30 ENCOUNTER — OFFICE VISIT (OUTPATIENT)
Age: 74
End: 2024-05-30

## 2024-05-30 VITALS
SYSTOLIC BLOOD PRESSURE: 130 MMHG | BODY MASS INDEX: 36.69 KG/M2 | WEIGHT: 182 LBS | HEIGHT: 59 IN | DIASTOLIC BLOOD PRESSURE: 60 MMHG

## 2024-05-30 DIAGNOSIS — S93.332A TRAUMATIC SUBLUXATION OF PERONEAL TENDON OF LEFT FOOT: Primary | ICD-10-CM

## 2024-05-30 DIAGNOSIS — M76.71 TENDINITIS OF RIGHT PERONEUS BREVIS TENDON: ICD-10-CM

## 2024-05-30 NOTE — OP NOTE
Operative Note      Patient: Leslye Hancock  YOB: 1950  MRN: [id number]    Date of Procedure: 5/31/2024    Pre-Op Diagnosis Codes:     * Tendinitis of right peroneus brevis tendon [M76.71]     * Traumatic subluxation of peroneal tendon of left foot [S93.332A]    Post-Op Diagnosis: Post-Op Diagnosis Codes:     * Tendinitis of right peroneus brevis tendon [M76.71]     * Traumatic subluxation of peroneal tendon of left foot [S93.332A]       Procedure(s):  EXCISIONAL DEBRIDEMENT LEFT PERONEAL BREVIS TENDON TENOSYNOVECTOMY; RECESSION OF PERONEAL BREVIS MUSCLE, RELOCATION OF PERONEAL TENDONS, SUPERIOR RETINACULAR REPAIR/RECONSTRUCTION, TENOSYNOVECTOMY OF PERONEAL TENDON LONGUS AND BREVIS TENDONS.   REGIONAL BLOCK  *SEE COMMENTS    Surgeon(s):  Jose Alberts MD    Assistant:   Surgical Assistant: Tl Hill  Physician Assistant: (Unknown)    Anesthesia: General and Regional     IV FLUIDS: 1000 ml  TOURNIQUET TIME:  37  minutes @  300 mm HG    Estimated Blood Loss (mL): Minimal    Complications: None    Specimens:   * No specimens in log *    Implants:  * No surgical log found *      Drains: * No LDAs found *    Findings:  Infection Present At Time Of Surgery (PATOS) (choose all levels that have infection present):  No infection present  Other Findings:      INDICATIONS    The patient is a pleasant 74 y.o. female who has a history of      Leslye Hancock is a exquisite point tenderness to the posterior lateral portion of her left ankle.  I was concerned about a stress fracture.  She had MRI, of the left ankle, on May 1, 2024, I reviewed the MRI and saw the images.  It does show a dislocated peroneal tendon and rupture of the superior peroneal retinaculum with dislocation the peroneal longus laterally and anteriorly.  There is some interstitial tendinosis, but do not see any tear.  Peroneal brevis, still significant ice, to the to the posterior lateral border of the posterior border the

## 2024-05-31 ENCOUNTER — APPOINTMENT (OUTPATIENT)
Facility: HOSPITAL | Age: 74
End: 2024-05-31
Attending: ORTHOPAEDIC SURGERY
Payer: MEDICARE

## 2024-05-31 ENCOUNTER — ANESTHESIA (OUTPATIENT)
Facility: HOSPITAL | Age: 74
End: 2024-05-31
Payer: MEDICARE

## 2024-05-31 ENCOUNTER — HOSPITAL ENCOUNTER (OUTPATIENT)
Facility: HOSPITAL | Age: 74
Setting detail: OUTPATIENT SURGERY
Discharge: HOME | End: 2024-05-31
Attending: ORTHOPAEDIC SURGERY | Admitting: ORTHOPAEDIC SURGERY
Payer: MEDICARE

## 2024-05-31 VITALS
HEIGHT: 59 IN | TEMPERATURE: 97.8 F | OXYGEN SATURATION: 96 % | BODY MASS INDEX: 37.74 KG/M2 | SYSTOLIC BLOOD PRESSURE: 144 MMHG | HEART RATE: 76 BPM | WEIGHT: 187.2 LBS | RESPIRATION RATE: 18 BRPM | DIASTOLIC BLOOD PRESSURE: 80 MMHG

## 2024-05-31 DIAGNOSIS — S93.332A TRAUMATIC SUBLUXATION OF PERONEAL TENDON OF LEFT FOOT: Primary | ICD-10-CM

## 2024-05-31 PROCEDURE — 6370000000 HC RX 637 (ALT 250 FOR IP): Performed by: ORTHOPAEDIC SURGERY

## 2024-05-31 PROCEDURE — 6370000000 HC RX 637 (ALT 250 FOR IP): Performed by: NURSE ANESTHETIST, CERTIFIED REGISTERED

## 2024-05-31 PROCEDURE — 2500000003 HC RX 250 WO HCPCS: Performed by: NURSE ANESTHETIST, CERTIFIED REGISTERED

## 2024-05-31 PROCEDURE — 2580000003 HC RX 258: Performed by: NURSE ANESTHETIST, CERTIFIED REGISTERED

## 2024-05-31 PROCEDURE — 6360000002 HC RX W HCPCS: Performed by: NURSE ANESTHETIST, CERTIFIED REGISTERED

## 2024-05-31 PROCEDURE — 2700000000 HC OXYGEN THERAPY PER DAY

## 2024-05-31 PROCEDURE — 2580000003 HC RX 258: Performed by: ORTHOPAEDIC SURGERY

## 2024-05-31 PROCEDURE — 6360000002 HC RX W HCPCS: Performed by: ORTHOPAEDIC SURGERY

## 2024-05-31 PROCEDURE — 64445 NJX AA&/STRD SCIATIC NRV IMG: CPT | Performed by: ANESTHESIOLOGY

## 2024-05-31 PROCEDURE — 94761 N-INVAS EAR/PLS OXIMETRY MLT: CPT

## 2024-05-31 PROCEDURE — 6360000002 HC RX W HCPCS: Performed by: ANESTHESIOLOGY

## 2024-05-31 RX ORDER — NALOXONE HYDROCHLORIDE 0.4 MG/ML
INJECTION, SOLUTION INTRAMUSCULAR; INTRAVENOUS; SUBCUTANEOUS PRN
Status: DISCONTINUED | OUTPATIENT
Start: 2024-05-31 | End: 2024-05-31 | Stop reason: HOSPADM

## 2024-05-31 RX ORDER — DEXAMETHASONE SODIUM PHOSPHATE 4 MG/ML
INJECTION, SOLUTION INTRA-ARTICULAR; INTRALESIONAL; INTRAMUSCULAR; INTRAVENOUS; SOFT TISSUE PRN
Status: DISCONTINUED | OUTPATIENT
Start: 2024-05-31 | End: 2024-05-31 | Stop reason: SDUPTHER

## 2024-05-31 RX ORDER — SODIUM CHLORIDE 0.9 % (FLUSH) 0.9 %
5-40 SYRINGE (ML) INJECTION PRN
Status: DISCONTINUED | OUTPATIENT
Start: 2024-05-31 | End: 2024-05-31 | Stop reason: HOSPADM

## 2024-05-31 RX ORDER — FAMOTIDINE 20 MG/1
20 TABLET, FILM COATED ORAL ONCE
Status: COMPLETED | OUTPATIENT
Start: 2024-05-31 | End: 2024-05-31

## 2024-05-31 RX ORDER — LIDOCAINE HYDROCHLORIDE 10 MG/ML
1 INJECTION, SOLUTION EPIDURAL; INFILTRATION; INTRACAUDAL; PERINEURAL
Status: COMPLETED | OUTPATIENT
Start: 2024-05-31 | End: 2024-05-31

## 2024-05-31 RX ORDER — FENTANYL CITRATE 50 UG/ML
100 INJECTION, SOLUTION INTRAMUSCULAR; INTRAVENOUS ONCE
Status: COMPLETED | OUTPATIENT
Start: 2024-05-31 | End: 2024-05-31

## 2024-05-31 RX ORDER — FENTANYL CITRATE 50 UG/ML
INJECTION, SOLUTION INTRAMUSCULAR; INTRAVENOUS PRN
Status: DISCONTINUED | OUTPATIENT
Start: 2024-05-31 | End: 2024-05-31 | Stop reason: SDUPTHER

## 2024-05-31 RX ORDER — PROPOFOL 10 MG/ML
INJECTION, EMULSION INTRAVENOUS PRN
Status: DISCONTINUED | OUTPATIENT
Start: 2024-05-31 | End: 2024-05-31 | Stop reason: SDUPTHER

## 2024-05-31 RX ORDER — SODIUM CHLORIDE 9 MG/ML
INJECTION, SOLUTION INTRAVENOUS PRN
Status: DISCONTINUED | OUTPATIENT
Start: 2024-05-31 | End: 2024-05-31 | Stop reason: HOSPADM

## 2024-05-31 RX ORDER — ONDANSETRON 2 MG/ML
4 INJECTION INTRAMUSCULAR; INTRAVENOUS
Status: DISCONTINUED | OUTPATIENT
Start: 2024-05-31 | End: 2024-05-31 | Stop reason: HOSPADM

## 2024-05-31 RX ORDER — SODIUM CHLORIDE 0.9 % (FLUSH) 0.9 %
5-40 SYRINGE (ML) INJECTION EVERY 12 HOURS SCHEDULED
Status: DISCONTINUED | OUTPATIENT
Start: 2024-05-31 | End: 2024-05-31 | Stop reason: HOSPADM

## 2024-05-31 RX ORDER — SUCCINYLCHOLINE/SOD CL,ISO/PF 100 MG/5ML
SYRINGE (ML) INTRAVENOUS PRN
Status: DISCONTINUED | OUTPATIENT
Start: 2024-05-31 | End: 2024-05-31 | Stop reason: SDUPTHER

## 2024-05-31 RX ORDER — MIDAZOLAM HYDROCHLORIDE 2 MG/2ML
2 INJECTION, SOLUTION INTRAMUSCULAR; INTRAVENOUS ONCE
Status: COMPLETED | OUTPATIENT
Start: 2024-05-31 | End: 2024-05-31

## 2024-05-31 RX ORDER — FENTANYL CITRATE 50 UG/ML
25 INJECTION, SOLUTION INTRAMUSCULAR; INTRAVENOUS EVERY 10 MIN PRN
Status: DISCONTINUED | OUTPATIENT
Start: 2024-05-31 | End: 2024-05-31 | Stop reason: HOSPADM

## 2024-05-31 RX ORDER — NEOSTIGMINE METHYLSULFATE 1 MG/ML
INJECTION, SOLUTION INTRAVENOUS PRN
Status: DISCONTINUED | OUTPATIENT
Start: 2024-05-31 | End: 2024-05-31 | Stop reason: SDUPTHER

## 2024-05-31 RX ORDER — GLYCOPYRROLATE 0.2 MG/ML
INJECTION INTRAMUSCULAR; INTRAVENOUS PRN
Status: DISCONTINUED | OUTPATIENT
Start: 2024-05-31 | End: 2024-05-31 | Stop reason: SDUPTHER

## 2024-05-31 RX ORDER — ROCURONIUM BROMIDE 10 MG/ML
INJECTION, SOLUTION INTRAVENOUS PRN
Status: DISCONTINUED | OUTPATIENT
Start: 2024-05-31 | End: 2024-05-31 | Stop reason: SDUPTHER

## 2024-05-31 RX ORDER — ROPIVACAINE HYDROCHLORIDE 5 MG/ML
30 INJECTION, SOLUTION EPIDURAL; INFILTRATION; PERINEURAL ONCE
Status: COMPLETED | OUTPATIENT
Start: 2024-05-31 | End: 2024-05-31

## 2024-05-31 RX ORDER — CHLORHEXIDINE GLUCONATE 40 MG/ML
SOLUTION TOPICAL PRN
Status: DISCONTINUED | OUTPATIENT
Start: 2024-05-31 | End: 2024-05-31 | Stop reason: ALTCHOICE

## 2024-05-31 RX ORDER — DEXTROSE MONOHYDRATE 100 MG/ML
INJECTION, SOLUTION INTRAVENOUS CONTINUOUS PRN
Status: DISCONTINUED | OUTPATIENT
Start: 2024-05-31 | End: 2024-05-31 | Stop reason: HOSPADM

## 2024-05-31 RX ORDER — ONDANSETRON 2 MG/ML
INJECTION INTRAMUSCULAR; INTRAVENOUS PRN
Status: DISCONTINUED | OUTPATIENT
Start: 2024-05-31 | End: 2024-05-31 | Stop reason: SDUPTHER

## 2024-05-31 RX ORDER — LIDOCAINE HYDROCHLORIDE 20 MG/ML
INJECTION, SOLUTION EPIDURAL; INFILTRATION; INTRACAUDAL; PERINEURAL PRN
Status: DISCONTINUED | OUTPATIENT
Start: 2024-05-31 | End: 2024-05-31 | Stop reason: SDUPTHER

## 2024-05-31 RX ORDER — SODIUM CHLORIDE, SODIUM LACTATE, POTASSIUM CHLORIDE, CALCIUM CHLORIDE 600; 310; 30; 20 MG/100ML; MG/100ML; MG/100ML; MG/100ML
INJECTION, SOLUTION INTRAVENOUS CONTINUOUS
Status: DISCONTINUED | OUTPATIENT
Start: 2024-05-31 | End: 2024-05-31 | Stop reason: HOSPADM

## 2024-05-31 RX ORDER — ROPIVACAINE HYDROCHLORIDE 5 MG/ML
INJECTION, SOLUTION EPIDURAL; INFILTRATION; PERINEURAL
Status: COMPLETED | OUTPATIENT
Start: 2024-05-31 | End: 2024-05-31

## 2024-05-31 RX ORDER — PHENYLEPHRINE HCL IN 0.9% NACL 1 MG/10 ML
SYRINGE (ML) INTRAVENOUS PRN
Status: DISCONTINUED | OUTPATIENT
Start: 2024-05-31 | End: 2024-05-31 | Stop reason: SDUPTHER

## 2024-05-31 RX ADMIN — ROCURONIUM BROMIDE 5 MG: 10 INJECTION, SOLUTION INTRAVENOUS at 07:42

## 2024-05-31 RX ADMIN — FENTANYL CITRATE 25 MCG: 50 INJECTION INTRAMUSCULAR; INTRAVENOUS at 08:02

## 2024-05-31 RX ADMIN — ROCURONIUM BROMIDE 35 MG: 10 INJECTION, SOLUTION INTRAVENOUS at 07:59

## 2024-05-31 RX ADMIN — DEXAMETHASONE SODIUM PHOSPHATE 4 MG: 4 INJECTION INTRA-ARTICULAR; INTRALESIONAL; INTRAMUSCULAR; INTRAVENOUS; SOFT TISSUE at 07:42

## 2024-05-31 RX ADMIN — Medication 50 MCG: at 08:25

## 2024-05-31 RX ADMIN — LIDOCAINE HYDROCHLORIDE 2 ML: 10 INJECTION, SOLUTION EPIDURAL; INFILTRATION; INTRACAUDAL; PERINEURAL at 07:22

## 2024-05-31 RX ADMIN — ROPIVACAINE HYDROCHLORIDE 15 ML: 5 INJECTION EPIDURAL; INFILTRATION; PERINEURAL at 07:23

## 2024-05-31 RX ADMIN — SUGAMMADEX 200 MG: 100 INJECTION, SOLUTION INTRAVENOUS at 09:28

## 2024-05-31 RX ADMIN — Medication 100 MCG: at 07:54

## 2024-05-31 RX ADMIN — Medication 100 MCG: at 08:12

## 2024-05-31 RX ADMIN — FAMOTIDINE 20 MG: 20 TABLET ORAL at 07:01

## 2024-05-31 RX ADMIN — Medication 100 MG: at 07:42

## 2024-05-31 RX ADMIN — SODIUM CHLORIDE, SODIUM LACTATE, POTASSIUM CHLORIDE, AND CALCIUM CHLORIDE: 600; 310; 30; 20 INJECTION, SOLUTION INTRAVENOUS at 07:00

## 2024-05-31 RX ADMIN — SODIUM CHLORIDE, SODIUM LACTATE, POTASSIUM CHLORIDE, AND CALCIUM CHLORIDE: 600; 310; 30; 20 INJECTION, SOLUTION INTRAVENOUS at 09:28

## 2024-05-31 RX ADMIN — MIDAZOLAM 2 MG: 1 INJECTION INTRAMUSCULAR; INTRAVENOUS at 07:22

## 2024-05-31 RX ADMIN — LIDOCAINE HYDROCHLORIDE 100 MG: 20 INJECTION, SOLUTION EPIDURAL; INFILTRATION; INTRACAUDAL; PERINEURAL at 07:42

## 2024-05-31 RX ADMIN — ROPIVACAINE HYDROCHLORIDE 30 ML: 5 INJECTION EPIDURAL; INFILTRATION; PERINEURAL at 07:20

## 2024-05-31 RX ADMIN — WATER 2000 MG: 1 INJECTION, SOLUTION INTRAMUSCULAR; INTRAVENOUS; SUBCUTANEOUS at 07:46

## 2024-05-31 RX ADMIN — GLYCOPYRROLATE 0.6 MG: 0.2 INJECTION INTRAMUSCULAR; INTRAVENOUS at 09:20

## 2024-05-31 RX ADMIN — Medication 100 MCG: at 09:09

## 2024-05-31 RX ADMIN — Medication 100 MCG: at 08:18

## 2024-05-31 RX ADMIN — FENTANYL CITRATE 100 MCG: 50 INJECTION INTRAMUSCULAR; INTRAVENOUS at 07:22

## 2024-05-31 RX ADMIN — Medication 50 MCG: at 08:03

## 2024-05-31 RX ADMIN — ONDANSETRON 4 MG: 2 INJECTION INTRAMUSCULAR; INTRAVENOUS at 09:25

## 2024-05-31 RX ADMIN — Medication 4 MG: at 09:20

## 2024-05-31 RX ADMIN — PROPOFOL 150 MG: 10 INJECTION, EMULSION INTRAVENOUS at 07:42

## 2024-05-31 ASSESSMENT — PAIN - FUNCTIONAL ASSESSMENT: PAIN_FUNCTIONAL_ASSESSMENT: 0-10

## 2024-05-31 NOTE — ANESTHESIA PROCEDURE NOTES
Peripheral Block    Patient location during procedure: pre-op  Reason for block: post-op pain management and at surgeon's request  Start time: 5/31/2024 7:20 AM  End time: 5/31/2024 7:28 AM  Staffing  Performed: anesthesiologist   Performed by: Kalpesh Jackson MD  Authorized by: Kalpesh Jackson MD    Preanesthetic Checklist  Completed: patient identified, IV checked, site marked, risks and benefits discussed, surgical/procedural consents, equipment checked, pre-op evaluation, timeout performed, anesthesia consent given, oxygen available, monitors applied/VS acknowledged, fire risk safety assessment completed and verbalized and blood product R/B/A discussed and consented  Peripheral Block   Patient position: left lateral decubitus  Prep: ChloraPrep  Provider prep: mask and sterile gloves  Patient monitoring: cardiac monitor, continuous pulse ox, frequent blood pressure checks, IV access, oxygen and responsive to questions  Block type: Sciatic  Popliteal  Laterality: left  Injection technique: single-shot  Guidance: nerve stimulator and ultrasound guided  Local infiltration: lidocaine  Infiltration strength: 1 %  Local infiltration: lidocaine  Dose: 2 mL    Needle   Needle type: insulated echogenic nerve stimulator needle   Needle gauge: 21 G  Needle localization: nerve stimulator and ultrasound guidance  Needle length: 10 cm  Assessment   Injection assessment: negative aspiration for heme, no paresthesia on injection, local visualized surrounding nerve on ultrasound and no intravascular symptoms  Paresthesia pain: none  Slow fractionated injection: yes  Hemodynamics: stable  Outcomes: uncomplicated and patient tolerated procedure well    Medications Administered  ropivacaine (NAROPIN) injection 0.5% - Perineural   30 mL - 5/31/2024 7:20:00 AM

## 2024-05-31 NOTE — PERIOP NOTE
Patient /Family /Designee has been informed that [facility] is not responsible for patient belongings per policy and the signed [facility] Patient Agreement document.  Personal items should be sent home or checked in with security.  Patient /Family /Designee selected the following action:                            [x]  Send personal items home with a family member or friend                                                 []  Check in personal items with security, excluding clothing                            []  Maintain personal items at the bedside, against recommendation                                 by Alexys Aiken [facility]                                   ** If patient /family /designee chooses to maintain personal items at the bedside,                                      Complete the patient belongings inventory in the EMR.   Belongings are with Ulises Odom, daughter.  Number: [phone]

## 2024-05-31 NOTE — ANESTHESIA PRE PROCEDURE
tobacco: Never   Substance Use Topics    Alcohol use: Yes                                Counseling given: Not Answered      Vital Signs (Current):   Vitals:    05/22/24 1418 05/31/24 0656   BP:  (!) 131/58   Pulse:  74   Resp:  18   Temp:  98.6 °F (37 °C)   TempSrc:  Oral   SpO2:  99%   Weight: 81.6 kg (180 lb) 84.9 kg (187 lb 3.2 oz)   Height: 1.499 m (4' 11\")                                               BP Readings from Last 3 Encounters:   05/31/24 (!) 131/58   05/30/24 130/60   04/15/24 (!) 140/80       NPO Status:                                                                                 BMI:   Wt Readings from Last 3 Encounters:   05/31/24 84.9 kg (187 lb 3.2 oz)   05/30/24 82.6 kg (182 lb)   05/15/24 81.6 kg (180 lb)     Body mass index is 37.81 kg/m².    CBC:   Lab Results   Component Value Date/Time    WBC 7.8 05/21/2024 12:18 PM    RBC 5.07 05/21/2024 12:18 PM    HGB 12.4 05/21/2024 12:18 PM    HCT 40.6 05/21/2024 12:18 PM    MCV 80.1 05/21/2024 12:18 PM    RDW 15.1 05/21/2024 12:18 PM     05/21/2024 12:18 PM       CMP:   Lab Results   Component Value Date/Time     05/21/2024 12:18 PM    K 4.3 05/21/2024 12:18 PM     05/21/2024 12:18 PM    CO2 29 05/21/2024 12:18 PM    BUN 21 05/21/2024 12:18 PM    CREATININE 1.29 05/21/2024 12:18 PM    GFRAA 50 06/15/2022 02:49 PM    LABGLOM 43 05/21/2024 12:18 PM    LABGLOM 38 07/20/2023 01:13 PM    GLUCOSE 85 05/21/2024 12:18 PM    CALCIUM 9.4 05/21/2024 12:18 PM    BILITOT 0.4 05/21/2024 12:18 PM    ALKPHOS 84 05/21/2024 12:18 PM    AST 17 05/21/2024 12:18 PM    ALT 18 05/21/2024 12:18 PM       POC Tests: No results for input(s): \"POCGLU\", \"POCNA\", \"POCK\", \"POCCL\", \"POCBUN\", \"POCHEMO\", \"POCHCT\" in the last 72 hours.    Coags:   Lab Results   Component Value Date/Time    PROTIME 13.0 05/21/2024 12:18 PM    INR 1.0 05/21/2024 12:18 PM    INR 1.7 08/31/2020 11:45 AM    APTT 32.4 08/03/2020 08:27 AM       HCG (If Applicable): No results found for:

## 2024-05-31 NOTE — ANESTHESIA POSTPROCEDURE EVALUATION
Department of Anesthesiology  Postprocedure Note    Patient: Leslye Hancock  MRN: [id number]  YOB: 1950  Date of evaluation: 5/31/2024    Procedure Summary       Date: 05/31/24 Room / Location: [facility] MAIN 06 / [facility] MAIN OR    Anesthesia Start: 0735 Anesthesia Stop: 0942    Procedure: EXCISIONAL DEBRIDEMENT LEFT PERONEAL BREVIS TENDON TENOSYNOVECTOMY; RETINACULAR RECONSTRUCTION; PRIMARY REPAIR PERONEAL BREVIS TENDON TEAR; C-ARM; [[facility]]; REGIONAL BLOCK  *SEE COMMENTS (Left) Diagnosis:       Tendinitis of right peroneus brevis tendon      Traumatic subluxation of peroneal tendon of left foot      (Tendinitis of right peroneus brevis tendon [M76.71])      (Traumatic subluxation of peroneal tendon of left foot [S93.332A])    Surgeons: Jose Alberts MD Responsible Provider: Kalpesh Jackson MD    Anesthesia Type: General ASA Status: 3            Anesthesia Type: General    Allyson Phase I: Allyson Score: 8    Allyson Phase II:      Anesthesia Post Evaluation    Patient location during evaluation: PACU  Patient participation: complete - patient participated  Level of consciousness: awake  Airway patency: patent  Nausea & Vomiting: no nausea  Cardiovascular status: blood pressure returned to baseline and hemodynamically stable  Respiratory status: acceptable  Hydration status: euvolemic  Pain management: adequate    No notable events documented.

## 2024-05-31 NOTE — INTERVAL H&P NOTE
Update History & Physical    The patient's History and Physical of  , 5/31/2024 6:56 AM   was reviewed with the patient and I examined the patient. There was no change. The surgical site was confirmed by the patient and me.     Plan: The risks, benefits, expected outcome, and alternative to the recommended procedure have been discussed with the patient. Patient understands and wants to proceed with the procedure.     Electronically signed by Jose Alberts MD on 5/31/2024 at 6:56 AM

## 2024-05-31 NOTE — BRIEF OP NOTE
Brief Postoperative Note        Patient: Leslye Hancock  YOB: 1950  MRN: [id number]     Date of Procedure: 5/31/2024     Pre-Op Diagnosis Codes:     * Tendinitis of right peroneus brevis tendon [M76.71]     * Traumatic subluxation of peroneal tendon of left foot [S93.332A]     Post-Op Diagnosis: Post-Op Diagnosis Codes:     * Tendinitis of right peroneus brevis tendon [M76.71]     * Traumatic subluxation of peroneal tendon of left foot [S93.332A]       Procedure(s):  EXCISIONAL DEBRIDEMENT LEFT PERONEAL BREVIS TENDON TENOSYNOVECTOMY; RECESSION OF PERONEAL BREVIS MUSCLE, RELOCATION OF PERONEAL TENDONS, SUPERIOR RETINACULAR REPAIR/RECONSTRUCTION, TENOSYNOVECTOMY OF PERONEAL TENDON LONGUS AND BREVIS TENDONS.   REGIONAL BLOCK  *SEE COMMENTS     Surgeon(s):  Jose Alberts MD     Assistant:   Surgical Assistant: Tl Hill  Physician Assistant: (Unknown)     Anesthesia: General and Regional      IV FLUIDS: 1000 ml  TOURNIQUET TIME:  37  minutes @  300 mm HG     Estimated Blood Loss (mL): Minimal     Complications: None     Specimens:   * No specimens in log *     Implants:  * No surgical log found *      Drains: * No LDAs found *     Findings:  Infection Present At Time Of Surgery (PATOS) (choose all levels that have infection present):  No infection present  Other Findings:       INDICATIONS     The patient is a pleasant 74 y.o. female who has a history of      Leslye Hancock is a exquisite point tenderness to the posterior lateral portion of her left ankle.  I was concerned about a stress fracture.  She had MRI, of the left ankle, on May 1, 2024, I reviewed the MRI and saw the images.  It does show a dislocated peroneal tendon and rupture of the superior peroneal retinaculum with dislocation the peroneal longus laterally and anteriorly.  There is some interstitial tendinosis, but do not see any tear.  Peroneal brevis, still significant ice, to the to the posterior lateral border of

## 2024-06-05 ENCOUNTER — OFFICE VISIT (OUTPATIENT)
Age: 74
End: 2024-06-05

## 2024-06-05 VITALS — HEIGHT: 59 IN | BODY MASS INDEX: 37.81 KG/M2

## 2024-06-05 DIAGNOSIS — S93.332A TRAUMATIC SUBLUXATION OF PERONEAL TENDON OF LEFT FOOT: Primary | ICD-10-CM

## 2024-06-05 DIAGNOSIS — Z09 POSTOP CHECK: ICD-10-CM

## 2024-06-05 PROCEDURE — 99024 POSTOP FOLLOW-UP VISIT: CPT | Performed by: ORTHOPAEDIC SURGERY

## 2024-06-05 RX ORDER — HYDROCODONE BITARTRATE AND ACETAMINOPHEN 5; 325 MG/1; MG/1
1 TABLET ORAL EVERY 8 HOURS PRN
Qty: 21 TABLET | Refills: 0 | Status: SHIPPED | OUTPATIENT
Start: 2024-06-05 | End: 2024-06-12

## 2024-06-05 NOTE — PROGRESS NOTES
Sig: Take 1 tablet by mouth every 8 hours as needed for Pain for up to 7 days. Intended supply: 7 days. Take lowest dose possible to manage pain Max Daily Amount: 3 tablets     Dispense:  21 tablet     Refill:  0     Reduce doses taken as pain becomes manageable Reduce doses taken as pain becomes manageable      There are no Patient Instructions on file for this visit.            Documentation by alonso Feliz, as dictated by Jose Alberts MD on 6/5/2024.

## 2024-06-10 ENCOUNTER — TELEPHONE (OUTPATIENT)
Age: 74
End: 2024-06-10

## 2024-06-10 NOTE — TELEPHONE ENCOUNTER
Patient called stating the wrap around her foot has loosened and is getting uncomfortable inside her boot and causing her foot to slip. She is asking to come in to have it fixed if possible.    Tel. [phone]

## 2024-06-10 NOTE — TELEPHONE ENCOUNTER
Spoke with Dr Estiven Sutherland--pt can come in for cast change-pt is on eliquis--have PA look at leg when changing cast

## 2024-06-11 ENCOUNTER — OFFICE VISIT (OUTPATIENT)
Age: 74
End: 2024-06-11

## 2024-06-11 DIAGNOSIS — Z09 POSTOP CHECK: ICD-10-CM

## 2024-06-11 DIAGNOSIS — S93.332A TRAUMATIC SUBLUXATION OF PERONEAL TENDON OF LEFT FOOT: Primary | ICD-10-CM

## 2024-06-11 PROCEDURE — 99024 POSTOP FOLLOW-UP VISIT: CPT

## 2024-06-11 NOTE — PROGRESS NOTES
Patient: Leslye Hancock                MRN: [id number]       SSN: [id number]  YOB: 1950        AGE: 74 y.o.        SEX: female  BMI: There is no height or weight on file to calculate BMI.    PCP: Noris Bowling DO  06/11/24    Chief Complaint: Cast Removal (Post op cast change due to cast loosening secondary to patient ambulating on surgical foot)      1. Traumatic subluxation of peroneal tendon of left foot  2. Postop check        HPI:  Leslye Hancock is a 74 y.o. female with chief complaint of   Chief Complaint   Patient presents with    Cast Removal     Post op cast change due to cast loosening secondary to patient ambulating on surgical foot     Patient presents earlier than their scheduled post op appointment with Dr. Alberts secondary to her walking on her post operative cast against medical advice. She states the pain is getting better but there is some swelling present still. She reports not elevating her foot very often. Otherwise, she says that she feels like it is getting better.         6/5/2024    11:15 AM   AMB PAIN ASSESSMENT   Location of Pain Ankle   Location Modifiers Left   Severity of Pain 3   Quality of Pain Dull   Frequency of Pain Intermittent       Assessment & Plan:  11 days s/p excisional Debridement Left Peroneal Brevis Tendon Tenosynovectomy; Retinacular Reconstruction; Primary Repair Peroneal Brevis Tendon Tear  Cast removed and replaced today. Prior to the new cast being put on, I inspected the patient's incision which is clean, dry, and intact without erythema or drainage. There is expected post operative swelling around the incision without associated tenderness to palpation or warmth. Sutures were left in place and will be removed later under the recommendation of Dr. Alberts once he is back in the office. Otherwise, I advised the patient to stop walking on her operative extremity and begin elevating her foot while she is sitting in her recliner

## 2024-06-12 ENCOUNTER — TELEPHONE (OUTPATIENT)
Age: 74
End: 2024-06-12

## 2024-06-12 NOTE — TELEPHONE ENCOUNTER
I have placed a requested signature in Dr. Alberts signature folder from [facility] in regards to dental treatment.

## 2024-06-17 ENCOUNTER — TELEPHONE (OUTPATIENT)
Age: 74
End: 2024-06-17

## 2024-06-17 NOTE — TELEPHONE ENCOUNTER
Spoke with patient.  Patient states that she was seen by BERONICA Andrews on 6-11-24.  Patient states that she feels like something is laying in between the big toe and #2 toe on the surgical side, LLE.    Patient states that she was wearing her non-slip sock on the opposite side and foot started to feel \"warm\" so she took the sock up.  Patient still stating that she \"feels like she has on a sock\" on the non-surgical side, even when she does not in fact have a sock on.  Patient was instructed to contact PCP to be evaluated since Dr. Alberts is out of the office this week. Patient denies SOB, no calf pain.  This was discussed with BERONICA Andrews and she agrees.

## 2024-06-17 NOTE — TELEPHONE ENCOUNTER
Pt lvm stating her surgical foot feels like it has a sock/stocking on that her foot is enclosed in.  She researched it and says she has a “condition” and would like to discuss it with someone.  Also states she needs to discuss her other foot as well but did not clarify the issue with the other foot.

## 2024-06-26 ENCOUNTER — TELEPHONE (OUTPATIENT)
Age: 74
End: 2024-06-26

## 2024-06-26 NOTE — TELEPHONE ENCOUNTER
Pt requesting a refill on the [employer].  Pt uses [facility].

## 2024-07-02 ENCOUNTER — OFFICE VISIT (OUTPATIENT)
Age: 74
End: 2024-07-02

## 2024-07-02 DIAGNOSIS — S93.332A TRAUMATIC SUBLUXATION OF PERONEAL TENDON OF LEFT FOOT: ICD-10-CM

## 2024-07-02 DIAGNOSIS — Z09 POSTOP CHECK: Primary | ICD-10-CM

## 2024-07-02 NOTE — PROGRESS NOTES
Excisional Debridement Left Peroneal Brevis Tendon Tenosynovectomy; Retinacular Reconstruction; Primary Repair Peroneal Brevis Tendon Tear; C-arm; [[facility]]; Regional Block  *see Comments - Left   SURGERY DATE: 5/31/2024   DAYS SINCE SURGERY: 32     ICD-10-CM    1. Postop check  Z09       2. Traumatic subluxation of peroneal tendon of left foot  S93.332A          No orders of the defined types were placed in this encounter.         SUBJECTIVE: Leslye Hancock is a 74 y.o. female is seen for a routine postop check.    She has transient sharp shooting pain from the tibia down to the foot that lasts about 30-40 minutes throughout the day. The pain sometimes causes the leg to jerk, describes the pain a \"lightning\". Reports no additional problems with the wound or other issues.Activity, diet and bowels are normal. No pain.  No chest pain fever shakes chills night sweats, no calf pain.      Of note: she has developed a sensation of a \"glove or stocking\" on the right foot since last visit. She has some blunted sensation to the right foot, believes she has symptoms of neuropathy. She also has a similar sensation on the left foot, mentions also having numbness between the toes and at the bottom of the foot. Denies having this sensation prior to the surgery. She saw her PCP recently for the numbness. Additionally, she has history of sciatica and denies history of diabetes.     OBJECTIVE: Appears well.  Incision is healed without complications or infection. Normal sensation along th surgical scar. Blunted sensation along the lateral border of the foot.  Incision looks great no redness erythema no drainage no signs infection.  As such I remove the sutures myself and then placed a sterile dry dressing.    Current Outpatient Medications   Medication Instructions    acetaminophen (TYLENOL) 650 mg, Oral, EVERY 4 HOURS PRN    acetaminophen (TYLENOL) 500 mg, Oral, EVERY 6 HOURS PRN    apixaban

## 2024-07-03 ENCOUNTER — TELEPHONE (OUTPATIENT)
Age: 74
End: 2024-07-03

## 2024-07-03 NOTE — TELEPHONE ENCOUNTER
Patient called in upset because she still hasn't received her pain medication Hydrocodone.     She's says she's been waiting since 06/26.    Please advise.  Patient can be reached at [phone].    Pharmacy:    [id number] - [place], [place] - [address] - P [phone] - F [phone] [[id number]]

## 2024-07-10 NOTE — TELEPHONE ENCOUNTER
Patient called today, she still has not received her   HYDROcodone-acetaminophen (NORCO) 5-325 MG per tablet prescription and states she has been trying since 7/3. She would like it sent to [facility]/pharmacy #[id number] - [place], [place] - [address] - P [phone] - F [phone]. She is requesting a call back to discuss the delay if possible.      Please review and advise patient, [phone]

## 2024-07-12 DIAGNOSIS — Z09 POSTOP CHECK: Primary | ICD-10-CM

## 2024-07-12 RX ORDER — HYDROCODONE BITARTRATE AND ACETAMINOPHEN 5; 325 MG/1; MG/1
1 TABLET ORAL EVERY 8 HOURS PRN
Qty: 21 TABLET | Refills: 0 | Status: SHIPPED | OUTPATIENT
Start: 2024-07-12 | End: 2024-07-19

## 2024-07-12 NOTE — TELEPHONE ENCOUNTER
Patient called again to request a refill of HYDROcodone-acetaminophen (NORCO) 5-325 MG per tablet be sent to [facility]/pharmacy #[id number] - [place], [place] - [address] - P [phone] - F [phone].

## 2024-07-17 ENCOUNTER — OFFICE VISIT (OUTPATIENT)
Age: 74
End: 2024-07-17

## 2024-07-17 DIAGNOSIS — M47.819 SPONDYLOSIS WITHOUT MYELOPATHY OR RADICULOPATHY, SITE UNSPECIFIED: Primary | ICD-10-CM

## 2024-07-17 PROCEDURE — 99024 POSTOP FOLLOW-UP VISIT: CPT | Performed by: ORTHOPAEDIC SURGERY

## 2024-07-17 RX ORDER — PREGABALIN 25 MG/1
25 CAPSULE ORAL NIGHTLY
Qty: 30 CAPSULE | Refills: 0 | Status: SHIPPED | OUTPATIENT
Start: 2024-07-17 | End: 2024-08-16

## 2024-07-17 NOTE — PROGRESS NOTES
Biotin 1,000 mcg, Oral, DAILY    cephALEXin (KEFLEX) 500 mg, Oral, 3 TIMES DAILY    cyanocobalamin 1,000 mcg, Oral, DAILY PRN    diazePAM (VALIUM) 5 MG tablet 1/2-1 tablet as needed    diclofenac sodium (VOLTAREN) 4 g, Topical, EVERY 6 HOURS PRN    diclofenac sodium (VOLTAREN) 4 g, Topical, 4 TIMES DAILY, Dispense 5, 100g tubes    estradiol (ESTRACE) 0.1 MG/GM vaginal cream apply 1/4 applicatorful pv 2 - 3  x weekly at hs    febuxostat (ULORIC) 40 mg, Oral, DAILY    fluticasone (FLONASE) 50 MCG/ACT nasal spray instill 2 sprays into each nostril once daily if needed for congestion    folic acid (FOLVITE) 1 mg, Oral, DAILY    HYDROcodone-acetaminophen (NORCO) 5-325 MG per tablet 1 tablet, Oral, EVERY 8 HOURS PRN, Intended supply: 7 days. Take lowest dose possible to manage pain    ipratropium (ATROVENT) 0.03 % nasal spray 2 sprays in each nostril prn allergy sxs    lidocaine (LIDODERM) 5 % APPLY 1 PATCH ONTO THE SKIN DAILY FOR 12 HOURS ON, 12 HOURS OFF    losartan (COZAAR) 100 mg, Oral, DAILY    metaxalone (SKELAXIN) 800 mg, Oral, 3 TIMES DAILY    metoclopramide (REGLAN) 10 mg, Oral, 4 TIMES DAILY BEFORE MEALS & NIGHTLY    metoprolol succinate (TOPROL XL) 100 mg, Oral, DAILY    metoprolol tartrate (LOPRESSOR) 50 mg, Oral, DAILY    omeprazole (PRILOSEC) 40 mg, Oral, DAILY    ondansetron (ZOFRAN) 4 mg, Oral, EVERY 8 HOURS PRN    pregabalin (LYRICA) 25 mg, Oral, NIGHTLY    vitamin D 1,000 Units, Oral, DAILY        DIAGNOTIC STUDIES:   Orders Placed This Encounter   Procedures    [facility]     Referral Priority:   Routine     Referral Type:   Eval and Treat     Referral Reason:   Specialty Services Required     Requested Specialty:   Physical Therapist     Number of Visits Requested:   1         ASSESSMENT: Leslye Hancock is doing  well thus far postoperative.  But now reports some stocking glove type sensation to her left ankle right ankle.  She no signs of CRPS.

## 2024-08-06 ENCOUNTER — HOSPITAL ENCOUNTER (OUTPATIENT)
Facility: HOSPITAL | Age: 74
Discharge: HOME | End: 2024-08-09
Payer: MEDICARE

## 2024-08-06 ENCOUNTER — TRANSCRIBE ORDERS (OUTPATIENT)
Facility: HOSPITAL | Age: 74
End: 2024-08-06

## 2024-08-06 DIAGNOSIS — N18.31 STAGE 3A CHRONIC KIDNEY DISEASE (HCC): ICD-10-CM

## 2024-08-06 DIAGNOSIS — I12.9: ICD-10-CM

## 2024-08-06 DIAGNOSIS — I12.9 CHRONIC KIDNEY DISEASE DUE TO BENIGN HYPERTENSION: ICD-10-CM

## 2024-08-06 DIAGNOSIS — I12.9: Primary | ICD-10-CM

## 2024-08-06 LAB
ALBUMIN SERPL-MCNC: 3.5 G/DL (ref 3.4–5)
ANION GAP SERPL CALC-SCNC: 5 MMOL/L (ref 3–18)
BUN SERPL-MCNC: 15 MG/DL (ref 7–18)
BUN/CREAT SERPL: 12 (ref 12–20)
CALCIUM SERPL-MCNC: 9.4 MG/DL (ref 8.5–10.1)
CALCIUM SERPL-MCNC: 9.5 MG/DL (ref 8.5–10.1)
CHLORIDE SERPL-SCNC: 109 MMOL/L (ref 100–111)
CO2 SERPL-SCNC: 28 MMOL/L (ref 21–32)
CREAT SERPL-MCNC: 1.21 MG/DL (ref 0.6–1.3)
CREAT UR-MCNC: 82 MG/DL (ref 30–125)
ERYTHROCYTE [DISTWIDTH] IN BLOOD BY AUTOMATED COUNT: 14.7 % (ref 11.6–14.5)
GLUCOSE SERPL-MCNC: 94 MG/DL (ref 74–99)
HCT VFR BLD AUTO: 39.4 % (ref 35–45)
HGB BLD-MCNC: 12.1 G/DL (ref 12–16)
MCH RBC QN AUTO: 25.1 PG (ref 24–34)
MCHC RBC AUTO-ENTMCNC: 30.7 G/DL (ref 31–37)
MCV RBC AUTO: 81.6 FL (ref 78–100)
MICROALBUMIN UR-MCNC: 0.58 MG/DL (ref 0–3)
MICROALBUMIN/CREAT UR-RTO: 7 MG/G (ref 0–30)
NRBC # BLD: 0 K/UL (ref 0–0.01)
NRBC BLD-RTO: 0 PER 100 WBC
PHOSPHATE SERPL-MCNC: 3.3 MG/DL (ref 2.5–4.9)
PLATELET # BLD AUTO: 238 K/UL (ref 135–420)
PMV BLD AUTO: 11.4 FL (ref 9.2–11.8)
POTASSIUM SERPL-SCNC: 4.6 MMOL/L (ref 3.5–5.5)
PTH-INTACT SERPL-MCNC: 49.9 PG/ML (ref 18.4–88)
RBC # BLD AUTO: 4.83 M/UL (ref 4.2–5.3)
SODIUM SERPL-SCNC: 142 MMOL/L (ref 136–145)
WBC # BLD AUTO: 6.8 K/UL (ref 4.6–13.2)

## 2024-08-06 PROCEDURE — 82043 UR ALBUMIN QUANTITATIVE: CPT

## 2024-08-06 PROCEDURE — 80069 RENAL FUNCTION PANEL: CPT

## 2024-08-06 PROCEDURE — 85027 COMPLETE CBC AUTOMATED: CPT

## 2024-08-06 PROCEDURE — 83970 ASSAY OF PARATHORMONE: CPT

## 2024-08-06 PROCEDURE — 82570 ASSAY OF URINE CREATININE: CPT

## 2024-08-06 PROCEDURE — 36415 COLL VENOUS BLD VENIPUNCTURE: CPT

## 2024-08-14 ENCOUNTER — OFFICE VISIT (OUTPATIENT)
Age: 74
End: 2024-08-14

## 2024-08-14 DIAGNOSIS — M47.819 SPONDYLOSIS WITHOUT MYELOPATHY OR RADICULOPATHY, SITE UNSPECIFIED: ICD-10-CM

## 2024-08-14 DIAGNOSIS — G62.9 NEUROPATHY: Primary | ICD-10-CM

## 2024-08-14 PROCEDURE — 99024 POSTOP FOLLOW-UP VISIT: CPT | Performed by: ORTHOPAEDIC SURGERY

## 2024-08-14 RX ORDER — PREGABALIN 25 MG/1
25 CAPSULE ORAL 2 TIMES DAILY
Qty: 60 CAPSULE | Refills: 0 | Status: SHIPPED | OUTPATIENT
Start: 2024-08-14 | End: 2024-09-13

## 2024-08-14 NOTE — PROGRESS NOTES
& NIGHTLY    metoprolol succinate (TOPROL XL) 100 mg, Oral, DAILY    metoprolol tartrate (LOPRESSOR) 50 mg, Oral, DAILY    omeprazole (PRILOSEC) 40 mg, Oral, DAILY    ondansetron (ZOFRAN) 4 mg, Oral, EVERY 8 HOURS PRN    pregabalin (LYRICA) 25 mg, Oral, 2 TIMES DAILY    vitamin D 1,000 Units, Oral, DAILY        DIAGNOTIC STUDIES:   Orders Placed This Encounter   Procedures    [facility] - Grupo Loya MD, Electromyography (EMG), [facility])     Referral Priority:   Routine     Referral Type:   Eval and Treat     Referral Reason:   Specialty Services Required     Referred to Provider:   Grupo Loya MD     Requested Specialty:   Physical Medicine and Rehab     Number of Visits Requested:   1    NCV WITH EMG BILATERAL LOWER EXTREMITIES     Standing Status:   Future     Standing Expiration Date:   8/14/2025     Scheduling Instructions:      Lower leg numbness      EMG/ DR. Loya         ASSESSMENT: Leslye Hancock is doing   well thus far postoperative.  But have not some numbness, plantar portion of her right forefoot, and plantar lateral part of her left ankle and hindfoot, near the SPN nerve root distribution of the left foot.  She is well-healed remote surgical scar left ankle.      ICD-10-CM    1. Neuropathy  G62.9 NCV WITH EMG BILATERAL LOWER EXTREMITIES     [facility] - Grupo Loya MD, Electromyography (EMG), [facility])      2. Spondylosis without myelopathy or radiculopathy, site unspecified  M47.819 pregabalin (LYRICA) 25 MG capsule           PLAN:     Incisions:  Sutures removed at a prior visit.  DVT Prophylaxis : none   Pain medications : none  Weight Bearing : WBAT   Recommended increasing Lyrica 25 mg to twice daily, once in the mornings and once at night before bed for nerve pain  Ordered EMG nerve conduction studies of bilateral lower extremities with neurology consult Dr. To.   Return after nerve studies        Orders Placed This Encounter    [facility]

## 2024-09-16 ENCOUNTER — HOSPITAL ENCOUNTER (OUTPATIENT)
Facility: HOSPITAL | Age: 74
Discharge: HOME | End: 2024-09-19
Payer: MEDICARE

## 2024-09-16 DIAGNOSIS — M85.89 OTHER SPECIFIED DISORDERS OF BONE DENSITY AND STRUCTURE, MULTIPLE SITES: ICD-10-CM

## 2024-09-16 PROCEDURE — 77080 DXA BONE DENSITY AXIAL: CPT

## 2024-09-18 DIAGNOSIS — M47.819 SPONDYLOSIS WITHOUT MYELOPATHY OR RADICULOPATHY, SITE UNSPECIFIED: ICD-10-CM

## 2024-09-18 RX ORDER — PREGABALIN 25 MG/1
25 CAPSULE ORAL 2 TIMES DAILY
Qty: 60 CAPSULE | Refills: 0 | OUTPATIENT
Start: 2024-09-18 | End: 2024-10-18

## 2024-09-24 DIAGNOSIS — G62.9 NEUROPATHY: Primary | ICD-10-CM

## 2024-09-24 RX ORDER — PREGABALIN 25 MG/1
25 CAPSULE ORAL 2 TIMES DAILY
Qty: 60 CAPSULE | Refills: 1 | Status: SHIPPED | OUTPATIENT
Start: 2024-09-24 | End: 2024-11-23

## 2024-09-26 ENCOUNTER — OFFICE VISIT (OUTPATIENT)
Age: 74
End: 2024-09-26
Payer: MEDICARE

## 2024-09-26 DIAGNOSIS — G89.29 CHRONIC PAIN OF LEFT KNEE: Primary | ICD-10-CM

## 2024-09-26 DIAGNOSIS — M25.562 CHRONIC PAIN OF LEFT KNEE: Primary | ICD-10-CM

## 2024-09-26 DIAGNOSIS — Z96.651 PRESENCE OF RIGHT ARTIFICIAL KNEE JOINT: ICD-10-CM

## 2024-09-26 PROCEDURE — 1123F ACP DISCUSS/DSCN MKR DOCD: CPT | Performed by: PHYSICIAN ASSISTANT

## 2024-09-26 PROCEDURE — 1036F TOBACCO NON-USER: CPT | Performed by: PHYSICIAN ASSISTANT

## 2024-09-26 PROCEDURE — 1090F PRES/ABSN URINE INCON ASSESS: CPT | Performed by: PHYSICIAN ASSISTANT

## 2024-09-26 PROCEDURE — 99213 OFFICE O/P EST LOW 20 MIN: CPT | Performed by: PHYSICIAN ASSISTANT

## 2024-09-26 PROCEDURE — G8427 DOCREV CUR MEDS BY ELIG CLIN: HCPCS | Performed by: PHYSICIAN ASSISTANT

## 2024-09-26 PROCEDURE — G8417 CALC BMI ABV UP PARAM F/U: HCPCS | Performed by: PHYSICIAN ASSISTANT

## 2024-09-26 PROCEDURE — G8399 PT W/DXA RESULTS DOCUMENT: HCPCS | Performed by: PHYSICIAN ASSISTANT

## 2024-10-21 ENCOUNTER — OFFICE VISIT (OUTPATIENT)
Age: 74
End: 2024-10-21
Payer: MEDICARE

## 2024-10-21 DIAGNOSIS — G62.9 NEUROPATHY: Primary | ICD-10-CM

## 2024-10-21 PROCEDURE — 1036F TOBACCO NON-USER: CPT | Performed by: ORTHOPAEDIC SURGERY

## 2024-10-21 PROCEDURE — G8484 FLU IMMUNIZE NO ADMIN: HCPCS | Performed by: ORTHOPAEDIC SURGERY

## 2024-10-21 PROCEDURE — G8417 CALC BMI ABV UP PARAM F/U: HCPCS | Performed by: ORTHOPAEDIC SURGERY

## 2024-10-21 PROCEDURE — G8427 DOCREV CUR MEDS BY ELIG CLIN: HCPCS | Performed by: ORTHOPAEDIC SURGERY

## 2024-10-21 PROCEDURE — G8399 PT W/DXA RESULTS DOCUMENT: HCPCS | Performed by: ORTHOPAEDIC SURGERY

## 2024-10-21 PROCEDURE — 1123F ACP DISCUSS/DSCN MKR DOCD: CPT | Performed by: ORTHOPAEDIC SURGERY

## 2024-10-21 PROCEDURE — 99214 OFFICE O/P EST MOD 30 MIN: CPT | Performed by: ORTHOPAEDIC SURGERY

## 2024-10-21 PROCEDURE — 1090F PRES/ABSN URINE INCON ASSESS: CPT | Performed by: ORTHOPAEDIC SURGERY

## 2024-10-21 RX ORDER — PREGABALIN 25 MG/1
25 CAPSULE ORAL 3 TIMES DAILY
Qty: 90 CAPSULE | Refills: 3 | Status: SHIPPED | OUTPATIENT
Start: 2024-10-21 | End: 2025-03-03

## 2024-10-21 NOTE — PATIENT INSTRUCTIONS
Take Lyrica 25 mg one tablet three times daily for 2 weeks until November 4, 2024. If you do not notice a difference in symptoms, increase to taking Lyrica 25 mg two tablets twice daily.

## 2024-10-21 NOTE — PROGRESS NOTES
AMBULATORY PROGRESS NOTE      Patient: Leslye Hancock             MRN: [id number]     SSN: [id number] There is no height or weight on file to calculate BMI.  YOB: 1950     AGE: 74 y.o.       EX: female    PCP: Noris Bowling DO       IMPRESSION //  DIAGNOSIS AND TREATMENT PLAN      Leslye Hancock has a diagnosis of:     Leslye Hancock has neuropathy to her left lower leg foot and ankle region.  She describes having numbness to her foot stocking glove type sensation to her foot.  Recommendations EMG nerve conduction studies.  She states she has similar sensation in her right forefoot but mild.  So the question is whether the block she had from her surgery, May 31, 2024, has contributed to her neuropathy in her left foot, which it may very well have.  And the questions whether she has any early neuropathy of her right forefoot toes.  She has a history of having had lumbar surgery in the past.     She is clear stating that she has never had this left foot numbness prior to her surgery.    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 Montgomery, June 9, 2022, for complaints of low back pain radiating to her left lower extremity in the L5 nerve root distribution to her ankle.  Left lower extremity EMG nerve conduction studies, recommended.  At that time she declined to medications.       IMPRESSION AND PLAN:   Patient returns to the office today with c/o low back pain radiating into the LLE in a L5 distribution to the ankle. Multiple treatment options were discussed. I will order a LLE EMG to further evaluate  her sxs. Declined MDP adn neuropathic medications. Patient is neurologically intact. I will see the patient back in 1 month's time

## 2024-11-13 ENCOUNTER — HOSPITAL ENCOUNTER (EMERGENCY)
Facility: HOSPITAL | Age: 74
Discharge: HOME | End: 2024-11-13
Payer: MEDICARE

## 2024-11-13 VITALS
DIASTOLIC BLOOD PRESSURE: 73 MMHG | RESPIRATION RATE: 18 BRPM | HEART RATE: 84 BPM | OXYGEN SATURATION: 99 % | WEIGHT: 189 LBS | TEMPERATURE: 97.3 F | BODY MASS INDEX: 38.1 KG/M2 | SYSTOLIC BLOOD PRESSURE: 132 MMHG | HEIGHT: 59 IN

## 2024-11-13 DIAGNOSIS — G89.29 ACUTE EXACERBATION OF CHRONIC LOW BACK PAIN: Primary | ICD-10-CM

## 2024-11-13 DIAGNOSIS — M54.50 ACUTE EXACERBATION OF CHRONIC LOW BACK PAIN: Primary | ICD-10-CM

## 2024-11-13 DIAGNOSIS — M62.838 SPASM OF MUSCLE: ICD-10-CM

## 2024-11-13 LAB
APPEARANCE UR: CLEAR
BILIRUB UR QL: NEGATIVE
COLOR UR: YELLOW
GLUCOSE UR STRIP.AUTO-MCNC: NEGATIVE MG/DL
HGB UR QL STRIP: NEGATIVE
KETONES UR QL STRIP.AUTO: NEGATIVE MG/DL
LEUKOCYTE ESTERASE UR QL STRIP.AUTO: NEGATIVE
NITRITE UR QL STRIP.AUTO: NEGATIVE
PH UR STRIP: 6 (ref 5–8)
PROT UR STRIP-MCNC: NEGATIVE MG/DL
SP GR UR REFRACTOMETRY: 1.02 (ref 1–1.03)
UROBILINOGEN UR QL STRIP.AUTO: 1 EU/DL (ref 0.2–1)

## 2024-11-13 PROCEDURE — 81003 URINALYSIS AUTO W/O SCOPE: CPT

## 2024-11-13 PROCEDURE — 96372 THER/PROPH/DIAG INJ SC/IM: CPT

## 2024-11-13 PROCEDURE — 6360000002 HC RX W HCPCS: Performed by: NURSE PRACTITIONER

## 2024-11-13 PROCEDURE — 6370000000 HC RX 637 (ALT 250 FOR IP): Performed by: NURSE PRACTITIONER

## 2024-11-13 PROCEDURE — 99284 EMERGENCY DEPT VISIT MOD MDM: CPT

## 2024-11-13 RX ORDER — BACLOFEN 10 MG/1
10 TABLET ORAL 3 TIMES DAILY PRN
Qty: 30 TABLET | Refills: 0 | Status: SHIPPED | OUTPATIENT
Start: 2024-11-13

## 2024-11-13 RX ORDER — LIDOCAINE 4 G/G
1 PATCH TOPICAL
Status: DISCONTINUED | OUTPATIENT
Start: 2024-11-13 | End: 2024-11-13 | Stop reason: HOSPADM

## 2024-11-13 RX ORDER — METHOCARBAMOL 500 MG/1
1000 TABLET, FILM COATED ORAL 4 TIMES DAILY
COMMUNITY

## 2024-11-13 RX ORDER — LIDOCAINE 50 MG/G
1 PATCH TOPICAL EVERY 24 HOURS
Qty: 30 PATCH | Refills: 0 | Status: SHIPPED | OUTPATIENT
Start: 2024-11-13 | End: 2024-12-13

## 2024-11-13 RX ORDER — KETOROLAC TROMETHAMINE 15 MG/ML
30 INJECTION, SOLUTION INTRAMUSCULAR; INTRAVENOUS ONCE
Status: COMPLETED | OUTPATIENT
Start: 2024-11-13 | End: 2024-11-13

## 2024-11-13 RX ORDER — METHYLPREDNISOLONE 4 MG/1
TABLET ORAL
Qty: 1 KIT | Refills: 0 | Status: SHIPPED | OUTPATIENT
Start: 2024-11-13 | End: 2024-11-19

## 2024-11-13 RX ORDER — DIAZEPAM 5 MG/1
5 TABLET ORAL ONCE
Status: COMPLETED | OUTPATIENT
Start: 2024-11-13 | End: 2024-11-13

## 2024-11-13 RX ADMIN — KETOROLAC TROMETHAMINE 30 MG: 15 INJECTION, SOLUTION INTRAMUSCULAR; INTRAVENOUS at 13:46

## 2024-11-13 RX ADMIN — DIAZEPAM 5 MG: 5 TABLET ORAL at 13:45

## 2024-11-13 ASSESSMENT — ENCOUNTER SYMPTOMS: BACK PAIN: 1

## 2024-11-13 ASSESSMENT — PAIN DESCRIPTION - ORIENTATION: ORIENTATION: LEFT;LOWER

## 2024-11-13 ASSESSMENT — PAIN SCALES - GENERAL
PAINLEVEL_OUTOF10: 6
PAINLEVEL_OUTOF10: 10
PAINLEVEL_OUTOF10: 8

## 2024-11-13 ASSESSMENT — PAIN DESCRIPTION - LOCATION: LOCATION: BACK

## 2024-11-13 ASSESSMENT — LIFESTYLE VARIABLES
HOW OFTEN DO YOU HAVE A DRINK CONTAINING ALCOHOL: NEVER
HOW MANY STANDARD DRINKS CONTAINING ALCOHOL DO YOU HAVE ON A TYPICAL DAY: PATIENT DOES NOT DRINK

## 2024-11-13 ASSESSMENT — PAIN - FUNCTIONAL ASSESSMENT: PAIN_FUNCTIONAL_ASSESSMENT: 0-10

## 2024-11-13 NOTE — ED TRIAGE NOTES
Patient complains of left lower back pain that radiates to left lower abdomen, described as spasms, rating 10/10, constant, increases with movement.    Has taken methocarbamol for pain 1000mg, four times a day without relief.    Denies fall/dysuria/constipation

## 2024-11-13 NOTE — ED PROVIDER NOTES
[facility] EMERGENCY DEPT  EMERGENCY DEPARTMENT HISTORY AND PHYSICAL EXAM      Date: 11/13/2024  Patient Name: Leslye Hancock  MRN: [id number]  YOB: 1950  Date of evaluation: 11/13/2024  Provider: AGATHA Marlow NP   Note Started: 12:54 PM EST 11/13/24      History of Presenting Illness     Chief Complaint   Patient presents with    Back Pain         History Provided By: Patient and medical chart review.    Additional History (Context): Leslye Hancock is a 74 y.o. female with   Past Medical History:   Diagnosis Date    Arthritis     Chronic kidney disease     GERD (gastroesophageal reflux disease)     Hypertension     Ill-defined condition     Positional Vertigo -  pt can not lie flat    Sleep apnea    who presents with complaints of bilateral lower back pain.  States she feels her back spasming every time she moves.  States left side is greater than right side.  Rating pain 9 out of 10.  States if she is laying in bed not moving feels no pain at all once she starts moving will feel the spasms.  Patient denies any urinary symptoms. Pt denies fever/chills, recent injury/trauma. Denies any urinary/bowel incontinence/retention, or saddle anesthesia.  [facility] Rob[facility]in she has been taking 1000 mg 4 times a day reports no relief.  Patient denies being on a blood thinner.  States she has previously taken muscle relaxers before which have helped.  Patient denies any other medical complaints or concerns.    PCP: Noris Bowling DO    Current Facility-Administered Medications   Medication Dose Route Frequency Provider Last Rate Last Admin    lidocaine 4 % external patch 1 patch  1 patch TransDERmal NOW Liya Anthony APRN - NP   1 patch at 11/13/24 1342     Current Outpatient Medications   Medication Sig Dispense Refill    methocarbamol (ROBAXIN) 500 MG tablet Take 2 tablets by mouth 4 times daily      lidocaine (LIDODERM) 5 % Place 1 patch

## 2024-11-13 NOTE — DISCHARGE INSTRUCTIONS
Apply heat to area 3-5 times a day  Take Lyrica 3 times a day for 2 weeks may increase to 4 times a day if no relief per ortho.  You may take Tylenol or Tylenol PM as needed for pain as well in addition to medications taken.  Take medication as prescribed. Follow-up with your primary care physician/ ortho within 2 days for reassessment. Bring the results from this visit with you for their review. Return to the ED immediately for any new, worsening, or persistent symptoms, including fever, vomiting, chest pain, shortness of breath, or any other medical concerns.

## 2024-12-20 ENCOUNTER — PROCEDURE VISIT (OUTPATIENT)
Age: 74
End: 2024-12-20

## 2024-12-20 ENCOUNTER — TRANSCRIBE ORDERS (OUTPATIENT)
Dept: SCHEDULING | Age: 74
End: 2024-12-20

## 2024-12-20 VITALS
RESPIRATION RATE: 18 BRPM | WEIGHT: 190.6 LBS | BODY MASS INDEX: 38.42 KG/M2 | DIASTOLIC BLOOD PRESSURE: 59 MMHG | HEIGHT: 59 IN | HEART RATE: 85 BPM | SYSTOLIC BLOOD PRESSURE: 121 MMHG

## 2024-12-20 DIAGNOSIS — Z12.31 ENCOUNTER FOR SCREENING MAMMOGRAM FOR HIGH-RISK PATIENT: Primary | ICD-10-CM

## 2024-12-20 DIAGNOSIS — R20.0 NUMBNESS AND TINGLING OF BOTH LOWER EXTREMITIES: Primary | ICD-10-CM

## 2024-12-20 DIAGNOSIS — G62.9 NEUROPATHY: ICD-10-CM

## 2024-12-20 DIAGNOSIS — R20.2 NUMBNESS AND TINGLING OF BOTH LOWER EXTREMITIES: Primary | ICD-10-CM

## 2024-12-20 DIAGNOSIS — R94.131 ABNORMAL EMG: ICD-10-CM

## 2024-12-20 NOTE — PROGRESS NOTES
MD  Electronically signed: GABRIELLA Knott. 12/20/24 12:41 PM EST     I, Grupo Loya MD, personally performed the services described in this documentation. I have authorized the scribe to complete the medical record entries input within this chart. I have reviewed the chart and agree that the record reflects my personal performance and is accurate and complete. [Electronically Signed: Grupo Loya MD. 12/20/2024 1:03 PM]

## 2025-01-08 ENCOUNTER — OFFICE VISIT (OUTPATIENT)
Age: 75
End: 2025-01-08
Payer: MEDICARE

## 2025-01-08 DIAGNOSIS — M79.89 LEG SWELLING: ICD-10-CM

## 2025-01-08 DIAGNOSIS — R20.2 NUMBNESS AND TINGLING OF BOTH LOWER EXTREMITIES: Primary | ICD-10-CM

## 2025-01-08 DIAGNOSIS — R20.0 NUMBNESS AND TINGLING OF BOTH LOWER EXTREMITIES: Primary | ICD-10-CM

## 2025-01-08 DIAGNOSIS — G62.9 NEUROPATHY: ICD-10-CM

## 2025-01-08 PROCEDURE — 1090F PRES/ABSN URINE INCON ASSESS: CPT | Performed by: ORTHOPAEDIC SURGERY

## 2025-01-08 PROCEDURE — 99213 OFFICE O/P EST LOW 20 MIN: CPT | Performed by: ORTHOPAEDIC SURGERY

## 2025-01-08 PROCEDURE — G8417 CALC BMI ABV UP PARAM F/U: HCPCS | Performed by: ORTHOPAEDIC SURGERY

## 2025-01-08 PROCEDURE — G8399 PT W/DXA RESULTS DOCUMENT: HCPCS | Performed by: ORTHOPAEDIC SURGERY

## 2025-01-08 PROCEDURE — M1308 PR FLU IMMUNIZE NO ADMIN: HCPCS | Performed by: ORTHOPAEDIC SURGERY

## 2025-01-08 PROCEDURE — 1160F RVW MEDS BY RX/DR IN RCRD: CPT | Performed by: ORTHOPAEDIC SURGERY

## 2025-01-08 PROCEDURE — 1126F AMNT PAIN NOTED NONE PRSNT: CPT | Performed by: ORTHOPAEDIC SURGERY

## 2025-01-08 PROCEDURE — 1159F MED LIST DOCD IN RCRD: CPT | Performed by: ORTHOPAEDIC SURGERY

## 2025-01-08 PROCEDURE — 1123F ACP DISCUSS/DSCN MKR DOCD: CPT | Performed by: ORTHOPAEDIC SURGERY

## 2025-01-08 PROCEDURE — 1036F TOBACCO NON-USER: CPT | Performed by: ORTHOPAEDIC SURGERY

## 2025-01-08 PROCEDURE — G8427 DOCREV CUR MEDS BY ELIG CLIN: HCPCS | Performed by: ORTHOPAEDIC SURGERY

## 2025-01-08 NOTE — PROGRESS NOTES
visit.      Disclaimer:     Sections of this note are dictated using utilizing voice recognition software, which may have resulted in some phonetic based errors in grammar and contents. Even though attempts were made to correct all the mistakes, some may have been missed, and remained in the body of the document. If questions arise, please contact our department.     An electronic signature was used to authenticate this note.    Leslye Hancock may have a reminder for a \"due or due soon\" health maintenance. I have asked that she contact her primary care provider for follow-up on this health maintenance.         Documentation by alonso Feliz, as dictated by Jose Alberts MD on 1/8/2025.

## 2025-01-08 NOTE — PATIENT INSTRUCTIONS
If we order a Diagnostic test (such as MRI or CT) during your office visit please see below:     Coordination of Care will be calling you to schedule your diagnostic test. If you have not heard from Coordination of Care within 2 business days, please call [phone].     Once you have a date scheduled for your diagnostic test, you will need to contact our office to schedule a follow up appointment about 4 days following the exam, as this is when the physician will review your diagnostic test results with you. You can contact our office to schedule appointment by phone at [phone], or you can send a message via [facility] to request an appointment.

## 2025-01-09 ENCOUNTER — HOSPITAL ENCOUNTER (OUTPATIENT)
Facility: HOSPITAL | Age: 75
Discharge: HOME | End: 2025-01-12
Payer: MEDICARE

## 2025-01-09 DIAGNOSIS — M47.816 LUMBAR SPONDYLOSIS: ICD-10-CM

## 2025-01-09 DIAGNOSIS — M15.0 PRIMARY GENERALIZED (OSTEO)ARTHRITIS: ICD-10-CM

## 2025-01-09 PROCEDURE — 72148 MRI LUMBAR SPINE W/O DYE: CPT

## 2025-01-14 ENCOUNTER — HOSPITAL ENCOUNTER (OUTPATIENT)
Facility: HOSPITAL | Age: 75
Discharge: HOME | End: 2025-01-17
Payer: MEDICARE

## 2025-01-14 DIAGNOSIS — R10.9 RIGHT FLANK PAIN: ICD-10-CM

## 2025-01-14 DIAGNOSIS — R10.9 LEFT FLANK PAIN: ICD-10-CM

## 2025-01-14 PROCEDURE — 74176 CT ABD & PELVIS W/O CONTRAST: CPT

## 2025-01-17 ENCOUNTER — HOSPITAL ENCOUNTER (OUTPATIENT)
Facility: HOSPITAL | Age: 75
Discharge: HOME | End: 2025-01-19
Attending: ORTHOPAEDIC SURGERY
Payer: MEDICARE

## 2025-01-17 DIAGNOSIS — G62.9 NEUROPATHY: ICD-10-CM

## 2025-01-17 DIAGNOSIS — R20.2 NUMBNESS AND TINGLING OF BOTH LOWER EXTREMITIES: ICD-10-CM

## 2025-01-17 DIAGNOSIS — M79.89 LEG SWELLING: ICD-10-CM

## 2025-01-17 DIAGNOSIS — R20.0 NUMBNESS AND TINGLING OF BOTH LOWER EXTREMITIES: ICD-10-CM

## 2025-01-17 PROCEDURE — 93970 EXTREMITY STUDY: CPT

## 2025-01-20 PROCEDURE — 93970 EXTREMITY STUDY: CPT | Performed by: SURGERY

## 2025-01-23 ENCOUNTER — HOSPITAL ENCOUNTER (OUTPATIENT)
Facility: HOSPITAL | Age: 75
Discharge: HOME | End: 2025-01-26
Payer: MEDICARE

## 2025-01-23 VITALS — HEIGHT: 59 IN | BODY MASS INDEX: 38.48 KG/M2

## 2025-01-23 DIAGNOSIS — Z12.31 ENCOUNTER FOR SCREENING MAMMOGRAM FOR HIGH-RISK PATIENT: ICD-10-CM

## 2025-01-23 PROCEDURE — 77063 BREAST TOMOSYNTHESIS BI: CPT

## 2025-02-05 ENCOUNTER — HOSPITAL ENCOUNTER (OUTPATIENT)
Facility: HOSPITAL | Age: 75
Discharge: HOME | End: 2025-02-08
Payer: MEDICARE

## 2025-02-05 DIAGNOSIS — N94.89 ADNEXAL MASS: ICD-10-CM

## 2025-02-05 PROCEDURE — 76856 US EXAM PELVIC COMPLETE: CPT

## 2025-02-25 ENCOUNTER — TRANSCRIBE ORDERS (OUTPATIENT)
Facility: HOSPITAL | Age: 75
End: 2025-02-25

## 2025-02-25 DIAGNOSIS — I10 ESSENTIAL HYPERTENSION: Primary | ICD-10-CM

## 2025-02-25 DIAGNOSIS — R60.0 PERIPHERAL EDEMA: ICD-10-CM

## 2025-03-26 ENCOUNTER — HOSPITAL ENCOUNTER (OUTPATIENT)
Facility: HOSPITAL | Age: 75
Discharge: HOME | End: 2025-03-28
Payer: MEDICARE

## 2025-03-26 VITALS
SYSTOLIC BLOOD PRESSURE: 121 MMHG | WEIGHT: 195 LBS | BODY MASS INDEX: 39.31 KG/M2 | HEIGHT: 59 IN | DIASTOLIC BLOOD PRESSURE: 59 MMHG

## 2025-03-26 DIAGNOSIS — I10 ESSENTIAL HYPERTENSION: ICD-10-CM

## 2025-03-26 DIAGNOSIS — R60.0 PERIPHERAL EDEMA: ICD-10-CM

## 2025-03-26 LAB
ECHO AO ASC DIAM: 2.9 CM
ECHO AO ASCENDING AORTA INDEX: 1.59 CM/M2
ECHO AO ROOT DIAM: 3.1 CM
ECHO AO ROOT INDEX: 1.7 CM/M2
ECHO AV AREA PEAK VELOCITY: 2.5 CM2
ECHO AV AREA VTI: 2.4 CM2
ECHO AV AREA/BSA PEAK VELOCITY: 1.4 CM2/M2
ECHO AV AREA/BSA VTI: 1.3 CM2/M2
ECHO AV MEAN GRADIENT: 3 MMHG
ECHO AV MEAN VELOCITY: 0.8 M/S
ECHO AV PEAK GRADIENT: 5 MMHG
ECHO AV PEAK VELOCITY: 1.1 M/S
ECHO AV VELOCITY RATIO: 0.82
ECHO AV VTI: 26.5 CM
ECHO BSA: 1.92 M2
ECHO EST RA PRESSURE: 3 MMHG
ECHO LA VOL A-L A2C: 39 ML (ref 22–52)
ECHO LA VOL A-L A4C: 46 ML (ref 22–52)
ECHO LA VOL BP: 39 ML (ref 22–52)
ECHO LA VOL MOD A2C: 37 ML (ref 22–52)
ECHO LA VOL MOD A4C: 41 ML (ref 22–52)
ECHO LA VOL/BSA BIPLANE: 21 ML/M2 (ref 16–34)
ECHO LA VOLUME AREA LENGTH: 42 ML
ECHO LA VOLUME INDEX A-L A2C: 21 ML/M2 (ref 16–34)
ECHO LA VOLUME INDEX A-L A4C: 25 ML/M2 (ref 16–34)
ECHO LA VOLUME INDEX AREA LENGTH: 23 ML/M2 (ref 16–34)
ECHO LA VOLUME INDEX MOD A2C: 20 ML/M2 (ref 16–34)
ECHO LA VOLUME INDEX MOD A4C: 23 ML/M2 (ref 16–34)
ECHO LV E' LATERAL VELOCITY: 7.9 CM/S
ECHO LV E' SEPTAL VELOCITY: 5.12 CM/S
ECHO LV EDV A2C: 61 ML
ECHO LV EDV A4C: 59 ML
ECHO LV EDV BP: 61 ML (ref 56–104)
ECHO LV EDV INDEX A4C: 32 ML/M2
ECHO LV EDV INDEX BP: 34 ML/M2
ECHO LV EDV NDEX A2C: 34 ML/M2
ECHO LV EF PHYSICIAN: 60 %
ECHO LV EJECTION FRACTION A2C: 62 %
ECHO LV EJECTION FRACTION A4C: 54 %
ECHO LV EJECTION FRACTION BIPLANE: 58 % (ref 55–100)
ECHO LV ESV A2C: 23 ML
ECHO LV ESV A4C: 27 ML
ECHO LV ESV BP: 26 ML (ref 19–49)
ECHO LV ESV INDEX A2C: 13 ML/M2
ECHO LV ESV INDEX A4C: 15 ML/M2
ECHO LV ESV INDEX BP: 14 ML/M2
ECHO LV FRACTIONAL SHORTENING: 44 % (ref 28–44)
ECHO LV INTERNAL DIMENSION DIASTOLE INDEX: 2.25 CM/M2
ECHO LV INTERNAL DIMENSION DIASTOLIC: 4.1 CM (ref 3.9–5.3)
ECHO LV INTERNAL DIMENSION SYSTOLIC INDEX: 1.26 CM/M2
ECHO LV INTERNAL DIMENSION SYSTOLIC: 2.3 CM
ECHO LV IVSD: 0.9 CM (ref 0.6–0.9)
ECHO LV MASS 2D: 105.6 G (ref 67–162)
ECHO LV MASS INDEX 2D: 58 G/M2 (ref 43–95)
ECHO LV POSTERIOR WALL DIASTOLIC: 0.8 CM (ref 0.6–0.9)
ECHO LV RELATIVE WALL THICKNESS RATIO: 0.39
ECHO LVOT AREA: 3.1 CM2
ECHO LVOT AV VTI INDEX: 0.77
ECHO LVOT DIAM: 2 CM
ECHO LVOT MEAN GRADIENT: 2 MMHG
ECHO LVOT PEAK GRADIENT: 3 MMHG
ECHO LVOT PEAK VELOCITY: 0.9 M/S
ECHO LVOT STROKE VOLUME INDEX: 35.4 ML/M2
ECHO LVOT SV: 64.4 ML
ECHO LVOT VTI: 20.5 CM
ECHO MV A VELOCITY: 0.92 M/S
ECHO MV AREA VTI: 2.6 CM2
ECHO MV E DECELERATION TIME (DT): 227.4 MS
ECHO MV E VELOCITY: 0.91 M/S
ECHO MV E/A RATIO: 0.99
ECHO MV E/E' LATERAL: 11.52
ECHO MV E/E' RATIO (AVERAGED): 14.65
ECHO MV E/E' SEPTAL: 17.77
ECHO MV LVOT VTI INDEX: 1.22
ECHO MV MAX VELOCITY: 0.9 M/S
ECHO MV MEAN GRADIENT: 2 MMHG
ECHO MV MEAN VELOCITY: 0.6 M/S
ECHO MV PEAK GRADIENT: 3 MMHG
ECHO MV VTI: 25 CM
ECHO PV MAX VELOCITY: 0.7 M/S
ECHO PV PEAK GRADIENT: 2 MMHG
ECHO RA AREA 4C: 10.5 CM2
ECHO RA END SYSTOLIC VOLUME APICAL 4 CHAMBER INDEX BSA: 13 ML/M2
ECHO RA VOLUME AREA LENGTH APICAL 4 CHAMBER: 24 ML
ECHO RA VOLUME: 23 ML
ECHO RIGHT VENTRICULAR SYSTOLIC PRESSURE (RVSP): 8 MMHG
ECHO RV BASAL DIMENSION: 3.5 CM
ECHO RV TAPSE: 2.4 CM (ref 1.7–?)
ECHO RVOT PEAK GRADIENT: 1 MMHG
ECHO RVOT PEAK VELOCITY: 0.5 M/S
ECHO TV REGURGITANT MAX VELOCITY: 1.16 M/S
ECHO TV REGURGITANT PEAK GRADIENT: 5 MMHG

## 2025-03-26 PROCEDURE — 93306 TTE W/DOPPLER COMPLETE: CPT

## 2025-03-26 PROCEDURE — 93306 TTE W/DOPPLER COMPLETE: CPT | Performed by: INTERNAL MEDICINE

## 2025-03-27 NOTE — PROGRESS NOTES
I left a message, this patient's number, informing her that her vein studies, did not show any clots.  I recommended cliff Betts knee-high compression socks, she can order this on [facility].    [phone] (Mobile)   [phone] (Home Phone)     DONNA Alberts MD  3/27/2025 1:08 PM

## 2025-04-09 NOTE — PROGRESS NOTES
[facility]  [facility], [address]  [place] [address]  Phone: [phone]  Fax: [phone]      Leslye Hancock  : 1950  PCP: Noris Bowling DO  4/10/2025    OLD/NEW PATIENT    PROGRESS NOTE    HISTORY OF PRESENT ILLNESS      C/o low back pain radiating into the LLE in a L5 distribution to the ankle. She additionally c/o severe cerebral, neck and left shoulder spasms.    States her pain is similar to the pain she had prior to spinal surgery done by Dr. Velazquez in 2018. Pt notes 1.5 years relief with spinal surgery.   Pt admits to  occasionally treating with Tylenol with minimal relief.   Records indicated the pt is allergic to Cymbalta, Tramadol and Flexeril.   Lumbar spine XR dated 11/3/2021 films independently reviewed. Per report, mild levocurvature, apex L2. Vertebral body heights preserved. Disc space loss and endplate  spurring most notable at L1-L2 and L2-L3, most pronounced along concavity of curvature. No significant AP listhesis. Lumbar lordosis maintained. Multilevel mild to moderate facet arthropathy. No acute fracture identified. Right upper quadrant surgical  clips. Mild bilateral SI joint degenerative changes. Per my review, there was no clear evidence of kyphoplasty.   At her last clinical appointment,  I offered MDP, pt declined. I ordered a L spine MRI w/o contrast secondary to chronic renal   Lumbar spine MRI w/o contrast dated 2022 films independently reviewed. Per report, Intradiscal degenerative changes most  severe at L1-L2. Facet arthropathy L4-L5 L5-S1. Rotoscoliosis.Truncal obesity    PLAN: LLE EMG    Leslye Hancock is a 74 y.o. female was seen today for follow up. Pt previously seen 24 for BLE EMG ordered by Dr. Alberts which was significant for mild right sural mononeuropathy. Pt notes of recent cataract surgery. Pt c/o SOB secondary to her weight. Pt notes limited walking tolerance. Pt c/o neck,

## 2025-04-10 ENCOUNTER — OFFICE VISIT (OUTPATIENT)
Age: 75
End: 2025-04-10
Payer: MEDICARE

## 2025-04-10 VITALS
TEMPERATURE: 98.2 F | OXYGEN SATURATION: 98 % | RESPIRATION RATE: 18 BRPM | BODY MASS INDEX: 38.67 KG/M2 | HEART RATE: 84 BPM | WEIGHT: 191.8 LBS | HEIGHT: 59 IN

## 2025-04-10 DIAGNOSIS — M79.18 CHRONIC PRIMARY MUSCULOSKELETAL PAIN: ICD-10-CM

## 2025-04-10 DIAGNOSIS — G89.29 CHRONIC PRIMARY MUSCULOSKELETAL PAIN: ICD-10-CM

## 2025-04-10 DIAGNOSIS — M47.816 LUMBAR FACET ARTHROPATHY: ICD-10-CM

## 2025-04-10 DIAGNOSIS — M54.50 LUMBAR PAIN: ICD-10-CM

## 2025-04-10 DIAGNOSIS — F41.9 ANXIETY DUE TO INVASIVE PROCEDURE: ICD-10-CM

## 2025-04-10 DIAGNOSIS — M54.16 LUMBAR RADICULOPATHY: Primary | ICD-10-CM

## 2025-04-10 PROCEDURE — 1123F ACP DISCUSS/DSCN MKR DOCD: CPT | Performed by: PHYSICAL MEDICINE & REHABILITATION

## 2025-04-10 PROCEDURE — 1036F TOBACCO NON-USER: CPT | Performed by: PHYSICAL MEDICINE & REHABILITATION

## 2025-04-10 PROCEDURE — G8427 DOCREV CUR MEDS BY ELIG CLIN: HCPCS | Performed by: PHYSICAL MEDICINE & REHABILITATION

## 2025-04-10 PROCEDURE — 99204 OFFICE O/P NEW MOD 45 MIN: CPT | Performed by: PHYSICAL MEDICINE & REHABILITATION

## 2025-04-10 PROCEDURE — 1160F RVW MEDS BY RX/DR IN RCRD: CPT | Performed by: PHYSICAL MEDICINE & REHABILITATION

## 2025-04-10 PROCEDURE — G8399 PT W/DXA RESULTS DOCUMENT: HCPCS | Performed by: PHYSICAL MEDICINE & REHABILITATION

## 2025-04-10 PROCEDURE — 1125F AMNT PAIN NOTED PAIN PRSNT: CPT | Performed by: PHYSICAL MEDICINE & REHABILITATION

## 2025-04-10 PROCEDURE — 1090F PRES/ABSN URINE INCON ASSESS: CPT | Performed by: PHYSICAL MEDICINE & REHABILITATION

## 2025-04-10 PROCEDURE — G8417 CALC BMI ABV UP PARAM F/U: HCPCS | Performed by: PHYSICAL MEDICINE & REHABILITATION

## 2025-04-10 PROCEDURE — 1159F MED LIST DOCD IN RCRD: CPT | Performed by: PHYSICAL MEDICINE & REHABILITATION

## 2025-04-10 RX ORDER — DIAZEPAM 5 MG/1
TABLET ORAL
Qty: 2 TABLET | Refills: 0 | Status: SHIPPED | OUTPATIENT
Start: 2025-04-10 | End: 2025-07-09

## 2025-04-10 RX ORDER — SOLIFENACIN SUCCINATE 5 MG/1
5 TABLET, FILM COATED ORAL DAILY
COMMUNITY

## 2025-04-10 RX ORDER — HYDROCODONE BITARTRATE AND ACETAMINOPHEN 5; 325 MG/1; MG/1
1 TABLET ORAL EVERY 4 HOURS PRN
COMMUNITY
Start: 2024-07-12

## 2025-04-10 RX ORDER — FUROSEMIDE 20 MG/1
TABLET ORAL
COMMUNITY
End: 2025-08-24

## 2025-04-10 RX ORDER — MULTIVIT WITH MINERALS/LUTEIN
1000 TABLET ORAL DAILY
COMMUNITY

## 2025-04-10 RX ORDER — COLCHICINE 0.6 MG/1
TABLET ORAL
COMMUNITY
Start: 2025-01-21

## 2025-04-10 ASSESSMENT — ENCOUNTER SYMPTOMS
BACK PAIN: 1
VOMITING: 0
SHORTNESS OF BREATH: 1
TROUBLE SWALLOWING: 0
NAUSEA: 0
WHEEZING: 0

## 2025-04-10 NOTE — PROGRESS NOTES
Leslye Hancock presents today for   Chief Complaint   Patient presents with    Back Problem    Pain    Back Pain       Is someone accompanying this pt? no    Is the patient using any DME equipment during OV? no    Depression Screening:       No data to display                Learning Assessment:  Failed to redirect to the Timeline version of the [employer] SmartLink.    Abuse Screening:       No data to display                Fall Risk  Failed to redirect to the Timeline version of the [employer] SmartLink.    OPIOID RISK TOOL  Failed to redirect to the Timeline version of the [employer] SmartLink.    Coordination of Care:  1. Have you been to the ER, urgent care clinic since your last visit? no  Hospitalized since your last visit? no    2. Have you seen or consulted any other health care providers outside of the [facility] since your last visit? no Include any pap smears or colon screening. no

## 2025-04-16 ENCOUNTER — HOSPITAL ENCOUNTER (OUTPATIENT)
Facility: HOSPITAL | Age: 75
Discharge: HOME | End: 2025-04-19
Payer: MEDICARE

## 2025-04-16 DIAGNOSIS — N28.89 RENAL MASS, LEFT: ICD-10-CM

## 2025-04-16 LAB — CREAT UR-MCNC: 1.4 MG/DL (ref 0.6–1.3)

## 2025-04-16 PROCEDURE — A9575 INJ GADOTERATE MEGLUMI 0.1ML: HCPCS

## 2025-04-16 PROCEDURE — 82565 ASSAY OF CREATININE: CPT

## 2025-04-16 PROCEDURE — 74183 MRI ABD W/O CNTR FLWD CNTR: CPT

## 2025-04-16 PROCEDURE — 6360000004 HC RX CONTRAST MEDICATION

## 2025-04-16 RX ADMIN — GADOTERATE MEGLUMINE 17 ML: 376.9 INJECTION INTRAVENOUS at 10:16

## 2025-04-21 DIAGNOSIS — G62.9 NEUROPATHY: ICD-10-CM

## 2025-04-22 RX ORDER — PREGABALIN 25 MG/1
CAPSULE ORAL
Qty: 90 CAPSULE | Refills: 2 | Status: SHIPPED | OUTPATIENT
Start: 2025-04-22 | End: 2025-06-21

## 2025-06-10 ENCOUNTER — OFFICE VISIT (OUTPATIENT)
Age: 75
End: 2025-06-10
Payer: MEDICARE

## 2025-06-10 DIAGNOSIS — M79.89 LEG SWELLING: ICD-10-CM

## 2025-06-10 PROCEDURE — G8417 CALC BMI ABV UP PARAM F/U: HCPCS | Performed by: ORTHOPAEDIC SURGERY

## 2025-06-10 PROCEDURE — 1123F ACP DISCUSS/DSCN MKR DOCD: CPT | Performed by: ORTHOPAEDIC SURGERY

## 2025-06-10 PROCEDURE — 1090F PRES/ABSN URINE INCON ASSESS: CPT | Performed by: ORTHOPAEDIC SURGERY

## 2025-06-10 PROCEDURE — 1125F AMNT PAIN NOTED PAIN PRSNT: CPT | Performed by: ORTHOPAEDIC SURGERY

## 2025-06-10 PROCEDURE — 1159F MED LIST DOCD IN RCRD: CPT | Performed by: ORTHOPAEDIC SURGERY

## 2025-06-10 PROCEDURE — 1036F TOBACCO NON-USER: CPT | Performed by: ORTHOPAEDIC SURGERY

## 2025-06-10 PROCEDURE — G8427 DOCREV CUR MEDS BY ELIG CLIN: HCPCS | Performed by: ORTHOPAEDIC SURGERY

## 2025-06-10 PROCEDURE — 1160F RVW MEDS BY RX/DR IN RCRD: CPT | Performed by: ORTHOPAEDIC SURGERY

## 2025-06-10 PROCEDURE — G8399 PT W/DXA RESULTS DOCUMENT: HCPCS | Performed by: ORTHOPAEDIC SURGERY

## 2025-06-10 PROCEDURE — 99213 OFFICE O/P EST LOW 20 MIN: CPT | Performed by: ORTHOPAEDIC SURGERY

## 2025-06-10 NOTE — PROGRESS NOTES
though attempts were made to correct all the mistakes, some may have been missed, and remained in the body of the document. If questions arise, please contact our department.     An electronic signature was used to authenticate this note.    Leslye Hancock may have a reminder for a \"due or due soon\" health maintenance. I have asked that she contact her primary care provider for follow-up on this health maintenance.         Documentation by alonso Feliz, as dictated by Jose Alberts MD on 6/10/2025.

## 2025-06-10 NOTE — PATIENT INSTRUCTIONS
Knee-high compression socks 10-15 mmHg measurements: 14.5 inches at the calf and 9.5 inches at the ankle. Recommended TeleFlip Compression socks from [employer].

## 2025-06-16 ENCOUNTER — TELEPHONE (OUTPATIENT)
Age: 75
End: 2025-06-16

## 2025-06-16 NOTE — TELEPHONE ENCOUNTER
Patient called and said that  has referred her to two Vein and Vascular Doctors that did not accept her insurance.     Patient said that she checked with her insurance and they told her that [facility] accepts her insurance.     Patient is asking that the referral be faxed to :    [facility]  Tel. [phone]  Fax [phone]    Patient would like a call once the referral has been sent.    Patient tel. [phone].    Note : order is from 6/10/25 for leg swelling.

## 2025-06-16 NOTE — TELEPHONE ENCOUNTER
I faxed over the referral as per patient's request. I called the patient twice to let her know but each time I called there was a busy signal. I was unable to leave a .

## 2025-06-29 ENCOUNTER — TRANSCRIBE ORDERS (OUTPATIENT)
Facility: HOSPITAL | Age: 75
End: 2025-06-29

## 2025-06-29 DIAGNOSIS — R14.1 FLATULENCE, ERUCTATION, AND GAS PAIN: ICD-10-CM

## 2025-06-29 DIAGNOSIS — R14.2 FLATULENCE, ERUCTATION, AND GAS PAIN: ICD-10-CM

## 2025-06-29 DIAGNOSIS — R10.13 ABDOMINAL PAIN, EPIGASTRIC: ICD-10-CM

## 2025-06-29 DIAGNOSIS — R14.3 FLATULENCE, ERUCTATION, AND GAS PAIN: ICD-10-CM

## 2025-06-29 DIAGNOSIS — R14.0 ABDOMINAL BLOATING: Primary | ICD-10-CM

## 2025-06-29 DIAGNOSIS — K21.9 GASTROESOPHAGEAL REFLUX DISEASE WITHOUT ESOPHAGITIS: ICD-10-CM

## 2025-07-16 ENCOUNTER — OFFICE VISIT (OUTPATIENT)
Age: 75
End: 2025-07-16
Payer: MEDICARE

## 2025-07-16 VITALS
TEMPERATURE: 98.2 F | HEIGHT: 59 IN | WEIGHT: 190.6 LBS | OXYGEN SATURATION: 97 % | SYSTOLIC BLOOD PRESSURE: 118 MMHG | HEART RATE: 78 BPM | BODY MASS INDEX: 38.42 KG/M2 | DIASTOLIC BLOOD PRESSURE: 68 MMHG

## 2025-07-16 DIAGNOSIS — M54.2 CERVICAL PAIN: ICD-10-CM

## 2025-07-16 DIAGNOSIS — M25.511 CHRONIC PAIN OF BOTH SHOULDERS: ICD-10-CM

## 2025-07-16 DIAGNOSIS — M25.512 CHRONIC PAIN OF BOTH SHOULDERS: ICD-10-CM

## 2025-07-16 DIAGNOSIS — G89.29 CHRONIC PAIN OF BOTH SHOULDERS: ICD-10-CM

## 2025-07-16 DIAGNOSIS — M47.816 LUMBAR FACET ARTHROPATHY: ICD-10-CM

## 2025-07-16 DIAGNOSIS — M79.18 CHRONIC PRIMARY MUSCULOSKELETAL PAIN: ICD-10-CM

## 2025-07-16 DIAGNOSIS — M25.572 LEFT ANKLE PAIN, UNSPECIFIED CHRONICITY: ICD-10-CM

## 2025-07-16 DIAGNOSIS — G89.29 CHRONIC PRIMARY MUSCULOSKELETAL PAIN: ICD-10-CM

## 2025-07-16 DIAGNOSIS — M54.50 LUMBAR PAIN: ICD-10-CM

## 2025-07-16 DIAGNOSIS — M54.16 LUMBAR RADICULOPATHY: Primary | ICD-10-CM

## 2025-07-16 PROCEDURE — 99214 OFFICE O/P EST MOD 30 MIN: CPT | Performed by: PHYSICAL MEDICINE & REHABILITATION

## 2025-07-16 PROCEDURE — 1159F MED LIST DOCD IN RCRD: CPT | Performed by: PHYSICAL MEDICINE & REHABILITATION

## 2025-07-16 PROCEDURE — G8427 DOCREV CUR MEDS BY ELIG CLIN: HCPCS | Performed by: PHYSICAL MEDICINE & REHABILITATION

## 2025-07-16 PROCEDURE — 1123F ACP DISCUSS/DSCN MKR DOCD: CPT | Performed by: PHYSICAL MEDICINE & REHABILITATION

## 2025-07-16 PROCEDURE — 1090F PRES/ABSN URINE INCON ASSESS: CPT | Performed by: PHYSICAL MEDICINE & REHABILITATION

## 2025-07-16 PROCEDURE — G8399 PT W/DXA RESULTS DOCUMENT: HCPCS | Performed by: PHYSICAL MEDICINE & REHABILITATION

## 2025-07-16 PROCEDURE — 1160F RVW MEDS BY RX/DR IN RCRD: CPT | Performed by: PHYSICAL MEDICINE & REHABILITATION

## 2025-07-16 PROCEDURE — 1125F AMNT PAIN NOTED PAIN PRSNT: CPT | Performed by: PHYSICAL MEDICINE & REHABILITATION

## 2025-07-16 PROCEDURE — G8417 CALC BMI ABV UP PARAM F/U: HCPCS | Performed by: PHYSICAL MEDICINE & REHABILITATION

## 2025-07-16 PROCEDURE — 1036F TOBACCO NON-USER: CPT | Performed by: PHYSICAL MEDICINE & REHABILITATION

## 2025-07-16 RX ORDER — ACETAMINOPHEN 500 MG
500 TABLET ORAL EVERY 6 HOURS PRN
COMMUNITY

## 2025-07-16 ASSESSMENT — ENCOUNTER SYMPTOMS
VOMITING: 0
SHORTNESS OF BREATH: 1
BACK PAIN: 1
WHEEZING: 0
NAUSEA: 0
TROUBLE SWALLOWING: 0

## 2025-07-16 NOTE — PROGRESS NOTES
Leslye Hancock presents today for   Chief Complaint   Patient presents with    Back Pain     Back pain is about the same   Right side is feeling better        Is someone accompanying this pt? no    Is the patient using any DME equipment during OV? no      Coordination of Care:  1. Have you been to the ER, urgent care clinic since your last visit? no  Hospitalized since your last visit? no    2. Have you seen or consulted any other health care providers outside of the [facility] since your last visit? no Include any pap smears or colon screening. no    
personally reviewed with patient:  FINDINGS:   Stable alignment is seen of the lumbar spine. There is minimal anterolisthesis  L5 on S1.     There is no acute loss of vertebral body height.     Fatty infiltration is seen around the endplates L1/L2 and L2/L3..      Conus is unremarkable, ending at the T12 level.     In the retroperitoneum incidental note is made of a circumaortic left renal  vein, developmental variant of normal.     There is there is marked disc space narrowing L1/L2 and L2/L3, mild disc space  narrowing elsewhere in the lumbar spine. No significant change.     At the L1/L2 level, spondylitic changes indent the ventral sac anteriorly. There  is posterolateral impression on the canal due to facet and ligamentous  hypertrophy resulting in overall triangulation of the canal. AP dimension is 12  mm. There is moderate bilateral facet hypertrophy. There is marked narrowing  right neural foramen due to spondylitic changes and facet encroachment. There is  mild left foraminal narrowing due to spondylitic changes.     At the L2/L3 level, spondylitic changes indent the ventral sac anteriorly. There  is posterolateral impression on the canal bilaterally due to moderate facet and  ligamentous hypertrophy. AP dimension of the canal is 14 mm. There is moderate  bilateral facet hypertrophy. There is moderate narrowing right neural foramen  due to spondylitic changes and facet encroachment. Left neural foramen is  patent.     At the L3/L4 level, there is a broad-based bulge that indents the ventral sac.  There is posterolateral impression on the canal due to marked right and moderate  left ligamentous and facet hypertrophy. AP dimension of the canal at this level  is 11 mm. There is marked right and moderate left facet hypertrophy. There is  moderate right foraminal narrowing due to bulging disc and ligamentous  encroachment. There is mild narrowing left neural foramen due to bulging disc  and facet encroachment.

## 2025-07-24 ENCOUNTER — TRANSCRIBE ORDERS (OUTPATIENT)
Facility: HOSPITAL | Age: 75
End: 2025-07-24

## 2025-07-24 DIAGNOSIS — R06.09 DYSPNEA ON EXERTION: Primary | ICD-10-CM

## 2025-07-24 DIAGNOSIS — J98.4 PULMONARY SCARRING: ICD-10-CM

## 2025-07-25 ENCOUNTER — HOSPITAL ENCOUNTER (OUTPATIENT)
Facility: HOSPITAL | Age: 75
Discharge: HOME | End: 2025-07-28
Payer: MEDICARE

## 2025-07-25 DIAGNOSIS — R14.1 FLATULENCE, ERUCTATION, AND GAS PAIN: ICD-10-CM

## 2025-07-25 DIAGNOSIS — R14.0 ABDOMINAL BLOATING: ICD-10-CM

## 2025-07-25 DIAGNOSIS — R14.2 FLATULENCE, ERUCTATION, AND GAS PAIN: ICD-10-CM

## 2025-07-25 DIAGNOSIS — K21.9 GASTROESOPHAGEAL REFLUX DISEASE WITHOUT ESOPHAGITIS: ICD-10-CM

## 2025-07-25 DIAGNOSIS — R14.3 FLATULENCE, ERUCTATION, AND GAS PAIN: ICD-10-CM

## 2025-07-25 DIAGNOSIS — R10.13 ABDOMINAL PAIN, EPIGASTRIC: ICD-10-CM

## 2025-07-25 LAB — CREAT UR-MCNC: 1.2 MG/DL (ref 0.6–1.3)

## 2025-07-25 PROCEDURE — 6360000004 HC RX CONTRAST MEDICATION: Performed by: STUDENT IN AN ORGANIZED HEALTH CARE EDUCATION/TRAINING PROGRAM

## 2025-07-25 PROCEDURE — 74177 CT ABD & PELVIS W/CONTRAST: CPT

## 2025-07-25 PROCEDURE — 82565 ASSAY OF CREATININE: CPT

## 2025-07-25 RX ORDER — IOPAMIDOL 612 MG/ML
100 INJECTION, SOLUTION INTRAVASCULAR
Status: COMPLETED | OUTPATIENT
Start: 2025-07-25 | End: 2025-07-25

## 2025-07-25 RX ORDER — DIATRIZOATE MEGLUMINE AND DIATRIZOATE SODIUM 660; 100 MG/ML; MG/ML
30 SOLUTION ORAL; RECTAL
Status: DISCONTINUED | OUTPATIENT
Start: 2025-07-25 | End: 2025-07-29 | Stop reason: HOSPADM

## 2025-07-25 RX ADMIN — IOPAMIDOL 100 ML: 612 INJECTION, SOLUTION INTRAVENOUS at 12:25

## 2025-07-25 RX ADMIN — DIATRIZOATE MEGLUMINE AND DIATRIZOATE SODIUM 30 ML: 660; 100 LIQUID ORAL; RECTAL at 12:25

## 2025-07-30 ENCOUNTER — HOSPITAL ENCOUNTER (OUTPATIENT)
Facility: HOSPITAL | Age: 75
Setting detail: RECURRING SERIES
Discharge: HOME | End: 2025-08-02
Attending: PHYSICAL MEDICINE & REHABILITATION
Payer: MEDICARE

## 2025-07-30 PROCEDURE — 97162 PT EVAL MOD COMPLEX 30 MIN: CPT

## 2025-07-30 PROCEDURE — 97530 THERAPEUTIC ACTIVITIES: CPT

## 2025-07-30 NOTE — PROGRESS NOTES
[facility] AT [facility]  [address], [place] [address] Phone: [phone] Fax [phone]  Plan of Care / Statement of Necessity for Physical Therapy Services     Patient Name: Leslye Hancock : 1950   Medical   Diagnosis: Lumbar radiculopathy  Lumbar pain  Chronic primary musculoskeletal pain  Lumbar facet arthropathy  Chronic pain of both shoulders  Left ankle pain, unspecified chronicity  Cervical pain   Treatment Diagnosis: M25.572  LEFT ANKLE PAIN , M25.511  RIGHT SHOULDER PAIN and M25.512  LEFT SHOULDER PAIN, and M54.2  NECK PAIN and M54.59  OTHER LOWER BACK PAIN   Onset Date: 2025 (referral) Start of Care (SOC): 2025   Referral Source: Grupo Loya MD Prior Hospitalization: See medical history   Prior Level of Function: Independent with ADLs, ambulation with cane   Comorbidities:  Musculoskeletal disorders, Social determinants of health: Depression, and Other:    L/S laminectomy, right TKR, B CTR, hysterectomy, cholecystectomy, , hernia repair, left ankle ligament repair, arthritis, depression, HTN, osteoporosis, vertigo      Assessment / key information:  Patient is a 75 y.o. female who presents with complaints of chronic neck, back, B shoulder, right elbow, B hand, B knee and left ankle pain, with recent worsening.  Patient reports that pain is exacerbated by standing/walking, stooping/bending, squatting, lifting, lying in certain positions, moving affected body parts and inclement weather.  Patient demonstrates impaired posture, decreased C/S, right elbow, L/S and B hip ROM; decreased core/B hip musculature strength, decreased position/activity tolerance, impaired balance, impaired functional mobility/gait and increased fall risk.  Patient would benefit from skilled PT services to address these issues and improve function.  Thank you for this referral.    Physical Therapy Evaluation Cervical

## 2025-07-30 NOTE — THERAPY EVALUATION
progress to PLOF and address remaining functional goals.  (see flow sheet as applicable)     Details if applicable: Education regarding diagnosis/prognosis, relevant anatomy/mechanics, aquatic therapy rationale/procedures     Total  8 Total Reminder: MC/BC bill using total billable min of TIMED therapeutic procedures (example: do not include dry needle or estim unattended, both untimed codes, in totals to left)     [x]  Patient Education billed concurrently with other procedures     ASSESSMENT:   See plan of care    Patient will continue to benefit from skilled PT services: see plan of care    [x]  See Plan of Care - for goals and assessment     PLAN  [x]  Upgrade activities as tolerated     [x]  Continue plan of care  []  Update interventions per flow sheet       []  Other:_      Dionne Chavez, PT 2025  10:19 AM    Justification for Eval Code Complexity:  Patient History : HIGH - L/S laminectomy, right TKR, B CTR, hysterectomy, cholecystectomy, , hernia repair, left ankle ligament repair, arthritis, depression, HTN, osteoporosis, vertigo  Examination HIGH - See objective  Clinical Presentation: HIGH  Clinical Decision Making : Neck Disability Index (NDI) = 48 % ; (30% - 48% Moderate Disability) = MODERATE Complexity and Oswestry Disability Index (MAIN) for Low Back Pain = 64 % ; (> 41% Severe Disability) = HIGH Complexity        25 1207    Neck Disability Index   Section 1: Pain Intensity 2   Section 2: Personal Care (Washing, Dressing, etc.) 2   Section 3: Lifting 4   Section 4: Reading 3   Section 5: Headaches 1   Section 6: Concentration 0   Section 7: Work 2   Section 8: Driving 2   Section 9: Sleeping 3   Section 10: Recreation 5   Neck Disability Index Raw Score 24   Numbers of Section Completed 10   Neck Disability Score % 48           25 1209   Modified Oswestry Disability Index (Modified MAIN)   Pain Intensity 4   Personal Care 1   Lifting 4   Walking 3   Sitting 0   Standing 4

## 2025-07-31 ENCOUNTER — HOSPITAL ENCOUNTER (OUTPATIENT)
Facility: HOSPITAL | Age: 75
Discharge: HOME | End: 2025-07-31
Attending: INTERNAL MEDICINE
Payer: MEDICARE

## 2025-07-31 DIAGNOSIS — R06.09 DYSPNEA ON EXERTION: ICD-10-CM

## 2025-07-31 DIAGNOSIS — J98.4 PULMONARY SCARRING: ICD-10-CM

## 2025-07-31 PROCEDURE — 71250 CT THORAX DX C-: CPT

## 2025-08-05 ENCOUNTER — APPOINTMENT (OUTPATIENT)
Facility: HOSPITAL | Age: 75
End: 2025-08-05
Attending: PHYSICAL MEDICINE & REHABILITATION
Payer: MEDICARE

## 2025-08-19 ENCOUNTER — HOSPITAL ENCOUNTER (OUTPATIENT)
Facility: HOSPITAL | Age: 75
Setting detail: RECURRING SERIES
Discharge: HOME | End: 2025-08-22
Attending: PHYSICAL MEDICINE & REHABILITATION
Payer: MEDICARE

## 2025-08-19 PROCEDURE — 97113 AQUATIC THERAPY/EXERCISES: CPT

## 2025-08-21 ENCOUNTER — HOSPITAL ENCOUNTER (OUTPATIENT)
Facility: HOSPITAL | Age: 75
Discharge: HOME | End: 2025-08-24
Payer: MEDICARE

## 2025-08-21 ENCOUNTER — TRANSCRIBE ORDERS (OUTPATIENT)
Facility: HOSPITAL | Age: 75
End: 2025-08-21

## 2025-08-21 DIAGNOSIS — I10 HYPERTENSION, UNSPECIFIED TYPE: ICD-10-CM

## 2025-08-21 DIAGNOSIS — E55.9 VITAMIN D DEFICIENCY: ICD-10-CM

## 2025-08-21 DIAGNOSIS — E78.00 HYPERCHOLESTEREMIA: ICD-10-CM

## 2025-08-21 DIAGNOSIS — N18.30 STAGE 3 CHRONIC KIDNEY DISEASE, UNSPECIFIED WHETHER STAGE 3A OR 3B CKD (HCC): ICD-10-CM

## 2025-08-21 DIAGNOSIS — I10 HYPERTENSION, UNSPECIFIED TYPE: Primary | ICD-10-CM

## 2025-08-21 LAB
25(OH)D3 SERPL-MCNC: 50.4 NG/ML (ref 30–100)
ALBUMIN SERPL-MCNC: 3.5 G/DL (ref 3.4–5)
ALBUMIN/GLOB SERPL: 1.2 (ref 0.8–1.7)
ALP SERPL-CCNC: 75 U/L (ref 45–117)
ALT SERPL-CCNC: 17 U/L (ref 10–35)
ANION GAP SERPL CALC-SCNC: 13 MMOL/L (ref 3–18)
APPEARANCE UR: ABNORMAL
AST SERPL-CCNC: 19 U/L (ref 10–38)
BACTERIA URNS QL MICRO: ABNORMAL /HPF
BASOPHILS # BLD: 0.04 K/UL (ref 0–0.1)
BASOPHILS NFR BLD: 0.7 % (ref 0–2)
BILIRUB SERPL-MCNC: 0.4 MG/DL (ref 0.2–1)
BILIRUB UR QL: NEGATIVE
BUN SERPL-MCNC: 15 MG/DL (ref 6–23)
BUN/CREAT SERPL: 12 (ref 12–20)
CALCIUM SERPL-MCNC: 9.8 MG/DL (ref 8.5–10.1)
CHLORIDE SERPL-SCNC: 108 MMOL/L (ref 98–107)
CHOLEST SERPL-MCNC: 171 MG/DL
CO2 SERPL-SCNC: 23 MMOL/L (ref 21–32)
COLOR UR: YELLOW
CREAT SERPL-MCNC: 1.17 MG/DL (ref 0.6–1.3)
CREAT UR-MCNC: 148 MG/DL (ref 30–125)
DIFFERENTIAL METHOD BLD: ABNORMAL
EOSINOPHIL # BLD: 0.06 K/UL (ref 0–0.4)
EOSINOPHIL NFR BLD: 1 % (ref 0–5)
EPITH CASTS URNS QL MICRO: ABNORMAL /LPF (ref 0–5)
ERYTHROCYTE [DISTWIDTH] IN BLOOD BY AUTOMATED COUNT: 15.1 % (ref 11.6–14.5)
GLOBULIN SER CALC-MCNC: 2.9 G/DL (ref 2–4)
GLUCOSE SERPL-MCNC: 88 MG/DL (ref 74–108)
GLUCOSE UR STRIP.AUTO-MCNC: NEGATIVE MG/DL
HCT VFR BLD AUTO: 42.4 % (ref 35–45)
HDLC SERPL-MCNC: 40 MG/DL (ref 40–60)
HDLC SERPL: 4.2 (ref 0–5)
HGB BLD-MCNC: 12.7 G/DL (ref 12–16)
HGB UR QL STRIP: NEGATIVE
IMM GRANULOCYTES # BLD AUTO: 0.02 K/UL (ref 0–0.04)
IMM GRANULOCYTES NFR BLD AUTO: 0.3 % (ref 0–0.5)
KETONES UR QL STRIP.AUTO: NEGATIVE MG/DL
LDLC SERPL CALC-MCNC: 111 MG/DL (ref 0–100)
LEUKOCYTE ESTERASE UR QL STRIP.AUTO: NEGATIVE
LYMPHOCYTES # BLD: 1.7 K/UL (ref 0.9–3.6)
LYMPHOCYTES NFR BLD: 29.7 % (ref 21–52)
MCH RBC QN AUTO: 23.9 PG (ref 24–34)
MCHC RBC AUTO-ENTMCNC: 30 G/DL (ref 31–37)
MCV RBC AUTO: 79.7 FL (ref 78–100)
MICROALBUMIN UR-MCNC: <1.2 MG/DL (ref 0–3)
MICROALBUMIN/CREAT UR-RTO: ABNORMAL MG/G (ref 0–30)
MONOCYTES # BLD: 0.74 K/UL (ref 0.05–1.2)
MONOCYTES NFR BLD: 12.9 % (ref 3–10)
NEUTS SEG # BLD: 3.16 K/UL (ref 1.8–8)
NEUTS SEG NFR BLD: 55.4 % (ref 40–73)
NITRITE UR QL STRIP.AUTO: NEGATIVE
NRBC # BLD: 0.02 K/UL (ref 0–0.01)
NRBC BLD-RTO: 0.3 PER 100 WBC
PH UR STRIP: 6 (ref 5–8)
PLATELET # BLD AUTO: 227 K/UL (ref 135–420)
PMV BLD AUTO: 12.2 FL (ref 9.2–11.8)
POTASSIUM SERPL-SCNC: 4.6 MMOL/L (ref 3.5–5.5)
PROT SERPL-MCNC: 6.4 G/DL (ref 6.4–8.2)
PROT UR STRIP-MCNC: NEGATIVE MG/DL
RBC # BLD AUTO: 5.32 M/UL (ref 4.2–5.3)
RBC #/AREA URNS HPF: NEGATIVE /HPF (ref 0–5)
SODIUM SERPL-SCNC: 143 MMOL/L (ref 136–145)
SP GR UR REFRACTOMETRY: 1.02 (ref 1–1.03)
TRIGL SERPL-MCNC: 96 MG/DL (ref 0–150)
TSH, 3RD GENERATION: 1.69 UIU/ML (ref 0.27–4.2)
UROBILINOGEN UR QL STRIP.AUTO: 0.2 EU/DL (ref 0.2–1)
VLDLC SERPL CALC-MCNC: 19 MG/DL
WBC # BLD AUTO: 5.7 K/UL (ref 4.6–13.2)
WBC URNS QL MICRO: NEGATIVE /HPF (ref 0–5)

## 2025-08-21 PROCEDURE — 81001 URINALYSIS AUTO W/SCOPE: CPT

## 2025-08-21 PROCEDURE — 80061 LIPID PANEL: CPT

## 2025-08-21 PROCEDURE — 36415 COLL VENOUS BLD VENIPUNCTURE: CPT

## 2025-08-21 PROCEDURE — 82570 ASSAY OF URINE CREATININE: CPT

## 2025-08-21 PROCEDURE — 82043 UR ALBUMIN QUANTITATIVE: CPT

## 2025-08-21 PROCEDURE — 82306 VITAMIN D 25 HYDROXY: CPT

## 2025-08-21 PROCEDURE — 80053 COMPREHEN METABOLIC PANEL: CPT

## 2025-08-21 PROCEDURE — 84443 ASSAY THYROID STIM HORMONE: CPT

## 2025-08-21 PROCEDURE — 85025 COMPLETE CBC W/AUTO DIFF WBC: CPT

## 2025-08-22 ENCOUNTER — HOSPITAL ENCOUNTER (OUTPATIENT)
Facility: HOSPITAL | Age: 75
Discharge: HOME | End: 2025-08-25
Payer: MEDICARE

## 2025-08-22 ENCOUNTER — TRANSCRIBE ORDERS (OUTPATIENT)
Facility: HOSPITAL | Age: 75
End: 2025-08-22

## 2025-08-22 DIAGNOSIS — N18.31 CHRONIC KIDNEY DISEASE (CKD) STAGE G3A/A1, MODERATELY DECREASED GLOMERULAR FILTRATION RATE (GFR) BETWEEN 45-59 ML/MIN/1.73 SQUARE METER AND ALBUMINURIA CREATININE RATIO LESS THAN 30 MG/G (HCC): Primary | ICD-10-CM

## 2025-08-22 DIAGNOSIS — N18.31 CHRONIC KIDNEY DISEASE (CKD) STAGE G3A/A1, MODERATELY DECREASED GLOMERULAR FILTRATION RATE (GFR) BETWEEN 45-59 ML/MIN/1.73 SQUARE METER AND ALBUMINURIA CREATININE RATIO LESS THAN 30 MG/G (HCC): ICD-10-CM

## 2025-08-22 LAB
ALBUMIN SERPL-MCNC: 3.3 G/DL (ref 3.4–5)
ANION GAP SERPL CALC-SCNC: 10 MMOL/L (ref 3–18)
BASOPHILS # BLD: 0.04 K/UL (ref 0–0.1)
BASOPHILS NFR BLD: 0.6 % (ref 0–2)
BUN SERPL-MCNC: 16 MG/DL (ref 6–23)
BUN/CREAT SERPL: 14 (ref 12–20)
CALCIUM SERPL-MCNC: 9.5 MG/DL (ref 8.5–10.1)
CHLORIDE SERPL-SCNC: 108 MMOL/L (ref 98–107)
CO2 SERPL-SCNC: 25 MMOL/L (ref 21–32)
CREAT SERPL-MCNC: 1.16 MG/DL (ref 0.6–1.3)
DIFFERENTIAL METHOD BLD: ABNORMAL
EOSINOPHIL # BLD: 0.08 K/UL (ref 0–0.4)
EOSINOPHIL NFR BLD: 1.2 % (ref 0–5)
ERYTHROCYTE [DISTWIDTH] IN BLOOD BY AUTOMATED COUNT: 15.1 % (ref 11.6–14.5)
GLUCOSE SERPL-MCNC: 96 MG/DL (ref 74–108)
HCT VFR BLD AUTO: 41.2 % (ref 35–45)
HGB BLD-MCNC: 12.5 G/DL (ref 12–16)
IMM GRANULOCYTES # BLD AUTO: 0.03 K/UL (ref 0–0.04)
IMM GRANULOCYTES NFR BLD AUTO: 0.5 % (ref 0–0.5)
LYMPHOCYTES # BLD: 2.15 K/UL (ref 0.9–3.6)
LYMPHOCYTES NFR BLD: 32.9 % (ref 21–52)
MCH RBC QN AUTO: 24.5 PG (ref 24–34)
MCHC RBC AUTO-ENTMCNC: 30.3 G/DL (ref 31–37)
MCV RBC AUTO: 80.6 FL (ref 78–100)
MONOCYTES # BLD: 0.94 K/UL (ref 0.05–1.2)
MONOCYTES NFR BLD: 14.4 % (ref 3–10)
NEUTS SEG # BLD: 3.29 K/UL (ref 1.8–8)
NEUTS SEG NFR BLD: 50.4 % (ref 40–73)
NRBC # BLD: 0 K/UL (ref 0–0.01)
NRBC BLD-RTO: 0 PER 100 WBC
PHOSPHATE SERPL-MCNC: 2.7 MG/DL (ref 2.5–4.9)
PLATELET # BLD AUTO: 240 K/UL (ref 135–420)
PMV BLD AUTO: 11.1 FL (ref 9.2–11.8)
POTASSIUM SERPL-SCNC: 4.5 MMOL/L (ref 3.5–5.5)
RBC # BLD AUTO: 5.11 M/UL (ref 4.2–5.3)
SODIUM SERPL-SCNC: 143 MMOL/L (ref 136–145)
WBC # BLD AUTO: 6.5 K/UL (ref 4.6–13.2)

## 2025-08-22 PROCEDURE — 36415 COLL VENOUS BLD VENIPUNCTURE: CPT

## 2025-08-22 PROCEDURE — 85025 COMPLETE CBC W/AUTO DIFF WBC: CPT

## 2025-08-22 PROCEDURE — 80069 RENAL FUNCTION PANEL: CPT

## 2025-08-26 ENCOUNTER — HOSPITAL ENCOUNTER (OUTPATIENT)
Facility: HOSPITAL | Age: 75
Setting detail: RECURRING SERIES
Discharge: HOME | End: 2025-08-29
Attending: PHYSICAL MEDICINE & REHABILITATION
Payer: MEDICARE

## 2025-08-26 PROCEDURE — 97113 AQUATIC THERAPY/EXERCISES: CPT

## 2025-08-26 PROCEDURE — 97530 THERAPEUTIC ACTIVITIES: CPT

## 2025-08-28 ENCOUNTER — HOSPITAL ENCOUNTER (OUTPATIENT)
Facility: HOSPITAL | Age: 75
Setting detail: RECURRING SERIES
Discharge: HOME | End: 2025-08-31
Attending: PHYSICAL MEDICINE & REHABILITATION
Payer: MEDICARE

## 2025-08-28 PROCEDURE — 97113 AQUATIC THERAPY/EXERCISES: CPT

## 2025-08-29 ENCOUNTER — OFFICE VISIT (OUTPATIENT)
Age: 75
End: 2025-08-29

## 2025-08-29 VITALS — HEIGHT: 59 IN | BODY MASS INDEX: 38.3 KG/M2 | WEIGHT: 190 LBS

## 2025-08-29 DIAGNOSIS — M25.562 CHRONIC PAIN OF LEFT KNEE: ICD-10-CM

## 2025-08-29 DIAGNOSIS — M17.12 UNILATERAL PRIMARY OSTEOARTHRITIS, LEFT KNEE: Primary | ICD-10-CM

## 2025-08-29 DIAGNOSIS — G89.29 CHRONIC PAIN OF LEFT KNEE: ICD-10-CM

## 2025-09-02 ENCOUNTER — TELEPHONE (OUTPATIENT)
Dept: ADMINISTRATIVE | Age: 75
End: 2025-09-02

## 2025-09-03 ENCOUNTER — TELEPHONE (OUTPATIENT)
Age: 75
End: 2025-09-03

## 2025-09-03 DIAGNOSIS — G89.29 CHRONIC PAIN OF BOTH SHOULDERS: Primary | ICD-10-CM

## 2025-09-03 DIAGNOSIS — M25.511 CHRONIC PAIN OF BOTH SHOULDERS: Primary | ICD-10-CM

## 2025-09-03 DIAGNOSIS — M25.512 CHRONIC PAIN OF BOTH SHOULDERS: Primary | ICD-10-CM

## 2025-09-04 ENCOUNTER — SCHEDULED TELEPHONE ENCOUNTER (OUTPATIENT)
Age: 75
End: 2025-09-04
Payer: MEDICARE

## 2025-09-04 DIAGNOSIS — M25.512 CHRONIC PAIN OF BOTH SHOULDERS: ICD-10-CM

## 2025-09-04 DIAGNOSIS — G89.29 CHRONIC PAIN OF BOTH SHOULDERS: ICD-10-CM

## 2025-09-04 DIAGNOSIS — M54.50 LUMBAR PAIN: ICD-10-CM

## 2025-09-04 DIAGNOSIS — M54.2 CERVICAL PAIN: ICD-10-CM

## 2025-09-04 DIAGNOSIS — M79.18 CHRONIC PRIMARY MUSCULOSKELETAL PAIN: ICD-10-CM

## 2025-09-04 DIAGNOSIS — M54.16 LUMBAR RADICULOPATHY: Primary | ICD-10-CM

## 2025-09-04 DIAGNOSIS — M25.511 CHRONIC PAIN OF BOTH SHOULDERS: ICD-10-CM

## 2025-09-04 DIAGNOSIS — G89.29 CHRONIC PRIMARY MUSCULOSKELETAL PAIN: ICD-10-CM

## 2025-09-04 DIAGNOSIS — M47.816 LUMBAR FACET ARTHROPATHY: ICD-10-CM

## 2025-09-04 PROCEDURE — 1159F MED LIST DOCD IN RCRD: CPT | Performed by: PHYSICAL MEDICINE & REHABILITATION

## 2025-09-04 PROCEDURE — 99214 OFFICE O/P EST MOD 30 MIN: CPT | Performed by: PHYSICAL MEDICINE & REHABILITATION

## 2025-09-04 PROCEDURE — 1160F RVW MEDS BY RX/DR IN RCRD: CPT | Performed by: PHYSICAL MEDICINE & REHABILITATION

## 2025-09-04 PROCEDURE — 1123F ACP DISCUSS/DSCN MKR DOCD: CPT | Performed by: PHYSICAL MEDICINE & REHABILITATION

## 2025-09-04 RX ORDER — ROSUVASTATIN CALCIUM 5 MG/1
TABLET, COATED ORAL
COMMUNITY
Start: 2025-08-29

## 2025-09-04 RX ORDER — DONEPEZIL HYDROCHLORIDE 5 MG/1
TABLET, FILM COATED ORAL
COMMUNITY
Start: 2025-08-29

## 2025-09-04 ASSESSMENT — ENCOUNTER SYMPTOMS
NAUSEA: 0
TROUBLE SWALLOWING: 0
WHEEZING: 0
SHORTNESS OF BREATH: 1
BACK PAIN: 1
VOMITING: 0

## (undated) DEVICE — 4-PORT MANIFOLD: Brand: NEPTUNE 2

## (undated) DEVICE — TAPE,CLOTH/SILK,CURAD,3"X10YD,LF,40/CS: Brand: CURAD

## (undated) DEVICE — SYR LR LCK 1ML GRAD NSAF 30ML --

## (undated) DEVICE — SKIN MARKER,REGULAR TIP WITH RULER AND LABELS: Brand: DEVON

## (undated) DEVICE — NEEDLE HYPO 21GA L1.5IN INTRAMUSCULAR S STL LATCH BVL UP

## (undated) DEVICE — SYRINGE MED 25GA 3ML L5/8IN SUBQ PLAS W/ DETACH NDL SFTY

## (undated) DEVICE — GARMENT COMPR L FOR 13-16IN FT INTMIT SGL BLDR HEM FORC II

## (undated) DEVICE — BIPOLAR SEALER 23-112-1 AQM 6.0: Brand: AQUAMANTYS ®

## (undated) DEVICE — CANNULA ORIG TL CLR W FOAM CUSHIONS AND 14FT SUPL TB 3 CHN

## (undated) DEVICE — Device: Brand: MEDEX

## (undated) DEVICE — HOOK LOCK LATEX FREE ELASTIC BANDAGE 6INX5YD

## (undated) DEVICE — Z DISCONTINUED USE 2744636  DRESSING AQUACEL 14 IN ALG W3.5XL14IN POLYUR FLM CVR W/ HYDRCOLL

## (undated) DEVICE — AIRWAY NP 32FR L152MM DIA8MM GLIDE-TEX SERRATION SLIDE-TEX

## (undated) DEVICE — SET EPI 18GA L3.5IN TUOHY NDL W/ 20GA CLS TIP NYL CATH

## (undated) DEVICE — T5 HOOD WITH PEEL AWAY FACE SHIELD

## (undated) DEVICE — SOLUTION IV 1000ML 0.9% SOD CHL

## (undated) DEVICE — GOWN ISOL IMPERV UNIV, DISP, OPEN BACK, BLUE --

## (undated) DEVICE — SUTURE FIBERWIRE SZ 2 W/ TAPERED NEEDLE BLUE L38IN NONABSORB BLU L26.5MM 1/2 CIRCLE AR7200

## (undated) DEVICE — KIT CLN UP BON SECOURS MARYV

## (undated) DEVICE — SMARTSLEEVE SURGICAL GOWN, 3XL LONG: Brand: CONVERTORS

## (undated) DEVICE — SYRINGE MED 3ML NDL 22GA L1 1/2IN REG BVL SFGLDE

## (undated) DEVICE — YANKAUER,SMOOTH HANDLE,HIGH CAPACITY: Brand: MEDLINE INDUSTRIES, INC.

## (undated) DEVICE — ZIMMER® STERILE DISPOSABLE TOURNIQUET CUFF WITH PLC, DUAL PORT, SINGLE BLADDER, 34 IN. (86 CM)

## (undated) DEVICE — SOFT SILICONE HYDROCELLULAR SACRUM DRESSING WITH LOCK AWAY LAYER: Brand: ALLEVYN LIFE SACRUM (LARGE) PACK OF 10

## (undated) DEVICE — FORCEPS BX L240CM JAW DIA2.8MM L CAP W/ NDL MIC MESH TOOTH

## (undated) DEVICE — REM POLYHESIVE ADULT PATIENT RETURN ELECTRODE: Brand: VALLEYLAB

## (undated) DEVICE — NEEDLE HYPO 18GA L1.5IN PNK S STL HUB POLYPR SHLD REG BVL

## (undated) DEVICE — (D)SYR 10ML 1/5ML GRAD NSAF -- PKGING CHANGE USE ITEM 338027

## (undated) DEVICE — SYR 20ML LL STRL LF --

## (undated) DEVICE — BOWL AND CEMENT CARTRIDGE WITH BREAKAWAY FEMORAL NOZZLE: Brand: ACM

## (undated) DEVICE — 3M™ STERI-DRAPE™ INSTRUMENT POUCH 1018: Brand: STERI-DRAPE™

## (undated) DEVICE — SNARE ENDOSCP L240CM SHTH DIA2.4MM LOOP W20MM MIN WRK CHN

## (undated) DEVICE — LINER SUCT CANSTR 3000CC PLAS SFT PRE ASSEMB W/OUT TBNG W/

## (undated) DEVICE — HANDPIECE SET WITH HIGH FLOW TIP AND SUCTION TUBE: Brand: INTERPULSE

## (undated) DEVICE — SPIROMETER INCENT 2500ML W ONE W VLV

## (undated) DEVICE — PACK SURG BSHR TOT KNEE LF

## (undated) DEVICE — MEDI-VAC NON-CONDUCTIVE SUCTION TUBING: Brand: CARDINAL HEALTH

## (undated) DEVICE — BLANKET WRM AD W50XL85.8IN PACU FULL BODY FORC AIR

## (undated) DEVICE — GARMENT,MEDLINE,DVT,INT,CALF,MED, GEN2: Brand: MEDLINE

## (undated) DEVICE — GOWN,REINFORCED,POLY,AURORA,XXLARGE,STR: Brand: MEDLINE

## (undated) DEVICE — PREP SKN CHLRAPRP 26ML TNT -- CONVERT TO ITEM 373320

## (undated) DEVICE — Z DISCONTINUED BY MEDLINE USE 2711682 TRAY SKIN PREP DRY W/ PREM GLV

## (undated) DEVICE — Device

## (undated) DEVICE — SUTURE VCRL SZ 0 L36IN ABSRB UD L36MM CT-1 1/2 CIR J946H

## (undated) DEVICE — CATHETER SUCT TR FL TIP 14FR W/ O CTRL

## (undated) DEVICE — TRAP SPEC COLL POLYP POLYSTYR --

## (undated) DEVICE — ENDOSCOPY PUMP TUBING/ CAP SET: Brand: ERBE

## (undated) DEVICE — STOCKING COMPR M L16-18IN LNG 19MMHG ANK 8-9IN CALF 12-15IN

## (undated) DEVICE — (D)PACK ICE DISP -- DISC BY MFR

## (undated) DEVICE — SYR 10ML LUER LOK 1/5ML GRAD --

## (undated) DEVICE — SOLUTION IRRIG 3000ML 0.9% SOD CHL FLX CONT 0797208] ICU MEDICAL INC]

## (undated) DEVICE — FLUFF AND POLYMER UNDERPAD,EXTRA HEAVY: Brand: WINGS

## (undated) DEVICE — NEEDLE EPI 18GA L3.5IN CLR HUB TUOHY W/ WNG PERIFIX

## (undated) DEVICE — GAUZE,SPONGE,4"X4",16PLY,STRL,LF,10/TRAY: Brand: MEDLINE

## (undated) DEVICE — CANNULA NSL AD TBNG L14FT STD PVC O2 CRV CONN NONFLARED NSL

## (undated) DEVICE — BLADE RMFG DBL RECIP DBL SD --

## (undated) DEVICE — SNARE POLYP M W27MMXL240CM OVL STIFF DISP CAPTIVATOR

## (undated) DEVICE — (D)BNDG ADHESIVE FABRIC 3/4X3 -- DISC BY MFR USE ITEM 357960

## (undated) DEVICE — SOLUTION IRRIG 1000ML H2O STRL BLT

## (undated) DEVICE — STRYKER PERFORMANCE SERIES SAGITTAL BLADE: Brand: STRYKER PERFORMANCE SERIES

## (undated) DEVICE — SYR 50ML SLIP TIP NSAF LF STRL --

## (undated) DEVICE — INTENDED FOR TISSUE SEPARATION, AND OTHER PROCEDURES THAT REQUIRE A SHARP SURGICAL BLADE TO PUNCTURE OR CUT.: Brand: BARD-PARKER SAFETY BLADES SIZE 10, STERILE

## (undated) DEVICE — SUTURE VCRL SZ 2 L27IN ABSRB VLT L65MM TP-1 1/2 CIR J649G

## (undated) DEVICE — INTENDED FOR TISSUE SEPARATION, AND OTHER PROCEDURES THAT REQUIRE A SHARP SURGICAL BLADE TO PUNCTURE OR CUT.: Brand: BARD-PARKER ® STAINLESS STEEL BLADES

## (undated) DEVICE — SUTURE MCRYL SZ 2-0 L36IN ABSRB UD L36MM CT-1 1/2 CIR Y945H